# Patient Record
Sex: FEMALE | Race: WHITE | Employment: OTHER | URBAN - METROPOLITAN AREA
[De-identification: names, ages, dates, MRNs, and addresses within clinical notes are randomized per-mention and may not be internally consistent; named-entity substitution may affect disease eponyms.]

---

## 2017-01-15 ENCOUNTER — DOCUMENTATION ONLY (OUTPATIENT)
Dept: OTHER | Facility: CLINIC | Age: 82
End: 2017-01-15

## 2017-01-15 DIAGNOSIS — Z71.89 ADVANCE CARE PLANNING: Primary | Chronic | ICD-10-CM

## 2019-03-12 ENCOUNTER — HOSPITAL ENCOUNTER (INPATIENT)
Facility: CLINIC | Age: 84
LOS: 3 days | Discharge: NURSING, REHAB OR LONG-TERM CARE | DRG: 556 | End: 2019-03-16
Attending: EMERGENCY MEDICINE | Admitting: INTERNAL MEDICINE
Payer: COMMERCIAL

## 2019-03-12 ENCOUNTER — APPOINTMENT (OUTPATIENT)
Dept: GENERAL RADIOLOGY | Facility: CLINIC | Age: 84
DRG: 556 | End: 2019-03-12
Attending: EMERGENCY MEDICINE
Payer: COMMERCIAL

## 2019-03-12 ENCOUNTER — APPOINTMENT (OUTPATIENT)
Dept: GENERAL RADIOLOGY | Facility: CLINIC | Age: 84
DRG: 556 | End: 2019-03-12
Attending: INTERNAL MEDICINE
Payer: COMMERCIAL

## 2019-03-12 ENCOUNTER — APPOINTMENT (OUTPATIENT)
Dept: CT IMAGING | Facility: CLINIC | Age: 84
DRG: 556 | End: 2019-03-12
Attending: EMERGENCY MEDICINE
Payer: COMMERCIAL

## 2019-03-12 DIAGNOSIS — K59.00 CONSTIPATION, UNSPECIFIED CONSTIPATION TYPE: Primary | ICD-10-CM

## 2019-03-12 DIAGNOSIS — M25.562 ACUTE PAIN OF LEFT KNEE: ICD-10-CM
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
## 2019-03-12 DIAGNOSIS — R33.9 URINARY RETENTION: ICD-10-CM

## 2019-03-12 PROBLEM — M79.605 LEFT LEG PAIN: Status: ACTIVE | Noted: 2019-03-12

## 2019-03-12 LAB
ANION GAP SERPL CALCULATED.3IONS-SCNC: 9 MMOL/L (ref 3–14)
BASOPHILS # BLD AUTO: 0 10E9/L (ref 0–0.2)
BASOPHILS NFR BLD AUTO: 0.4 %
BUN SERPL-MCNC: 31 MG/DL (ref 7–30)
CALCIUM SERPL-MCNC: 10 MG/DL (ref 8.5–10.1)
CHLORIDE SERPL-SCNC: 102 MMOL/L (ref 94–109)
CO2 SERPL-SCNC: 29 MMOL/L (ref 20–32)
CREAT SERPL-MCNC: 1.18 MG/DL (ref 0.52–1.04)
DIFFERENTIAL METHOD BLD: NORMAL
EOSINOPHIL # BLD AUTO: 0 10E9/L (ref 0–0.7)
EOSINOPHIL NFR BLD AUTO: 0.1 %
ERYTHROCYTE [DISTWIDTH] IN BLOOD BY AUTOMATED COUNT: 12.5 % (ref 10–15)
GFR SERPL CREATININE-BSD FRML MDRD: 43 ML/MIN/{1.73_M2}
GLUCOSE SERPL-MCNC: 134 MG/DL (ref 70–99)
HCT VFR BLD AUTO: 45.1 % (ref 35–47)
HGB BLD-MCNC: 14.9 G/DL (ref 11.7–15.7)
IMM GRANULOCYTES # BLD: 0 10E9/L (ref 0–0.4)
IMM GRANULOCYTES NFR BLD: 0.4 %
INR PPP: 2.19 (ref 0.86–1.14)
LYMPHOCYTES # BLD AUTO: 1 10E9/L (ref 0.8–5.3)
LYMPHOCYTES NFR BLD AUTO: 10.1 %
MCH RBC QN AUTO: 31 PG (ref 26.5–33)
MCHC RBC AUTO-ENTMCNC: 33 G/DL (ref 31.5–36.5)
MCV RBC AUTO: 94 FL (ref 78–100)
MONOCYTES # BLD AUTO: 0.6 10E9/L (ref 0–1.3)
MONOCYTES NFR BLD AUTO: 5.9 %
NEUTROPHILS # BLD AUTO: 8.3 10E9/L (ref 1.6–8.3)
NEUTROPHILS NFR BLD AUTO: 83.1 %
NRBC # BLD AUTO: 0 10*3/UL
NRBC BLD AUTO-RTO: 0 /100
PLATELET # BLD AUTO: 187 10E9/L (ref 150–450)
POTASSIUM SERPL-SCNC: 3.9 MMOL/L (ref 3.4–5.3)
RBC # BLD AUTO: 4.8 10E12/L (ref 3.8–5.2)
SODIUM SERPL-SCNC: 140 MMOL/L (ref 133–144)
TROPONIN I SERPL-MCNC: <0.015 UG/L (ref 0–0.04)
WBC # BLD AUTO: 10 10E9/L (ref 4–11)

## 2019-03-12 PROCEDURE — 73590 X-RAY EXAM OF LOWER LEG: CPT | Mod: LT

## 2019-03-12 PROCEDURE — 73502 X-RAY EXAM HIP UNI 2-3 VIEWS: CPT

## 2019-03-12 PROCEDURE — 85610 PROTHROMBIN TIME: CPT | Performed by: EMERGENCY MEDICINE

## 2019-03-12 PROCEDURE — 93005 ELECTROCARDIOGRAM TRACING: CPT

## 2019-03-12 PROCEDURE — 99219 ZZC INITIAL OBSERVATION CARE,LEVL II: CPT | Performed by: INTERNAL MEDICINE

## 2019-03-12 PROCEDURE — G0378 HOSPITAL OBSERVATION PER HR: HCPCS

## 2019-03-12 PROCEDURE — 71045 X-RAY EXAM CHEST 1 VIEW: CPT

## 2019-03-12 PROCEDURE — 25800030 ZZH RX IP 258 OP 636: Performed by: INTERNAL MEDICINE

## 2019-03-12 PROCEDURE — 99207 ZZC CDG-MDM COMPONENT: MEETS LOW - DOWN CODED: CPT | Performed by: INTERNAL MEDICINE

## 2019-03-12 PROCEDURE — 25000132 ZZH RX MED GY IP 250 OP 250 PS 637: Performed by: INTERNAL MEDICINE

## 2019-03-12 PROCEDURE — 70450 CT HEAD/BRAIN W/O DYE: CPT

## 2019-03-12 PROCEDURE — 80048 BASIC METABOLIC PNL TOTAL CA: CPT | Performed by: EMERGENCY MEDICINE

## 2019-03-12 PROCEDURE — 73552 X-RAY EXAM OF FEMUR 2/>: CPT | Mod: LT

## 2019-03-12 PROCEDURE — 99285 EMERGENCY DEPT VISIT HI MDM: CPT | Mod: 25

## 2019-03-12 PROCEDURE — 73560 X-RAY EXAM OF KNEE 1 OR 2: CPT | Mod: LT

## 2019-03-12 PROCEDURE — 25000128 H RX IP 250 OP 636: Performed by: EMERGENCY MEDICINE

## 2019-03-12 PROCEDURE — 84484 ASSAY OF TROPONIN QUANT: CPT | Performed by: EMERGENCY MEDICINE

## 2019-03-12 PROCEDURE — 96361 HYDRATE IV INFUSION ADD-ON: CPT

## 2019-03-12 PROCEDURE — 85025 COMPLETE CBC W/AUTO DIFF WBC: CPT | Performed by: EMERGENCY MEDICINE

## 2019-03-12 PROCEDURE — 96374 THER/PROPH/DIAG INJ IV PUSH: CPT

## 2019-03-12 RX ORDER — BISACODYL 10 MG
10 SUPPOSITORY, RECTAL RECTAL DAILY PRN
Status: DISCONTINUED | OUTPATIENT
Start: 2019-03-12 | End: 2019-03-16 | Stop reason: HOSPADM

## 2019-03-12 RX ORDER — PROCHLORPERAZINE 25 MG
12.5 SUPPOSITORY, RECTAL RECTAL EVERY 12 HOURS PRN
Status: DISCONTINUED | OUTPATIENT
Start: 2019-03-12 | End: 2019-03-16 | Stop reason: HOSPADM

## 2019-03-12 RX ORDER — AMOXICILLIN 250 MG
1 CAPSULE ORAL 2 TIMES DAILY PRN
Status: DISCONTINUED | OUTPATIENT
Start: 2019-03-12 | End: 2019-03-16 | Stop reason: HOSPADM

## 2019-03-12 RX ORDER — NALOXONE HYDROCHLORIDE 0.4 MG/ML
.1-.4 INJECTION, SOLUTION INTRAMUSCULAR; INTRAVENOUS; SUBCUTANEOUS
Status: DISCONTINUED | OUTPATIENT
Start: 2019-03-12 | End: 2019-03-16 | Stop reason: HOSPADM

## 2019-03-12 RX ORDER — SODIUM CHLORIDE 9 MG/ML
INJECTION, SOLUTION INTRAVENOUS CONTINUOUS
Status: DISCONTINUED | OUTPATIENT
Start: 2019-03-12 | End: 2019-03-13

## 2019-03-12 RX ORDER — LIDOCAINE 40 MG/G
CREAM TOPICAL
Status: DISCONTINUED | OUTPATIENT
Start: 2019-03-12 | End: 2019-03-16 | Stop reason: HOSPADM

## 2019-03-12 RX ORDER — NITROGLYCERIN 0.4 MG/1
0.4 TABLET SUBLINGUAL EVERY 5 MIN PRN
Status: DISCONTINUED | OUTPATIENT
Start: 2019-03-12 | End: 2019-03-16 | Stop reason: HOSPADM

## 2019-03-12 RX ORDER — ACETAMINOPHEN 325 MG/1
650 TABLET ORAL EVERY 4 HOURS PRN
Status: DISCONTINUED | OUTPATIENT
Start: 2019-03-12 | End: 2019-03-13

## 2019-03-12 RX ORDER — ONDANSETRON 4 MG/1
4 TABLET, ORALLY DISINTEGRATING ORAL EVERY 6 HOURS PRN
Status: DISCONTINUED | OUTPATIENT
Start: 2019-03-12 | End: 2019-03-16 | Stop reason: HOSPADM

## 2019-03-12 RX ORDER — PROCHLORPERAZINE MALEATE 5 MG
5 TABLET ORAL EVERY 6 HOURS PRN
Status: DISCONTINUED | OUTPATIENT
Start: 2019-03-12 | End: 2019-03-16 | Stop reason: HOSPADM

## 2019-03-12 RX ORDER — POLYETHYLENE GLYCOL 3350 17 G/17G
17 POWDER, FOR SOLUTION ORAL DAILY PRN
Status: DISCONTINUED | OUTPATIENT
Start: 2019-03-12 | End: 2019-03-16 | Stop reason: HOSPADM

## 2019-03-12 RX ORDER — MORPHINE SULFATE 4 MG/ML
4 INJECTION, SOLUTION INTRAMUSCULAR; INTRAVENOUS ONCE
Status: COMPLETED | OUTPATIENT
Start: 2019-03-12 | End: 2019-03-12

## 2019-03-12 RX ORDER — ONDANSETRON 2 MG/ML
4 INJECTION INTRAMUSCULAR; INTRAVENOUS EVERY 6 HOURS PRN
Status: DISCONTINUED | OUTPATIENT
Start: 2019-03-12 | End: 2019-03-16 | Stop reason: HOSPADM

## 2019-03-12 RX ORDER — ACETAMINOPHEN 650 MG/1
650 SUPPOSITORY RECTAL EVERY 4 HOURS PRN
Status: DISCONTINUED | OUTPATIENT
Start: 2019-03-12 | End: 2019-03-13

## 2019-03-12 RX ORDER — AMOXICILLIN 250 MG
2 CAPSULE ORAL 2 TIMES DAILY PRN
Status: DISCONTINUED | OUTPATIENT
Start: 2019-03-12 | End: 2019-03-16 | Stop reason: HOSPADM

## 2019-03-12 RX ADMIN — OXYCODONE HYDROCHLORIDE 2.5 MG: 5 TABLET ORAL at 22:46

## 2019-03-12 RX ADMIN — MORPHINE SULFATE 4 MG: 4 INJECTION INTRAVENOUS at 20:19

## 2019-03-12 RX ADMIN — SODIUM CHLORIDE: 9 INJECTION, SOLUTION INTRAVENOUS at 22:47

## 2019-03-12 ASSESSMENT — MIFFLIN-ST. JEOR: SCORE: 1075.96

## 2019-03-12 NOTE — ED PROVIDER NOTES
History     Chief Complaint:  Fall      HPI   Liberty Horta is a 84 year old female with a history of atrial fibrillation on warfarin who presents via EMS from her independent living facility after a fall. The patient had been complaining of right thigh pain for the last few days. Today, the patient states that she got up to get a drink of water before going for a walk when she became generally weak and fell. The patient states that she did not hit her head. Since the fall, the patient has had posterior left knee pain. She has never had left knee pain previously. EMS did provide the patient with 100 mcg of Fentanyl. Of note, influenza has been going around her living facility, but pt denies cough and fever.       Allergies:  Darvocet  Demerol     Medications:    Tylenol   Albuterol   Atorvastatin calcium   Dulcolax  Diltiazem  Lasix   Glucosamine Sulfate   Levothyroxine Sodium   Claritin   Meprobamate   Psyllium   Senokot  Ultram   Sotalol   Warfarin Sodium      Past Medical History:    Basal cell carcinoma   Chronic atrial fibrillation   DVT  Esophageal reflux   Hypertension   Hypothyroid   Osteoarthritis   Pacemaker   Rental insufficiency     Past Surgical History:    Implant pacemaker   Open reduction internal fixation right femur   Left knee replacement     Family History:    History reviewed. No pertinent family history.     Social History:  Marital Status:   Presents to the ED via EMS with her family  Tobacco Use: Never Used  Alcohol Use: No  PCP: JENNIFER ROJAS      Review of Systems   Constitutional: Negative for fever.   Respiratory: Negative for cough and shortness of breath.    Cardiovascular: Negative for chest pain.   Genitourinary: Negative for dysuria.   Musculoskeletal:        Positive for left knee pain.    Neurological: Positive for weakness. Negative for numbness.   All other systems reviewed and are negative.      Physical Exam   First Vitals:  BP: 150/90  Pulse: 60  Heart  Rate: 66  Temp: 97.7  F (36.5  C)  Resp: 18  SpO2: 100 %      Physical Exam    Nursing note and vitals reviewed.  Constitutional: Cooperative. Sleepy 2/2 narcotic use by EMS.  HENT:   Mouth/Throat: Moist mucous membranes.   Eyes: EOMI, nonicteric sclera  Cardiovascular: Normal rate, irregular rhythm, no murmurs, rubs, or gallops  Pulmonary/Chest: Effort normal and breath sounds normal. No respiratory distress. No wheezes. No rales.   Abdominal: Soft. Nontender, nondistended, no guarding or rigidity. BS present.   Musculoskeletal: RLE. Normal ROM hip, knee, ankle without pain to palpation or with ROM.   LLE. Pain to palpation distal femur, knee, and proximal tib/fib. No pain at pelvis/ankle. Pain with minimal ROM.   Neurological: Alert. Moves all extremities spontaneously. Normal/equal sensation BLE. Able to wiggle all toes and DF/PF feet.   Skin: Skin is warm and dry. No rash noted.   Psychiatric: Normal mood and affect.      Emergency Department Course   ECG:  @ 1758  Indication: Fall  Vent. Rate 67 bpm. IA interval 172 ms. QRS duration 92 ms. QT/QTc 430/454 ms. P-R-T axis 13 14 -34.   Normal sinus rhythm. Minimal voltage criteria for LVH, may be normal variant. Nonspecific ST changes.  Abnormal ECG.  No significant change when compared to previous ECG from 11/19/16   Read @ 1825 by Dr. Lopez.     Imaging:  Head CT without contrast:   No bleed or fractures are identified.  Report per radiology.   Imaging independently reviewed and agree with radiologist interpretation.      Knee x-ray, left 1/2 views:   No acute fracture is seen. Left total knee arthroplasty  appears unremarkable.  Report per radiology.   Imaging independently reviewed and agree with radiologist interpretation.      Pelvis and Hip Left  X-rays 2 views:   No acute fracture is seen. Both hips appear located. Pubic  symphysis degenerative change suggested.  Report per radiology.   Imaging independently reviewed and agree with radiologist  interpretation.      Left femur x-ray, 2 views:   No fractures are identified on these images.  Report per radiology.   Imaging independently reviewed and agree with radiologist interpretation.      Tibia and fibula, left x-ray, 2 views:   No fractures are identified on these views.  Report per radiology.   Imaging independently reviewed and agree with radiologist interpretation.    Radiographic findings were communicated with the patient who voiced understanding of the findings.    Laboratory:  (1817) Troponin I: <0.015  BMP: Glucose 134 (H), BUN 31 (H), Creatinine 1.18 (H), GFR 43 (L), otherwise WNL    CBC:  WBC 10.0, HGB 14.9, , otherwise WNL     Interventions:  (2019) Morphine, 4 mg, IV    Emergency Department Course:  Nursing notes and vitals reviewed.  (1832) I performed an exam of the patient as documented above.    EKG was done, interpretation as above.   A peripheral IV was established. Blood was drawn from the patient. This was sent for laboratory testing, findings above.     The patient was sent for a x-rays while in the emergency department, findings above.    Findings and plan explained to the patient who consents to admission.   (2123) I discussed the patient with Dr. Haji of the hospitalist service, who will admit the patient to an observation bed for further monitoring, evaluation, and treatment.     Impression & Plan    Medical Decision Making:  Pt presents following fall at home. Pt was walking with walker at the time and felt weak. Given anticoagulation, Head CT indicated which is fortunately negative. Pt's RLE exam is normal, however after her fall, she's had significant LLE pain requiring 100mcg fentanyl by EMS and further pain control here. Uncertain etiology of this pain as pt has significant pain with minor ROM but imaging negaitve. She neurovascularly intact distally making vascular injury less likely. Doubt dislocation/relocation. Given pt is unable to walk, she is unsafe for  discharge home therefore admission requested. Dr. Haji accepts for admission. All familiy's questions answered. UA is pending at time of admission.       Diagnosis:    ICD-10-CM    1. Fall, initial encounter W19.XXXA    2. Acute pain of left knee M25.562        Disposition:  Admitted to Dr. Adithya PECK, Quynh Law, am serving as a scribe on 3/12/2019 at 6:32 PM to personally document services performed by Dr. Lopez based on my observations and the provider's statements to me.    3/12/2019   [facility] EMERGENCY DEPARTMENT       Vinay Lopez MD  03/13/19 0244

## 2019-03-12 NOTE — ED TRIAGE NOTES
Pt arrives via EMS from independent living d/t fall. Pt states she felt dizzy so she lowered herself to floor. Pt too weak to get back up. Pt yelling for help while on floor for approx 15-20 mins. No LOC. C/o L knee pain. CMS intact. No CP or SOB. Pt on warfarin for afib. Given 100 mcg fentanyl by EMS. . ABC intact. A&O x4.

## 2019-03-12 NOTE — ED NOTES
Bed: ED33  Expected date: 3/12/19  Expected time: 5:32 PM  Means of arrival: Ambulance  Comments:  BV4

## 2019-03-13 ENCOUNTER — APPOINTMENT (OUTPATIENT)
Dept: PHYSICAL THERAPY | Facility: CLINIC | Age: 84
DRG: 556 | End: 2019-03-13
Attending: INTERNAL MEDICINE
Payer: COMMERCIAL

## 2019-03-13 ENCOUNTER — APPOINTMENT (OUTPATIENT)
Dept: CT IMAGING | Facility: CLINIC | Age: 84
DRG: 556 | End: 2019-03-13
Attending: PHYSICIAN ASSISTANT
Payer: COMMERCIAL

## 2019-03-13 PROBLEM — S83.105A ACUTE TRAUMATIC INTERNAL DERANGEMENT OF LEFT KNEE, INITIAL ENCOUNTER: Status: ACTIVE | Noted: 2019-03-13

## 2019-03-13 LAB
ANION GAP SERPL CALCULATED.3IONS-SCNC: 7 MMOL/L (ref 3–14)
BUN SERPL-MCNC: 26 MG/DL (ref 7–30)
CALCIUM SERPL-MCNC: 9.3 MG/DL (ref 8.5–10.1)
CHLORIDE SERPL-SCNC: 108 MMOL/L (ref 94–109)
CO2 SERPL-SCNC: 26 MMOL/L (ref 20–32)
CREAT SERPL-MCNC: 0.93 MG/DL (ref 0.52–1.04)
FLUAV+FLUBV AG SPEC QL: NEGATIVE
FLUAV+FLUBV AG SPEC QL: NEGATIVE
GFR SERPL CREATININE-BSD FRML MDRD: 57 ML/MIN/{1.73_M2}
GLUCOSE SERPL-MCNC: 103 MG/DL (ref 70–99)
INR PPP: 2.2 (ref 0.86–1.14)
INTERPRETATION ECG - MUSE: NORMAL
POTASSIUM SERPL-SCNC: 3.5 MMOL/L (ref 3.4–5.3)
SODIUM SERPL-SCNC: 141 MMOL/L (ref 133–144)
SPECIMEN SOURCE: NORMAL

## 2019-03-13 PROCEDURE — 93010 ELECTROCARDIOGRAM REPORT: CPT | Performed by: INTERNAL MEDICINE

## 2019-03-13 PROCEDURE — 96361 HYDRATE IV INFUSION ADD-ON: CPT

## 2019-03-13 PROCEDURE — 97530 THERAPEUTIC ACTIVITIES: CPT | Mod: GP

## 2019-03-13 PROCEDURE — 40000275 ZZH STATISTIC RCP TIME EA 10 MIN

## 2019-03-13 PROCEDURE — 25800030 ZZH RX IP 258 OP 636: Performed by: PHYSICIAN ASSISTANT

## 2019-03-13 PROCEDURE — 25000132 ZZH RX MED GY IP 250 OP 250 PS 637: Performed by: PHYSICIAN ASSISTANT

## 2019-03-13 PROCEDURE — G0378 HOSPITAL OBSERVATION PER HR: HCPCS

## 2019-03-13 PROCEDURE — 73700 CT LOWER EXTREMITY W/O DYE: CPT | Mod: LT

## 2019-03-13 PROCEDURE — 25800030 ZZH RX IP 258 OP 636: Performed by: INTERNAL MEDICINE

## 2019-03-13 PROCEDURE — 80048 BASIC METABOLIC PNL TOTAL CA: CPT | Performed by: INTERNAL MEDICINE

## 2019-03-13 PROCEDURE — 93005 ELECTROCARDIOGRAM TRACING: CPT

## 2019-03-13 PROCEDURE — 87804 INFLUENZA ASSAY W/OPTIC: CPT | Performed by: INTERNAL MEDICINE

## 2019-03-13 PROCEDURE — 85610 PROTHROMBIN TIME: CPT | Performed by: INTERNAL MEDICINE

## 2019-03-13 PROCEDURE — 12000011 ZZH R&B MS OVERFLOW

## 2019-03-13 PROCEDURE — 97161 PT EVAL LOW COMPLEX 20 MIN: CPT | Mod: GP

## 2019-03-13 PROCEDURE — 36415 COLL VENOUS BLD VENIPUNCTURE: CPT | Performed by: INTERNAL MEDICINE

## 2019-03-13 PROCEDURE — 99207 ZZC CDG-MDM COMPONENT: MEETS LOW - DOWN CODED: CPT | Performed by: PHYSICIAN ASSISTANT

## 2019-03-13 PROCEDURE — 99232 SBSQ HOSP IP/OBS MODERATE 35: CPT | Performed by: PHYSICIAN ASSISTANT

## 2019-03-13 PROCEDURE — 25000132 ZZH RX MED GY IP 250 OP 250 PS 637: Performed by: INTERNAL MEDICINE

## 2019-03-13 PROCEDURE — 72192 CT PELVIS W/O DYE: CPT

## 2019-03-13 RX ORDER — WARFARIN SODIUM 2.5 MG/1
2.5 TABLET ORAL DAILY
COMMUNITY
End: 2021-01-01

## 2019-03-13 RX ORDER — WARFARIN SODIUM 3 MG/1
6 TABLET ORAL
Status: COMPLETED | OUTPATIENT
Start: 2019-03-13 | End: 2019-03-13

## 2019-03-13 RX ORDER — FUROSEMIDE 40 MG
40 TABLET ORAL DAILY
COMMUNITY
Start: 2020-02-21 | End: 2020-02-11

## 2019-03-13 RX ORDER — ACETAMINOPHEN 325 MG/1
650 TABLET ORAL EVERY 6 HOURS PRN
Status: ON HOLD | COMMUNITY
End: 2019-03-16

## 2019-03-13 RX ORDER — ACETAMINOPHEN 500 MG
1000 TABLET ORAL EVERY 8 HOURS
Status: DISCONTINUED | OUTPATIENT
Start: 2019-03-13 | End: 2019-03-16 | Stop reason: HOSPADM

## 2019-03-13 RX ORDER — DILTIAZEM HYDROCHLORIDE 180 MG/1
180 CAPSULE, COATED, EXTENDED RELEASE ORAL DAILY
Status: DISCONTINUED | OUTPATIENT
Start: 2019-03-13 | End: 2019-03-16 | Stop reason: HOSPADM

## 2019-03-13 RX ORDER — TAMSULOSIN HYDROCHLORIDE 0.4 MG/1
0.4 CAPSULE ORAL DAILY
Status: DISCONTINUED | OUTPATIENT
Start: 2019-03-13 | End: 2019-03-16 | Stop reason: HOSPADM

## 2019-03-13 RX ORDER — LEVOTHYROXINE SODIUM 150 UG/1
150 TABLET ORAL DAILY
Status: DISCONTINUED | OUTPATIENT
Start: 2019-03-13 | End: 2019-03-16 | Stop reason: HOSPADM

## 2019-03-13 RX ORDER — SODIUM CHLORIDE 9 MG/ML
INJECTION, SOLUTION INTRAVENOUS CONTINUOUS
Status: ACTIVE | OUTPATIENT
Start: 2019-03-13 | End: 2019-03-14

## 2019-03-13 RX ORDER — FUROSEMIDE 40 MG
40 TABLET ORAL DAILY
Status: DISCONTINUED | OUTPATIENT
Start: 2019-03-13 | End: 2019-03-16 | Stop reason: HOSPADM

## 2019-03-13 RX ORDER — SOTALOL HYDROCHLORIDE 80 MG/1
80 TABLET ORAL 2 TIMES DAILY
Status: DISCONTINUED | OUTPATIENT
Start: 2019-03-13 | End: 2019-03-16 | Stop reason: HOSPADM

## 2019-03-13 RX ADMIN — SODIUM CHLORIDE: 9 INJECTION, SOLUTION INTRAVENOUS at 20:52

## 2019-03-13 RX ADMIN — DILTIAZEM HYDROCHLORIDE 180 MG: 180 CAPSULE, COATED, EXTENDED RELEASE ORAL at 18:54

## 2019-03-13 RX ADMIN — ACETAMINOPHEN 650 MG: 325 TABLET, FILM COATED ORAL at 12:39

## 2019-03-13 RX ADMIN — SODIUM CHLORIDE: 9 INJECTION, SOLUTION INTRAVENOUS at 09:13

## 2019-03-13 RX ADMIN — TAMSULOSIN HYDROCHLORIDE 0.4 MG: 0.4 CAPSULE ORAL at 14:57

## 2019-03-13 RX ADMIN — SODIUM CHLORIDE: 9 INJECTION, SOLUTION INTRAVENOUS at 14:57

## 2019-03-13 RX ADMIN — SOTALOL HYDROCHLORIDE 80 MG: 80 TABLET ORAL at 18:53

## 2019-03-13 RX ADMIN — LEVOTHYROXINE SODIUM 150 MCG: 150 TABLET ORAL at 18:55

## 2019-03-13 RX ADMIN — WARFARIN SODIUM 6 MG: 3 TABLET ORAL at 18:55

## 2019-03-13 RX ADMIN — FUROSEMIDE 40 MG: 40 TABLET ORAL at 18:54

## 2019-03-13 RX ADMIN — ACETAMINOPHEN 1000 MG: 500 TABLET, FILM COATED ORAL at 19:41

## 2019-03-13 ASSESSMENT — ACTIVITIES OF DAILY LIVING (ADL)
COGNITION: 0 - NO COGNITION ISSUES REPORTED
SWALLOWING: 0-->SWALLOWS FOODS/LIQUIDS WITHOUT DIFFICULTY
RETIRED_COMMUNICATION: 0-->UNDERSTANDS/COMMUNICATES WITHOUT DIFFICULTY
BATHING: 2-->ASSISTIVE PERSON
TRANSFERRING: 4-->COMPLETELY DEPENDENT
AMBULATION: 4-->COMPLETELY DEPENDENT
NUMBER_OF_TIMES_PATIENT_HAS_FALLEN_WITHIN_LAST_SIX_MONTHS: 1
RETIRED_EATING: 0-->INDEPENDENT
TOILETING: 4-->COMPLETELY DEPENDENT
DRESS: 2-->ASSISTIVE PERSON
FALL_HISTORY_WITHIN_LAST_SIX_MONTHS: YES

## 2019-03-13 ASSESSMENT — ENCOUNTER SYMPTOMS
COUGH: 0
DYSURIA: 0
NUMBNESS: 0
WEAKNESS: 1
SHORTNESS OF BREATH: 0
FEVER: 0

## 2019-03-13 NOTE — PROGRESS NOTES
"[facility]    Medicine Progress Note - Hospitalist Service       Date of Admission:  3/12/2019  Assessment & Plan   Liberty Horta is a 84 year old female with a PMH significant for hypothyroidism, chronic atrial fibrillation on coumadin, history of stress cardiomyopathy (EF 35% in 2010 w/ normal heart cath at that time, EF improved to 50-55% on echo 10/2016), history of ?SSS after anesthesia, GERD, hypertension, and osteoarthritis who presented to the hospital on 3/12/19 with  left knee pain secondary to an episode earlier that day when she quickly lowered herself to the floor when she felt lightheaded standing up to walk across the room. She felt like her legs were in an awkward position when she lowered herself down, stating that her left knee felt like it \"bent backwards,\" but she denies a true fall or striking her head or losing consciousness. She continues to have exquisite knee tenderness with passive or active ROM and her hospital stay has been complicated by urinary retention with difficult catheter placement, so now also has a Lieberman in place.    1.  Left lower extremity pain, primarily left knee  No fractures on extensive left lower extremity XR imaging.  She has a prior left TKA. She was able to sit up and dangle her legs over the side of the bed the morning after admission but was unable to bear any weight on the LLE due to pain. She had a difficult time characterizing the primary spot of pain, but on exam she has the most difficult time with any passive or active ROM of her left knee. Her left leg rests in the valgus position in bed, which is not normal for her. She denies much pain at rest but has obvious and activity-limiting pain with even just passively lifting her lower extremity off the bed. Pain management has been difficult for her in the past as she is very sensitive to sedative effect of narcotics. Given her exquisite pain on exam, believe that further imaging is necessary " to sufficiently evaluate for missed fracture or other acute abnormality limiting her mobility. Will also request orthopedic recommendations regarding further work up or possible treatment.  -CT of left knee, hips and pelvis (cannot perform MRI d/t implanted pacemaker)  -Schedule tylenol 1000 mg Q8H, will replace prn oxycodone with prn ultram as she has not tolerated oxycodone well in the past  -Orthopedic consult given her inability to tolerate bearing weight on LLE due to left knee pain     2.  Generalized weakness with episode of lightheadedness  Patient's independent living has been on room quarantine precautions for influenza. The patient typically ambulates multiple times daily in the hallways at her facility, but has unable to due this because of the quarantine; instead she has been very sedentary, essentially sitting in her chair most of the day for the past 5 days prior to admission. Per patient's son, she historically deconditions very easily if she does not remain active and ambulatory. He visited her earlier in the day prior to her admission and she seemed very weak and fatigued. She denies fever, chills, myalgias, cough, wheezing, SOB, nausea, vomiting, abdominal pain, or diarrhea. Infectious work up on admission including influenza testing, CXR and UA were unremarkable. Afebrile and hemodynamically stable during admission thus far, so less likely that infection is contributing to current condition. She reports that her lightheadedness has resolved. She has required frequent encouraging from family and staff to keep up with PO intake so will continue IV hydration tonight.  -Continue IVF  ml/hr x 15 hours overnight as she appears mildly dehydrated (EF on echo 10/2016 was 50-55%)  -Obtain orthostatics tomorrow AM (was unable to obtain today due to fatigue and inability to bear weight on LLE d/t pain)  -Interrogate pacer given episode of lightheadedness/dizziness resulting in injury  -PT evaluated  patient today and recommended TCU     3.  Acute urinary retention  Bladder scanned for 680 ml today, straight cath attempted multiple times with difficulty. Lieberman was placed and will remain for now given repetitive cath attempts. She appears very worn out after Lieberman catheter placement today as it was quite difficult. She has historically had Lieberman catheters placed in the setting of post-op urinary retention.  -Start PO flomax 0.4 mg daily  -Reassess timing of Lieberman removal tomorrow AM     4.  Chronic atrial fibrillation w/ pacer in place for hx of AVB w/ cardiac pauses  Rate has been controlled here.  -Interrogate pacer for further work up of #2  -Continue warfarin with pharmacy to dose  -Continue PTA diltiazem and sotalol  -Continue to monitor on telemetry.     5.  Hypothyroidism  -Continue PTA levothyroxine.     6.  Hyperlipidemia  -Continue PTA atorvastatin.    Diet: Regular Diet Adult    DVT Prophylaxis: Warfarin  Lieberman Catheter: in place, indication: Retention  Code Status: Full Code      Disposition Plan   Expected discharge: 2 - 3 days, recommended to transitional care unit once adequate pain management/ tolerating PO medications, safe disposition plan/ TCU bed available and Orthopedic consultation complete.  Entered: Marjorie Goodwin PA-C 03/13/2019, 6:42 PM       The patient's care was discussed with the Patient and Patient's Family.    Marjorie Goodwin PA-C  Hospitalist Service  [facility]    ______________________________________________________________________    Interval History   Ms. Horta reports feeling okay when left lower extremity is still, but has significant pain with passive range of motion and unable to left left leg off of bed herself due to left knee pain. She is very fatigued today following catheter placement. She denies fever, chills, myalgias, cough, sob, wheezing, nausea, vomiting, abdominal pain, or diarrhea. She is a retired nurse and is very pleasant with a sweet,  supportive family.    Data reviewed today: I reviewed all medications, new labs and imaging results over the last 24 hours. I personally reviewed  Results for orders placed or performed during the hospital encounter of 03/12/19 (from the past 24 hour(s))   XR Knee Left 1/2 Views    Narrative    KNEE LEFT ONE TO TWO VIEWS    3/12/2019 8:18 PM     HISTORY: Fall, left knee pain    COMPARISON: None.      Impression    IMPRESSION: No acute fracture is seen. Left total knee arthroplasty  appears unremarkable.    SHYANN PETERSEN MD   XR Pelvis and Hip Left 2 Views    Narrative    PELVIS AND HIP LEFT TWO VIEWS   3/12/2019 8:18 PM     HISTORY: Fall, left hip pain    COMPARISON: 10/4/2016.      Impression    IMPRESSION: No acute fracture is seen. Both hips appear located. Pubic  symphysis degenerative change suggested.    SHYANN PETERSEN MD   XR Femur Left 2 Views    Narrative    FEMUR TWO VIEWS LEFT  3/12/2019 9:48 PM     HISTORY: fall, left leg pain    COMPARISON: None.    FINDINGS: There is normal osseous alignment.  No fractures are  identified.      Impression    IMPRESSION: No fractures are identified on these images.    NIKKI MONET MD   XR Tibia & Fibula Left 2 Views    Narrative    TIBIA FIBULA LEFT  TWO-THREE  VIEWS 3/12/2019 9:48 PM     HISTORY: fall, left proximal leg pain    COMPARISON: None.    FINDINGS: There is normal osseous alignment.  No fractures are  identified. Diffuse osteopenia. A total knee arthroplasty is in place.      Impression    IMPRESSION: No fractures are identified on these views.    NIKKI MONET MD   XR Chest Port 1 View    Narrative    XR CHEST PORT 1 VW  3/12/2019 11:35 PM     HISTORY: Weakness.    COMPARISON: 11/17/2010.    FINDINGS: Semiupright portable chest. Left subclavian cardiac device  in place. No pneumothorax. The heart is at the upper limits of normal  in size. There is no pulmonary edema. The thoracic aorta is calcified.  The lungs are clear. Old deformity of the right humeral  head.  Degenerative disease in the thoracic spine.      Impression    IMPRESSION: No acute abnormality.    JODEE CARROLL MD   Influenza A/B antigen   Result Value Ref Range    Influenza A/B Agn Specimen Nasopharyngeal     Influenza A Negative NEG^Negative    Influenza B Negative NEG^Negative   Social Work IP Consult    Narrative    Jeni Loera BSW     3/13/2019  3:12 PM  Care Transition Initial Assessment - SW     Met with: Patient, son Michele  Active Problems:    Left leg pain       DATA  Lives With: facility resident   Living Arrangements: independent living facility. [facility]  Quality of Family Relationships: helpful, involved, supportive  Description of Support System: Supportive, Involved  Who is your support system?: Children  Support Assessment: Adequate family and caregiver support,   Adequate social supports.   Identified issues/concerns regarding health management: Pt   resides at the [facility]. Pt has paid staffing from outside   agencies [facility] and [facility] to assist with   dressing/grooming daily, and bathing 2 days/week. Pt receives   meals, housekeeping, and a staff pendant from the facility. PT   evaluated and recommended TCU. Facility preference is [facility], who   has a contract with [facility]. Private room preferred. SW discussed   private pay if [employer] doesn't provide auth, $8592-8125 upfront   with daily cost of $300-400. Son reported that this would be an   option if needed, understanding that pt cannot return to [facility] in   her current physical condition. Referral send to [facility] for   review--facility anticipates a bed available for pt. Son   requesting HE WC transport at discharge. Reviewed out of pocket   cost for [facility] transport, $75 for base rate and $5 per   mile to the destination. Pt/family expressed understanding and   are agreeable to this.           Quality of Family Relationships: helpful, involved, supportive  Transportation Anticipated: family or friend will  provide    ASSESSMENT  Cognitive Status: Sleepy. Pt was sleeping for the remainder of   the assessment.   Concerns to be addressed: Discharge planning. TCU referral   sent--[employer] insurance requiring prior auth     PLAN  Financial costs for the patient includes: $45 private room fee at   TCU, WC transport.  Patient given options and choices for discharge Yes.  Patient/family is agreeable to the plan?  Yes.  [facility]  Patient anticipates discharging to:  TCU-TBD. Referral sent to   [facility] for review. SW to continue following for discharge   planning. HE WC transport at discharge.          Basic metabolic panel   Result Value Ref Range    Sodium 141 133 - 144 mmol/L    Potassium 3.5 3.4 - 5.3 mmol/L    Chloride 108 94 - 109 mmol/L    Carbon Dioxide 26 20 - 32 mmol/L    Anion Gap 7 3 - 14 mmol/L    Glucose 103 (H) 70 - 99 mg/dL    Urea Nitrogen 26 7 - 30 mg/dL    Creatinine 0.93 0.52 - 1.04 mg/dL    GFR Estimate 57 (L) >60 mL/min/[1.73_m2]    GFR Estimate If Black 66 >60 mL/min/[1.73_m2]    Calcium 9.3 8.5 - 10.1 mg/dL   INR   Result Value Ref Range    INR 2.20 (H) 0.86 - 1.14       Physical Exam   Vital Signs: Temp: 96.7  F (35.9  C) Temp src: Oral BP: 143/53 Pulse: 63 Heart Rate: 63 Resp: 16 SpO2: 92 % O2 Device: None (Room air)    Weight: 149 lbs 9.6 oz  GENERAL:  Comfortable.  PSYCH: Pleasant, oriented, No acute distress.  HEENT:  Atraumatic, normocephalic. PERRLA. Normal conjunctiva, normal hearing, and oropharynx is normal.  NECK:  Supple, no neck vein distention, adenopathy or bruits, normal thyroid.  HEART:  Normal S1, S2 with no murmur, no pericardial rub, gallops or S3 or S4.  LUNGS:  Clear to auscultation, normal respiratory effort. No wheezing, rales or ronchi.  GI:  Soft, no hepatosplenomegaly, normal bowel sounds. Non-tender, non distended.   EXTREMITIES:  No pedal edema, +2 pulses bilateral and equal. Left leg in valgus position in bed. Unable to  tolerate passive ROM testing secondary to left knee pain. Unable to bend left knee actively or raise left leg off bed secondary to left knee pain.  SKIN:  Dry to touch, No rash, wound or ulcerations.  NEUROLOGIC:  CN 2-12 intact, BL 5/5 symmetric upper and lower extremity strength, sensation is intact with no focal deficits.     Data   Results for orders placed or performed during the hospital encounter of 03/12/19   XR Pelvis and Hip Left 2 Views    Narrative    PELVIS AND HIP LEFT TWO VIEWS   3/12/2019 8:18 PM     HISTORY: Fall, left hip pain    COMPARISON: 10/4/2016.      Impression    IMPRESSION: No acute fracture is seen. Both hips appear located. Pubic  symphysis degenerative change suggested.    SHYANN PETERSEN MD   XR Knee Left 1/2 Views    Narrative    KNEE LEFT ONE TO TWO VIEWS    3/12/2019 8:18 PM     HISTORY: Fall, left knee pain    COMPARISON: None.      Impression    IMPRESSION: No acute fracture is seen. Left total knee arthroplasty  appears unremarkable.    SHYANN PETERSEN MD   Head CT w/o contrast    Narrative    CT SCAN OF THE HEAD WITHOUT CONTRAST   3/12/2019 8:00 PM     HISTORY: fall, on anticoagulation, high risk    TECHNIQUE:  Axial images of the head and coronal reformations without  IV contrast material. Radiation dose for this scan was reduced using  automated exposure control, adjustment of the mA and/or kV according  to patient size, or iterative reconstruction technique.    COMPARISON: None.    FINDINGS:     Intracranial contents: There is diffuse parenchymal volume loss.   White matter changes are present in the cerebral hemispheres that are  consistent with small vessel ischemic disease in this age patient.  There is no evidence of intracranial hemorrhage, mass, acute infarct  or anomaly.    Visualized orbits/sinuses/mastoids:  The visualized portions of the  sinuses and mastoids appear normal.    Osseous structures/soft tissues:  There is no evidence of trauma.      Impression    IMPRESSION:  No bleed or fractures are identified.      NIKKI MONET MD   XR Femur Left 2 Views    Narrative    FEMUR TWO VIEWS LEFT  3/12/2019 9:48 PM     HISTORY: fall, left leg pain    COMPARISON: None.    FINDINGS: There is normal osseous alignment.  No fractures are  identified.      Impression    IMPRESSION: No fractures are identified on these images.    NIKKI MONET MD   XR Tibia & Fibula Left 2 Views    Narrative    TIBIA FIBULA LEFT  TWO-THREE  VIEWS 3/12/2019 9:48 PM     HISTORY: fall, left proximal leg pain    COMPARISON: None.    FINDINGS: There is normal osseous alignment.  No fractures are  identified. Diffuse osteopenia. A total knee arthroplasty is in place.      Impression    IMPRESSION: No fractures are identified on these views.    NIKKI MONET MD   XR Chest Port 1 View    Narrative    XR CHEST PORT 1 VW  3/12/2019 11:35 PM     HISTORY: Weakness.    COMPARISON: 11/17/2010.    FINDINGS: Semiupright portable chest. Left subclavian cardiac device  in place. No pneumothorax. The heart is at the upper limits of normal  in size. There is no pulmonary edema. The thoracic aorta is calcified.  The lungs are clear. Old deformity of the right humeral head.  Degenerative disease in the thoracic spine.      Impression    IMPRESSION: No acute abnormality.    JODEE CARROLL MD   CBC with platelets differential   Result Value Ref Range    WBC 10.0 4.0 - 11.0 10e9/L    RBC Count 4.80 3.8 - 5.2 10e12/L    Hemoglobin 14.9 11.7 - 15.7 g/dL    Hematocrit 45.1 35.0 - 47.0 %    MCV 94 78 - 100 fl    MCH 31.0 26.5 - 33.0 pg    MCHC 33.0 31.5 - 36.5 g/dL    RDW 12.5 10.0 - 15.0 %    Platelet Count 187 150 - 450 10e9/L    Diff Method Automated Method     % Neutrophils 83.1 %    % Lymphocytes 10.1 %    % Monocytes 5.9 %    % Eosinophils 0.1 %    % Basophils 0.4 %    % Immature Granulocytes 0.4 %    Nucleated RBCs 0 0 /100    Absolute Neutrophil 8.3 1.6 - 8.3 10e9/L    Absolute Lymphocytes 1.0 0.8 - 5.3 10e9/L    Absolute Monocytes 0.6 0.0 -  1.3 10e9/L    Absolute Eosinophils 0.0 0.0 - 0.7 10e9/L    Absolute Basophils 0.0 0.0 - 0.2 10e9/L    Abs Immature Granulocytes 0.0 0 - 0.4 10e9/L    Absolute Nucleated RBC 0.0    Basic metabolic panel   Result Value Ref Range    Sodium 140 133 - 144 mmol/L    Potassium 3.9 3.4 - 5.3 mmol/L    Chloride 102 94 - 109 mmol/L    Carbon Dioxide 29 20 - 32 mmol/L    Anion Gap 9 3 - 14 mmol/L    Glucose 134 (H) 70 - 99 mg/dL    Urea Nitrogen 31 (H) 7 - 30 mg/dL    Creatinine 1.18 (H) 0.52 - 1.04 mg/dL    GFR Estimate 43 (L) >60 mL/min/[1.73_m2]    GFR Estimate If Black 50 (L) >60 mL/min/[1.73_m2]    Calcium 10.0 8.5 - 10.1 mg/dL   Troponin I   Result Value Ref Range    Troponin I ES <0.015 0.000 - 0.045 ug/L   INR   Result Value Ref Range    INR 2.19 (H) 0.86 - 1.14   Basic metabolic panel   Result Value Ref Range    Sodium 141 133 - 144 mmol/L    Potassium 3.5 3.4 - 5.3 mmol/L    Chloride 108 94 - 109 mmol/L    Carbon Dioxide 26 20 - 32 mmol/L    Anion Gap 7 3 - 14 mmol/L    Glucose 103 (H) 70 - 99 mg/dL    Urea Nitrogen 26 7 - 30 mg/dL    Creatinine 0.93 0.52 - 1.04 mg/dL    GFR Estimate 57 (L) >60 mL/min/[1.73_m2]    GFR Estimate If Black 66 >60 mL/min/[1.73_m2]    Calcium 9.3 8.5 - 10.1 mg/dL   INR   Result Value Ref Range    INR 2.20 (H) 0.86 - 1.14   EKG 12 lead   Result Value Ref Range    Interpretation ECG Click View Image link to view waveform and result    Social Work IP Consult    Narrative    Jeni Loera BSW     3/13/2019  3:12 PM  Care Transition Initial Assessment -      Met with: Patient, son Michele  Active Problems:    Left leg pain       DATA  Lives With: facility resident   Living Arrangements: independent living facility. [facility]  Quality of Family Relationships: helpful, involved, supportive  Description of Support System: Supportive, Involved  Who is your support system?: Children  Support Assessment: Adequate family and caregiver support,   Adequate social supports.   Identified issues/concerns  regarding health management: Pt   resides at the [facility]. Pt has paid staffing from outside   agencies [facility] and [facility] to assist with   dressing/grooming daily, and bathing 2 days/week. Pt receives   meals, housekeeping, and a staff pendant from the facility. PT   evaluated and recommended TCU. Facility preference is [facility], who   has a contract with [employer]. Private room preferred. SW discussed   private pay if [employer] doesn't provide auth, $9215-0581 upfront   with daily cost of $300-400. Son reported that this would be an   option if needed, understanding that pt cannot return to [facility] in   her current physical condition. Referral send to [facility] for   review--facility anticipates a bed available for pt. Son   requesting HE WC transport at discharge. Reviewed out of pocket   cost for [facility] transport, $75 for base rate and $5 per   mile to the destination. Pt/family expressed understanding and   are agreeable to this.           Quality of Family Relationships: helpful, involved, supportive  Transportation Anticipated: family or friend will provide    ASSESSMENT  Cognitive Status: Sleepy. Pt was sleeping for the remainder of   the assessment.   Concerns to be addressed: Discharge planning. TCU referral   sent--[employer] insurance requiring prior auth     PLAN  Financial costs for the patient includes: $45 private room fee at   TCU, WC transport.  Patient given options and choices for discharge Yes.  Patient/family is agreeable to the plan?  Yes.  Patient Goals and Preferences: [facility] TCU prior to returning home.  Patient anticipates discharging to:  TCU-TBD. Referral sent to   [facility] for review. SW to continue following for discharge   planning. HE WC transport at discharge.          Influenza A/B antigen   Result Value Ref Range    Influenza A/B Agn Specimen Nasopharyngeal     Influenza A Negative NEG^Negative    Influenza B Negative NEG^Negative

## 2019-03-13 NOTE — PLAN OF CARE
"PRIMARY DIAGNOSIS: ACUTE PAIN  OUTPATIENT/OBSERVATION GOALS TO BE MET BEFORE DISCHARGE:  1. Pain Status: Improved-controlled with oral pain medications.    2. Return to near baseline physical activity: No    3. Cleared for discharge by consultants (if involved): No    Discharge Planner Nurse   Safe discharge environment identified: Yes  Barriers to discharge: Knee pain       Entered by: Samuel Grant 03/13/2019   Pt is alert and oriented x4. She is having pain 4/10 in her L knee. Limited mobility in LLE. CMS intact. Pt retaining urine this AM. Bladder scanned for 680 ml. Lieberman inserted per provider order. She has not gotten out of bed yet, only dangled feet at bedside. Assist x2. Regular diet. NS @ 100 ml/hr. Tele SR AV paced HR 69. VSS.    /65 (BP Location: Right arm)   Pulse 63   Temp 96.2  F (35.7  C) (Oral)   Resp 16   Ht 1.549 m (5' 1\")   Wt 67.9 kg (149 lb 9.6 oz)   SpO2 93%   BMI 28.27 kg/m         Please review provider order for any additional goals.   Nurse to notify provider when observation goals have been met and patient is ready for discharge.  "

## 2019-03-13 NOTE — PLAN OF CARE
PRIMARY DIAGNOSIS: ACUTE PAIN  OUTPATIENT/OBSERVATION GOALS TO BE MET BEFORE DISCHARGE:  1. Pain Status: Improved-controlled with oral pain medications.    2. Return to near baseline physical activity: No    3. Cleared for discharge by consultants (if involved): No    Discharge Planner Nurse   Safe discharge environment identified: Yes- Pt comes from [facility]  Barriers to discharge: Yes       Entered by: Velvet Pérez 03/13/2019 5:18 AM     Pt A&O, vitally stable. Pt denies any pain when laying in bed. Tele: AV paced. Assist x2, has not been OOB yet. IVF infusing. Pt incontinent, purewick in place, UA to be collected. PT/SW consult in place. Will continue to monitor.   Please review provider order for any additional goals.   Nurse to notify provider when observation goals have been met and patient is ready for discharge.

## 2019-03-13 NOTE — PHARMACY-ANTICOAGULATION SERVICE
Clinical Pharmacy - Warfarin Dosing Consult     Pharmacy has been consulted to manage this patient s warfarin therapy.  Indication: Atrial Fibrillation  Therapy Goal: INR 2-2.5  Warfarin Prior to Admission: Yes  Warfarin PTA Regimen: 5 mg MWFSa, 6 mg ROW    INR   Date Value Ref Range Status   03/13/2019 2.20 (H) 0.86 - 1.14 Final   03/12/2019 2.19 (H) 0.86 - 1.14 Final       Recommend warfarin 6 mg today.  Pharmacy will monitor Liberty Horta daily and order warfarin doses to achieve specified goal.      Please contact pharmacy as soon as possible if the warfarin needs to be held for a procedure or if the warfarin goals change.

## 2019-03-13 NOTE — PLAN OF CARE
Patient No change    PRIMARY DIAGNOSIS: ACUTE PAIN  OUTPATIENT/OBSERVATION GOALS TO BE MET BEFORE DISCHARGE:  1. Pain Status: Pain free at rest    2. Return to near baseline physical activity: No    3. Cleared for discharge by consultants (if involved): No    Discharge Planner Nurse   Safe discharge environment identified: Yes  Barriers to discharge: Yes       Entered by: Yessy Rolon 03/13/2019 4:00PM     Please review provider order for any additional goals.   Nurse to notify provider when observation goals have been met and patient is ready for discharge.    Up with lift/air mat, unable to move left leg without moderate pain, CT scans ordered, pain tolerable at rest, LS clear, room air, zavaleta in place due to urinary retention, plan for TCU, will continue to monitor and provide supportive cares.

## 2019-03-13 NOTE — PLAN OF CARE
ROOM # [id number]    Living Situation  Lives at the [facility] name: [facility]  : Michele Horta     Activity level at baseline: indep  Activity level on admit: assist x 2, walker and gait belt      Patient registered to observation; given Patient Bill of Rights; given the opportunity to ask questions about observation status and their plan of care.  Patient has been oriented to the observation room, bathroom and call light is in place.    Discussed discharge goals and expectations with patient/family.

## 2019-03-13 NOTE — PROGRESS NOTES
03/13/19 1200   Quick Adds   Type of Visit Initial PT Evaluation   Living Environment   Lives With facility resident   Living Arrangements independent living facility   Home Accessibility no concerns   Transportation Anticipated family or friend will provide   Living Environment Comment Lives in ILF, has meal velasquez   Self-Care   Usual Activity Tolerance moderate   Current Activity Tolerance poor   Regular Exercise (Multiple daily hallway walks)   Equipment Currently Used at Home walker, rolling   Activity/Exercise/Self-Care Comment Daily assist comes in to assist with bathing and dressing   Functional Level Prior   Ambulation 1-->assistive equipment   Transferring 1-->assistive equipment   Toileting 1-->assistive equipment   Bathing 3-->assistive equipment and person   Fall history within last six months yes   Number of times patient has fallen within last six months 1   Which of the above functional risks had a recent onset or change? ambulation;toileting;transferring;dressing;bathing;eating   General Information   Onset of Illness/Injury or Date of Surgery - Date 03/12/19   Referring Physician Kendall Haji, DO   Patient/Family Goals Statement Discover cause of knee pain   Pertinent History of Current Problem (include personal factors and/or comorbidities that impact the POC) Liberty Horta is a 84 year old female with a history of atrial fibrillation on warfarin who presents via EMS from her independent living facility after a fall. The patient had been complaining of right thigh pain for the last few days. Today, the patient states that she got up to get a drink of water before going for a walk when she became generally weak and fell. The patient states that she did not hit her head. Since the fall, the patient has had posterior left knee pain. Imaging to L knee has been negative.   Precautions/Limitations fall precautions   Cognitive Status Examination   Orientation person;place   Level of  "Consciousness lethargic/somnolent   Follows Commands and Answers Questions 100% of the time   Pain Assessment   Patient Currently in Pain Yes, see Vital Sign flowsheet   Posture    Posture Comments Unable to assess   Range of Motion (ROM)   ROM Comment No tolerance for L knee AROM or gentle PROM   Strength   Strength Comments NT   Bed Mobility   Bed Mobility Comments Unable to supine<>sit max-A of two due to pain   Transfer Skills   Transfer Comments Unable   Gait   Gait Comments Unable   Balance   Balance Comments Unable to assess   Sensory Examination   Sensory Perception Comments Denies NT   Modality Interventions   Planned Modality Interventions Cryotherapy   General Therapy Interventions   Planned Therapy Interventions gait training;bed mobility training;strengthening;ROM;transfer training;progressive activity/exercise;home program guidelines;risk factor education   Clinical Impression   Criteria for Skilled Therapeutic Intervention yes, treatment indicated   PT Diagnosis Imapaired functional mobility   Influenced by the following impairments Pain   Functional limitations due to impairments Bed mobility, transfers, gait   Clinical Presentation Stable/Uncomplicated   Clinical Presentation Rationale Medically stable   Clinical Decision Making (Complexity) Low complexity   Therapy Frequency` 3 times/week   Predicted Duration of Therapy Intervention (days/wks) 3 days   Anticipated Discharge Disposition Transitional Care Facility   Risk & Benefits of therapy have been explained Yes   Patient, Family & other staff in agreement with plan of care Yes   [facility] [employer] TM \"6 Clicks\"   2016, Trustees of [facility], under license to [employer].  All rights reserved.   6 Clicks Short Forms Basic Mobility Inpatient Short Form   [facility]  \"6 Clicks\" V.2 Basic Mobility Inpatient Short Form   1. Turning from your back to your side while in a flat bed without using bedrails? 2 - A Lot   2. " Moving from lying on your back to sitting on the side of a flat bed without using bedrails? 1 - Total   3. Moving to and from a bed to a chair (including a wheelchair)? 1 - Total   4. Standing up from a chair using your arms (e.g., wheelchair, or bedside chair)? 1 - Total   5. To walk in hospital room? 1 - Total   6. Climbing 3-5 steps with a railing? 1 - Total   Basic Mobility Raw Score (Score out of 24.Lower scores equate to lower levels of function) 7   Total Evaluation Time   Total Evaluation Time (Minutes) 9

## 2019-03-13 NOTE — H&P
[facility]    History and Physical - Hospitalist Service       Date of Admission:  3/12/2019    Assessment & Plan   Liberty Horta is a 84 year old female admitted on 3/12/2019. She has a past medical history significant for hypothyroidism, atrial fibrillation, skin cancer, GERD, hypertension, and osteoarthritis.  She presented to the hospital with left knee pain.  She had had a episode of dizziness and lowered herself to the ground earlier today.  Did not fall.  Did feel that her legs were in an awkward position as she lowered herself to the ground.  Continued to feel weak.  Was unable to get up off of the floor.  Was brought to emergency room for further evaluation.    1.  Left leg pain.  No fractures on imaging.  Possible muscle strain.  Pain medications as needed.  Physical therapy consult.    2.  Weakness.  Does appear to be mildly dehydrated.  Start continuous IV fluids.  Physical therapy consult.  Check urinalysis.  Check chest x-ray.  Check influenza antigens.    3.  Acute kidney injury.  Suspect dehydration.  Start continuous IV fluids.  Avoid nephrotoxins as able.    4.  Atrial fibrillation.  Restart warfarin.  Restart diltiazem.  Restart sotalol.  Monitor on telemetry.    5.  Hypothyroidism.  Restart levothyroxine.    6.  Hyperlipidemia.  Restart atorvastatin.     Diet: Regular Diet Adult    DVT Prophylaxis: Warfarin  Lieberman Catheter: not present  Code Status: Full Code      Disposition Plan   Expected discharge: Tomorrow, recommended to transitional care unit   Entered: Kendall Haji DO 03/12/2019, 10:34 PM         ______________________________________________________________________    Chief Complaint   Left leg pain.    History is obtained from the patient    History of Present Illness   Liberty Horta is a 84 year old female who has a past medical history significant for hypothyroidism, atrial fibrillation, skin cancer, GERD, hypertension, and osteoarthritis.  She has  been having right leg pain for the past several days.  There have also been several people at her living facility with influenza.  Because of the influenza present at her living facility, residents have been restricted to the rooms to try to keep them safe.  She has not been walking as she usually does because she has been confined to her room.  She has not been very mobile because of right leg pain either.  She had not been eating and drinking as well as usual.  She felt very thirsty.  Had gotten up to go get a glass of water.  Felt quite dizzy.  Felt as though she might fall.  She lowered herself to the ground.  Did not fall.  Did not suffer trauma.  Did feel that her legs were in an awkward position as she lowered herself to the ground.  Continued to feel weak.  Was unable to get up off of the floor.  Was brought to emergency room for further evaluation.  She has noted an occasional cough.  Has not felt short of breath.  Has not felt as though she has had fevers or chills.  Has not urinated for several hours.  Has had an occasional fall in the past.  No other complaints at this time.      Review of Systems    The 10 point Review of Systems is negative other than noted in the HPI     Past Medical History    I have reviewed this patient's medical history and updated it with pertinent information if needed.   Past Medical History:   Diagnosis Date     Basal cell carcinoma      Chronic atrial fibrillation (H)      DVT (deep vein thrombosis) in pregnancy (H)      Esophageal reflux      Hypertension      Hypothyroid      Osteoarthritis      Pacemaker      Renal insufficiency        Past Surgical History   I have reviewed this patient's surgical history and updated it with pertinent information if needed.  Past Surgical History:   Procedure Laterality Date     IMPLANT PACEMAKER       OPEN REDUCTION INTERNAL FIXATION FEMUR DISTAL Right 10/6/2016    Procedure: OPEN REDUCTION INTERNAL FIXATION FEMUR DISTAL;  Surgeon: Almas  Filipe Shetty MD;  Location: [facility] OR     ORTHOPEDIC SURGERY      Knee replacement left in 2011       Social History   I have reviewed this patient's social history and updated it with pertinent information if needed.  Social History     Tobacco Use     Smoking status: Never Smoker   Substance Use Topics     Alcohol use: No     Drug use: No       Family History   I have reviewed this patient's family history and updated it with pertinent information if needed.       Prior to Admission Medications   Prior to Admission Medications   Prescriptions Last Dose Informant Patient Reported? Taking?   ATORVASTATIN CALCIUM PO   Yes No   Sig: Take 40 mg by mouth At Bedtime   Ascorbic Acid (VITAMIN C PO)   Yes No   Sig: Take 500 mg by mouth 2 times daily (with meals)   Cholecalciferol (VITAMIN D3 PO)   Yes No   Sig: Take 3,000 Units by mouth daily   GLUCOSAMINE SULFATE PO   Yes No   Sig: Take 3,000 mg by mouth every morning   GLUCOSAMINE SULFATE PO   Yes No   Sig: Take 1,500 mg by mouth every evening   LEVOTHYROXINE SODIUM PO   Yes No   Sig: Take 150 mcg by mouth daily   MEPROBAMATE PO   Yes No   Sig: Take 200 mg by mouth 2 times daily as needed for other (migraines)   Multiple Vitamins-Minerals (CENTRUM SILVER) per tablet   Yes No   Sig: Take 1 tablet by mouth daily   SLO-NIACIN PO   Yes No   Sig: Take 1,000 mg by mouth At Bedtime   SOTALOL HCL PO   Yes No   Sig: Take 80 mg by mouth 2 times daily   WARFARIN SODIUM PO   Yes No   Sig: Take 5-7.5 mg by mouth every evening 7.5mg on MWF, 5mg on all other days.   acetaminophen (TYLENOL) 325 MG tablet   No No   Sig: Take 3 tablets (975 mg) by mouth every 8 hours   albuterol (PROAIR HFA, PROVENTIL HFA, VENTOLIN HFA) 108 (90 BASE) MCG/ACT inhaler   Yes No   Sig: Inhale 2 puffs into the lungs every 6 hours as needed for shortness of breath / dyspnea or wheezing   bisacodyl (DULCOLAX) 10 MG suppository   No No   Sig: Place 1 suppository (10 mg) rectally daily as needed for constipation    calcium carb 1250 mg, 500 mg [place],/vitamin D 200 units (OSCAL WITH D) 500-200 MG-UNIT per tablet   Yes No   Sig: Take 1 tablet by mouth 2 times daily (with meals)   diltiazem (TIAZAC) 180 MG 24 hr ER beaded capsule   Yes No   Sig: Take 180 mg by mouth daily   ferrous sulfate (IRON) 325 (65 FE) MG tablet   Yes No   Sig: Take 325 mg by mouth 2 times daily (with meals)   furosemide (LASIX) 20 MG tablet   No No   Sig: Take 1 tablet (20 mg) by mouth 2 times daily   loratadine (CLARITIN) 10 MG tablet   Yes No   Sig: Take 10 mg by mouth daily   psyllium 0.52 G capsule   Yes No   Sig: Take 1 capsule by mouth every morning   psyllium 0.52 G capsule   Yes No   Sig: Take 2 capsules by mouth daily (with dinner)   senna-docusate (SENOKOT-S;PERICOLACE) 8.6-50 MG per tablet   No No   Sig: Take 1-2 tablets by mouth 2 times daily   traMADol (ULTRAM) 50 MG tablet   No No   Sig: Take 1 tablet (50 mg) by mouth every 6 hours as needed      Facility-Administered Medications: None     Allergies   Allergies   Allergen Reactions     Darvocet [Propoxyphene N-Apap]      Demerol [Meperidine]        Physical Exam   Vital Signs: Temp: 97.7  F (36.5  C) Temp src: Oral BP: 165/62 Pulse: 63 Heart Rate: 64 Resp: 16 SpO2: 93 % O2 Device: None (Room air)    Weight: 149 lbs 9.6 oz    Gen:  NAD, A&Ox3.  Eyes:  PERRL, sclera anicteric.  OP:  MMM, no lesions.  Neck:  Supple.  CV:  Regular, no murmurs.  Lung:  CTA b/l, normal effort.  Ab:  +BS, soft.  Skin:  Warm, dry to touch.  No rash.  Ext:  No pitting edema LE b/l.      Data   Data reviewed today: I reviewed all medications, new labs and imaging results over the last 24 hours. I personally reviewed the EKG tracing showing Sinus rhythm.  No obvious acute ischemia..    Recent Labs   Lab 03/12/19  1817   WBC 10.0   HGB 14.9   MCV 94         POTASSIUM 3.9   CHLORIDE 102   CO2 29   BUN 31*   CR 1.18*   ANIONGAP 9   ARLENE 10.0   *   TROPI <0.015

## 2019-03-13 NOTE — PLAN OF CARE
Discharge Planner PT     PT: Orders received, eval completed, tx initiated.  Pt lives in an ILF with assist for bathing and dressing. At baseline ambulates mod-I with a walker.    Patient plan for discharge: Unable to go home  Current status: Attempted gentle PROM to L knee, pt does not tolerate any motion. Attempted max-A of 2 for supine>sit, extended time taken due to slow movements at B LE, unable to raise trunk, did not tolerate. Pain at L knee with movement of any extremety, reports pain on medial side. Inconsistant pain to touch. Rests with L knee valgus. Discussed TCU recommendation. Will follow at 3x/week if pain better managed and increased mobility allows discharge home.  Barriers to return to prior living situation: Total-A for all mobility, does not tolerate movement  Recommendations for discharge: TCU  Rationale for recommendations: Will need ongoing skilled PT intervention to improve independence and safety with functional mobility skills prior to returning home. Knee pain making pt dependent for all mobility.       Entered by: Victor Hugo Justin 03/13/2019 12:59 PM

## 2019-03-13 NOTE — CONSULTS
Care Transition Initial Assessment - SW     Met with: Patient, son Michele  Active Problems:    Left leg pain       DATA  Lives With: facility resident   Living Arrangements: independent living facility. [facility]  Quality of Family Relationships: helpful, involved, supportive  Description of Support System: Supportive, Involved  Who is your support system?: Children  Support Assessment: Adequate family and caregiver support, Adequate social supports.   Identified issues/concerns regarding health management: Pt resides at the [facility]. Pt has paid staffing from outside agencies [facility] and [facility] to assist with dressing/grooming daily, and bathing 2 days/week. Pt receives meals, housekeeping, and a staff pendant from the facility. PT evaluated and recommended TCU. Facility preference is [facility], who has a contract with [facility]. Private room preferred. SW discussed private pay if [employer] doesn't provide auth, $[phone] upfront with daily cost of $300-400. Son reported that this would be an option if needed, understanding that pt cannot return to [facility] in her current physical condition. Referral send to [facility] for review--facility anticipates a bed available for pt. Son requesting HE WC transport at discharge. Reviewed out of pocket cost for [facility] transport, $75 for base rate and $5 per mile to the destination. Pt/family expressed understanding and are agreeable to this.           Quality of Family Relationships: helpful, involved, supportive  Transportation Anticipated: family or friend will provide    ASSESSMENT  Cognitive Status: Sleepy. Pt was sleeping for the remainder of the assessment.   Concerns to be addressed: Discharge planning. TCU referral sent--[employer] insurance requiring prior auth     PLAN  Financial costs for the patient includes: $45 private room fee at TCU, WC transport.  Patient given options and choices for discharge Yes.  Patient/family is agreeable to the plan?  Yes.  Patient  Goals and Preferences: [facility] TCU prior to returning home.  Patient anticipates discharging to:  TCU-TBD. Referral sent to [facility] for review. SW to continue following for discharge planning. HE WC transport at discharge.

## 2019-03-13 NOTE — PLAN OF CARE
"PRIMARY DIAGNOSIS: ACUTE PAIN  OUTPATIENT/OBSERVATION GOALS TO BE MET BEFORE DISCHARGE:  1. Pain Status: Improved-controlled with oral pain medications.     2. Return to near baseline physical activity: No     3. Cleared for discharge by consultants (if involved): No     Discharge Planner Nurse   Safe discharge environment identified: Yes  Barriers to discharge: Knee pain       Entered by: Samuel Grant 03/13/2019   Pt is alert and oriented x4. She is having pain 4/10 in her L knee. Limited mobility in LLE. CMS intact. Pt retaining urine this AM. Bladder scanned for 680 ml. Lieberman inserted per provider order. She has not gotten out of bed yet, only dangled feet at bedside. Assist x2. Regular diet. NS @ 100 ml/hr. Tele SR AV paced HR 69. VSS.     /53 (BP Location: Right arm)   Pulse 63   Temp 96.7  F (35.9  C) (Oral)   Resp 16   Ht 1.549 m (5' 1\")   Wt 67.9 kg (149 lb 9.6 oz)   SpO2 92%   BMI 28.27 kg/m            Please review provider order for any additional goals.   Nurse to notify provider when observation goals have been met and patient is ready for discharge.  "

## 2019-03-13 NOTE — PLAN OF CARE
PRIMARY DIAGNOSIS: ACUTE PAIN  OUTPATIENT/OBSERVATION GOALS TO BE MET BEFORE DISCHARGE:  1. Pain Status: Improved-controlled with oral pain medications.    2. Return to near baseline physical activity: No    3. Cleared for discharge by consultants (if involved): No    Discharge Planner Nurse   Safe discharge environment identified: Yes- Pt comes from [facility]  Barriers to discharge: Yes       Entered by: Velvet Pérez 03/13/2019 2:09 AM     Pt A&O, vitally stable. Pt denies any pain when laying in bed but pain increases with movement. Tele: AV paced. Assist x2, has not been OOB yet. IVF infusing. Pt incontinent, purewick in place, UA to be collected. PT to see. Will continue to monitor.   Please review provider order for any additional goals.   Nurse to notify provider when observation goals have been met and patient is ready for discharge.

## 2019-03-13 NOTE — ED NOTES
[facility]  ED Nurse Handoff Report    Liberty Horta is a 84 year old female   ED Chief complaint: Fall  . ED Diagnosis:   Final diagnoses:   Fall, initial encounter   Acute pain of left knee     Allergies:   Allergies   Allergen Reactions     Darvocet [Propoxyphene N-Apap]      Demerol [Meperidine]        Code Status: not on file  Activity level - Baseline/Home:  Independent. Activity Level - Current:   Total Care. Lift room needed: Yes. Bariatric: No   Needed: No   Isolation: No. Infection: Not Applicable.     Vital Signs:   Vitals:    03/12/19 1930 03/12/19 1931 03/12/19 2030 03/12/19 2045   BP: 167/79  114/71 154/65   Pulse: 60  61 63   Resp:  14     Temp:       TempSrc:       SpO2:   90% 97%       Cardiac Rhythm:  ,      Pain level:    Patient confused: No. Patient Falls Risk: Yes.   Elimination Status: Pt has been incontinent, unable to obtain quick cath due to pts body habitus (multiple attempts)  Patient Report - Initial Complaint: Fall. Focused Assessment:  Liberty Horta is a 84 year old female with a history of atrial fibrillation on warfarin who presents via EMS from her independent living facility after a fall. The patient had been complaining of right thigh pain for the last few days. Today, the patient states that she got up to get a drink of water before going for a walk when she became generally weak and fell. The patient states that she did not hit her head. Since the fall, the patient has had posterior left knee pain. She has never had left knee pain previously. EMS did provide the patient with 100 mcg of Fentanyl. Of note, influenza has been going around her living facility.       * Pain better after meds  * Unable to place weight on left leg  * CMS intact, 2+ pulses, able to wiggle toes  Tests Performed:   Labs Ordered and Resulted from Time of ED Arrival Up to the Time of Departure from the ED   BASIC METABOLIC PANEL - Abnormal; Notable for the following  components:       Result Value    Glucose 134 (*)     Urea Nitrogen 31 (*)     Creatinine 1.18 (*)     GFR Estimate 43 (*)     GFR Estimate If Black 50 (*)     All other components within normal limits   CBC WITH PLATELETS DIFFERENTIAL   TROPONIN I   ROUTINE UA WITH MICROSCOPIC REFLEX TO CULTURE     Head CT w/o contrast   Final Result   IMPRESSION: No bleed or fractures are identified.         NIKKI MONET MD      XR Pelvis and Hip Left 2 Views   Final Result   IMPRESSION: No acute fracture is seen. Both hips appear located. Pubic   symphysis degenerative change suggested.      SHYANN PETERSEN MD      XR Knee Left 1/2 Views   Final Result   IMPRESSION: No acute fracture is seen. Left total knee arthroplasty   appears unremarkable.      SHYANN PETERSEN MD      XR Femur Left 2 Views    (Results Pending)   XR Tibia & Fibula Left 2 Views    (Results Pending)       Treatments provided: Fentanyl en route, morphine  Family Comments: many at bedside  OBS brochure/video discussed/provided to patient:  yes  ED Medications:   Medications   morphine (PF) injection 4 mg (4 mg Intravenous Given 3/12/19 2019)     Drips infusing:  No  For the majority of the shift, the patient's behavior Green. Interventions performed were rounding.     Severe Sepsis OR Septic Shock Diagnosis Present: No      ED Nurse Name/Phone Number: Ayanna Carroll,   9:21 PM  RECEIVING UNIT ED HANDOFF REVIEW    Above ED Nurse Handoff Report was reviewed: Yes  Reviewed by: Karen M. Lesch on March 12, 2019 at 9:49 PM

## 2019-03-14 ENCOUNTER — APPOINTMENT (OUTPATIENT)
Dept: GENERAL RADIOLOGY | Facility: CLINIC | Age: 84
DRG: 556 | End: 2019-03-14
Attending: PHYSICIAN ASSISTANT
Payer: COMMERCIAL

## 2019-03-14 LAB
INR PPP: 2.66 (ref 0.86–1.14)
INTERPRETATION ECG - MUSE: NORMAL

## 2019-03-14 PROCEDURE — 25000132 ZZH RX MED GY IP 250 OP 250 PS 637: Performed by: PHYSICIAN ASSISTANT

## 2019-03-14 PROCEDURE — 36415 COLL VENOUS BLD VENIPUNCTURE: CPT | Performed by: INTERNAL MEDICINE

## 2019-03-14 PROCEDURE — 12000011 ZZH R&B MS OVERFLOW

## 2019-03-14 PROCEDURE — 99232 SBSQ HOSP IP/OBS MODERATE 35: CPT | Performed by: INTERNAL MEDICINE

## 2019-03-14 PROCEDURE — 73560 X-RAY EXAM OF KNEE 1 OR 2: CPT | Mod: LT

## 2019-03-14 PROCEDURE — 25000132 ZZH RX MED GY IP 250 OP 250 PS 637: Performed by: INTERNAL MEDICINE

## 2019-03-14 PROCEDURE — 85610 PROTHROMBIN TIME: CPT | Performed by: INTERNAL MEDICINE

## 2019-03-14 RX ORDER — WARFARIN SODIUM 2 MG/1
6 TABLET ORAL
Status: COMPLETED | OUTPATIENT
Start: 2019-03-14 | End: 2019-03-14

## 2019-03-14 RX ADMIN — Medication 25 MG: at 23:43

## 2019-03-14 RX ADMIN — TAMSULOSIN HYDROCHLORIDE 0.4 MG: 0.4 CAPSULE ORAL at 08:18

## 2019-03-14 RX ADMIN — ACETAMINOPHEN 1000 MG: 500 TABLET, FILM COATED ORAL at 04:16

## 2019-03-14 RX ADMIN — SOTALOL HYDROCHLORIDE 80 MG: 80 TABLET ORAL at 20:03

## 2019-03-14 RX ADMIN — FUROSEMIDE 40 MG: 40 TABLET ORAL at 08:18

## 2019-03-14 RX ADMIN — SOTALOL HYDROCHLORIDE 80 MG: 80 TABLET ORAL at 08:18

## 2019-03-14 RX ADMIN — WARFARIN SODIUM 6 MG: 2 TABLET ORAL at 18:15

## 2019-03-14 RX ADMIN — DILTIAZEM HYDROCHLORIDE 180 MG: 180 CAPSULE, COATED, EXTENDED RELEASE ORAL at 08:18

## 2019-03-14 RX ADMIN — Medication 25 MG: at 02:44

## 2019-03-14 RX ADMIN — ACETAMINOPHEN 1000 MG: 500 TABLET, FILM COATED ORAL at 20:02

## 2019-03-14 RX ADMIN — ACETAMINOPHEN 1000 MG: 500 TABLET, FILM COATED ORAL at 11:31

## 2019-03-14 RX ADMIN — LEVOTHYROXINE SODIUM 150 MCG: 150 TABLET ORAL at 08:18

## 2019-03-14 RX ADMIN — Medication 25 MG: at 13:33

## 2019-03-14 NOTE — PLAN OF CARE
PRIMARY DIAGNOSIS: ACUTE PAIN  OUTPATIENT/OBSERVATION GOALS TO BE MET BEFORE DISCHARGE:  1. Pain Status: Pain free.    2. Return to near baseline physical activity: No    3. Cleared for discharge by consultants (if involved): N/A    Patient is alert and oriented x4. VSS. Pain increased in Left leg overnight, PRN ultram given and scheduled tylenol. Unable to move Left side of her body without having pain. Unable to complete orthostatic BP.  NS running at 100 mL/hr. Tele monitoring, A Pace 60s. TCU is recommended. SW consulted.    Discharge Planner Nurse   Safe discharge environment identified: Yes  Barriers to discharge: No       Entered by: Cyndy Etienne 03/14/2019 5:21 AM     Please review provider order for any additional goals.   Nurse to notify provider when observation goals have been met and patient is ready for discharge.

## 2019-03-14 NOTE — PLAN OF CARE
PRIMARY DIAGNOSIS: ACUTE PAIN  OUTPATIENT/OBSERVATION GOALS TO BE MET BEFORE DISCHARGE:  1. Pain Status: Pain free.    2. Return to near baseline physical activity: No    3. Cleared for discharge by consultants (if involved): N/A    Patient is alert and oriented x4. VSS. Patient denies pain at rest. Pain is increased with activity. Patient unable to move Left side of her body without having pain. NS running at 100 mL/hr. TCU is recommended. SW consulted. Will continue to monitor.     Discharge Planner Nurse   Safe discharge environment identified: Yes  Barriers to discharge: No       Entered by: Cyndy Etienne 03/13/2019 8:26 PM     Please review provider order for any additional goals.   Nurse to notify provider when observation goals have been met and patient is ready for discharge.

## 2019-03-14 NOTE — PROGRESS NOTES
S: Pt. seen in [facility]. Female. Kash STOUT RX: Hinged KO, ROM as tolerated. DX: Post TKA.  O:A: Pt. fell at her residence and injuries her left knee that is post TKA 2 years ago. Today I F/D a POKO to provide stability to left knee complex. Donning doffing wear and care instructions given to Pt.. Pt. should be seen tomorrow to review Donning and doffing because Pt. was very drowsy.    P: Pt. to be seen as needed.    Adebayo BRENNAN,ASHLEY

## 2019-03-14 NOTE — PROGRESS NOTES
Discharge Planner   Discharge Plans in progress: [facility] TCU  Barriers to discharge plan: None anticipated  Follow up plan: [facility] has obtained [employer] insurance authorization. Anticipate pt will be medically stable for discharge tomorrow. HE WC transportation to be arranged. SW to continue following.        Entered by: Jeni Loera 03/14/2019 2:54 PM

## 2019-03-14 NOTE — PROGRESS NOTES
Your information has been submitted on March 14th, 2019 at 08:48:11 AM CDT. The confirmation number is [id number]

## 2019-03-14 NOTE — CONSULTS
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  It is reviewed approximately 2 days ago she became weak and fell gradually to the floor.  She believes her knee was hyperextended at the time.  Since this time she has had gradually worsening left knee pain and inability to ambulate due to the pain.   She currently is complaining of global knee pain with increased lateral discomfort.  She was unable to flex her knee at bedside due to the pain.  Patient is on Coumadin.  Patient had a left total knee arthroplasty in November 2007 performed at [facility].    CT scans of the knee and pelvis were obtained: Negative for fracture  Pelvis CT did reveal appearance of severe central stenosis at the L3-4 level  X-rays of the knee, femur, and tibia and fibula were all negative for fracture.       Past Medical History:     Past Medical History:   Diagnosis Date     Basal cell carcinoma      Chronic atrial fibrillation (H)      DVT (deep vein thrombosis) in pregnancy (H)      Esophageal reflux      Hypertension      Hypothyroid       Osteoarthritis      Pacemaker      Renal insufficiency              Past Surgical History:     Past Surgical History:   Procedure Laterality Date     IMPLANT PACEMAKER       OPEN REDUCTION INTERNAL FIXATION FEMUR DISTAL Right 10/6/2016    Procedure: OPEN REDUCTION INTERNAL FIXATION FEMUR DISTAL;  Surgeon: Filipe Coburn MD;  Location: [facility] OR     ORTHOPEDIC SURGERY      Knee replacement left in 2011             Social History:     Social History     Tobacco Use     Smoking status: Never Smoker   Substance Use Topics     Alcohol use: No             Family History:   No family history on file.          Immunizations:     VACCINE/DOSE   Diptheria   DPT   DTAP   HBIG   Hepatitis A   Hepatitis B   HIB   Influenza   Measles   Meningococcal   MMR   Mumps   Pneumococcal   Polio   Rubella   Small Pox   TDAP   Varicella   Zoster             Allergies:     Allergies   Allergen Reactions     Darvocet [Propoxyphene N-Apap]      Demerol [Meperidine]              Medications:     Current Facility-Administered Medications   Medication     acetaminophen (TYLENOL) tablet 1,000 mg     benzocaine-menthol (CHLORASEPTIC) 6-10 MG lozenge 1 lozenge     bisacodyl (DULCOLAX) Suppository 10 mg     diltiazem ER COATED BEADS (CARDIZEM CD/CARTIA XT) 24 hr capsule 180 mg     furosemide (LASIX) tablet 40 mg     levothyroxine (SYNTHROID/LEVOTHROID) tablet 150 mcg     lidocaine (LMX4) cream     lidocaine 1 % 0.1-1 mL     melatonin tablet 1 mg     naloxone (NARCAN) injection 0.1-0.4 mg     nitroGLYcerin (NITROSTAT) sublingual tablet 0.4 mg     ondansetron (ZOFRAN-ODT) ODT tab 4 mg    Or     ondansetron (ZOFRAN) injection 4 mg     polyethylene glycol (MIRALAX/GLYCOLAX) Packet 17 g     prochlorperazine (COMPAZINE) injection 5 mg    Or     prochlorperazine (COMPAZINE) tablet 5 mg    Or     prochlorperazine (COMPAZINE) Suppository 12.5 mg     senna-docusate (SENOKOT-S/PERICOLACE) 8.6-50 MG per tablet 1 tablet    Or     senna-docusate  (SENOKOT-S/PERICOLACE) 8.6-50 MG per tablet 2 tablet     sodium chloride (PF) 0.9% PF flush 3 mL     sodium chloride (PF) 0.9% PF flush 3 mL     sotalol (BETAPACE) tablet 80 mg     tamsulosin (FLOMAX) capsule 0.4 mg     traMADol (ULTRAM) half-tab 25-50 mg     warfarin (COUMADIN) tablet 6 mg     Warfarin Therapy Reminder (Check START DATE - warfarin may be starting in the FUTURE)             Review of Systems:   CV: NEGATIVE for chest pain, palpitations or peripheral edema  C: NEGATIVE for fever, chills, change in weight  E/M: NEGATIVE for ear, mouth and throat problems  R: NEGATIVE for significant cough or SOB          Physical Exam:   All vitals have been reviewed  Patient Vitals for the past 24 hrs:   BP Temp Temp src Pulse Heart Rate Resp SpO2   03/14/19 1229 125/56 -- -- -- -- -- --   03/14/19 0956 117/61 96  F (35.6  C) Oral 61 -- 20 92 %   03/14/19 0407 149/60 98  F (36.7  C) Oral -- 72 16 91 %   03/13/19 2333 120/55 95.8  F (35.4  C) Axillary -- 68 16 91 %   03/13/19 2021 113/51 99.1  F (37.3  C) Axillary -- 62 18 92 %   03/13/19 1504 143/53 96.7  F (35.9  C) Oral -- 63 16 92 %       Intake/Output Summary (Last 24 hours) at 3/14/2019 1501  Last data filed at 3/14/2019 1300  Gross per 24 hour   Intake 3740 ml   Output 2000 ml   Net 1740 ml     On physical exam of the left knee, patient's incision is well-healed.  She has a mild joint effusion present.  She is globally tender to palpation throughout the left knee.  She does have increased tenderness to palpation along the lateral aspect of the left knee.  Pain with both valgus and varus stress testing -  difficult to assess stability due to her discomfort.  Unwilling to flex or extend her knee due to pain.  Mild discomfort with left calf compression.  Denies pain with dorsi or plantarflexion of her left ankle.  Sensation is intact and equal bilaterally.  Pulses present.          Data:   All laboratory data reviewed  Results for orders placed or performed during  the hospital encounter of 03/12/19   XR Pelvis and Hip Left 2 Views    Narrative    PELVIS AND HIP LEFT TWO VIEWS   3/12/2019 8:18 PM     HISTORY: Fall, left hip pain    COMPARISON: 10/4/2016.      Impression    IMPRESSION: No acute fracture is seen. Both hips appear located. Pubic  symphysis degenerative change suggested.    SHYANN PETERSEN MD   XR Knee Left 1/2 Views    Narrative    KNEE LEFT ONE TO TWO VIEWS    3/12/2019 8:18 PM     HISTORY: Fall, left knee pain    COMPARISON: None.      Impression    IMPRESSION: No acute fracture is seen. Left total knee arthroplasty  appears unremarkable.    SHYANN PETERSEN MD   Head CT w/o contrast    Narrative    CT SCAN OF THE HEAD WITHOUT CONTRAST   3/12/2019 8:00 PM     HISTORY: fall, on anticoagulation, high risk    TECHNIQUE:  Axial images of the head and coronal reformations without  IV contrast material. Radiation dose for this scan was reduced using  automated exposure control, adjustment of the mA and/or kV according  to patient size, or iterative reconstruction technique.    COMPARISON: None.    FINDINGS:     Intracranial contents: There is diffuse parenchymal volume loss.   White matter changes are present in the cerebral hemispheres that are  consistent with small vessel ischemic disease in this age patient.  There is no evidence of intracranial hemorrhage, mass, acute infarct  or anomaly.    Visualized orbits/sinuses/mastoids:  The visualized portions of the  sinuses and mastoids appear normal.    Osseous structures/soft tissues:  There is no evidence of trauma.      Impression    IMPRESSION: No bleed or fractures are identified.      NIKKI MONET MD   XR Femur Left 2 Views    Narrative    FEMUR TWO VIEWS LEFT  3/12/2019 9:48 PM     HISTORY: fall, left leg pain    COMPARISON: None.    FINDINGS: There is normal osseous alignment.  No fractures are  identified.      Impression    IMPRESSION: No fractures are identified on these images.    NIKKI MONET MD   XR Tibia & Fibula  Left 2 Views    Narrative    TIBIA FIBULA LEFT  TWO-THREE  VIEWS 3/12/2019 9:48 PM     HISTORY: fall, left proximal leg pain    COMPARISON: None.    FINDINGS: There is normal osseous alignment.  No fractures are  identified. Diffuse osteopenia. A total knee arthroplasty is in place.      Impression    IMPRESSION: No fractures are identified on these views.    NIKKI MONET MD   XR Chest Port 1 View    Narrative    XR CHEST PORT 1 VW  3/12/2019 11:35 PM     HISTORY: Weakness.    COMPARISON: 11/17/2010.    FINDINGS: Semiupright portable chest. Left subclavian cardiac device  in place. No pneumothorax. The heart is at the upper limits of normal  in size. There is no pulmonary edema. The thoracic aorta is calcified.  The lungs are clear. Old deformity of the right humeral head.  Degenerative disease in the thoracic spine.      Impression    IMPRESSION: No acute abnormality.    JODEE CARROLL MD   CT Pelvis Bone wo Contrast    Narrative    CT PELVIS BONE WITHOUT CONTRAST 3/13/2019 6:13 PM     HISTORY: Pelvic fracture, known or suspected; fall, x-ray negative for  fracture, cannot bear weight, evaluate for fracture.    Radiation dose for this scan was reduced using automated exposure  control, adjustment of the mA and/or kV according to patient size, or  iterative reconstruction technique.    FINDINGS: On the uppermost image, there is probable severe central  stenosis at the presumed L3-4 level. Anterior fusion appears to be  present at the presumed L4-L5 and L5-S1 levels. The L5 segment may be  transitional suggesting possible segmentation anomaly in the lumbar  spine. There is no evidence of pelvic or sacral fracture. The proximal  femurs appear intact without evidence of hip fracture.  Chondrocalcinosis is noted at the hips and pubis symphysis.  Degenerative changes are present at the pubis symphysis. Hip joint  space widths are relatively well preserved. Arterial calcification is  noted. There is a small amount of  peritoneal fluid in the pelvis of  indeterminate etiology. A Lieberman catheter is present within the urinary  bladder.      Impression    IMPRESSION:  1. Suspected severe central stenosis at the presumed L3-L4 level on  the edge of the scan, not optimally evaluated with this scan. 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 The  presumed L4-L5 and L5-S1 disc levels appear to be anteriorly fused.  2. No apparent hip or pelvic fracture.  3. Chondrocalcinosis and pubis symphysis degenerative changes.  4. Small amount of free peritoneal fluid within the pelvis of  indeterminate etiology.    LEONEL VALERIO MD   CT Knee Left w/o Contrast    Narrative    CT KNEE LEFT WITHOUT CONTRAST March 13, 2019 6:12 PM     HISTORY: Knee pain, initial exam. Knee pain, negative x-ray or  effusion only.    TECHNIQUE: Radiation dose for this scan was reduced using automated  exposure control, adjustment of the mA and/or kV according to patient  size, or iterative reconstruction technique.    FINDINGS: Left total knee arthroplasty is in place with associated  metallic artifact. There is no lucency about the tibial cement/host  bone interface to suggest tibial component loosening. The femoral  cement/host bone interface is less well seen. However, there is no  definitive evidence of femoral component loosening. There is a  nonmetal backed patellar prosthesis without definitive evidence of  loosening. There are several small calcifications/ossifications  located approximately 2 cm from the superior margin of the patella  associated with the quadriceps tendon. These are also radiographically  visible on 3/12/2019. There appears to be a small amount of fluid  within the suprapatellar recess of the knee joint. Partly calcified  superficial venous varices are noted about the knee. No periarticular  fracture is identified.      Impression     IMPRESSION:  1. Several calcifications/ossifications superior to the patella which  may be related to age indeterminate quadriceps tendon injury, most  likely chronic.  2. Nonspecific small joint effusion.  3. Left total knee arthroplasty without other evidence of  complication.  4. Partly calcified superficial venous varicosities noted proximal and  distal to the knee. Possibilities include chronic superficial  thrombophlebitis.    LEONEL VALERIO MD   XR Knee Left 1/2 Views    Narrative    LEFT KNEE TWO VIEWS March 14, 2019 1:22 PM     HISTORY: Obtain a true AP/lateral view without rotation.    COMPARISON: None.      Impression    IMPRESSION: There is a small knee effusion. Left total knee  replacement. There are calcifications in the quadriceps tendon. No  fracture or dislocation.    JAMES LEA MD   CBC with platelets differential   Result Value Ref Range    WBC 10.0 4.0 - 11.0 10e9/L    RBC Count 4.80 3.8 - 5.2 10e12/L    Hemoglobin 14.9 11.7 - 15.7 g/dL    Hematocrit 45.1 35.0 - 47.0 %    MCV 94 78 - 100 fl    MCH 31.0 26.5 - 33.0 pg    MCHC 33.0 31.5 - 36.5 g/dL    RDW 12.5 10.0 - 15.0 %    Platelet Count 187 150 - 450 10e9/L    Diff Method Automated Method     % Neutrophils 83.1 %    % Lymphocytes 10.1 %    % Monocytes 5.9 %    % Eosinophils 0.1 %    % Basophils 0.4 %    % Immature Granulocytes 0.4 %    Nucleated RBCs 0 0 /100    Absolute Neutrophil 8.3 1.6 - 8.3 10e9/L    Absolute Lymphocytes 1.0 0.8 - 5.3 10e9/L    Absolute Monocytes 0.6 0.0 - 1.3 10e9/L    Absolute Eosinophils 0.0 0.0 - 0.7 10e9/L    Absolute Basophils 0.0 0.0 - 0.2 10e9/L    Abs Immature Granulocytes 0.0 0 - 0.4 10e9/L    Absolute Nucleated RBC 0.0    Basic metabolic panel   Result Value Ref Range    Sodium 140 133 - 144 mmol/L    Potassium 3.9 3.4 - 5.3 mmol/L    Chloride 102 94 - 109 mmol/L    Carbon Dioxide 29 20 - 32 mmol/L    Anion Gap 9 3 - 14 mmol/L    Glucose 134 (H) 70 - 99 mg/dL    Urea Nitrogen 31 (H) 7 - 30 mg/dL     Creatinine 1.18 (H) 0.52 - 1.04 mg/dL    GFR Estimate 43 (L) >60 mL/min/[1.73_m2]    GFR Estimate If Black 50 (L) >60 mL/min/[1.73_m2]    Calcium 10.0 8.5 - 10.1 mg/dL   Troponin I   Result Value Ref Range    Troponin I ES <0.015 0.000 - 0.045 ug/L   INR   Result Value Ref Range    INR 2.19 (H) 0.86 - 1.14   Basic metabolic panel   Result Value Ref Range    Sodium 141 133 - 144 mmol/L    Potassium 3.5 3.4 - 5.3 mmol/L    Chloride 108 94 - 109 mmol/L    Carbon Dioxide 26 20 - 32 mmol/L    Anion Gap 7 3 - 14 mmol/L    Glucose 103 (H) 70 - 99 mg/dL    Urea Nitrogen 26 7 - 30 mg/dL    Creatinine 0.93 0.52 - 1.04 mg/dL    GFR Estimate 57 (L) >60 mL/min/[1.73_m2]    GFR Estimate If Black 66 >60 mL/min/[1.73_m2]    Calcium 9.3 8.5 - 10.1 mg/dL   INR   Result Value Ref Range    INR 2.20 (H) 0.86 - 1.14   INR   Result Value Ref Range    INR 2.66 (H) 0.86 - 1.14   EKG 12 lead   Result Value Ref Range    Interpretation ECG Click View Image link to view waveform and result    EKG 12 lead   Result Value Ref Range    Interpretation ECG Click View Image link to view waveform and result    Social Work IP Consult    Narrative    Jeni Loera BSW     3/13/2019  3:12 PM  Care Transition Initial Assessment -      Met with: Patient, son Michele  Active Problems:    Left leg pain       DATA  Lives With: facility resident   Living Arrangements: independent living facility. [facility]  Quality of Family Relationships: helpful, involved, supportive  Description of Support System: Supportive, Involved  Who is your support system?: Children  Support Assessment: Adequate family and caregiver support,   Adequate social supports.   Identified issues/concerns regarding health management: Pt   resides at the [facility]. Pt has paid staffing from outside   agencies [facility] and [facility] to assist with   dressing/grooming daily, and bathing 2 days/week. Pt receives   meals, housekeeping, and a staff pendant from the facility. PT    evaluated and recommended TCU. Facility preference is [facility], who   has a contract with [facility]. Private room preferred. SW discussed   private pay if [employer] doesn't provide auth, $[phone] upfront   with daily cost of $300-400. Son reported that this would be an   option if needed, understanding that pt cannot return to ILF in   her current physical condition. Referral send to [facility] for   review--facility anticipates a bed available for pt. Son   requesting HE WC transport at discharge. Reviewed out of pocket   cost for [facility] transport, $75 for base rate and $5 per   mile to the destination. Pt/family expressed understanding and   are agreeable to this.           Quality of Family Relationships: helpful, involved, supportive  Transportation Anticipated: family or friend will provide    ASSESSMENT  Cognitive Status: Sleepy. Pt was sleeping for the remainder of   the assessment.   Concerns to be addressed: Discharge planning. TCU referral   sent--[employer] insurance requiring prior auth     PLAN  Financial costs for the patient includes: $45 private room fee at   TCU, WC transport.  Patient given options and choices for discharge Yes.  Patient/family is agreeable to the plan?  Yes.  Patient Goals and Preferences: [facility] TCU prior to returning home.  Patient anticipates discharging to:  TCU-TBD. Referral sent to   [facility] for review. SW to continue following for discharge   planning. HE WC transport at discharge.          Influenza A/B antigen   Result Value Ref Range    Influenza A/B Agn Specimen Nasopharyngeal     Influenza A Negative NEG^Negative    Influenza B Negative NEG^Negative          Attestation:  I have reviewed today's vital signs, notes, medications, labs and imaging with Dr. Alvarez.  Amount of time performed on this consult: 45 minutes.    Doris Hewitt PA-C

## 2019-03-14 NOTE — PROGRESS NOTES
Discharge Planner   Discharge Plans in progress: [facility] TCU  Barriers to discharge plan: [employer] prior authorization  Follow up plan: Pt has clinically been accepted for a prvt TCU bed at [facility]. Facility has initiated prior auth process for insurance. HE WC transport needed at discharge. SW will continue to follow for discharge planning.        Entered by: Jeni Loera 03/14/2019 8:33 AM

## 2019-03-14 NOTE — PROGRESS NOTES
[facility]  Hospitalist Progress Note  Venita Moraes MD 03/14/19  Text Page  Pager: [phone] (7am-6pm)    Reason for Stay (Diagnosis): Left Knee Pain         Assessment and Plan:      Summary of Stay: Liberty Horta is a 84 year old female with past medical history of hypothyroidism, chronic atrial fibrillation on coumadin, history of stress cardiomyopathy (EF 35% in 2010 w/ normal heart cath at that time, EF improved to 50-55% on echo 10/2016), history of ?SSS after anesthesia, GERD, hypertension, and osteoarthritis who was admitted on 3/12/19 with left knee pain which started after she had lowered herself to the ground due to weakness/lightheadedness. Hospital stay has been complicated by urinary retention with difficult catheter placement, so now also has a Lieberman in place. Will need TCU upon discharge.    Problem List/Assessment and Plan:     1.  Left Knee Pain: No fractures on extensive left lower extremity XR imaging.  She has a prior left TKA. She had persistent rather severe pain in the left knee limiting any mobility.  CT obtained which showed likely chronic quadricept tendon injury and small effusion but no other acute abnormalities.  Also found to have severe central stenosis of L3-L4 but no symptoms to go along with this (just has pain in the left knee). Pain management has been difficult for her in the past as she is very sensitive to sedative effect of narcotics.   -Orthopedics consulted  -Schedule tylenol 1000 mg Q8H, prn ultram (she has not tolerated oxycodone in the past)  -Will need TCU upon discharge     2.  Generalized weakness with episode of lightheadedness  Patient's independent living has been on room quarantine precautions for influenza. The patient typically ambulates multiple times daily in the hallways at her facility, but has unable to due this because of the quarantine; instead she has been very sedentary, essentially sitting in her chair most of the day for the  past 5 days prior to admission. Per patient's son, she historically deconditions very easily if she does not remain active and ambulatory. Infectious work up on admission including influenza testing, CXR and UA were unremarkable. Lightheadedness resolved. I suspect this is deconditioning due to pain as well as decreased movement PTA.  -PT evaluated patient today and recommended TCU     3.  Acute urinary retention  Had urinary retention, straight cath attempted multiple times with difficulty. Zavaleta was placed and will remain for now given repetitive cath attempts. She has historically had Zavaleta catheters placed in the setting of post-op urinary retention.  -PO Flomax 0.4 mg daily (started this admission)  -Keep zavaleta in at least until tomorrow     4.  Chronic atrial fibrillation w/ pacer in place for hx of AVB w/ cardiac pauses  Rate has been controlled here.  -Continue warfarin with pharmacy to dose  -Continue PTA diltiazem and sotalol  -Continue to monitor on telemetry.     5.  Hypothyroidism  -Continue PTA levothyroxine.     6.  Hyperlipidemia  -Continue PTA atorvastatin.    Diet: Regular Diet Adult    DVT Prophylaxis: Warfarin  Zavaleta Catheter: in place, indication: Retention  Code Status: Full Code      Disposition Plan   Expected discharge: Tomorrow, recommended to transitional care unit once adequate pain management/ tolerating PO medications.  Entered: Venita Moraes MD 03/14/2019, 10:07 AM       The patient's care was discussed with the Bedside Nurse, Care Coordinator/, Patient and Patient's Family.    Venita Moraes MD  Hospitalist Service  [facility]          Interval History (Subjective):      Patient was seen with her son this morning.  She states that her pain moves around but her son helps with history.  He states that she had been complaining of left thigh pain before she came to the hospital but since she has been admitted she has continued to complain of left knee pain.  " She has been unable to ambulate because of this.  She denies numbness, tingling or back pain.  She did have urinary retention and zavaleta was placed yesterday.  She is now eating well.  No CP, SOB or abdominal pain.  Denies nausea or emesis.                  Physical Exam:      Last Vital Signs:  /61 (BP Location: Right arm)   Pulse 61   Temp 96  F (35.6  C) (Oral)   Resp 20   Ht 1.549 m (5' 1\")   Wt 67.9 kg (149 lb 9.6 oz)   SpO2 92%   BMI 28.27 kg/m      General: Alert, awake, no acute distress.  HEENT: Normocephalic and atraumatic, eyes anicteric and without scleral injection, EOMI, face symmetric, MMM.  Cardiac: RRR, normal S1, S2. No m/g/r, 1 + non pitting LE edema.  Pulmonary: Normal chest rise, normal work of breathing.  Lungs CTAB without crackles or wheezing.  Abdomen: soft, non-tender, non-distended.  Normoactive bowel sounds, no guarding or rebound tenderness.  Extremities: Mild left knee valgus position, she is able to slightly internally and externally rotate the left hip, no warmth or redness, unable to flex knee due to pain.  Warm, well perfused.  Skin: no rashes or lesions.  Warm and Dry.  Neuro: No focal deficits.  Speech clear.  Moving upper extremities in bed, very weak.  Cannot move left leg significantly due to pain in the left knee  Psych: Alert and oriented x3. Appropriate affect.         Medications:      All current medications were reviewed with changes reflected in problem list.         Data:      All new lab and imaging data was reviewed.   Labs:  Recent Labs   Lab 03/13/19  0635 03/12/19  1817    140   POTASSIUM 3.5 3.9   CHLORIDE 108 102   CO2 26 29   ANIONGAP 7 9   * 134*   BUN 26 31*   CR 0.93 1.18*   GFRESTIMATED 57* 43*   GFRESTBLACK 66 50*   ARLENE 9.3 10.0     Recent Labs   Lab 03/12/19  1817   WBC 10.0   HGB 14.9   HCT 45.1   MCV 94         Imaging:   Recent Results (from the past 24 hour(s))   CT Knee Left w/o Contrast    Narrative    CT KNEE LEFT " WITHOUT CONTRAST March 13, 2019 6:12 PM     HISTORY: Knee pain, initial exam. Knee pain, negative x-ray or  effusion only.    TECHNIQUE: Radiation dose for this scan was reduced using automated  exposure control, adjustment of the mA and/or kV according to patient  size, or iterative reconstruction technique.    FINDINGS: Left total knee arthroplasty is in place with associated  metallic artifact. There is no lucency about the tibial cement/host  bone interface to suggest tibial component loosening. The femoral  cement/host bone interface is less well seen. However, there is no  definitive evidence of femoral component loosening. There is a  nonmetal backed patellar prosthesis without definitive evidence of  loosening. There are several small calcifications/ossifications  located approximately 2 cm from the superior margin of the patella  associated with the quadriceps tendon. These are also radiographically  visible on 3/12/2019. There appears to be a small amount of fluid  within the suprapatellar recess of the knee joint. Partly calcified  superficial venous varices are noted about the knee. No periarticular  fracture is identified.      Impression    IMPRESSION:  1. Several calcifications/ossifications superior to the patella which  may be related to age indeterminate quadriceps tendon injury, most  likely chronic.  2. Nonspecific small joint effusion.  3. Left total knee arthroplasty without other evidence of  complication.  4. Partly calcified superficial venous varicosities noted proximal and  distal to the knee. Possibilities include chronic superficial  thrombophlebitis.    LEONEL VALERIO MD   CT Pelvis Bone wo Contrast    Narrative    CT PELVIS BONE WITHOUT CONTRAST 3/13/2019 6:13 PM     HISTORY: Pelvic fracture, known or suspected; fall, x-ray negative for  fracture, cannot bear weight, evaluate for fracture.    Radiation dose for this scan was reduced using automated exposure  control, adjustment of the  mA and/or kV according to patient size, or  iterative reconstruction technique.    FINDINGS: On the uppermost image, there is probable severe central  stenosis at the presumed L3-4 level. Anterior fusion appears to be  present at the presumed L4-L5 and L5-S1 levels. The L5 segment may be  transitional suggesting possible segmentation anomaly in the lumbar  spine. There is no evidence of pelvic or sacral fracture. The proximal  femurs appear intact without evidence of hip fracture.  Chondrocalcinosis is noted at the hips and pubis symphysis.  Degenerative changes are present at the pubis symphysis. Hip joint  space widths are relatively well preserved. Arterial calcification is  noted. There is a small amount of peritoneal fluid in the pelvis of  indeterminate etiology. A Lieberman catheter is present within the urinary  bladder.      Impression    IMPRESSION:  1. Suspected severe central stenosis at the presumed L3-L4 level on  the edge of the scan, not optimally evaluated with this scan. 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 The  presumed L4-L5 and L5-S1 disc levels appear to be anteriorly fused.  2. No apparent hip or pelvic fracture.  3. Chondrocalcinosis and pubis symphysis degenerative changes.  4. Small amount of free peritoneal fluid within the pelvis of  indeterminate etiology.    MD Venita SANDRA MD.

## 2019-03-15 ENCOUNTER — APPOINTMENT (OUTPATIENT)
Dept: NUCLEAR MEDICINE | Facility: CLINIC | Age: 84
DRG: 556 | End: 2019-03-15
Attending: PHYSICIAN ASSISTANT
Payer: COMMERCIAL

## 2019-03-15 LAB
INR PPP: 2.94 (ref 0.86–1.14)
WBC # BLD AUTO: 7.5 10E9/L (ref 4–11)

## 2019-03-15 PROCEDURE — 25000132 ZZH RX MED GY IP 250 OP 250 PS 637: Performed by: INTERNAL MEDICINE

## 2019-03-15 PROCEDURE — A9561 TC99M OXIDRONATE: HCPCS | Performed by: INTERNAL MEDICINE

## 2019-03-15 PROCEDURE — 99232 SBSQ HOSP IP/OBS MODERATE 35: CPT | Performed by: INTERNAL MEDICINE

## 2019-03-15 PROCEDURE — 85048 AUTOMATED LEUKOCYTE COUNT: CPT | Performed by: INTERNAL MEDICINE

## 2019-03-15 PROCEDURE — 78315 BONE IMAGING 3 PHASE: CPT

## 2019-03-15 PROCEDURE — 85610 PROTHROMBIN TIME: CPT | Performed by: INTERNAL MEDICINE

## 2019-03-15 PROCEDURE — 36415 COLL VENOUS BLD VENIPUNCTURE: CPT | Performed by: INTERNAL MEDICINE

## 2019-03-15 PROCEDURE — 12000011 ZZH R&B MS OVERFLOW

## 2019-03-15 PROCEDURE — 34300033 ZZH RX 343: Performed by: INTERNAL MEDICINE

## 2019-03-15 PROCEDURE — 25000132 ZZH RX MED GY IP 250 OP 250 PS 637: Performed by: PHYSICIAN ASSISTANT

## 2019-03-15 RX ORDER — WARFARIN SODIUM 5 MG/1
5 TABLET ORAL
Status: COMPLETED | OUTPATIENT
Start: 2019-03-15 | End: 2019-03-15

## 2019-03-15 RX ADMIN — SOTALOL HYDROCHLORIDE 80 MG: 80 TABLET ORAL at 20:01

## 2019-03-15 RX ADMIN — Medication 25 MG: at 08:04

## 2019-03-15 RX ADMIN — Medication 25.5 MILLICURIE: at 13:28

## 2019-03-15 RX ADMIN — WARFARIN SODIUM 5 MG: 5 TABLET ORAL at 18:27

## 2019-03-15 RX ADMIN — ACETAMINOPHEN 1000 MG: 500 TABLET, FILM COATED ORAL at 20:02

## 2019-03-15 RX ADMIN — DILTIAZEM HYDROCHLORIDE 180 MG: 180 CAPSULE, COATED, EXTENDED RELEASE ORAL at 08:49

## 2019-03-15 RX ADMIN — Medication 50 MG: at 13:56

## 2019-03-15 RX ADMIN — Medication 25 MG: at 22:34

## 2019-03-15 RX ADMIN — FUROSEMIDE 40 MG: 40 TABLET ORAL at 08:49

## 2019-03-15 RX ADMIN — ACETAMINOPHEN 1000 MG: 500 TABLET, FILM COATED ORAL at 03:13

## 2019-03-15 RX ADMIN — ACETAMINOPHEN 1000 MG: 500 TABLET, FILM COATED ORAL at 12:02

## 2019-03-15 RX ADMIN — LEVOTHYROXINE SODIUM 150 MCG: 150 TABLET ORAL at 08:49

## 2019-03-15 RX ADMIN — TAMSULOSIN HYDROCHLORIDE 0.4 MG: 0.4 CAPSULE ORAL at 08:49

## 2019-03-15 RX ADMIN — SOTALOL HYDROCHLORIDE 80 MG: 80 TABLET ORAL at 08:49

## 2019-03-15 NOTE — PLAN OF CARE
Vital signs:  Temp: 96.8  F (36  C) Temp src: Oral BP: 149/60 Pulse: 61 Heart Rate: 60 Resp: 16 SpO2: 93 %    Neuros: Intact  GI:  Bowel sounds positive.   : Zavaleta in place,  Will possibly  take out this morning and do voiding trials  SKIN: Scattered  bruising and ecchymosis  Activity: Assist of 2, did not get out of bed yesterday  Diet: Regular  Pain: Pain to left leg 5/10, taking tramadol and scheduled tylenol with relief. Brace to left knee on   Plan:  Remove zavaleta, do voiding trials and will discharge to [facility] TCU when ready

## 2019-03-15 NOTE — PLAN OF CARE
"  /57 (BP Location: Right arm)   Pulse 61   Temp 99.6  F (37.6  C) (Temporal)   Resp 18   Ht 1.549 m (5' 1\")   Wt 67.9 kg (149 lb 9.6 oz)   SpO2 94%   BMI 28.27 kg/m    Neuro: WNL  Cardiac: WNL, pacer  Tele: A-paced with 1*AVB HR 60 per tele tech  Lungs: WNL  GI: WNL  : zavaleta catheter in place d/t retention; anticipate void trial 3/15  Pain: L knee pain managed with scheduled tylenol, PRN tramadol  IV: PIV RYANE SL  Labs/tests: L knee xray completed today - no acute findings  Diet: Regular, tolerating well, appetite good  Activity: Ax2 when oob; assisted to stand at bedside x3 this afternoon; orthotics consulted to place knee brace to L knee - pt reports she has noticed quite an improvement since brace was applied, has not been oob since brace applied  Plan: continue to encourage mobility, encourage PO intake, anticipate discharge to TCU 3/15 pending successful void post-zavaleta removal      "

## 2019-03-15 NOTE — PROGRESS NOTES
Orthopedic Surgery  Liberty Horta  3/15/2019  Admit Date:  3/12/2019  Left Knee Pain following fall  Incidental finding of L3-4 central canal stenosis seen on CT    Patient resting comfortably in bed.    Pain controlled while at rest.  Still pain with weight bearing.   Reports pain much improved with  T-scope brace applied.   Tolerating oral intake.    Denies nausea or vomiting  Denies chest pain or shortness of breath    Vital Sign Ranges  Temperature Temp  Av.8  F (36.6  C)  Min: 96.8  F (36  C)  Max: 99.6  F (37.6  C)   Blood pressure Systolic (24hrs), Av , Min:125 , Max:158        Diastolic (24hrs), Av, Min:56, Max:77      Pulse No Data Recorded   Respirations Resp  Av.7  Min: 16  Max: 18   Pulse oximetry SpO2  Av %  Min: 92 %  Max: 94 %       On physical exam of the left knee, patient's incision is well-healed.  She has a mild joint effusion present.  She is globally tender to palpation throughout the left knee and left leg.  She does have increased tenderness to palpation along the lateral aspect of the left knee, also tenderness superior to patella.  Pain with both valgus and varus stress testing -  difficult to assess stability due to her discomfort.  Able to flex to 10 degrees slightly more comfortably today vs yesterday.  Mild discomfort with left calf compression.  Denies pain with dorsi or plantarflexion of her left ankle.  Sensation is intact and equal bilaterally.  Pulses present.    Labs:  Recent Labs   Lab Test 19  0635 19  2258   POTASSIUM 3.5 3.9 4.9     Recent Labs   Lab Test 19  2258 10/09/16  0550   HGB 14.9 14.0 7.2*     Recent Labs   Lab Test 03/15/19  0618 19  0642 19  0635   INR 2.94* 2.66* 2.20*     Recent Labs   Lab Test 19  2258 10/08/16  0624    198 111*     CT scans of the knee and pelvis were obtained: Negative for fracture  Pelvis CT did reveal appearance of severe  central stenosis at the L3-4 level  X-rays of the knee, femur, and tibia and fibula were all negative for fracture.    A/P  1. PLAN:   Patient seen and examined by Dr. Armstrong today.     An ultrasound of the quadriceps tendon was ordered, unfortunately this cannot be completed at [facility] until next week.  Therefore we will proceed with a bilateral lower extremity bone scan to further evaluate for possible insufficiency fracture versus prosthetic loosening.   WBC decreased today, would not recommend an aspiration at this time   Nonweightbearing status due to patient's pain, continue to attempt transferring with physical therapy   Pain medication as ordered, ice as needed.  T score   Brace able to be off in the bed per patient request.  Have applied during transferring.    2. Disposition   Anticipate d/c to TCU when stable, progressing in PT.     Doris Hewitt PA-C

## 2019-03-15 NOTE — PLAN OF CARE
A&O. CMS intact. LLE pain managed w/ scheduled Tylenol and prn Ultram. 2+ moderate swelling to BLE. Leg brace in place, pt. stated she feels relief w/ this brace on. Currently NWB to LLE. Lieberman catheter patent w/ adequate amts. of output. BS active x4, tolerating regular diet, passing flatus. Family remains at bedside. Pt. to complete bone scan of lower extremities at 3:00 PM. Ortho following. Discharge plans pending. Will continue to monitor.

## 2019-03-15 NOTE — CONSULTS
Consult Date:  03/15/2019      CHIEF COMPLAINT:  Left leg pain.      HISTORY OF PRESENT ILLNESS:  Dr. Haji has requested orthopedic evaluation for this 84-year-old female with a history of a previous left knee arthroplasty.  She was admitted due to pain and difficulty walking.  Apparently, according to her son, she has been a limited ambulator using a walker.  Apparently, also there was a quarantine in her care facility, so she did not do walking for over 1 week.  She then attempted to walk and felt weak and lowered herself to the ground without a discrete injury.  She notes diffuse left leg pain.  A component of this was present previously.  She localizes this predominantly about the knee.      PAST MEDICAL HISTORY:  Significant for atrial fibrillation, hypothyroidism, hyperlipidemia and a suspected acute renal insufficiency from dehydration.      PHYSICAL EXAMINATION:  She has normal general appearance and affect, respirations are normal.  Skin is intact.  She is able flex and extend her foot.  She has no clear discomfort with internal and external rotation of the hip.  Exam of her knee shows that she is unwilling to perform a straight leg lift test.  There is no clear soft tissue swelling or effusion of the knee.  Collateral ligaments are stable.  She is diffusely tender from her tubercle to mid quad.  There is no palpable defect.      X-rays are unremarkable.  There is a small amount of anterior femoral bone that appears reactive.  There is calcification in the region of the distal quad on the lateral view.  Review of her CT scan shows no evidence of fracture.  The musculoskeletal radiologist overread this report and felt that the quadriceps tendon was intact.      IMPRESSION:     1.  Left knee pain.   2.  Status post left total knee replacement.      RECOMMENDATIONS:  I reviewed with the family and the patient that the origin of her pain is unclear.  She could have an insufficiency fracture or quadriceps  injury, although less likely based on the CT exam.  She is unable to have an MRI scan.  I will obtain a bone scan of the lower extremities to rule out a fracture.  If this is negative, she will be sent to a rehabilitation facility with reevaluation of her leg as an outpatient.  If she continues to have difficulties performing a leg lift, an ultrasound could be done as an outpatient.         VIRGIE SCHULTZ MD             D: 03/15/2019   T: 03/15/2019   MT: JOVANNA      Name:     ESTEFANY MONROY   MRN:      [id number]        Account:       [id number]   :      1935           Consult Date:  03/15/2019      Document: [id number]

## 2019-03-15 NOTE — PROGRESS NOTES
[facility]  Hospitalist Progress Note  Venita Moraes MD 03/14/19  Text Page  Pager: [phone] (7am-6pm)    Reason for Stay (Diagnosis): Left Knee Pain         Assessment and Plan:      Summary of Stay: Liberty Horta is a 84 year old female with past medical history of hypothyroidism, chronic atrial fibrillation on coumadin, history of stress cardiomyopathy (EF 35% in 2010 w/ normal heart cath at that time, EF improved to 50-55% on echo 10/2016), history of ?SSS after anesthesia, GERD, hypertension, and osteoarthritis who was admitted on 3/12/19 with left knee pain which started after she had lowered herself to the ground due to weakness/lightheadedness. Hospital stay has been complicated by urinary retention with difficult catheter placement, so now also has a Lieberman in place. Today will have bone scan to further evaluate for occult fracture of the knee.  Will need TCU upon discharge.    Problem List/Assessment and Plan:     1.  Left Knee Pain: No fractures on extensive left lower extremity XR imaging.  She has a prior left TKA. She had persistent rather severe pain in the left knee limiting any mobility.  CT obtained which showed likely chronic quadricept tendon injury and small effusion but no other acute abnormalities.  Also found to have severe central stenosis of L3-L4 but no symptoms to go along with this (just has pain in the left knee). Pain management has been difficult for her in the past as she is very sensitive to sedative effect of narcotics. Discussed with orthopedics today.  Plan had been to get US of the quadricept to rule out a ruptured tendon (which would require surgery) but this cannot be done at [facility] until next Wednesday.  Instead will be obtaining bone scan of the knee to see if occult fracture is present (cannot do MRI due to PPM).  -Orthopedics consulted, appreciate recommendations  -Schedule tylenol 1000 mg Q8H, prn ultram (she has not tolerated oxycodone in the  past)  -Will need TCU upon discharge  -NWB LLE until bone scan is complete  -Bone scan of left knee today     2.  Generalized weakness with episode of lightheadedness  Patient's independent living has been on room quarantine precautions for influenza. The patient typically ambulates multiple times daily in the hallways at her facility, but has unable to due this because of the quarantine; instead she has been very sedentary, essentially sitting in her chair most of the day for the past 5 days prior to admission. Per patient's son, she historically deconditions very easily if she does not remain active and ambulatory. Infectious work up on admission including influenza testing, CXR and UA were unremarkable. Lightheadedness resolved. I suspect this is deconditioning due to pain as well as decreased movement PTA.  -PT evaluated patient today and recommended TCU     3.  Acute urinary retention  Had urinary retention, straight cath attempted multiple times with difficulty. Zavaleta was placed and will remain for now given repetitive cath attempts. She has historically had Zavaleta catheters placed in the setting of post-op urinary retention.  -PO Flomax 0.4 mg daily (started this admission)  -Keep zavaleta in at least until tomorrow (will be able to determine weight bear status after bone scan done)     4.  Chronic atrial fibrillation w/ pacer in place for hx of AVB w/ cardiac pauses  Rate has been controlled here.  -Continue warfarin with pharmacy to dose  -Continue PTA diltiazem and sotalol  -Continue to monitor on telemetry.     5.  Hypothyroidism  -Continue PTA levothyroxine.     6.  Hyperlipidemia  -Continue PTA atorvastatin.    Diet: Regular Diet Adult    DVT Prophylaxis: Warfarin  Zavaleta Catheter: in place, indication: Retention  Code Status: Full Code      Disposition Plan   Expected discharge: Tomorrow, recommended to transitional care unit once adequate pain management/ tolerating PO medications.  Entered: Venita Melgar  "MD Dimitry 03/15/2019, 11:46 AM       The patient's care was discussed with the Bedside Nurse, Care Coordinator/, Patient and Patient's Family.    Venita Moraes MD  Hospitalist Service  [facility]          Interval History (Subjective):      Patient was seen with her son this morning.  She is resting at this time.  I discussed the case with Ortho on two occasions.  Had been planning of US of the hamstring to rule out rupture but this could not be done until next Wednesday.  Now will be obtaining bone scan of the knee to rule out fracture or instability of the hardware.    Needs to be non weight bearing on the left leg until imaging is done.                  Physical Exam:      Last Vital Signs:  /77 (BP Location: Right arm)   Pulse 61   Temp 97  F (36.1  C) (Oral)   Resp 16   Ht 1.549 m (5' 1\")   Wt 67.9 kg (149 lb 9.6 oz)   SpO2 92%   BMI 28.27 kg/m      General: Resting comfortably in bed, NAD  HEENT: Normocephalic and atraumatic, eyes anicteric and without scleral injection, EOMI, face symmetric, MMM.  Cardiac: RRR, normal S1, S2. No m/g/r, 1 + non pitting LE edema.  Pulmonary: Normal chest rise, normal work of breathing.  Lungs CTAB without crackles or wheezing.  Abdomen: soft, non-tender, non-distended.  Normoactive bowel sounds, no guarding or rebound tenderness.  Extremities: Mild left knee valgus position, she is able to slightly internally and externally rotate the left hip, no warmth or redness, unable to flex knee due to pain.  Warm, well perfused.  Skin: no rashes or lesions.  Warm and Dry.  Neuro: No focal deficits.  Speech clear.  Moving upper extremities in bed, very weak.  Cannot move left leg significantly due to pain in the left knee  Psych: Alert and oriented x3. Appropriate affect.         Medications:      All current medications were reviewed with changes reflected in problem list.         Data:      All new lab and imaging data was reviewed. "   Labs:  Recent Labs   Lab 03/13/19  0635 03/12/19  1817    140   POTASSIUM 3.5 3.9   CHLORIDE 108 102   CO2 26 29   ANIONGAP 7 9   * 134*   BUN 26 31*   CR 0.93 1.18*   GFRESTIMATED 57* 43*   GFRESTBLACK 66 50*   ARLENE 9.3 10.0     Recent Labs   Lab 03/15/19  0618 03/12/19  1817   WBC 7.5 10.0   HGB  --  14.9   HCT  --  45.1   MCV  --  94   PLT  --  187      Imaging:   Recent Results (from the past 24 hour(s))   XR Knee Left 1/2 Views    Narrative    LEFT KNEE TWO VIEWS March 14, 2019 1:22 PM     HISTORY: Obtain a true AP/lateral view without rotation.    COMPARISON: None.      Impression    IMPRESSION: There is a small knee effusion. Left total knee  replacement. There are calcifications in the quadriceps tendon. No  fracture or dislocation.    MD Venita OLMSTEAD MD.

## 2019-03-16 VITALS
TEMPERATURE: 93.9 F | OXYGEN SATURATION: 94 % | RESPIRATION RATE: 16 BRPM | SYSTOLIC BLOOD PRESSURE: 150 MMHG | HEIGHT: 61 IN | BODY MASS INDEX: 28.25 KG/M2 | WEIGHT: 149.6 LBS | HEART RATE: 60 BPM | DIASTOLIC BLOOD PRESSURE: 71 MMHG

## 2019-03-16 LAB
ANION GAP SERPL CALCULATED.3IONS-SCNC: 7 MMOL/L (ref 3–14)
BUN SERPL-MCNC: 27 MG/DL (ref 7–30)
CALCIUM SERPL-MCNC: 9.4 MG/DL (ref 8.5–10.1)
CHLORIDE SERPL-SCNC: 105 MMOL/L (ref 94–109)
CO2 SERPL-SCNC: 26 MMOL/L (ref 20–32)
CREAT SERPL-MCNC: 0.89 MG/DL (ref 0.52–1.04)
GFR SERPL CREATININE-BSD FRML MDRD: 60 ML/MIN/{1.73_M2}
GLUCOSE SERPL-MCNC: 87 MG/DL (ref 70–99)
INR PPP: 2.71 (ref 0.86–1.14)
POTASSIUM SERPL-SCNC: 4 MMOL/L (ref 3.4–5.3)
SODIUM SERPL-SCNC: 138 MMOL/L (ref 133–144)

## 2019-03-16 PROCEDURE — 99239 HOSP IP/OBS DSCHRG MGMT >30: CPT | Performed by: HOSPITALIST

## 2019-03-16 PROCEDURE — 36415 COLL VENOUS BLD VENIPUNCTURE: CPT | Performed by: INTERNAL MEDICINE

## 2019-03-16 PROCEDURE — 25000132 ZZH RX MED GY IP 250 OP 250 PS 637: Performed by: PHYSICIAN ASSISTANT

## 2019-03-16 PROCEDURE — 85610 PROTHROMBIN TIME: CPT | Performed by: INTERNAL MEDICINE

## 2019-03-16 PROCEDURE — 80048 BASIC METABOLIC PNL TOTAL CA: CPT | Performed by: INTERNAL MEDICINE

## 2019-03-16 RX ORDER — TAMSULOSIN HYDROCHLORIDE 0.4 MG/1
0.4 CAPSULE ORAL DAILY
DISCHARGE
Start: 2019-03-17 | End: 2019-06-17

## 2019-03-16 RX ORDER — TRAMADOL HYDROCHLORIDE 50 MG/1
25-50 TABLET ORAL EVERY 6 HOURS PRN
Qty: 30 TABLET | Refills: 0 | Status: SHIPPED | OUTPATIENT
Start: 2019-03-16 | End: 2019-06-17

## 2019-03-16 RX ORDER — AMOXICILLIN 250 MG
1 CAPSULE ORAL 2 TIMES DAILY PRN
DISCHARGE
Start: 2019-03-16 | End: 2019-06-17

## 2019-03-16 RX ORDER — POLYETHYLENE GLYCOL 3350 17 G/17G
17 POWDER, FOR SOLUTION ORAL DAILY PRN
DISCHARGE
Start: 2019-03-16 | End: 2019-06-17

## 2019-03-16 RX ORDER — ACETAMINOPHEN 500 MG
1000 TABLET ORAL EVERY 8 HOURS
Status: ON HOLD | DISCHARGE
Start: 2019-03-16 | End: 2019-06-19

## 2019-03-16 RX ADMIN — FUROSEMIDE 40 MG: 40 TABLET ORAL at 08:38

## 2019-03-16 RX ADMIN — SOTALOL HYDROCHLORIDE 80 MG: 80 TABLET ORAL at 08:38

## 2019-03-16 RX ADMIN — ACETAMINOPHEN 1000 MG: 500 TABLET, FILM COATED ORAL at 12:28

## 2019-03-16 RX ADMIN — ACETAMINOPHEN 1000 MG: 500 TABLET, FILM COATED ORAL at 03:34

## 2019-03-16 RX ADMIN — TAMSULOSIN HYDROCHLORIDE 0.4 MG: 0.4 CAPSULE ORAL at 08:38

## 2019-03-16 RX ADMIN — DILTIAZEM HYDROCHLORIDE 180 MG: 180 CAPSULE, COATED, EXTENDED RELEASE ORAL at 08:38

## 2019-03-16 RX ADMIN — LEVOTHYROXINE SODIUM 150 MCG: 150 TABLET ORAL at 08:38

## 2019-03-16 NOTE — PLAN OF CARE
"/76 (BP Location: Right arm)   Pulse 60   Temp 96.8  F (36  C) (Oral)   Resp 16   Ht 1.549 m (5' 1\")   Wt 67.9 kg (149 lb 9.6 oz)   SpO2 96%   BMI 28.27 kg/m      A&Ox4. Hypertensive this AM (181/76), VSS otherwise. CMS intact. LLE pain managed w/ scheduled Tylenol. Prn Ultram available. 2+ moderate swelling to BLE. Leg brace in place, pt. stated she feels relief w/ this brace on. Currently NWB to LLE. Lieberman catheter patent w/ adequate amts. of output (retention, continues po Flomax). BS active x4, tolerating regular diet, passing flatus. Family remains at bedside. Ortho following. Discharge plans pending. Will continue to monitor.     "

## 2019-03-16 NOTE — DISCHARGE SUMMARY
[facility]  Hospitalist Discharge Summary       Date of Admission:  3/12/2019  Date of Discharge:  3/16/2019  Discharging Provider: Portillo Villela MD      Discharge Diagnoses   Knee pain, likely soft tissue injury    Follow-ups Needed After Discharge   Follow-up Appointments     Follow Up and recommended labs and tests      Follow up with halfway physician.  The following labs/tests are   recommended: INR in 2-3 days.  Follow up with Dr.RANDALL CHRISTA SCHULTZ MD  In 1-2 weeks ([facility]   Orthopedics [phone])             Unresulted Labs Ordered in the Past 30 Days of this Admission     No orders found from 1/11/2019 to 3/13/2019.          Discharge Disposition   Discharged to short-term care facility  Condition at discharge: Stable    Hospital Course    84 year old female who has a past medical history significant for hypothyroidism, atrial fibrillation, skin cancer, GERD, hypertension, and osteoarthritis.  She has been having right leg pain for the past several days.  There have also been several people at her living facility with influenza.  Because of the influenza present at her living facility, residents have been restricted to the rooms to try to keep them safe.  She has not been walking as she usually does because she has been confined to her room.  She has not been very mobile because of right leg pain either.  She had not been eating and drinking as well as usual.  She felt very thirsty.  Had gotten up to go get a glass of water.  Felt quite dizzy.  Felt as though she might fall.  She lowered herself to the ground.  Did not fall.  Did not suffer trauma.  Did feel that her legs were in an awkward position as she lowered herself to the ground.  Continued to feel weak.  Was unable to get up off of the floor.  Was brought to emergency room for further evaluation.  She has noted an occasional cough.  Has not felt short of breath.  Has not felt as though she has had fevers or chills.  Has  not urinated for several hours.  Has had an occasional fall in the past.  No other complaints at this time.    Patient had xrays, CT and bone scan of her left knee. Ortho was following. We were unable to obtain an MRI due to pacemaker. At this point ortho feels she had a soft tissue injury. She is now in a brace. She will discharge to TCU today. She had issues with urinary retention while here. A zavaleta is in and she was started on flomax. Voiding trial will be done at TCU. Son in room. Questions answered.    Consultations This Hospital Stay   PHYSICAL THERAPY ADULT IP CONSULT  PHARMACY TO DOSE WARFARIN  SOCIAL WORK IP CONSULT  ORTHOPEDIC SURGERY IP CONSULT  ORTHOSIS EXTREMITY LOWER REFERRAL IP CONSULT  PHYSICAL THERAPY ADULT IP CONSULT  OCCUPATIONAL THERAPY ADULT IP CONSULT    Code Status   Full Code    Time Spent on this Encounter   I, Portillo Villela, personally saw the patient today and spent greater than 30 minutes discharging this patient.       Portillo Villela MD  [facility]  ______________________________________________________________________    Physical Exam   Vital Signs: Temp: 96.8  F (36  C) Temp src: Oral BP: 181/76 Pulse: 60   Resp: 16 SpO2: 96 % O2 Device: None (Room air)    Weight: 149 lbs 9.6 oz  Constitutional: awake, alert, cooperative, no apparent distress, and appears stated age  Respiratory: No increased work of breathing, good air exchange, clear to auscultation bilaterally, no crackles or wheezing  Cardiovascular: Normal apical impulse, regular rate and rhythm, normal S1 and S2, no S3 or S4, and no murmur noted  GI: No scars, normal bowel sounds, soft, non-distended, non-tender, no masses palpated, no hepatosplenomegally       Primary Care Physician   JENNIFER ROJAS    Immunizations       Discharge Orders      General info for SNF    Length of Stay Estimate: Short Term Care: Estimated # of Days 31-90  Condition at Discharge: Stable  Level of care:skilled    Rehabilitation Potential: Fair  Admission H&P remains valid and up-to-date: Yes  Recent Chemotherapy: N/A  Use Nursing Home Standing Orders: Yes     Mantoux instructions    Give two-step Mantoux (PPD) Per Facility Policy Yes     Reason for your hospital stay    Left knee pain     Lieberman catheter    To straight gravity drainage. Voiding trial in 1-2 days     Follow Up and recommended labs and tests    Follow up with senior care physician.  The following labs/tests are recommended: INR in 2-3 days.  Follow up with Dr.RANDALL CHRISTA SCHULTZ MD  In 1-2 weeks ([facility] Orthoped[facility] [phone])     Activity - Up with nursing assistance    Continue knee brace until follows up with ortho     Full Code     Physical Therapy Adult Consult    Evaluate and treat as clinically indicated.    Reason:  Knee pain     Occupational Therapy Adult Consult    Evaluate and treat as clinically indicated.    Reason:  Knee pain     Fall precautions     Advance Diet as Tolerated    Follow this diet upon discharge: Orders Placed This Encounter      Regular Diet Adult       Significant Results and Procedures   Most Recent 3 CBC's:  Recent Labs   Lab Test 03/15/19  0618 03/12/19  1817 11/19/16  2258 10/09/16  0550  10/08/16  0624   WBC 7.5 10.0 7.5  --   --  7.8   HGB  --  14.9 14.0 7.2*   < > 7.3*   MCV  --  94 100  --   --  101*   PLT  --  187 198  --   --  111*    < > = values in this interval not displayed.     Most Recent 3 BMP's:  Recent Labs   Lab Test 03/16/19  0608 03/13/19  0635 03/12/19  1817    141 140   POTASSIUM 4.0 3.5 3.9   CHLORIDE 105 108 102   CO2 26 26 29   BUN 27 26 31*   CR 0.89 0.93 1.18*   ANIONGAP 7 7 9   ARLENE 9.4 9.3 10.0   GLC 87 103* 134*     Most Recent Urinalysis:  Recent Labs   Lab Test 11/19/16  2304   COLOR Light Yellow   APPEARANCE Clear   URINEGLC Negative   URINEBILI Negative   URINEKETONE Negative   SG 1.009   UBLD Negative   URINEPH 5.0   PROTEIN Negative   NITRITE Negative   LEUKEST Negative   RBCU 0    WBCU 1   ,   Results for orders placed or performed during the hospital encounter of 03/12/19   XR Pelvis and Hip Left 2 Views    Narrative    PELVIS AND HIP LEFT TWO VIEWS   3/12/2019 8:18 PM     HISTORY: Fall, left hip pain    COMPARISON: 10/4/2016.      Impression    IMPRESSION: No acute fracture is seen. Both hips appear located. Pubic  symphysis degenerative change suggested.    SHYANN PETERSEN MD   XR Knee Left 1/2 Views    Narrative    KNEE LEFT ONE TO TWO VIEWS    3/12/2019 8:18 PM     HISTORY: Fall, left knee pain    COMPARISON: None.      Impression    IMPRESSION: No acute fracture is seen. Left total knee arthroplasty  appears unremarkable.    SHYANN PETERSEN MD   Head CT w/o contrast    Narrative    CT SCAN OF THE HEAD WITHOUT CONTRAST   3/12/2019 8:00 PM     HISTORY: fall, on anticoagulation, high risk    TECHNIQUE:  Axial images of the head and coronal reformations without  IV contrast material. Radiation dose for this scan was reduced using  automated exposure control, adjustment of the mA and/or kV according  to patient size, or iterative reconstruction technique.    COMPARISON: None.    FINDINGS:     Intracranial contents: There is diffuse parenchymal volume loss.   White matter changes are present in the cerebral hemispheres that are  consistent with small vessel ischemic disease in this age patient.  There is no evidence of intracranial hemorrhage, mass, acute infarct  or anomaly.    Visualized orbits/sinuses/mastoids:  The visualized portions of the  sinuses and mastoids appear normal.    Osseous structures/soft tissues:  There is no evidence of trauma.      Impression    IMPRESSION: No bleed or fractures are identified.      NIKKI MONET MD   XR Femur Left 2 Views    Narrative    FEMUR TWO VIEWS LEFT  3/12/2019 9:48 PM     HISTORY: fall, left leg pain    COMPARISON: None.    FINDINGS: There is normal osseous alignment.  No fractures are  identified.      Impression    IMPRESSION: No fractures are  identified on these images.    NIKKI MONET MD   XR Tibia & Fibula Left 2 Views    Narrative    TIBIA FIBULA LEFT  TWO-THREE  VIEWS 3/12/2019 9:48 PM     HISTORY: fall, left proximal leg pain    COMPARISON: None.    FINDINGS: There is normal osseous alignment.  No fractures are  identified. Diffuse osteopenia. A total knee arthroplasty is in place.      Impression    IMPRESSION: No fractures are identified on these views.    NIKKI MONET MD   XR Chest Port 1 View    Narrative    XR CHEST PORT 1 VW  3/12/2019 11:35 PM     HISTORY: Weakness.    COMPARISON: 11/17/2010.    FINDINGS: Semiupright portable chest. Left subclavian cardiac device  in place. No pneumothorax. The heart is at the upper limits of normal  in size. There is no pulmonary edema. The thoracic aorta is calcified.  The lungs are clear. Old deformity of the right humeral head.  Degenerative disease in the thoracic spine.      Impression    IMPRESSION: No acute abnormality.    JODEE CARROLL MD   CT Pelvis Bone wo Contrast    Narrative    CT PELVIS BONE WITHOUT CONTRAST 3/13/2019 6:13 PM     HISTORY: Pelvic fracture, known or suspected; fall, x-ray negative for  fracture, cannot bear weight, evaluate for fracture.    Radiation dose for this scan was reduced using automated exposure  control, adjustment of the mA and/or kV according to patient size, or  iterative reconstruction technique.    FINDINGS: On the uppermost image, there is probable severe central  stenosis at the presumed L3-4 level. Anterior fusion appears to be  present at the presumed L4-L5 and L5-S1 levels. The L5 segment may be  transitional suggesting possible segmentation anomaly in the lumbar  spine. There is no evidence of pelvic or sacral fracture. The proximal  femurs appear intact without evidence of hip fracture.  Chondrocalcinosis is noted at the hips and pubis symphysis.  Degenerative changes are present at the pubis symphysis. Hip joint  space widths are relatively well preserved.  Arterial calcification is  noted. There is a small amount of peritoneal fluid in the pelvis of  indeterminate etiology. A Lieberman catheter is present within the urinary  bladder.      Impression    IMPRESSION:  1. Suspected severe central stenosis at the presumed L3-L4 level on  the edge of the scan, not optimally evaluated with this scan. 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 The  presumed L4-L5 and L5-S1 disc levels appear to be anteriorly fused.  2. No apparent hip or pelvic fracture.  3. Chondrocalcinosis and pubis symphysis degenerative changes.  4. Small amount of free peritoneal fluid within the pelvis of  indeterminate etiology.    LEONEL VALERIO MD   CT Knee Left w/o Contrast    Narrative    CT KNEE LEFT WITHOUT CONTRAST March 13, 2019 6:12 PM     HISTORY: Knee pain, initial exam. Knee pain, negative x-ray or  effusion only.    TECHNIQUE: Radiation dose for this scan was reduced using automated  exposure control, adjustment of the mA and/or kV according to patient  size, or iterative reconstruction technique.    FINDINGS: Left total knee arthroplasty is in place with associated  metallic artifact. There is no lucency about the tibial cement/host  bone interface to suggest tibial component loosening. The femoral  cement/host bone interface is less well seen. However, there is no  definitive evidence of femoral component loosening. There is a  nonmetal backed patellar prosthesis without definitive evidence of  loosening. There are several small calcifications/ossifications  located approximately 2 cm from the superior margin of the patella  associated with the quadriceps tendon. These are also radiographically  visible on 3/12/2019. There appears to be a small amount of fluid  within the suprapatellar recess of the knee joint. Partly calcified  superficial venous varices are noted about the knee. No  periarticular  fracture is identified.      Impression    IMPRESSION:  1. Several calcifications/ossifications superior to the patella which  may be related to age indeterminate quadriceps tendon injury, most  likely chronic.  2. Nonspecific small joint effusion.  3. Left total knee arthroplasty without other evidence of  complication.  4. Partly calcified superficial venous varicosities noted proximal and  distal to the knee. Possibilities include chronic superficial  thrombophlebitis.    LEONEL VALERIO MD   XR Knee Left 1/2 Views    Narrative    LEFT KNEE TWO VIEWS March 14, 2019 1:22 PM     HISTORY: Obtain a true AP/lateral view without rotation.    COMPARISON: None.      Impression    IMPRESSION: There is a small knee effusion. Left total knee  replacement. There are calcifications in the quadriceps tendon. No  fracture or dislocation.    JAMES LEA MD   NM Bone Scan 3 Phase    Narrative    NM BONE SCAN THREE PHASE 3/15/2019 4:05 PM    HISTORY: Left knee arthroplasty 2010. Pain since injury 3/12/2019.    TECHNIQUE: 25.5 mCi 99mTc-HDP Three phase study of limited area  performed.     COMPARISON/CORRELATIVE IMAGING: None.    FINDINGS: Dynamic flow study was not diagnostic due to patient motion.  Immediate blood pool images show early increased uptake around the  left knee arthroplasty components and/or synovium. Mild early  increased uptake is seen related to the medial compartment of the  right knee.    Delayed images show moderately increased uptake around the femoral and  tibial components of the left knee arthroplasty consistent with  loosening/infection. There are no focal linear areas of increased  uptake to suggest a superimposed fracture.    Delayed images show abnormal increased uptake about the right knee,  especially in the medial compartment, most consistent with  degenerative change.      Impression    IMPRESSION:   1. Abnormal activity around the left knee arthroplasty components  consistent  with loosening/infection.  2. Probable degenerative change right knee.    GERI HAWTHORNE MD       Discharge Medications   Current Discharge Medication List      START taking these medications    Details   polyethylene glycol (MIRALAX/GLYCOLAX) packet Take 17 g by mouth daily as needed for constipation    Associated Diagnoses: Constipation, unspecified constipation type      senna-docusate (SENOKOT-S/PERICOLACE) 8.6-50 MG tablet Take 1 tablet by mouth 2 times daily as needed for constipation    Associated Diagnoses: Constipation, unspecified constipation type      tamsulosin (FLOMAX) 0.4 MG capsule Take 1 capsule (0.4 mg) by mouth daily    Associated Diagnoses: Urinary retention      traMADol (ULTRAM) 50 MG tablet Take 0.5-1 tablets (25-50 mg) by mouth every 6 hours as needed for moderate pain  Qty: 30 tablet, Refills: 0    Associated Diagnoses: Acute pain of left knee         CONTINUE these medications which have CHANGED    Details   acetaminophen (TYLENOL) 500 MG tablet Take 2 tablets (1,000 mg) by mouth every 8 hours For 1 week, then re-assess    Associated Diagnoses: Acute pain of left knee         CONTINUE these medications which have NOT CHANGED    Details   diltiazem (TIAZAC) 180 MG 24 hr ER beaded capsule Take 180 mg by mouth daily      furosemide (LASIX) 40 MG tablet Take 40 mg by mouth daily      LEVOTHYROXINE SODIUM PO Take 150 mcg by mouth daily      Multiple Vitamins-Minerals (CENTRUM SILVER) per tablet Take 1 tablet by mouth daily      psyllium (METAMUCIL/KONSYL) capsule Take 2 capsules by mouth 2 times daily      SOTALOL HCL PO Take 80 mg by mouth 2 times daily      warfarin (COUMADIN) 2.5 MG tablet Take by mouth daily 5 mg MWFSa, 6 mg TuThSu or as directed by INR clinic      ATORVASTATIN CALCIUM PO Take 40 mg by mouth At Bedtime           Allergies   Allergies   Allergen Reactions     Darvocet [Propoxyphene N-Apap]      Demerol [Meperidine]

## 2019-03-16 NOTE — PLAN OF CARE
Vital signs:  Temp: 96.5  F (35.8  C) Temp src: Oral BP: 152/68 Pulse: 60 Heart Rate: 56 Resp: 18 SpO2: 93 %     Neuro:  Intact., patient arouses easily to voice, alert, orientated x 4 pleasant, cooperative  Cardiac:  Monitor per tele tech:  Paced rhythm  GI:  Bowel sounds positive. Patient tolerating evening meal tray without nausea or emesis  : Lieberman patient, urine output moderate  SKIN: Scattered  bruising and ecchymosis  Activity: assist of two with no weight bearing on left lower extremity  Diet: Regular  Pain: Pain to left leg 5/10, taking tramadol and scheduled tylenol with relief. Brace to left knee on   Plan: Discharge to TCU at [facility].  Continue to provide supportive cares.

## 2019-03-16 NOTE — PLAN OF CARE
Patient's After Visit Summary was reviewed with patient and/or family (son).   Patient verbalized understanding of After Visit Summary, recommended follow up and was given an opportunity to ask questions.   Discharge medications sent home with patient/family: Not applicable (Prescriptions faxed/sent to [facility])   Discharged with HE transport via wheelchair.    IV access removed. All personal belongings returned to patient. Lieberman catheter patent.

## 2019-03-16 NOTE — PLAN OF CARE
PRIMARY DIAGNOSIS: L knee pain  OUTPATIENT/OBSERVATION GOALS TO BE MET BEFORE DISCHARGE:  1. Pain Status: Improved-controlled with oral pain medications.    2. Return to near baseline physical activity: No    3. Cleared for discharge by consultants (if involved): N/A    Discharge Planner Nurse   Safe discharge environment identified: Yes  Barriers to discharge: Yes       Entered by: Elda Mckeon 03/16/2019 5:49 AM    Pt is A&Ox4, repositioning with assist x2, SL, pain controlled with oral pain meds, zavaleta in for retention- urine clear, brace on left leg, NWB on left leg, PT following- recommends TCU, tolerating a regular diet, will continue to monitor, Tele: AV paced-60     Please review provider order for any additional goals.   Nurse to notify provider when observation goals have been met and patient is ready for discharge.

## 2019-03-16 NOTE — PROGRESS NOTES
Service Date: 2019      There are no changes in the patient's status.      Physical examination is unchanged.        RADIOLOGY REPORT:  I reviewed the patient's bone scan.  There is no evidence of fracture about the femur or tibia.  There is somewhat increased uptake around the knee itself.  This is nonspecific.  This appears to be present on both the femoral and tibial sides.      ASSESSMENT AND PLAN:  There is no clinical evidence of infection with no fever and a normal white blood count.  Her history is more consistent with a soft tissue or bony injury.  There is no evidence of fracture or quadriceps rupture.  I will review this with the patient, but I recommend observation alone at this point.         VIRGIE SCHULTZ MD             D: 2019   T: 2019   MT: SANIA      Name:     ESTEFANY MONROY   MRN:      [id number]        Account:      [id number]   :      1935           Service Date: 2019      Document: [id number]

## 2019-03-16 NOTE — PHARMACY-ANTICOAGULATION SERVICE
Clinical Pharmacy- Warfarin Discharge Note  This patient is currently on warfarin for the treatment of Atrial fibrillation.  INR Goal= 2-3  Expected length of therapy unknown    Warfarin PTA Regimen: 5 mg MWFSa, 6 mg ROW      Anticoagulation Dose History     Recent Dosing and Labs Latest Ref Rng & Units 10/9/2016 11/19/2016 3/12/2019 3/13/2019 3/14/2019 3/15/2019 3/16/2019    Warfarin 2 mg - - - - - 6 mg - -    Warfarin 3 mg - - - - 6 mg - - -    Warfarin 5 mg - - - - - - 5 mg -    INR 0.86 - 1.14 1.46(H) 2.52(H) 2.19(H) 2.20(H) 2.66(H) 2.94(H) 2.71(H)          Vitamin K doses administered during the last 7 days: none  FFP administered during the last 7 days: none      Agree with discharge orders to resume home dose of warfarin 5mg on Mon,Wed,Fri,Sat, + 6mg the rest of the week. INR check per anticoagulation clinic.

## 2019-03-16 NOTE — PROGRESS NOTES
Notified [facility] patient is ready for discharge.   Patient and Family in agreement of discharge plan.   Requested for transport to be scheduled.   Notified [facility] transport is scheduled for 1230. Faxed orders.

## 2019-03-16 NOTE — PLAN OF CARE
Physical Therapy Discharge Summary    Reason for therapy discharge:    Discharged to transitional care facility.    Progress towards therapy goal(s). See goals on Care Plan in [facility] electronic health record for goal details.  Goals partially met.  Barriers to achieving goals:   discharge from facility.    Therapy recommendation(s):    Continued therapy is recommended.  Rationale/Recommendations:  To improve functional mobility as pt below baseline .

## 2019-03-24 ENCOUNTER — RECORDS - HEALTHEAST (OUTPATIENT)
Dept: LAB | Facility: CLINIC | Age: 84
End: 2019-03-24

## 2019-03-25 LAB
ANION GAP SERPL CALCULATED.3IONS-SCNC: 13 MMOL/L (ref 5–18)
BUN SERPL-MCNC: 24 MG/DL (ref 8–28)
CALCIUM SERPL-MCNC: 9.9 MG/DL (ref 8.5–10.5)
CHLORIDE BLD-SCNC: 104 MMOL/L (ref 98–107)
CO2 SERPL-SCNC: 22 MMOL/L (ref 22–31)
CREAT SERPL-MCNC: 0.73 MG/DL (ref 0.6–1.1)
GFR SERPL CREATININE-BSD FRML MDRD: >60 ML/MIN/1.73M2
GLUCOSE BLD-MCNC: 91 MG/DL (ref 70–125)
POTASSIUM BLD-SCNC: 4 MMOL/L (ref 3.5–5)
SODIUM SERPL-SCNC: 139 MMOL/L (ref 136–145)

## 2019-04-04 ENCOUNTER — RECORDS - HEALTHEAST (OUTPATIENT)
Dept: LAB | Facility: CLINIC | Age: 84
End: 2019-04-04

## 2019-04-04 LAB — INR PPP: 1.73 (ref 0.9–1.1)

## 2019-05-08 ENCOUNTER — MEDICAL CORRESPONDENCE (OUTPATIENT)
Dept: HEALTH INFORMATION MANAGEMENT | Facility: CLINIC | Age: 84
End: 2019-05-08

## 2019-05-10 ENCOUNTER — AMBULATORY - HEALTHEAST (OUTPATIENT)
Dept: OTHER | Facility: CLINIC | Age: 84
End: 2019-05-10

## 2019-05-10 ENCOUNTER — DOCUMENTATION ONLY (OUTPATIENT)
Dept: OTHER | Facility: CLINIC | Age: 84
End: 2019-05-10

## 2019-06-17 ENCOUNTER — HOSPITAL ENCOUNTER (OUTPATIENT)
Facility: CLINIC | Age: 84
Setting detail: OBSERVATION
Discharge: NURSING, REHAB OR LONG-TERM CARE | End: 2019-06-19
Attending: EMERGENCY MEDICINE | Admitting: INTERNAL MEDICINE
Payer: COMMERCIAL

## 2019-06-17 ENCOUNTER — APPOINTMENT (OUTPATIENT)
Dept: CT IMAGING | Facility: CLINIC | Age: 84
End: 2019-06-17
Attending: EMERGENCY MEDICINE
Payer: COMMERCIAL

## 2019-06-17 DIAGNOSIS — S32.058A OTHER CLOSED FRACTURE OF FIFTH LUMBAR VERTEBRA, INITIAL ENCOUNTER (H): ICD-10-CM

## 2019-06-17 DIAGNOSIS — S32.059A CLOSED FRACTURE OF FIFTH LUMBAR VERTEBRA, UNSPECIFIED FRACTURE MORPHOLOGY, INITIAL ENCOUNTER (H): Primary | ICD-10-CM

## 2019-06-17 LAB
ALBUMIN UR-MCNC: NEGATIVE MG/DL
ANION GAP SERPL CALCULATED.3IONS-SCNC: 6 MMOL/L (ref 3–14)
APPEARANCE UR: CLEAR
BASOPHILS # BLD AUTO: 0 10E9/L (ref 0–0.2)
BASOPHILS NFR BLD AUTO: 0.3 %
BILIRUB UR QL STRIP: NEGATIVE
BUN SERPL-MCNC: 20 MG/DL (ref 7–30)
CALCIUM SERPL-MCNC: 9.7 MG/DL (ref 8.5–10.1)
CHLORIDE SERPL-SCNC: 105 MMOL/L (ref 94–109)
CO2 SERPL-SCNC: 30 MMOL/L (ref 20–32)
COLOR UR AUTO: NORMAL
CREAT SERPL-MCNC: 0.81 MG/DL (ref 0.52–1.04)
DIFFERENTIAL METHOD BLD: NORMAL
EOSINOPHIL # BLD AUTO: 0.2 10E9/L (ref 0–0.7)
EOSINOPHIL NFR BLD AUTO: 2.1 %
ERYTHROCYTE [DISTWIDTH] IN BLOOD BY AUTOMATED COUNT: 12.8 % (ref 10–15)
GFR SERPL CREATININE-BSD FRML MDRD: 67 ML/MIN/{1.73_M2}
GLUCOSE SERPL-MCNC: 95 MG/DL (ref 70–99)
GLUCOSE UR STRIP-MCNC: NEGATIVE MG/DL
HCT VFR BLD AUTO: 44.9 % (ref 35–47)
HGB BLD-MCNC: 14.2 G/DL (ref 11.7–15.7)
HGB UR QL STRIP: NEGATIVE
IMM GRANULOCYTES # BLD: 0 10E9/L (ref 0–0.4)
IMM GRANULOCYTES NFR BLD: 0.4 %
INR PPP: 1.97 (ref 0.86–1.14)
KETONES UR STRIP-MCNC: NEGATIVE MG/DL
LEUKOCYTE ESTERASE UR QL STRIP: NEGATIVE
LYMPHOCYTES # BLD AUTO: 1.3 10E9/L (ref 0.8–5.3)
LYMPHOCYTES NFR BLD AUTO: 16.5 %
MCH RBC QN AUTO: 30.9 PG (ref 26.5–33)
MCHC RBC AUTO-ENTMCNC: 31.6 G/DL (ref 31.5–36.5)
MCV RBC AUTO: 98 FL (ref 78–100)
MONOCYTES # BLD AUTO: 0.7 10E9/L (ref 0–1.3)
MONOCYTES NFR BLD AUTO: 9.2 %
NEUTROPHILS # BLD AUTO: 5.6 10E9/L (ref 1.6–8.3)
NEUTROPHILS NFR BLD AUTO: 71.5 %
NITRATE UR QL: NEGATIVE
NRBC # BLD AUTO: 0 10*3/UL
NRBC BLD AUTO-RTO: 0 /100
PH UR STRIP: 6.5 PH (ref 5–7)
PLATELET # BLD AUTO: 187 10E9/L (ref 150–450)
POTASSIUM SERPL-SCNC: 4 MMOL/L (ref 3.4–5.3)
RBC # BLD AUTO: 4.6 10E12/L (ref 3.8–5.2)
RBC #/AREA URNS AUTO: 1 /HPF (ref 0–2)
SODIUM SERPL-SCNC: 141 MMOL/L (ref 133–144)
SOURCE: NORMAL
SP GR UR STRIP: 1.01 (ref 1–1.03)
UROBILINOGEN UR STRIP-MCNC: NORMAL MG/DL (ref 0–2)
WBC # BLD AUTO: 7.8 10E9/L (ref 4–11)
WBC #/AREA URNS AUTO: 1 /HPF (ref 0–5)

## 2019-06-17 PROCEDURE — 72131 CT LUMBAR SPINE W/O DYE: CPT

## 2019-06-17 PROCEDURE — 25000132 ZZH RX MED GY IP 250 OP 250 PS 637: Performed by: EMERGENCY MEDICINE

## 2019-06-17 PROCEDURE — 99207 ZZC CDG-CODE CATEGORY CHANGED: CPT | Performed by: INTERNAL MEDICINE

## 2019-06-17 PROCEDURE — 51798 US URINE CAPACITY MEASURE: CPT

## 2019-06-17 PROCEDURE — 99220 ZZC INITIAL OBSERVATION CARE,LEVL III: CPT | Mod: AI | Performed by: INTERNAL MEDICINE

## 2019-06-17 PROCEDURE — 99285 EMERGENCY DEPT VISIT HI MDM: CPT | Mod: 25

## 2019-06-17 PROCEDURE — 85610 PROTHROMBIN TIME: CPT | Performed by: EMERGENCY MEDICINE

## 2019-06-17 PROCEDURE — 25000132 ZZH RX MED GY IP 250 OP 250 PS 637: Performed by: INTERNAL MEDICINE

## 2019-06-17 PROCEDURE — 85025 COMPLETE CBC W/AUTO DIFF WBC: CPT | Performed by: EMERGENCY MEDICINE

## 2019-06-17 PROCEDURE — 80048 BASIC METABOLIC PNL TOTAL CA: CPT | Performed by: EMERGENCY MEDICINE

## 2019-06-17 PROCEDURE — 81001 URINALYSIS AUTO W/SCOPE: CPT | Performed by: EMERGENCY MEDICINE

## 2019-06-17 RX ORDER — LEVOTHYROXINE SODIUM 150 UG/1
150 TABLET ORAL DAILY
Status: DISCONTINUED | OUTPATIENT
Start: 2019-06-18 | End: 2019-06-19 | Stop reason: HOSPADM

## 2019-06-17 RX ORDER — ONDANSETRON 2 MG/ML
4 INJECTION INTRAMUSCULAR; INTRAVENOUS EVERY 6 HOURS PRN
Status: DISCONTINUED | OUTPATIENT
Start: 2019-06-17 | End: 2019-06-19 | Stop reason: HOSPADM

## 2019-06-17 RX ORDER — ACETAMINOPHEN 500 MG
1000 TABLET ORAL ONCE
Status: COMPLETED | OUTPATIENT
Start: 2019-06-17 | End: 2019-06-17

## 2019-06-17 RX ORDER — NALOXONE HYDROCHLORIDE 0.4 MG/ML
.1-.4 INJECTION, SOLUTION INTRAMUSCULAR; INTRAVENOUS; SUBCUTANEOUS
Status: DISCONTINUED | OUTPATIENT
Start: 2019-06-17 | End: 2019-06-17

## 2019-06-17 RX ORDER — ATORVASTATIN CALCIUM 40 MG/1
40 TABLET, FILM COATED ORAL AT BEDTIME
Status: DISCONTINUED | OUTPATIENT
Start: 2019-06-17 | End: 2019-06-19 | Stop reason: HOSPADM

## 2019-06-17 RX ORDER — BISACODYL 10 MG
10 SUPPOSITORY, RECTAL RECTAL DAILY PRN
Status: DISCONTINUED | OUTPATIENT
Start: 2019-06-17 | End: 2019-06-19 | Stop reason: HOSPADM

## 2019-06-17 RX ORDER — LIDOCAINE 40 MG/G
CREAM TOPICAL
Status: DISCONTINUED | OUTPATIENT
Start: 2019-06-17 | End: 2019-06-19 | Stop reason: HOSPADM

## 2019-06-17 RX ORDER — LIDOCAINE 4 G/G
1 PATCH TOPICAL ONCE
Status: COMPLETED | OUTPATIENT
Start: 2019-06-17 | End: 2019-06-18

## 2019-06-17 RX ORDER — SOTALOL HYDROCHLORIDE 80 MG/1
80 TABLET ORAL 2 TIMES DAILY
Status: DISCONTINUED | OUTPATIENT
Start: 2019-06-17 | End: 2019-06-19 | Stop reason: HOSPADM

## 2019-06-17 RX ORDER — WARFARIN SODIUM 5 MG/1
5 TABLET ORAL
Status: COMPLETED | OUTPATIENT
Start: 2019-06-17 | End: 2019-06-17

## 2019-06-17 RX ORDER — DILTIAZEM HYDROCHLORIDE 180 MG/1
180 CAPSULE, COATED, EXTENDED RELEASE ORAL DAILY
Status: DISCONTINUED | OUTPATIENT
Start: 2019-06-18 | End: 2019-06-19 | Stop reason: HOSPADM

## 2019-06-17 RX ORDER — ONDANSETRON 4 MG/1
4 TABLET, ORALLY DISINTEGRATING ORAL EVERY 6 HOURS PRN
Status: DISCONTINUED | OUTPATIENT
Start: 2019-06-17 | End: 2019-06-19 | Stop reason: HOSPADM

## 2019-06-17 RX ORDER — LIDOCAINE 4 G/G
1 PATCH TOPICAL
Status: DISCONTINUED | OUTPATIENT
Start: 2019-06-18 | End: 2019-06-19 | Stop reason: HOSPADM

## 2019-06-17 RX ORDER — ACETAMINOPHEN 500 MG
1000 TABLET ORAL 3 TIMES DAILY
Status: DISCONTINUED | OUTPATIENT
Start: 2019-06-17 | End: 2019-06-19 | Stop reason: HOSPADM

## 2019-06-17 RX ORDER — AMOXICILLIN 250 MG
2 CAPSULE ORAL 2 TIMES DAILY PRN
Status: DISCONTINUED | OUTPATIENT
Start: 2019-06-17 | End: 2019-06-19 | Stop reason: HOSPADM

## 2019-06-17 RX ORDER — FUROSEMIDE 40 MG
40 TABLET ORAL DAILY
Status: DISCONTINUED | OUTPATIENT
Start: 2019-06-18 | End: 2019-06-19 | Stop reason: HOSPADM

## 2019-06-17 RX ORDER — NALOXONE HYDROCHLORIDE 0.4 MG/ML
.1-.4 INJECTION, SOLUTION INTRAMUSCULAR; INTRAVENOUS; SUBCUTANEOUS
Status: DISCONTINUED | OUTPATIENT
Start: 2019-06-17 | End: 2019-06-19 | Stop reason: HOSPADM

## 2019-06-17 RX ORDER — AMOXICILLIN 250 MG
1 CAPSULE ORAL 2 TIMES DAILY PRN
Status: DISCONTINUED | OUTPATIENT
Start: 2019-06-17 | End: 2019-06-19 | Stop reason: HOSPADM

## 2019-06-17 RX ADMIN — ACETAMINOPHEN 1000 MG: 500 TABLET, FILM COATED ORAL at 08:38

## 2019-06-17 RX ADMIN — WARFARIN SODIUM 5 MG: 5 TABLET ORAL at 18:54

## 2019-06-17 RX ADMIN — ACETAMINOPHEN 1000 MG: 500 TABLET, FILM COATED ORAL at 19:53

## 2019-06-17 RX ADMIN — OXYCODONE HYDROCHLORIDE 2.5 MG: 5 TABLET ORAL at 12:03

## 2019-06-17 RX ADMIN — ACETAMINOPHEN 1000 MG: 500 TABLET, FILM COATED ORAL at 14:41

## 2019-06-17 RX ADMIN — LIDOCAINE 1 PATCH: 246 PATCH TOPICAL at 12:03

## 2019-06-17 RX ADMIN — SOTALOL HYDROCHLORIDE 80 MG: 80 TABLET ORAL at 19:53

## 2019-06-17 RX ADMIN — ATORVASTATIN CALCIUM 40 MG: 40 TABLET, FILM COATED ORAL at 22:50

## 2019-06-17 ASSESSMENT — ENCOUNTER SYMPTOMS
DIARRHEA: 0
BLOOD IN STOOL: 0
ABDOMINAL PAIN: 0
HEADACHES: 0
FEVER: 0
MYALGIAS: 1
CONSTIPATION: 0
SHORTNESS OF BREATH: 0
DYSURIA: 0

## 2019-06-17 NOTE — PLAN OF CARE
ROOM # 220    Living Situation (if not independent, order SW consult):[facility] assisted living  Facility name:  :[facility] (son)    Activity level at baseline: Ind with walker  Activity level on admit: Assist of 2      Patient registered to observation; given Patient Bill of Rights; given the opportunity to ask questions about observation status and their plan of care.  Patient has been oriented to the observation room, bathroom and call light is in place.    Discussed discharge goals and expectations with patient/family.

## 2019-06-17 NOTE — PHARMACY-ADMISSION MEDICATION HISTORY
Admission medication history interview status for this patient is complete. See [facility] admission navigator for allergy information, prior to admission medications and immunization status.     Medication history interview source(s):Patient and Family  Medication history resources (including written lists, pill bottles, clinic record):None  [facility]    Changes made to PTA medication list:  Added: none  Deleted:   Multivitamin, miralax, metamucil, senokot, tamsolusin, tramadol  Changed: dosing on levothyroxin, atorvastatin, sotalol    Actions taken by pharmacist (provider contacted, etc):None     Additional medication history information:None    Medication reconciliation/reorder completed by provider prior to medication history? No    Do you take OTC medications (eg tylenol, ibuprofen, fish oil, eye/ear drops, etc)? Y (Y/N)    For patients on insulin therapy: N (Y/N)      Prior to Admission medications    Medication Sig Last Dose Taking? Auth Provider   acetaminophen (TYLENOL) 500 MG tablet Take 2 tablets (1,000 mg) by mouth every 8 hours For 1 week, then re-assess 6/16/2019 at pm Yes Portillo Villela MD   atorvastatin (LIPITOR) 40 MG tablet Take 1 tablet by mouth At Bedtime  6/16/2019 at Unknown time Yes Unknown, Entered By History   diltiazem (TIAZAC) 180 MG 24 hr ER beaded capsule Take 180 mg by mouth daily 6/17/2019 at am Yes Unknown, Entered By History   furosemide (LASIX) 40 MG tablet Take 40 mg by mouth daily 6/17/2019 at am Yes Unknown, Entered By History   levothyroxine (TIROSINT) 150 MCG capsule Take 150 mcg by mouth daily  6/17/2019 at am Yes Unknown, Entered By History   sotalol (BETAPACE) 80 MG tablet Take 1 tablet by mouth 2 times daily  6/17/2019 at am Yes Unknown, Entered By History   warfarin (COUMADIN) 2.5 MG tablet Take 2.5 mg by mouth daily 5 mg Tuesday-Sunday, 2.5 mg on Monday 6/17/2019 at am Yes Unknown, Entered By History

## 2019-06-17 NOTE — PHARMACY-ANTICOAGULATION SERVICE
Clinical Pharmacy - Warfarin Dosing Consult     Pharmacy has been consulted to manage this patient s warfarin therapy.  Indication: Atrial Fibrillation  Therapy Goal: INR 2-3  Warfarin Prior to Admission: Yes  Warfarin PTA Regimen: 2.5 mg Tuesday-Sunday, 5 mg Monday   Recent documented change in oral intake/nutrition: Unknown    INR   Date Value Ref Range Status   06/17/2019 1.97 (H) 0.86 - 1.14 Final   03/16/2019 2.71 (H) 0.86 - 1.14 Final       Recommend warfarin 5 mg today.  Pharmacy will monitor Liberty Horta daily and order warfarin doses to achieve specified goal.      Please contact pharmacy as soon as possible if the warfarin needs to be held for a procedure or if the warfarin goals change.

## 2019-06-17 NOTE — ED NOTES
Patient attempted ambulation. Unable to move patient from lying to sitting due to increased pain in lumbar region. MD notified

## 2019-06-17 NOTE — ED NOTES
[facility]  ED Nurse Handoff Report    Liberty Horta is a 84 year old female   ED Chief complaint: No chief complaint on file.  . ED Diagnosis:   Final diagnoses:   Other closed fracture of fifth lumbar vertebra, initial encounter (H) - Non-displaced left lateral mass     Allergies:   Allergies   Allergen Reactions     Darvocet [Propoxyphene N-Apap]      Demerol [Meperidine]        Code Status: Full Code  Activity level - Baseline/Kaia ambulate with walker. Activity Level - Current:   Assist X 2. Lift room needed: Yes. Bariatric: No   Needed: No   Isolation: No. Infection: Not Applicable.     Vital Signs:   Vitals:    06/17/19 1016 06/17/19 1017 06/17/19 1018 06/17/19 1030   BP:    127/78   Pulse:    67   Resp:       Temp:       TempSrc:       SpO2: 94% 95% 96% 95%       Cardiac Rhythm:  ,      Pain level: 0-10 Pain Scale: 5  Patient confused: No. Patient Falls Risk: Yes.   Elimination Status: Has voided   Patient Report -   Left buttock pain past 3 days, hurts more to bear weight. Denies any recent fall or injury. Staff at assisted living stated patient has been urinate more than usual, with couple accidents yesterday. Patient denies any burning urination. ABCs intact.        Focused Assessment: Genitourinary - Genitourinary WDL: all  Voiding Characteristics: incontinence; frequency; dribbling; urgency  Urine Characteristics: clear; light; clear yellow (straight cath urine assessed as clear, light yellow) Genitourinary Comment: pt with 3 days of flank pain is having increased frequency and constancy. No fever, appetite adequate  Genitalia (Female) WDL: WDL   Genitourinary -  Signs and Symptoms: flank pain   Tests Performed:   Lumbar spine CT w/o contrast   Final Result   IMPRESSION:   1. Numbering of the levels is based on what appears to be five lumbar   type vertebral bodies with partial sacralization of L5. If level   specific treatment is considered, close attention should be  given to   the numbering of the levels.   2. There is a nondisplaced recent appearing fracture involving the   left lateral mass of L5 adjacent to the left sacroiliac joint.   3. Diffuse degenerative changes as described above. No focal disc   herniation is demonstrated. This is most prominent at L3-L4 where   there is severe central canal stenosis and moderate to severe   bilateral neural foraminal stenosis.      RAJ CORONA MD      . Abnormal Results:   Labs Ordered and Resulted from Time of ED Arrival Up to the Time of Departure from the ED   INR - Abnormal; Notable for the following components:       Result Value    INR 1.97 (*)     All other components within normal limits   ROUTINE UA WITH MICROSCOPIC   CBC WITH PLATELETS DIFFERENTIAL   BASIC METABOLIC PANEL   PULSE OXIMETRY NURSING   PERIPHERAL IV CATHETER       Treatments provided: SEE MAR  Family Comments: At bedside  OBS brochure/video discussed/provided to patient:  No  ED Medications:   Medications   lidocaine 1 % 0.1-1 mL (has no administration in time range)   lidocaine (LMX4) cream (has no administration in time range)   sodium chloride (PF) 0.9% PF flush 3 mL (has no administration in time range)   sodium chloride (PF) 0.9% PF flush 3 mL (has no administration in time range)   Lidocaine (LIDOCARE) 4 % Patch 1 patch (1 patch Transdermal Given 6/17/19 1203)   acetaminophen (TYLENOL) tablet 1,000 mg (1,000 mg Oral Given 6/17/19 0838)   oxyCODONE IR (ROXICODONE) half-tab 2.5 mg (2.5 mg Oral Given 6/17/19 1203)     Drips infusing:  No  For the majority of the shift, the patient's behavior Green. Interventions performed were NA     Severe Sepsis OR Septic Shock Diagnosis Present: No      ED Nurse Name/Phone Number: Deann GOOD Fall,   12:05 PM  RECEIVING UNIT ED HANDOFF REVIEW    Above ED Nurse Handoff Report was reviewed: Yes  Reviewed by: Ramona Gonzalez on June 17, 2019 at 1:25 PM

## 2019-06-17 NOTE — ED NOTES
Pt was scanned for 558 ml in her bladder pre void. Pt used the bedpan to void. Currently scanning pt for post void residual.

## 2019-06-17 NOTE — ED NOTES
MD Performed bedside US with large amount of urine in bladder post void. No orders at this time for zavaleta.

## 2019-06-17 NOTE — ED PROVIDER NOTES
History     Chief Complaint:  Left buttock pain    HPI   Liberty Horta is a 84 year old female who presents with pain in her left buttock. She reports that the pain started a few days ago. She states that the pain worsens while she is weightbearing and reduces when she sits or lays down. She notes that she has taken Tylenol for the pain without relief. She notes urinary incontinence recently. She denies recent falls, recent injury, fevers, chest pain, shortness of breath, abdominal pain, headache, abnormal bowel movements, numbness in the legs. Of note, the patient had a fall earlier this year and had a left knee replacement.    Allergies:  Darvocet  Demerol  Tramadol    Medications:    Lipitor  Tiazac  Lasix  Tirosint  Betapace  Coumadin    Past Medical History:    Basal cell carcinoma  Chronic atrial fibrillation  Esophageal reflux  Hypertension  Hypothyroid  Osteoarthritis  Pacemaker  Renal insufficiency    Past Surgical History:    Pacemaker implant  Open reduction internal fixation femurs distal (R)  Knee replacement (L)    Family History:    History reviewed. No pertinent family history.     Social History:  Smoking status: No  Alcohol use: No  Drug use: No  PCP: JENNIFER ROJAS  Presents to the ED with her son  Marital Status:   [5]       Review of Systems   Constitutional: Negative for fever.   Respiratory: Negative for shortness of breath.    Cardiovascular: Negative for chest pain.   Gastrointestinal: Negative for abdominal pain, blood in stool, constipation and diarrhea.   Genitourinary: Positive for enuresis. Negative for dysuria.   Musculoskeletal: Positive for gait problem and myalgias.   Neurological: Negative for headaches.   All other systems reviewed and are negative.        Physical Exam     Patient Vitals for the past 24 hrs:   BP Temp Temp src Pulse Heart Rate Resp SpO2   06/17/19 1300 132/68 -- -- 68 -- -- 93 %   06/17/19 1200 127/78 -- -- 73 -- -- 96 %   06/17/19 1145 (!)  138/112 -- -- 85 -- -- 96 %   06/17/19 1131 143/78 -- -- 87 -- -- 97 %   06/17/19 1030 127/78 -- -- 67 -- -- 95 %   06/17/19 1018 -- -- -- -- -- -- 96 %   06/17/19 1017 -- -- -- -- -- -- 95 %   06/17/19 1016 -- -- -- -- -- -- 94 %   06/17/19 1015 113/70 -- -- 69 -- -- 94 %   06/17/19 1000 124/69 -- -- 72 -- -- 95 %   06/17/19 0945 113/68 -- -- 79 -- -- 95 %   06/17/19 0943 -- -- -- -- -- -- 98 %   06/17/19 0942 -- -- -- -- -- -- 97 %   06/17/19 0941 -- -- -- -- -- -- 94 %   06/17/19 0940 -- -- -- -- -- -- 96 %   06/17/19 0938 -- -- -- -- -- -- 94 %   06/17/19 0937 -- -- -- -- -- -- 96 %   06/17/19 0930 122/69 -- -- 84 -- -- 95 %   06/17/19 0921 119/68 -- -- 74 -- -- --   06/17/19 0845 121/73 -- -- 87 -- -- 95 %   06/17/19 0830 115/62 -- -- 81 -- -- 97 %   06/17/19 0815 118/62 -- -- 85 -- -- 95 %   06/17/19 0800 153/71 -- -- 78 -- -- 98 %   06/17/19 0745 144/61 -- -- 59 -- -- 96 %   06/17/19 0701 179/78 97.8  F (36.6  C) Oral 60 60 18 98 %     Physical Exam     Nursing note and vitals reviewed.    Constitutional: Pleasant and well groomed.          HENT:    Mouth/Throat: Oropharynx is without swelling or erythema. Oral mucosa moist.    Eyes: Conjunctivae are normal. No scleral icterus.    Neck: Neck supple.   Cardiovascular: Normal rate, irregular rhythm and intact distal pulses.   Pulmonary/Chest: Effort normal and breath sounds normal.   Abdominal: Soft.  No distension. There is no tenderness.   Musculoskeletal:  No edema, No calf tenderness. Left gluteal tenderness. No lower extremity shortening. Midline low lumbar tenderness and left paraspinal muscle tenderness.   Neurological:Alert and answering questions appropriately. Coordination normal. Lower dorsi plantar flexor and knee flexion 5/5. Lower extremity light touch sensation intact. Pain with attempt to flex left hip.   Skin: Skin is warm and dry. No overlying skin changes. No external sign of trauma.   Psychiatric: Normal mood and affect.     Emergency  Department Course   Imaging:  Radiographic findings were communicated with the patient who voiced understanding of the findings.  Lumbar spine CT w/o contrast  1. Numbering of the levels is based on what appears to be five lumbar  type vertebral bodies with partial sacralization of L5. If level  specific treatment is considered, close attention should be given to  the numbering of the levels.  2. There is a nondisplaced recent appearing fracture involving the  left lateral mass of L5 adjacent to the left sacroiliac joint.  3. Diffuse degenerative changes as described above. No focal disc  herniation is demonstrated. This is most prominent at L3-L4 where  there is severe central canal stenosis and moderate to severe  bilateral neural foraminal stenosis.  As read by Radiology.    Laboratory:  CBC: WNL (WBC 7.8, HGB 14.2, )  BMP: WNL (Creatinine 0.81)  INR: 1.97 (H)  UA: Negative    Interventions:  0838: Tylenol 1000 mg PO  1203: Lidocaine 1 patch Transdermal  1203: Oxycodone 2.5 mg PO    Emergency Department Course:  Past medical records, nursing notes, and vitals reviewed.  0749: I performed an exam of the patient and obtained history, as documented above.    1017: I rechecked the patient.    1027: I talked to the orthopedics PA about the patient.    1109: I rechecked the patient. Explained findings to the patient.    1117: I rechecked the patient.    1131: Findings and plan explained to the Patient who consents to admission.     1217: Discussed the patient with Dr. Hicks, who will admit the patient to an adult med/surg bed for further monitoring, evaluation, and treatment.     Impression & Plan    Medical Decision Making:  Liberty Horta is a 84 year old female who presents with acute onset of left buttock pain 3 days ago without reported trauma.  Last night this was keeping her from sleeping and therefore she presented to the emergency department.  Differential diagnosis was broad and included but  was not limited to pelvic fracture, hip fracture, zoster, urinary tract infection, lumbar radiculopathy.  ED evaluation is as noted above.  With x-rays strength Tylenol she was now able to sleep and rest comfortably.  CT revealed a nondisplaced lateral mass fracture.  Consulted with Orth about the findings and initially believed that we would be able to discharge her home however she was too uncomfortable to sit up and therefore require admission.  I consulted with Ortho again to see if they would be able to manage her as an inpatient and they agreed.  The patient had had some urinary frequency and an episode of incontinence of urine with a negative urine analysis here in the emergency department.  It did seem that she may be having some urinary retention which was determined just prior to transfer to the floor.  The inpatient service was notified of this finding.  It seems unlikely that this is related to her symptoms as the level of the fracture is the L5 level.  Dr. Lehman has accepted ongoing care of the patient for ongoing evaluation and management    Critical Care time:  none    Diagnosis:    ICD-10-CM    1. Other closed fracture of fifth lumbar vertebra, initial encounter (H) S32.058A     Non-displaced left lateral mass     Disposition:  Admitted to adult med/surg    Jose Steinberg  6/17/2019   [facility] EMERGENCY DEPARTMENT  IJose, am serving as a scribe at 7:49 AM on 6/17/2019 to document services personally performed by Marlee Jo MD based on my observations and the provider's statements to me.        Marlee Jo MD  06/17/19 0271

## 2019-06-17 NOTE — H&P
[facility]    History and Physical  Hospitalist       Date of Admission:  6/17/2019    Assessment & Plan   Liberty Horta is a 84 year old female with past medical history significant for hypothyroidism, chronic atrial fibrillation, status post pacemaker, history of skin cancer, GERD, hypertension, hyperlipidemia, osteoarthritis.  History of left knee replacement and right hip ORIF. Patient reported left buttock pain starting a few days ago.  She initially was taking Tylenol for the pain, but did not have significant relief.  Pain was worse with weightbearing and reduced when sitting or lying down.  On 6/17 morning, patient developed acutely worsening pain and was no longer able to bear weight due to the pain. Presented to the emergency department for further evaluation.  Lumbar spine CT was completed and revealed a recent-appearing nondisplaced fracture involving left lateral mass of L5 adjacent to the left sacroiliac joint. Unable to discharge home secondary to pain and patient will be admitted to hospitalist service for further evaluation and treatment of fracture with orthopedics consulting.     Nondisplaced L5 lateral vertebral body fracture  Appreciate orthopedics consulted.  Nonoperative management has been recommended.  PT and OT have been ordered.  Weightbearing as tolerated with walker.  Have scheduled Tylenol with lidocaine patch.  PRN oxycodone available.  Orthopedics will reassess in a.m. consider support brace versus steroid Dosepak.    History of urinary retention  Patient has had history of urinary retention with previous admissions.  Nursing to bladder scan and can place Lieberman if retaining.    Hypothyroidism  Continue PTA levothyroxine.    Chronic atrial fibrillation  Status post pacemaker  Continue PTA diltiazem and sotalol.  Patient is currently in a regular, paced rhythm.  INR is 1.97 and will continue warfarin with pharmacy to dose.    Hypertension  Hyperlipidemia  Continue  PTA diltiazem, Lasix, and atorvastatin.    DVT Prophylaxis: Warfarin  Code Status: Full Code after discussion with patient.  She does state that she has wish for her children to make this decision.  I have discussed this later in the day and she believes they may decide on DNR/DNI, but she wishes to wait until tomorrow to confirm this with them.  She does not wish for me to call them tonight.  Expected discharge: Anticipate hospitalization at least 2 days pending orthopedics evaluation, improvement in pain control, and ability to ambulate.     Jessica Gutierrez MD FACP  Hospitalist Service  [facility]  Text Page (7am - 6pm)    Primary Care Physician   JENNIFER ROJAS    Chief Complaint   Left buttock pain    History is obtained from the patient and the emergency department physician.    History of Present Illness   Liberty Horta is a 84 year old female with past medical history significant for hypothyroidism, chronic atrial fibrillation, history of skin cancer, GERD, hypertension, hyperlipidemia, osteoarthritis.  Patient has had a series of events leading to low back and leg pain in the past months, but denies any recent falls.  Patient reported left buttock pain starting a few days ago.  She initially was taking Tylenol for the pain, but did not have significant relief.  Pain was worse with weightbearing and reduced when sitting or lying down.  On 6/17 morning, patient developed acutely worsening pain and was no longer able to bear weight due to the pain.  Presented to the emergency department for further evaluation.  Lumbar spine CT was completed and revealed a recent appearing nondisplaced fracture involving left lateral mass of L5 adjacent to the left sacroiliac joint.  Emergency department discussed with orthopedics with initial plan for patient to discharge home.  However given increased pain and inability to sit up or bear weight, patient will be admitted for further evaluation and  treatment.  Orthopedics was consulted for further recommendations.  Patient had reported urinary frequency as well as incontinence.  Urinalysis was unremarkable in the emergency department.  It was also thought that patient may be having some degree of urinary retention at time of admission, as well, though this was not felt to be related to fracture at the L5 level. Patient will be admitted to hospitalist service for further evaluation and treatment.       Past Medical History    I have reviewed this patient's medical history and updated it with pertinent information if needed.   Past Medical History:   Diagnosis Date     Basal cell carcinoma      Chronic atrial fibrillation (H)      DVT (deep vein thrombosis) in pregnancy (H)      Esophageal reflux      Hypertension      Hypothyroid      Osteoarthritis      Pacemaker      Renal insufficiency        Past Surgical History   I have reviewed this patient's surgical history and updated it with pertinent information if needed.  Past Surgical History:   Procedure Laterality Date     IMPLANT PACEMAKER       OPEN REDUCTION INTERNAL FIXATION FEMUR DISTAL Right 10/6/2016    Procedure: OPEN REDUCTION INTERNAL FIXATION FEMUR DISTAL;  Surgeon: Filipe Coburn MD;  Location:  OR     ORTHOPEDIC SURGERY      Knee replacement left in 2011       Prior to Admission Medications   Prior to Admission Medications   Prescriptions Last Dose Informant Patient Reported? Taking?   acetaminophen (TYLENOL) 500 MG tablet 6/16/2019 at pm  No Yes   Sig: Take 2 tablets (1,000 mg) by mouth every 8 hours For 1 week, then re-assess   atorvastatin (LIPITOR) 40 MG tablet 6/16/2019 at Unknown time  Yes Yes   Sig: Take 1 tablet by mouth At Bedtime    diltiazem (TIAZAC) 180 MG 24 hr ER beaded capsule 6/17/2019 at am  Yes Yes   Sig: Take 180 mg by mouth daily   furosemide (LASIX) 40 MG tablet 6/17/2019 at am  Yes Yes   Sig: Take 40 mg by mouth daily   levothyroxine (TIROSINT) 150 MCG capsule 6/17/2019  "at am  Yes Yes   Sig: Take 150 mcg by mouth daily    sotalol (BETAPACE) 80 MG tablet 2019 at am  Yes Yes   Sig: Take 1 tablet by mouth 2 times daily    warfarin (COUMADIN) 2.5 MG tablet 2019 at Unknown time  Yes Yes   Sig: Take by mouth daily 5 mg Tuesday-, 2.5 mg on Monday      Facility-Administered Medications: None     Allergies   Allergies   Allergen Reactions     Darvocet [Propoxyphene N-Apap]      Demerol [Meperidine]      Tramadol      Patient described feeling \"squirrely\"       Social History   Patient is a retired nurse.  She has been  since her   of colon cancer at age 54.  She lives alone.  Denies any alcohol or tobacco use.  Denies illicit drugs.    Family History   Patient reports that her father  at age 85.  He has history of heart disease.  Patient's mother  in her 70s and also had a history of heart disease.    Review of Systems   Patient denies any recent fevers or chills.  Denies any recent chest pain or shortness of breath.  Denies any abdominal pain, constipation, or diarrhea.  Has reported increased frequency of urination followed by possible difficulty with urination.  Difficulty with weightbearing due to pain on left side as above.  Unless otherwise stated above remainder 10 point review of systems otherwise negative.    Physical Exam   Temp: 97.4  F (36.3  C) Temp src: Oral BP: (!) 136/92 Pulse: 74 Heart Rate: 60 Resp: 18 SpO2: 96 % O2 Device: None (Room air)    Vital Signs with Ranges  Temp:  [97.4  F (36.3  C)-97.8  F (36.6  C)] 97.4  F (36.3  C)  Pulse:  [59-87] 74  Heart Rate:  [60] 60  Resp:  [18] 18  BP: (103-179)/() 136/92  SpO2:  [92 %-98 %] 96 %  0 lbs 0 oz    Constitutional: Elderly woman who appears well for her age. Alert and oriented x3 at time of exam. No distress at rest, the patient does have discomfort with any movement.  Nontoxic.  Children at bedside earlier today.    HEENT: NCAT. EOMI. Moist oral mucosa.  Respiratory: Clear " to auscultation bilaterally. No crackles or wheezes.  Cardiovascular: Regular, paced rhythm with regular rate.  No murmur.  GI: Soft, nontender, nondistended. Normoactive bowel sounds.   Musculoskeletal: No internal rotation or lower extremity shortening.  Pain with attempts to flex left hip.  Given known fracture, did not further evaluate range of motion or strength to lower extremities.  Upper extremities 5 out of 5.    Neurologic: Alert and oriented x3. No focal neurologic deficits.     Data   Data reviewed today:  I personally reviewed labs and CT lumbar spine report.     Recent Labs   Lab 06/17/19  0831   WBC 7.8   HGB 14.2   MCV 98      INR 1.97*      POTASSIUM 4.0   CHLORIDE 105   CO2 30   BUN 20   CR 0.81   ANIONGAP 6   ARLENE 9.7   GLC 95       Recent Results (from the past 24 hour(s))   Lumbar spine CT w/o contrast    Narrative    CT LUMBAR SPINE WITHOUT CONTRAST June 17, 2019 9:18 AM    HISTORY: Back and left-sided buttock pain.    COMPARISON: None.    TECHNIQUE: CT imaging is performed through the lumbar spine without  contrast. Radiation dose for this scan was reduced using automated  exposure control, adjustment of the mA and/or kV according to patient  size, or iterative reconstruction technique.     FINDINGS: There are five lumbar type vertebral bodies with partial  sacralization of L5. The anterior aspect of the L5-S1 disc is not  entirely included on this study. Vertebral body alignment is normal.  There is a nondisplaced recent appearing fracture involving the left  lateral mass of L5 adjacent to the anterior aspect of the left  sacroiliac joint. No other fracture is demonstrated. There is  calcification of the vascular structures. No other soft tissue  abnormality is seen.    There are vacuum discs from T11 to L4. There is prominent disc height  loss at L4-L5 with bridging anterior marginal osteophytes. There is  moderate disc height loss at L2-L3 with mild disc height  loss  elsewhere.    FINDINGS by specific level:    T11-T12: There is a mild disc bulge and marginal osteophyte formation.  No disc herniation is demonstrated. There is mild narrowing of the  neural foramina bilaterally. No central canal stenosis is seen.    T12-L1: There is a mild disc bulge and moderate marginal osteophyte  formation. No disc herniation is seen. There are moderate degenerative  changes in the facet joints. No central canal stenosis is present.  There is mild bilateral neural foraminal narrowing.    L1-L2: There is a moderate disc bulge and mild marginal osteophyte  formation. No disc herniation is seen. There are moderate degenerative  changes in the facet joints. There is mild to moderate central canal  stenosis. There is moderate to severe right and moderate left neural  foraminal stenosis.    L2-L3: There is a moderately prominent disc bulge and marginal  osteophyte formation with no focal disc herniation seen. There are  moderate to advanced degenerative changes in the facet joints. There  is moderate central canal stenosis with moderate to severe right and  moderate left neural foraminal stenosis.    L3-L4: There is a prominent disc bulge and marginal osteophyte  formation with no focal disc herniation seen. There are prominent  hypertrophic degenerative changes in the facet joints. There is severe  central canal stenosis with moderate to severe bilateral neural  foraminal stenosis.    L4-L5: There is a moderate disc bulge with prominent marginal  osteophyte formation including anterior bridging spur formation.  Advanced left and mild to moderate right facet joint degenerative  changes are present. There is mild central canal stenosis with  moderate right and mild left neural foraminal stenosis.    L5-S1: The anterior aspect of the disc space is not included on this  study. The visualized disc height is well-preserved. There is a mild  disc bulge with no focal herniation seen. There are  moderate to  advanced degenerative changes in the facet joints. No stenosis is  seen.      Impression    IMPRESSION:  1. Numbering of the levels is based on what appears to be five lumbar  type vertebral bodies with partial sacralization of L5. If level  specific treatment is considered, close attention should be given to  the numbering of the levels.  2. There is a nondisplaced recent appearing fracture involving the  left lateral mass of L5 adjacent to the left sacroiliac joint.  3. Diffuse degenerative changes as described above. No focal disc  herniation is demonstrated. This is most prominent at L3-L4 where  there is severe central canal stenosis and moderate to severe  bilateral neural foraminal stenosis.    RAJ CORONA MD

## 2019-06-18 ENCOUNTER — APPOINTMENT (OUTPATIENT)
Dept: PHYSICAL THERAPY | Facility: CLINIC | Age: 84
End: 2019-06-18
Attending: INTERNAL MEDICINE
Payer: COMMERCIAL

## 2019-06-18 ENCOUNTER — APPOINTMENT (OUTPATIENT)
Dept: OCCUPATIONAL THERAPY | Facility: CLINIC | Age: 84
End: 2019-06-18
Attending: INTERNAL MEDICINE
Payer: COMMERCIAL

## 2019-06-18 LAB — INR PPP: 2.38 (ref 0.86–1.14)

## 2019-06-18 PROCEDURE — 97161 PT EVAL LOW COMPLEX 20 MIN: CPT | Mod: GP | Performed by: PHYSICAL THERAPIST

## 2019-06-18 PROCEDURE — 97116 GAIT TRAINING THERAPY: CPT | Mod: GP,59 | Performed by: PHYSICAL THERAPIST

## 2019-06-18 PROCEDURE — G0378 HOSPITAL OBSERVATION PER HR: HCPCS

## 2019-06-18 PROCEDURE — 25000132 ZZH RX MED GY IP 250 OP 250 PS 637: Performed by: INTERNAL MEDICINE

## 2019-06-18 PROCEDURE — 97165 OT EVAL LOW COMPLEX 30 MIN: CPT | Mod: GO

## 2019-06-18 PROCEDURE — 97535 SELF CARE MNGMENT TRAINING: CPT | Mod: GO

## 2019-06-18 PROCEDURE — 99225 ZZC SUBSEQUENT OBSERVATION CARE,LEVEL II: CPT | Performed by: INTERNAL MEDICINE

## 2019-06-18 PROCEDURE — 36415 COLL VENOUS BLD VENIPUNCTURE: CPT | Performed by: INTERNAL MEDICINE

## 2019-06-18 PROCEDURE — 97530 THERAPEUTIC ACTIVITIES: CPT | Mod: GP | Performed by: PHYSICAL THERAPIST

## 2019-06-18 PROCEDURE — 85610 PROTHROMBIN TIME: CPT | Performed by: INTERNAL MEDICINE

## 2019-06-18 RX ORDER — WARFARIN SODIUM 2.5 MG/1
2.5 TABLET ORAL
Status: COMPLETED | OUTPATIENT
Start: 2019-06-18 | End: 2019-06-18

## 2019-06-18 RX ADMIN — ATORVASTATIN CALCIUM 40 MG: 40 TABLET, FILM COATED ORAL at 21:30

## 2019-06-18 RX ADMIN — DILTIAZEM HYDROCHLORIDE 180 MG: 180 CAPSULE, COATED, EXTENDED RELEASE ORAL at 08:32

## 2019-06-18 RX ADMIN — ACETAMINOPHEN 1000 MG: 500 TABLET, FILM COATED ORAL at 08:31

## 2019-06-18 RX ADMIN — ACETAMINOPHEN 1000 MG: 500 TABLET, FILM COATED ORAL at 19:55

## 2019-06-18 RX ADMIN — LEVOTHYROXINE SODIUM 150 MCG: 150 TABLET ORAL at 06:48

## 2019-06-18 RX ADMIN — SOTALOL HYDROCHLORIDE 80 MG: 80 TABLET ORAL at 19:55

## 2019-06-18 RX ADMIN — SOTALOL HYDROCHLORIDE 80 MG: 80 TABLET ORAL at 08:32

## 2019-06-18 RX ADMIN — FUROSEMIDE 40 MG: 40 TABLET ORAL at 08:32

## 2019-06-18 RX ADMIN — ACETAMINOPHEN 1000 MG: 500 TABLET, FILM COATED ORAL at 14:49

## 2019-06-18 RX ADMIN — WARFARIN SODIUM 2.5 MG: 2.5 TABLET ORAL at 17:55

## 2019-06-18 RX ADMIN — Medication 2.5 MG: at 06:48

## 2019-06-18 NOTE — PROGRESS NOTES
Orthopedic Surgery  Liberty Horta  2019  Admit Date:  2019  Left sided low back pain, L5 vertebral body fracture      Alert and orient to person, place, and time.  Patient resting comfortably in chair  Pain improved today  Tolerating oral intake.    Denies nausea or vomiting  Denies chest pain or shortness of breath    Vital Sign Ranges  Temperature Temp  Av.8  F (36.6  C)  Min: 97.4  F (36.3  C)  Max: 98.1  F (36.7  C)   Blood pressure Systolic (24hrs), Av , Min:103 , Max:138        Diastolic (24hrs), Av, Min:67, Max:112      Pulse Pulse  Av.8  Min: 68  Max: 85   Respirations Resp  Av.8  Min: 16  Max: 18   Pulse oximetry SpO2  Av.9 %  Min: 92 %  Max: 96 %       On physical exam:  She denies having pain when at rest.  Minimal pain with transferring.  She remains mildly tender to palpation along the midline of her lumbar spine in the L5 region.  No palpable swelling or masses.  Straight leg raise is negative for radiculopathy.  Denies pain with bilateral hip range of motion.  Denies numbness or tingling to light touch on the right versus left lower extremities.  Distal pulses are intact and equal bilaterally.  Able to resist dorsi and plantar flexion.  Denies pain with knee flexion or extension bilaterally.      Labs:  Recent Labs   Lab Test 19  0831 19  0608 19  0635   POTASSIUM 4.0 4.0 3.5     Recent Labs   Lab Test 19  0831 19  1817 16  2258   HGB 14.2 14.9 14.0     Recent Labs   Lab Test 19  0600 19  0831 19  0608   INR 2.38* 1.97* 2.71*     Recent Labs   Lab Test 19  0831 19  1817 16  2258    187 198       1. PLAN:   Mobilize with PT/OT    WBAT Bilat LEs.     Family request limiting narcotics.  Tylenol primarily for pain    Would not add Medrol dose pack at this time as she is improving and does not have any radiating symptoms.    Patient declines support brace, states her family has one  for her.    Follow-up: 1-2 weeks with [facility] spine provider if continued or worsening pain    2. Disposition   Anticipate d/c to home vs TCU when medically cleared and progressing in PT.  Ok from ortho standpoint    Doris Hewitt PA-C

## 2019-06-18 NOTE — PROGRESS NOTES
SPIRITUAL HEALTH SERVICES Progress Note  [facility] Obs 2    SHS visit per routine hospital  request.  Pt son is with pt and states pt would like to receive  Yazdanism communion while admitted. Introduced patient/family to Spiritual Health Services and availability for support during hospital stay and advised [facility]isters will provide Eucharist daily.     Plan: Spiritual Health Services remains available for additional emotional/spiritual support.    Rickey Nava MA  Staff   Pager: [phone]  Phone: [phone]

## 2019-06-18 NOTE — PLAN OF CARE
Blood pressure 138/68, pulse 74, temperature 97.8  F (36.6  C), temperature source Oral, resp. rate 17, SpO2 93 %.    Neuro:   Patient is alert, orientated, pleasant, cooperative with cares  LS:  Clear, adequate sats on room air  CV:  WDL  GI: WDL  : Patient using bedpan for voiding.  Reported dribbling and frequency.  Bladder  Scanned for 125 ccs  Activity:  Patient is a heavy assist of two persons, unsteady on feet, uses a walker for assistance in ambulation at baseline.  PLAN:  Continue to provide supportive cares.  PT SW consulted.

## 2019-06-18 NOTE — PLAN OF CARE
PRIMARY DIAGNOSIS: ACUTE PAIN/5th lumbar vertebra fx  OUTPATIENT/OBSERVATION GOALS TO BE MET BEFORE DISCHARGE:  1. Pain Status: Improved-controlled with oral pain medications.    2. Return to near baseline physical activity: No-baseline is Ind w/walker at assisted living-currently A2 w/gaitbelt and walker    3. Cleared for discharge by consultants (if involved): No-ortho, PT, and OT following    Discharge Planner Nurse   Safe discharge environment identified: No  Barriers to discharge: Yes       Entered by: Rosario Kelly 06/18/2019 2:40 PM   /61 (BP Location: Left arm)   Pulse 74   Temp 97.8  F (36.6  C) (Oral)   Resp 16   SpO2 92%   Neuro: WNL-becomes anxious with ambulating o/w WNL  Cardiac: Hx:a-fib, s/p pacemaker  Lungs: LS clear/equal   GI: WNL  : frequency (on lasix), dribbling, incontinence.   Pain: controlled with scheduled Tylenol today  IV: SL  Diet: Regular  Activity: A2 w/gaitbelt and walker-encourage to ambulate to bathroom and to chair for meals  Plan: PT, OT, ortho, SW following. Safe discharge plan needs to be established.     Please review provider order for any additional goals.   Nurse to notify provider when observation goals have been met and patient is ready for discharge.

## 2019-06-18 NOTE — PROGRESS NOTES
06/18/19 1344   Quick Adds   Type of Visit Initial Occupational Therapy Evaluation   Living Environment   Lives With facility resident   Living Arrangements assisted living  ([facility])   Home Accessibility no concerns   Transportation Anticipated family or friend will provide   Living Environment Comment Pt lives in apt at the [facility], at baseline pt indep w/ mobility using a fww, managing her own meds, escort to meals, assist with showers and has a shower chair, A w/ dressing and 1 nighttime bathroom check . Pt can get out of bed indep, but needs A with BLE;s getting into bed. Pt has a bed rail and wedge pillow.    Self-Care   Usual Activity Tolerance fair   Current Activity Tolerance fair   Regular Exercise   (walks about 100' daily with w/ fww, wc follow to meals)   Equipment Currently Used at Home walker, rolling;shower chair   Functional Level   Ambulation 1-->assistive equipment   Transferring 1-->assistive equipment   Toileting 1-->assistive equipment   Bathing 3-->assistive equipment and person   Dressing 1-->assistive equipment   Eating 0-->independent   Communication 0-->understands/communicates without difficulty   Swallowing 0-->swallows foods/liquids without difficulty   Cognition 0 - no cognition issues reported   Fall history within last six months yes   Number of times patient has fallen within last six months 2   Which of the above functional risks had a recent onset or change? transferring;toileting   Prior Functional Level Comment Pt typically walks in her apt with fww, walks hallway x 1 w/ staff ( 100'). Son reports when pt initially moved to the [facility], was receiving Ax1 for all transfers to w/c, assist for all ADLs, had recently progressed to ambulating 100ft. Son spoke with detention and they are able to increase services as needed.    General Information   Onset of Illness/Injury or Date of Surgery - Date 06/17/19   Referring Physician Jessica Sadler MD   Patient/Family Goals Statement Pt's  goal is to d/c home w/ increased services   Additional Occupational Profile Info/Pertinent History of Current Problem Per chart: Pt is an 84 year old female admitted with progressive LLE pain with radiating symptoms and inability to bear wt, recent L 5 lateral mass vertebral fx.   Precautions/Limitations fall precautions   Cognitive Status Examination   Cognitive Comment Lethargic and fatigued during session   Visual Perception   Visual Perception Wears glasses   Sensory Examination   Sensory Comments Pt denies numbness/tingling in BUEs   Pain Assessment   Patient Currently in Pain No   Range of Motion (ROM)   ROM Comment WFL   Strength   Strength Comments Generalized weakness BUEs   Hand Strength   Hand Strength Comments Generalized weakness B gross grasp   Bed Mobility Skill: Sit to Supine   Level of [place]: Sit/Supine moderate assist (50% patients effort)   Physical Assist/Nonphysical Assist: Sit/Supine 1 person assist   Bed Mobility Skill: Supine to Sit   Level of [place]: Supine/Sit minimum assist (75% patients effort)   Physical Assist/Nonphysical Assist: Supine/Sit 1 person assist   Transfer Skill: Bed to Chair/Chair to Bed   Level of [place]: Bed to Chair contact guard   Physical Assist/Nonphysical Assist: Bed to Chair 1 person assist   Weight-Bearing Restrictions weight-bearing as tolerated   Assistive Device - Transfer Skill Bed to Chair Chair to Bed Rehab Eval rolling walker   Transfer Skill: Sit to Stand   Level of [place]: Sit/Stand contact guard   Physical Assist/Nonphysical Assist: Sit/Stand 1 person assist   Transfer Skill: Sit to Stand weight-bearing as tolerated   Assistive Device for Transfer: Sit/Stand rolling walker   Transfer Skill: Toilet Transfer   Level of [place]: Toilet contact guard   Physical Assist/Nonphysical Assist: Toilet 1 person assist   Weight-Bearing Restrictions: Toilet weight-bearing as tolerated   Assistive Device rolling walker   Balance   Balance  Comments CGA provided while in stance   Upper Body Dressing   Level of [place]: Dress Upper Body minimum assist (75% patients effort)   Physical Assist/Nonphysical Assist: Dress Upper Body 1 person assist   Lower Body Dressing   Level of [place]: Dress Lower Body maximum assist (25% patients effort)   Physical Assist/Nonphysical Assist: Dress Lower Body 1 person assist   Toileting   Level of [place]: Toilet moderate assist (50% patients effort)   Physical Assist/Nonphysical Assist: Toilet 1 person assist   Grooming   Level of [place]: Grooming minimum assist (75% patients effort)   Physical Assist/Nonphysical Assist: Grooming 1 person assist   Instrumental Activities of Daily Living (IADL)   IADL Comments Facility and family assist with all IADLs   Activities of Daily Living Analysis   Impairments Contributing to Impaired Activities of Daily Living balance impaired;strength decreased   ADL Comments Pt presents to OT below baseline level of functioning in all areas of self cares including grooming and toileting   General Therapy Interventions   Planned Therapy Interventions ADL retraining;strengthening;transfer training   Clinical Impression   Criteria for Skilled Therapeutic Interventions Met yes, treatment indicated   OT Diagnosis Impaired ADLs, mobility   Influenced by the following impairments Decreased functional activity tolerance and strength   Assessment of Occupational Performance 3-5 Performance Deficits   Identified Performance Deficits Grooming, toileting, transfers   Clinical Decision Making (Complexity) Low complexity   Therapy Frequency 5x/week   Predicted Duration of Therapy Intervention (days/wks) 3 days   Anticipated Discharge Disposition Home with Assist;Home with Home Therapy   Risks and Benefits of Treatment have been explained. Yes   Patient, Family & other staff in agreement with plan of care Yes   Clinical Impression Comments Pt would benefit from skilled OT to maximize  "safety and indep in ADLs and mobility tasks due to current deficits impacting function    [facility] AM-PAC  \"6 Clicks\" Daily Activity Inpatient Short Form   1. Putting on and taking off regular lower body clothing? 1 - Total   2. Bathing (including washing, rinsing, drying)? 1 - Total   3. Toileting, which includes using toilet, bedpan or urinal? 2 - A Lot   4. Putting on and taking off regular upper body clothing? 3 - A Little   5. Taking care of personal grooming such as brushing teeth? 3 - A Little   6. Eating meals? 4 - None   Daily Activity Raw Score (Score out of 24.Lower scores equate to lower levels of function) 14   Total Evaluation Time   Total Evaluation Time (Minutes) 10     "

## 2019-06-18 NOTE — PLAN OF CARE
PRIMARY DIAGNOSIS: L5 Lumbar Fx  OUTPATIENT/OBSERVATION GOALS TO BE MET BEFORE DISCHARGE:  1. Pain Status: Improved-controlled with oral pain medications.    2. Return to near baseline physical activity: No    3. Cleared for discharge by consultants (if involved): Yes    Discharge Planner Nurse   Safe discharge environment identified: Yes  Barriers to discharge: No       Entered by: Yessy Rolon 06/18/2019      Please review provider order for any additional goals.   Nurse to notify provider when observation goals have been met and patient is ready for discharge.    VSS, up with A-1 with walker and gait belt, tolerating regular diet, LS clear, room air, denies SOB, BS A&Ax4, passing gas, pain controlled with tylenol, lidocaine PIP, hopeful to return to intermediate with increased cares.

## 2019-06-18 NOTE — PLAN OF CARE
"OT: Order received, eval completed and treatment initiated. Per chart: Pt is an 84 year old female admitted with progressive LLE pain with radiating symptoms and inability to bear wt, recent L 5 lateral mass vertebral fx. Pt resides in [facility] at \"[facility]\", walk in shower with shower chair, RTS. Pt receives staff assist for AM/PM dressing, bathing 2x/wk, 1 nighttime BR check/assist, escort to meals (for past month has been able to ambulate ~100 ft FWW level with staff), assist for sit > supine for BLE mgmt. Facility and family assist with all IADLs including 3 meals/day, cleaning/laundry and med mgmt. Son present and reporting upon initial move to [facility], pt had Ax1 for all transfers to w/c and services can be increased to previous level.     Discharge Planner OT   Patient plan for discharge: Home w/ increased ADL/transfer services and HH PT/OT  Current status: Pt required min A for bed mobility supine > sit EOB, mod A for sit > supine for BLE mgmt. Pt completed sit > stand from EOB to FWW with CGA, required multiple trials to achieve upright standing position, but no physical assist required. Pt ambulated to BR FWW level with CGA, slow pace, increased time. Pt completed toilet transfer on/off RTS with CGA and safety cues for positioning. Pt able to stand at sink for grooming/hygiene task of oral cares, min A for toothpaste mgmt. Pt required seated rest break on return trip to EOB due to fatigue. Pt lethargic during session. Extensive conversation had with pt and pt's son regarding previous living environment and recommendations.   Barriers to return to prior living situation: Current Ax1 required for ADLs/transfers, decreased strength and functional activity tolerance, fatigue/lethargy  Recommendations for discharge: Home to [facility] w/ increased services including Ax1 for all transfers/mobility, Ax1 for toileting/toilet tf, resumed assist for ADLs/IADLS and HH PT/OT  Rationale for recommendations: Pt is below " baseline level of functioning, recommend ongoing skilled OT to maximize safety and indep in ADLs and mobility tasks. If [facility] unable to increase services to Ax1 for all transfers/ADLS, recommend TCU.        Entered by: Dina Segura 06/18/2019 2:33 PM

## 2019-06-18 NOTE — UTILIZATION REVIEW
"  Admission Status; Secondary Review Determination         Under the authority of the Utilization Management Committee, the utilization review process indicated a secondary review on the above patient.  The review outcome is based on review of the medical records, discussions with staff, and applying clinical experience noted on the date of the review.          (x) Observation Status Appropriate - This patient does not meet hospital inpatient criteria and is placed in observation status. If this patient's primary payer is Medicare and was admitted as an inpatient, Condition Code 44 should be used and patient status changed to \"observation\".     RATIONALE FOR DETERMINATION     The severity of illness, intensity of service provided, expected LOS and risk for adverse outcome make the care appropriate for further observation; however, doesn't meet criteria for hospital inpatient admission.   notified of this determination.    Patient is an 84-year-old female who was brought to the emergency room for assessment of significant pain in her low back especially on the side when weightbearing and it was improved with sitting or lying down.  The pain worsened and she was unable to weight-bear.  Lumbar CT scan was done that showed a acute nondisplaced fracture involving the lateral facet of L5 adjacent to the left SI joint.  There is also noted significant spinal stenosis at L3-4 with significant bilateral severe nerve impingement.  She does have significant past medical history for hypothyroidism,, chronic atrial fib, status post pacemaker, degenerative arthritis.  She does have a left knee replacement.  Orthopedics has been consulted and is debating on need for steroid injection.  At this point no surgery has been recommended.  The patient is no longer retaining urine and there are no other acute medical issues being treated for the hospital.  I discussed the case with Dr. Sadler.  We agreed that the hospital " status is most consistent with observation at this time.      The information on this document is developed by the utilization review team in order for the business office to ensure compliance.  This only denotes the appropriateness of proper admission status and does not reflect the quality of care rendered.         The definitions of Inpatient Status and Observation Status used in making the determination above are those provided in the CMS Coverage Manual, Chapter 1 and Chapter 6, section 70.4.      Sincerely,     Jsoe Groves MD  Physician Advisor  Utilization Review/ Case Management  [facility].

## 2019-06-18 NOTE — PROGRESS NOTES
06/18/19 1000   Quick Adds   Type of Visit Initial PT Evaluation   Living Environment   Lives With facility resident   Living Arrangements assisted living  ([facility])   Transportation Anticipated family or friend will provide   Living Environment Comment Pt lives in apt at the [facility], at baseline pt indep w/ mobility using a fww, managing her own meds, escort to meals, assist with showers and has a shower chair, A w/ dressing and 1 nighttime bathroom check . Pt can get out of bed indep, but needs A with BLE;s getting into bed. Pt has a bed rail and wedge pillow.    Self-Care   Usual Activity Tolerance fair   Current Activity Tolerance poor   Regular Exercise   (walks about 100' daily with w/ fww, wc follow to meals)   Equipment Currently Used at Home walker, rolling;shower chair   Activity/Exercise/Self-Care Comment Indep w/ most ADL's   Functional Level Prior   Ambulation 1-->assistive equipment   Transferring 1-->assistive equipment   Toileting 1-->assistive equipment   Bathing 3-->assistive equipment and person   Communication 0-->understands/communicates without difficulty   Swallowing 0-->swallows foods/liquids without difficulty   Cognition 0 - no cognition issues reported   Fall history within last six months yes   Number of times patient has fallen within last six months 2   Prior Functional Level Comment Pt typically walks in her apt with fww, walks hallway x 1 w/ staff ( 100')   General Information   Onset of Illness/Injury or Date of Surgery - Date 06/17/19   Referring Physician Jessica Sadler   Patient/Family Goals Statement Pt wants to go home, family prefers home, but aware pt needs to have a safe DC plan   Pertinent History of Current Problem (include personal factors and/or comorbidities that impact the POC) Pt admitted with progressive LLE pain with radiating symptoms and inability to bear wt, recent L 5 lateral mass vertebral fx . Hx: L knee replacement, R hip ORIF, pacemaker.  "  Precautions/Limitations fall precautions   Weight-Bearing Status - LLE weight-bearing as tolerated   General Observations adult children present at eval and supportive. Dtr went to pt's apartment to look for her soft back brace during session,k but could not find it.   Cognitive Status Examination   Level of Consciousness alert   Follows Commands and Answers Questions 75% of the time;able to follow single-step instructions   Pain Assessment   Patient Currently in Pain Yes, see Vital Sign flowsheet  (5/10 L buttock)   Integumentary/Edema   Integumentary/Edema no deficits were identifed   Posture    Posture Forward head position   Posture Comments slight forward flexed at torso   Range of Motion (ROM)   ROM Comment LLE: 80 HF then pain as sciatica nerve stretches across buttocks. Lacking ankle flexibility, DF to barely neutral    Strength   Strength Comments Pt cannot SLR on L, due to pain more so than true weakness . Bilat DF/PF/EHL 5/5.    Bed Mobility   Bed Mobility Comments Mod A    Transfer Skills   Transfer Comments CGA>min A, tc/verbal cues   Gait   Gait Comments 3' for eval with fww, slow shuffling gait with need for constant cues for step sequence, sudden need to sit due to pain/feeling \" nervous\"   Balance   Balance Comments sitting balance WNL, needs UE support of AD for upright dynamic and static balance   General Therapy Interventions   Planned Therapy Interventions transfer training;gait training;bed mobility training;progressive activity/exercise   Intervention Comments fww left in room   Clinical Impression   Criteria for Skilled Therapeutic Intervention yes, treatment indicated   PT Diagnosis impaired gait below baseline functional mobility   Influenced by the following impairments pain LLE, weakness, loss of DF/PF flexibility, fear of falling, fatigue   Functional limitations due to impairments bed mobility. transfers, gait   Clinical Presentation Stable/Uncomplicated   Clinical Presentation " "Rationale PLOF, per clinical observation and current functioning   Clinical Decision Making (Complexity) Low complexity   Therapy Frequency Daily   Predicted Duration of Therapy Intervention (days/wks) 2   Anticipated Discharge Disposition Transitional Care Facility  (unless pt's mobility dramatically improves)   Risk & Benefits of therapy have been explained Yes   Patient, Family & other staff in agreement with plan of care Yes   Clinical Impression Comments Per son who is present, pt moving at ablut 50% below baseline with speed and is only walking 20% of her normal distance.    [facility] Switchboard TM \"6 Clicks\"   2016, Trustees of [facility], under license to [employer].  All rights reserved.   6 Clicks Short Forms Basic Mobility Inpatient Short Form   [facility] [employer]  \"6 Clicks\" V.2 Basic Mobility Inpatient Short Form   1. Turning from your back to your side while in a flat bed without using bedrails? 3 - A Little   2. Moving from lying on your back to sitting on the side of a flat bed without using bedrails? 2 - A Lot   3. Moving to and from a bed to a chair (including a wheelchair)? 3 - A Little   4. Standing up from a chair using your arms (e.g., wheelchair, or bedside chair)? 3 - A Little   5. To walk in hospital room? 3 - A Little   6. Climbing 3-5 steps with a railing? 2 - A Lot   Basic Mobility Raw Score (Score out of 24.Lower scores equate to lower levels of function) 16   Total Evaluation Time   Total Evaluation Time (Minutes) 20     "

## 2019-06-18 NOTE — CONSULTS
Care Transition Initial Assessment - RN        Met with: Patient and Family.  DATA   Active Problems:    Closed fracture of fifth lumbar vertebra, unspecified fracture morphology, initial encounter (H)       Cognitive Status: awake and alert.  Primary Care Clinic Name: [facility]  Primary Care MD Name: Delmis Chris  Contact information and PCP information verified: Yes  Lives With: facility resident   Living Arrangements: assisted living([facility])  Quality of Family Relationships: involved, supportive              Insurance concerns: No Insurance issues identified  ASSESSMENT  Patient currently receives the following services:  [facility] assisted living facility provides meals 3x/day, bathing , dressing support, nightly bathroom checks, transfers Ax1 and escort to meals.        Identified issues/concerns regarding health management: patient diagnosed with closed fracture fifth lumbar vertebra. Patient living at the [facility]. Met with patient and son, Michele. Pt receives services from the [facility] for meals 3x/day, bathing , dressing support, nightly bathroom checks, transfers Ax1 and escort to meals. Patient takes her medications independently with support of family for set up. She also has an emergency pendent. Per chart, PT recommending TCU. Discussed with family TCU placement vs return to [facility] with increased services. Family would prefer patient return to the [facility] with increased services. Discussed starting prior auth process with TCU, son open to starting process. Son given list of [facility] Skilled Nursing Facilities. He would like referrals sent to [facility], [facility] and [facility]. Referrals sent to facilities.  CTS also call the [facility] RN staff [phone], spoke with Tommy. Facility providing meal, bathing, dressing, transfers and escort support to meals. The [facility] can provide some additional services. Evening RNEmily will call  CTS back.     Update 1536: call received from LETY Diaz at [facility]. Facility can provide increase services for an additional cost to patient. [facility] will call patient's son and discuss increased service cost.     Barrier to discharge: Awaiting TCU referral response vs [facility] with increased services.      PLAN  Financial costs for the patient include yes, [employer] will need TCU prior authorization. If [employer] doesn't provide auth, Patient will have $[phone] upfront cost for TCU stay.      Patient given options and choices for discharge yes .  Patient/family is agreeable to the plan?  Yes  Patient anticipates discharging to TCU vs home .        Transportation/person available to transport on day of discharge  is SonMichele. He will need to be notified of discharge time.     Plan/Disposition: TCU vs [facility] with increased services.     Care  (CTS) will continue to follow as needed.    Allen Owens RN BSN CTS  Care Management Team  [facility]

## 2019-06-18 NOTE — PLAN OF CARE
Discharge Planner PT PT: PT evaluation completion , treatment initiated for  Pt admitted with progressive LLE pain with radiating symptoms and inability to bear wt, recent L 5 lateral mass vertebral fx . Hx: L knee replacement, R hip ORIF, pacemaker. Pt lives in apt at the [facility], at baseline pt indep w/ mobility using a fww, managing her own meds, Pt walks to meals 100' w/escort who has a wc in tow once they get to dining room, assist with showers and has a shower chair, A w/ dressing and 1 night time bathroom check . Pt can get out of bed indep, but needs A with BLE;s getting into bed. Pt has a bed rail and wedge pillow.   Patient plan for discharge: Pt wants to go home, family prefers home, but understands need for safe discharge plan. Pt can get increase services.   Current status: Pain 5/10. Mod A torso and BLE's sit<>supine. Increased pain w/ sitting, but tolerable.  Min A>CGA with transfers sit<>stand, needs safety cues. Walking with fww 3' x1 ( sudden need to sit), 11' x1 ( fatigued), and 20' x 1 with fatigue and fear of falling/nervousness developing. WC follow provided, pt requires occasional assist to maneuver the fww, and cues for step sequence, shuffles her feet. Up to recliner w/ suggestion of 30-60 min sitting.   Barriers to return to prior living situation: Pt alone in apartment. Pt lacking confidence/strength/stamina with gait and transfers, pt would not be able to manage her toileting at this time. Pt can get increased services, pt have concerns that pt will not call  each time she needs to mobilize.   Recommendations for discharge: TCU. If pt/family refusing TCU, then pt would need to have increased  services for all functional mobility, she would need to call for help 100% of the time until back to near baseline functioning, Pt is a high falls risk. Home PT would then be recommended as pt needs assistive device and assistive person to get out of building.   Rationale for recommendations:  Currently pt is about 50% below baseline for speed per son, and is only walking 20% ( 20 feet) of her daily distance of 100', so is significantly below baseline due to pain  ( 5/10), and lack of confidence in herself/fear of falling/, needing lots of reassurance. Pt would benefit from skilled PT to address these deficits.        Entered by: Britta Shetty 06/18/2019 12:13 PM   '

## 2019-06-18 NOTE — PLAN OF CARE
PRIMARY DIAGNOSIS: ACUTE PAIN  OUTPATIENT/OBSERVATION GOALS TO BE MET BEFORE DISCHARGE:  1. Pain Status: Pain free.    2. Return to near baseline physical activity: No    3. Cleared for discharge by consultants (if involved): No    Discharge Planner Nurse   Safe discharge environment identified: No  Barriers to discharge: Yes       Entered by: Rita Johnson 06/18/2019 12:24 AM     Please review provider order for any additional goals.   Nurse to notify provider when observation goals have been met and patient is ready for discharge.    VSS. Afebrile. AxO. Denies pain. Due to void. Ax2 with bedpan. Moderate dorsi and plantar flexion. Will continue to monitor.

## 2019-06-18 NOTE — PLAN OF CARE
PRIMARY DIAGNOSIS: ACUTE PAIN  OUTPATIENT/OBSERVATION GOALS TO BE MET BEFORE DISCHARGE:  1. Pain Status: Pain free.    2. Return to near baseline physical activity: No    3. Cleared for discharge by consultants (if involved): No    Discharge Planner Nurse   Safe discharge environment identified: No  Barriers to discharge: Yes       Entered by: Rita Johnson 06/18/2019 4:29 AM     Please review provider order for any additional goals.   Nurse to notify provider when observation goals have been met and patient is ready for discharge.    VSS. Afebrile. AxO. Denies pain. Incontinent at times. purewick in place..dribbling, frequency at times. Ax2 with bedpan. Moderate dorsi and plantar flexion. Will continue to monitor.     6:51 AM 3/10 back pain. Oxycodone x1. Repositioned. Bladder scanned for 214 ml. Will continue to monitor.

## 2019-06-19 VITALS
HEART RATE: 60 BPM | TEMPERATURE: 98.4 F | SYSTOLIC BLOOD PRESSURE: 120 MMHG | RESPIRATION RATE: 18 BRPM | OXYGEN SATURATION: 93 % | DIASTOLIC BLOOD PRESSURE: 52 MMHG

## 2019-06-19 LAB — INR PPP: 2.38 (ref 0.86–1.14)

## 2019-06-19 PROCEDURE — 25000132 ZZH RX MED GY IP 250 OP 250 PS 637: Performed by: INTERNAL MEDICINE

## 2019-06-19 PROCEDURE — 85610 PROTHROMBIN TIME: CPT | Performed by: INTERNAL MEDICINE

## 2019-06-19 PROCEDURE — 99217 ZZC OBSERVATION CARE DISCHARGE: CPT | Performed by: PHYSICIAN ASSISTANT

## 2019-06-19 PROCEDURE — 36415 COLL VENOUS BLD VENIPUNCTURE: CPT | Performed by: INTERNAL MEDICINE

## 2019-06-19 PROCEDURE — G0378 HOSPITAL OBSERVATION PER HR: HCPCS

## 2019-06-19 RX ORDER — LIDOCAINE 4 G/G
1 PATCH TOPICAL EVERY 24 HOURS
Qty: 15 PATCH | Refills: 0 | Status: SHIPPED | OUTPATIENT
Start: 2019-06-19 | End: 2020-02-11

## 2019-06-19 RX ORDER — ACETAMINOPHEN 500 MG
1000 TABLET ORAL 3 TIMES DAILY
Refills: 0 | COMMUNITY
Start: 2019-06-19 | End: 2020-02-11

## 2019-06-19 RX ADMIN — ACETAMINOPHEN 1000 MG: 500 TABLET, FILM COATED ORAL at 07:36

## 2019-06-19 RX ADMIN — LIDOCAINE 1 PATCH: 560 PATCH PERCUTANEOUS; TOPICAL; TRANSDERMAL at 07:41

## 2019-06-19 RX ADMIN — FUROSEMIDE 40 MG: 40 TABLET ORAL at 07:36

## 2019-06-19 RX ADMIN — SOTALOL HYDROCHLORIDE 80 MG: 80 TABLET ORAL at 07:36

## 2019-06-19 RX ADMIN — DILTIAZEM HYDROCHLORIDE 180 MG: 180 CAPSULE, COATED, EXTENDED RELEASE ORAL at 07:36

## 2019-06-19 RX ADMIN — LEVOTHYROXINE SODIUM 150 MCG: 150 TABLET ORAL at 07:36

## 2019-06-19 NOTE — PLAN OF CARE
Patient's After Visit Summary was reviewed with patient and son.  Patient verbalized understanding of After Visit Summary, recommended follow up and was given an opportunity to ask questions.   Discharge medications sent home with patient/family: YES, sent to her pharmacy (lidocaine Patches)    Discharged with son    OBSERVATION patient END time: 1129    Patient was stable and provided a wheelchair ride out at discharge.

## 2019-06-19 NOTE — PHARMACY-ANTICOAGULATION SERVICE
Clinical Pharmacy- Warfarin Discharge Note  This patient is currently on warfarin for the treatment of Atrial fibrillation.  INR Goal= 2-3  Expected length of therapy undetermined.    Warfarin PTA Regimen: 5mg Tuesday-Sunday, 2.5mg Monday (Corrected from initial note)      Anticoagulation Dose History     Recent Dosing and Labs Latest Ref Rng & Units 3/13/2019 3/14/2019 3/15/2019 3/16/2019 6/17/2019 6/18/2019 6/19/2019    Warfarin 2 mg - - 6 mg - - - - -    Warfarin 2.5 mg - - - - - - 2.5 mg -    Warfarin 3 mg - 6 mg - - - - - -    Warfarin 5 mg - - - 5 mg - 5 mg - -    INR 0.86 - 1.14 2.20(H) 2.66(H) 2.94(H) 2.71(H) 1.97(H) 2.38(H) 2.38(H)          Vitamin K doses administered during the last 7 days: none  FFP administered during the last 7 days: none    Agree with discharge orders to resume home regimen of 5mg Tuesday through Sunday and 2.5mg on Mondays with INR check per anticoagulation clinic

## 2019-06-19 NOTE — PROGRESS NOTES
Discharge Planner   Discharge Plans in progress: [facility] nursing staff is ready to receive patient. They will be able to provide patient with increased services as ordered. Patient's son will provide transportion to the [facility].   Barriers to discharge plan: none anticipated.   Follow up plan: awaiting discharge orders when medically ready; CTS will fax discharge orders to [facility], fax #[phone].        Entered by: Allen Owens 06/19/2019 10:14 AM

## 2019-06-19 NOTE — PLAN OF CARE
Occupational Therapy Discharge Summary    Reason for therapy discharge:    Discharged to home with home therapy.    Progress towards therapy goal(s). See goals on Care Plan in [facility] electronic health record for goal details.  Goals not met.  Barriers to achieving goals:   discharge from facility.    Therapy recommendation(s):    Continued therapy is recommended.  Rationale/Recommendations:  [facility] services including Ax1 for all transfers/mobility, Ax1 for toileting/toilet tf, resumed assist for ADLs/IADLS and HH PT/OT.

## 2019-06-19 NOTE — PLAN OF CARE
PRIMARY DIAGNOSIS: LUMBAR Fx  OUTPATIENT/OBSERVATION GOALS TO BE MET BEFORE DISCHARGE:  1. ADLs back to baseline: No    2. Activity and level of assistance: Up with maximum assistance. Consider SW and/or PT evaluation. Heavy assist of two    3. Pain status: Improved-controlled with oral pain medications.    4. Return to near baseline physical activity: No     Discharge Planner Nurse   Safe discharge environment identified: Yes  Barriers to discharge: Yes       Entered by: Sina Grove 06/19/2019 3:51 AM     Please review provider order for any additional goals.   Nurse to notify provider when observation goals have been met and patient is ready for discharge.  Temp: 97.4  F (36.3  C) Temp src: Oral BP: 141/69 Pulse: 60 Heart Rate: 69 Resp: 20 SpO2: 97 % O2 Device: None (Room air)    Pt is AxOx4. Pt denies pain, N/V/D, numbness or tingling. Pt is incontinent of bladder, pure wick was put in place on prior shift. Pt reports her desire to go home instead of TCU. Lidocaine patch not in place. Ortho, PT, and SW following

## 2019-06-19 NOTE — PROGRESS NOTES
Orthopedic Surgery  Liberty Horta  2019  Admit Date:  2019  Left sided low back pain, L5 vertebral body fracture    Patient in restroom at time of exam.  Talked with patient's son.  Reports that Liberty continues to improve and is ambulating more comfortably.  They agree with plan for her to return to her assisted living today.     Vital Sign Ranges  Temperature Temp  Av.8  F (36.6  C)  Min: 97.4  F (36.3  C)  Max: 98.4  F (36.9  C)   Blood pressure Systolic (24hrs), Av , Min:120 , Max:166        Diastolic (24hrs), Av, Min:52, Max:85      Pulse Pulse  Av  Min: 60  Max: 60   Respirations Resp  Av.7  Min: 16  Max: 20   Pulse oximetry SpO2  Av.3 %  Min: 92 %  Max: 97 %         Labs:  Recent Labs   Lab Test 19  0831 19  0608 19  0635   POTASSIUM 4.0 4.0 3.5     Recent Labs   Lab Test 19  0831 19  1817 16  2258   HGB 14.2 14.9 14.0     Recent Labs   Lab Test 19  0533 19  0600 19  0831   INR 2.38* 2.38* 1.97*                   1.PLAN:              Mobilize with PT/OT               WBAT Bilat LEs.                Family request limiting narcotics.  Tylenol primarily for pain               Would not add Medrol dose pack at this time as she is improving and does not have any radiating symptoms.               Patient declines support brace,  family has a 2 panel support brace for her.               Follow-up: 1-2 weeks with [facility] spine provider if continued or worsening pain    2. Disposition   Anticipate d/c to assisted living with increased cares today when medically cleared.  Ok from ortho standpoint.      Doris Hewitt PA-C

## 2019-06-19 NOTE — PROGRESS NOTES
[facility]    Hospitalist Progress Note      Assessment & Plan   Liberty Horta is a 84 year old female who was admitted on 6/17/2019. Past medical history significant for hypothyroidism, chronic atrial fibrillation, status post pacemaker, history of skin cancer, GERD, hypertension, hyperlipidemia, osteoarthritis.  History of left knee replacement and right hip ORIF. Patient reported left buttock pain starting a few days prior to admission.  She initially was taking Tylenol for the pain, but did not have significant relief.  Pain was worse with weightbearing and reduced when sitting or lying down.  On 6/17 morning, patient developed acutely worsening pain and was no longer able to bear weight due to the pain. Presented to the emergency department for further evaluation. Lumbar spine CT was completed and revealed a recent-appearing nondisplaced fracture involving left lateral mass of L5 adjacent to the left sacroiliac joint. Unable to discharge home secondary to pain and patient will be admitted to hospitalist service for further evaluation and treatment of fracture with orthopedics consulting.      Nondisplaced L5 lateral vertebral body fracture  Appreciate orthopedics consult.  Nonoperative management has been recommended.  PT and OT have been ordered.  Weightbearing as tolerated with walker.  Have scheduled Tylenol with lidocaine patch.  PRN oxycodone available, however has caused increased sedation.  Patient and family request no further narcotics.   Orthopedics has reassessed and does not plan to add steroid Dosepak at this time.  Patient declined a support brace, stating that she believes family has one at home available for her.  Orthopedics recommends follow-up in 1 to 2 weeks with [facility] spine provider if patient has continued or worsening pain.     Severe central stenosis at L3-L4 level  This was noted on 6/17 CT scan and was also noted on 3/13/2019 CT scan.  Lumbar fracture was not noted at  that time and since development of lumbar fracture, patient has the onset of the left buttock pain. Left buttock pain is limiting ambulation at this time.   Patient has not had any urinary retention during this stay.   If patient were to have onset of any new neurologic changes, could consider further neurosurgical evaluation.     History of urinary retention  Patient has had history of urinary retention with previous admissions. Nursing to bladder scan and can place Lieberman if retaining. Has not required straight cath.      Hypothyroidism  Continue PTA levothyroxine.     Chronic atrial fibrillation  Status post pacemaker  Continue PTA diltiazem and sotalol.  Patient is currently in a regular, paced rhythm.  INR is 2.38 and will continue warfarin with pharmacy to dose.     Hypertension  Hyperlipidemia  Continue PTA diltiazem, Lasix, and atorvastatin.     DVT Prophylaxis: Warfarin  Code Status:  DNR/DNI.  This is a change from previous admissions and was changed after extensive conversation with patient and her son.  Expected discharge: Anticipate hospitalization another 1 to 2 days depending on improvement in ambulation and placement.  Patient and family are currently deciding between TCU versus return to assisted living with increased assistance.  Will note that patient required assist of 2 this evening to get out of bed.    Jessica Gutierrez MD FACP  Hospitalist Service  [facility]  Text Page (7am - 6pm)    Interval History   Patient seen by orthopedics this morning.  Patient working with PT and OT.  Possible discharge to TCU versus return to assisted living with increased assistance.  Did have conversation with patient and son and have change CODE STATUS to DNR/DNI.  Patient seen again this evening and reporting increased left lower back pain with sitting up.  Required assist of 2 for transfer this evening.   No new complaints.   Otherwise no acute events.     -Data reviewed today: I reviewed all new  labs and imaging results over the last 24 hours.     Physical Exam   Temp: 97.8  F (36.6  C) Temp src: Oral BP: 154/67   Heart Rate: 69 Resp: 18 SpO2: 95 % O2 Device: None (Room air)    There were no vitals filed for this visit.  Vital Signs with Ranges  Temp:  [97.7  F (36.5  C)-98.1  F (36.7  C)] 97.8  F (36.6  C)  Heart Rate:  [59-72] 69  Resp:  [16-18] 18  BP: (131-154)/(61-69) 154/67  SpO2:  [92 %-95 %] 95 %  I/O last 3 completed shifts:  In: -   Out: 200 [Urine:200]    Constitutional: Elderly woman.  Somnolent.  Oriented x3.  No distress at rest, but does have significant pain with any movement.  This does not appear to be significantly improved from yesterday.  Nontoxic.  Son at bedside earlier today.  Nursing at bedside.  HEENT: NCAT. EOMI. Moist oral mucosa.  Respiratory: Clear to auscultation bilaterally. No crackles or wheezes.  Cardiovascular: Regular, paced rhythm with regular rate.  No murmur.   GI: Soft, nontender, nondistended. Normoactive bowel sounds.   Musculoskeletal: Continues to have tenderness to midline lumbar spine. Pain with minimal movement. Sensation intact to lower extremities. Straight leg test negative for radiating pain.   Neurologic: Somnolent. Oriented x3. No focal neurologic deficits at time of exam.       Medications     - MEDICATION INSTRUCTIONS -       Warfarin Therapy Reminder         acetaminophen  1,000 mg Oral TID     atorvastatin  40 mg Oral At Bedtime     diltiazem ER COATED BEADS  180 mg Oral Daily     furosemide  40 mg Oral Daily     levothyroxine  150 mcg Oral Daily     lidocaine  1 patch Transdermal Q24H     lidocaine   Transdermal Q8H     lidocaine   Transdermal Q24h     sodium chloride (PF)  3 mL Intracatheter Q8H     sotalol  80 mg Oral BID       Data   Recent Labs   Lab 06/18/19  0600 06/17/19  0831   WBC  --  7.8   HGB  --  14.2   MCV  --  98   PLT  --  187   INR 2.38* 1.97*   NA  --  141   POTASSIUM  --  4.0   CHLORIDE  --  105   CO2  --  30   BUN  --  20   CR   --  0.81   ANIONGAP  --  6   ARLENE  --  9.7   GLC  --  95       No results found for this or any previous visit (from the past 24 hour(s)).

## 2019-06-19 NOTE — DISCHARGE SUMMARY
[facility] [place]    Observation Unit   Discharge Summary  Hospitalist    Date of Admission:  6/17/2019  Date of Discharge:  6/19/2019  Provider:  Cyndie Phelps PA-C  Date of Service (when I last saw the patient): 06/19/19    Discharge Diagnoses   Nondisplaced L5 lateral vertebral body fracture  Severe central stenosis at L3-4 level    Other medical issues:  Past Medical History:   Diagnosis Date     Basal cell carcinoma      Chronic atrial fibrillation (H)      DVT (deep vein thrombosis) in pregnancy (H)      Esophageal reflux      Hypertension      Hypothyroid      Osteoarthritis      Pacemaker      Renal insufficiency        History of Present Illness   Liberty Horta is a 84 year old female who was admitted on 6/17/2019. Past medical history significant for hypothyroidism, chronic atrial fibrillation, status post pacemaker, history of skin cancer, GERD, hypertension, hyperlipidemia, osteoarthritis.  History of left knee replacement and right hip ORIF. Patient reported left buttock pain starting a few days prior to admission. On 6/17 morning, patient developed acutely worsening pain and was no longer able to bear weight due to the pain. Presented to the emergency department for further evaluation. Lumbar spine CT was completed and revealed a recent-appearing nondisplaced fracture involving left lateral mass of L5 adjacent to the left sacroiliac joint. Unable to discharge home secondary to pain and patient was admitted to the hospitalist service for further evaluation and treatment of fracture with orthopedics consulting. Please see the admission history and physical for full details.    Hospital Course   Liberty Horta was admitted on 6/17/2019.  The following problems were addressed during her hospitalization:    #Nondisplaced L5 lateral vertebral body fracture  - Orthopedic surgery consulted on admission and recommended nonoperative management with WBAT with walker.   - PT evaluated patient and  recommended TCU but family prefers the patient to return to her [facility] with increased services and home PT/OT  - Pain controlled with scheduled Tylenol with lidocaine patch.  PRN oxycodone was available, however has caused increased sedation thus patient and family requested no further narcotics.  - Patient declined a support brace, stating that she believes family has one at home available for her.  - Orthopedics recommends follow-up in 1 to 2 weeks with [facility] spine provider if patient has continued or worsening pain.     #Severe central stenosis at L3-L4 level: this was noted on 6/17 CT scan and was also noted on 3/13/2019 CT scan. Lumbar fracture was not noted at that time and since development of lumbar fracture, patient has the onset of the left buttock pain. Left buttock pain is limiting ambulation at this time.      #Hypothyroidism  Continue PTA levothyroxine.     #Chronic atrial fibrillation  #Status post pacemaker  Continue PTA diltiazem and sotalol.  Patient is currently in a regular, paced rhythm.  INR is 2.38 and will continue PTA Warfarin dosing upon discharge.    #Hypertension  #Hyperlipidemia  Continue PTA diltiazem, Lasix, and atorvastatin.    Pending Results   None    Code Status   DNR / DNI       Primary Care Physician   JENNIFER ROJAS    Exam:    /52 (BP Location: Left arm)   Pulse 60   Temp 98.4  F (36.9  C) (Oral)   Resp 18   SpO2 93%   Constitutional: alert, interactive and oriented x3.  NAD. Pain improved but still increases with movement. Son at bedside.   HEENT: NCAT. EOMI. Moist oral mucosa.  Respiratory: Clear to auscultation bilaterally. No crackles or wheezes.  Cardiovascular: Regular, paced rhythm with regular rate.  No murmur.   GI: Soft, nontender, nondistended. Normoactive bowel sounds.   Musculoskeletal: Continues to have tenderness to midline lumbar spine. Pain with increased movement. Sensation intact to lower extremities. Straight leg test negative for radiating pain.    Neurologic: No focal neurologic deficits at time of exam.     Discharge Disposition   Discharged to home    Consultations This Hospital Stay   ORTHOPEDIC SURGERY IP CONSULT  SPIRITUAL HEALTH SERVICES IP CONSULT  PHARMACY TO DOSE WARFARIN  SOCIAL WORK IP CONSULT  PHYSICAL THERAPY ADULT IP CONSULT  OCCUPATIONAL THERAPY ADULT IP CONSULT    Time Spent on this Encounter   IBelkis, personally saw the patient today and spent greater than 30 minutes discharging this patient.    Discharge Orders      Home Care PT Referral for Hospital Discharge      Home Care OT Referral for Hospital Discharge      Reason for your hospital stay    You were admitted due to concerns for worsening left buttocks and low back pain with workup including basic labs and imaging which showed a new L5 compression fracture. Your pain was controlled with scheduled Tylenol and Lidocaine patches. You were evaluated by orthopedic surgery who recommended weight bearing as tolerated, no need for surgical intervention or steroids, and outpatient follow up with spine surgery at [facility] in 1-2 weeks if pain is not improving or worsening. You were evaluated by physical therapy during your stay with recommendations for either increased cares at your current assisted living with home care or rehab, you opted to return to your assisted living with increased care at this time.     Follow-up and recommended labs and tests     Follow up with primary care provider, JENNIFER ROJAS, within 7 days for hospital follow- up.  No follow up labs or test are needed.  Follow up with [facility] spine surgeon in 1-2 weeks if ongoing pain or worsening of pain.     Activity    Your activity upon discharge: activity as tolerated     MD face to face encounter    Documentation of Face to Face and Certification for Home Health Services    I certify that patient: Liberty Horta is under my care and that I, or a nurse practitioner or  physician's assistant working with me, had a face-to-face encounter that meets the physician face-to-face encounter requirements with this patient on: 6/19/2019.    This encounter with the patient was in whole, or in part, for the following medical condition, which is the primary reason for home health care: decreased mobility due to increased pain.    I certify that, based on my findings, the following services are medically necessary home health services: Occupational Therapy and Physical Therapy.    My clinical findings support the need for the above services because: Occupational Therapy Services are needed to assess and treat cognitive ability and address ADL safety due to impairment in decreased mobility due to compression fracture. and Physical Therapy Services are needed to assess and treat the following functional impairments: decreased mobility due to compression fracture.    Further, I certify that my clinical findings support that this patient is homebound (i.e. absences from home require considerable and taxing effort and are for medical reasons or Confucian[employer] services or infrequently or of short duration when for other reasons) because: Requires assistance of another person or specialized equipment to access medical services because patient: Is unable to operate assistive equipment on their own...    Based on the above findings. I certify that this patient is confined to the home and needs intermittent skilled nursing care, physical therapy and/or speech therapy.  The patient is under my care, and I have initiated the establishment of the plan of care.  This patient will be followed by a physician who will periodically review the plan of care.  Physician/Provider to provide follow up care: Delmis Chris    Attending hospital physician (the Medicare certified [facility] provider): Portillo Villela MD  Physician Signature: See electronic signature associated with these discharge orders.  Date: 6/19/2019  "    Discharge Instructions    You can use over the counter Lidocaine patches pending coverage for prescription     DNR/DNI     Diet    Follow this diet upon discharge: Orders Placed This Encounter      Combination Diet Regular Diet Adult     Discharge Medications   Current Discharge Medication List      START taking these medications    Details   Lidocaine (LIDOCARE) 4 % Patch Place 1 patch onto the skin every 24 hours Apply to lumbar spine, 12 hours on then 12 hours off  Qty: 15 patch, Refills: 0    Associated Diagnoses: Closed fracture of fifth lumbar vertebra, unspecified fracture morphology, initial encounter (H)         CONTINUE these medications which have CHANGED    Details   acetaminophen (TYLENOL) 500 MG tablet Take 2 tablets (1,000 mg) by mouth 3 times daily  Refills: 0    Associated Diagnoses: Closed fracture of fifth lumbar vertebra, unspecified fracture morphology, initial encounter (H)         CONTINUE these medications which have NOT CHANGED    Details   atorvastatin (LIPITOR) 40 MG tablet Take 1 tablet by mouth At Bedtime       diltiazem (TIAZAC) 180 MG 24 hr ER beaded capsule Take 180 mg by mouth daily      furosemide (LASIX) 40 MG tablet Take 40 mg by mouth daily      levothyroxine (TIROSINT) 150 MCG capsule Take 150 mcg by mouth daily       sotalol (BETAPACE) 80 MG tablet Take 1 tablet by mouth 2 times daily       warfarin (COUMADIN) 2.5 MG tablet Take by mouth daily 5 mg Tuesday-Sunday, 2.5 mg on Monday           Allergies   Allergies   Allergen Reactions     Darvocet [Propoxyphene N-Apap]      Demerol [Meperidine]      Tramadol      Patient described feeling \"squirrely\"     Data   Results for orders placed or performed during the hospital encounter of 06/17/19   Lumbar spine CT w/o contrast    Narrative    CT LUMBAR SPINE WITHOUT CONTRAST June 17, 2019 9:18 AM    HISTORY: Back and left-sided buttock pain.    COMPARISON: None.    TECHNIQUE: CT imaging is performed through the lumbar spine " without  contrast. Radiation dose for this scan was reduced using automated  exposure control, adjustment of the mA and/or kV according to patient  size, or iterative reconstruction technique.     FINDINGS: There are five lumbar type vertebral bodies with partial  sacralization of L5. The anterior aspect of the L5-S1 disc is not  entirely included on this study. Vertebral body alignment is normal.  There is a nondisplaced recent appearing fracture involving the left  lateral mass of L5 adjacent to the anterior aspect of the left  sacroiliac joint. No other fracture is demonstrated. There is  calcification of the vascular structures. No other soft tissue  abnormality is seen.    There are vacuum discs from T11 to L4. There is prominent disc height  loss at L4-L5 with bridging anterior marginal osteophytes. There is  moderate disc height loss at L2-L3 with mild disc height loss  elsewhere.    FINDINGS by specific level:    T11-T12: There is a mild disc bulge and marginal osteophyte formation.  No disc herniation is demonstrated. There is mild narrowing of the  neural foramina bilaterally. No central canal stenosis is seen.    T12-L1: There is a mild disc bulge and moderate marginal osteophyte  formation. No disc herniation is seen. There are moderate degenerative  changes in the facet joints. No central canal stenosis is present.  There is mild bilateral neural foraminal narrowing.    L1-L2: There is a moderate disc bulge and mild marginal osteophyte  formation. No disc herniation is seen. There are moderate degenerative  changes in the facet joints. There is mild to moderate central canal  stenosis. There is moderate to severe right and moderate left neural  foraminal stenosis.    L2-L3: There is a moderately prominent disc bulge and marginal  osteophyte formation with no focal disc herniation seen. There are  moderate to advanced degenerative changes in the facet joints. There  is moderate central canal stenosis  with moderate to severe right and  moderate left neural foraminal stenosis.    L3-L4: There is a prominent disc bulge and marginal osteophyte  formation with no focal disc herniation seen. There are prominent  hypertrophic degenerative changes in the facet joints. There is severe  central canal stenosis with moderate to severe bilateral neural  foraminal stenosis.    L4-L5: There is a moderate disc bulge with prominent marginal  osteophyte formation including anterior bridging spur formation.  Advanced left and mild to moderate right facet joint degenerative  changes are present. There is mild central canal stenosis with  moderate right and mild left neural foraminal stenosis.    L5-S1: The anterior aspect of the disc space is not included on this  study. The visualized disc height is well-preserved. There is a mild  disc bulge with no focal herniation seen. There are moderate to  advanced degenerative changes in the facet joints. No stenosis is  seen.      Impression    IMPRESSION:  1. Numbering of the levels is based on what appears to be five lumbar  type vertebral bodies with partial sacralization of L5. If level  specific treatment is considered, close attention should be given to  the numbering of the levels.  2. There is a nondisplaced recent appearing fracture involving the  left lateral mass of L5 adjacent to the left sacroiliac joint.  3. Diffuse degenerative changes as described above. No focal disc  herniation is demonstrated. This is most prominent at L3-L4 where  there is severe central canal stenosis and moderate to severe  bilateral neural foraminal stenosis.    RAJ CORONA MD   UA with Microscopic   Result Value Ref Range    Color Urine Light Yellow     Appearance Urine Clear     Glucose Urine Negative NEG^Negative mg/dL    Bilirubin Urine Negative NEG^Negative    Ketones Urine Negative NEG^Negative mg/dL    Specific Gravity Urine 1.013 1.003 - 1.035    Blood Urine Negative NEG^Negative     pH Urine 6.5 5.0 - 7.0 pH    Protein Albumin Urine Negative NEG^Negative mg/dL    Urobilinogen mg/dL Normal 0.0 - 2.0 mg/dL    Nitrite Urine Negative NEG^Negative    Leukocyte Esterase Urine Negative NEG^Negative    Source Catheterized Urine     WBC Urine 1 0 - 5 /HPF    RBC Urine 1 0 - 2 /HPF   CBC with platelets differential   Result Value Ref Range    WBC 7.8 4.0 - 11.0 10e9/L    RBC Count 4.60 3.8 - 5.2 10e12/L    Hemoglobin 14.2 11.7 - 15.7 g/dL    Hematocrit 44.9 35.0 - 47.0 %    MCV 98 78 - 100 fl    MCH 30.9 26.5 - 33.0 pg    MCHC 31.6 31.5 - 36.5 g/dL    RDW 12.8 10.0 - 15.0 %    Platelet Count 187 150 - 450 10e9/L    Diff Method Automated Method     % Neutrophils 71.5 %    % Lymphocytes 16.5 %    % Monocytes 9.2 %    % Eosinophils 2.1 %    % Basophils 0.3 %    % Immature Granulocytes 0.4 %    Nucleated RBCs 0 0 /100    Absolute Neutrophil 5.6 1.6 - 8.3 10e9/L    Absolute Lymphocytes 1.3 0.8 - 5.3 10e9/L    Absolute Monocytes 0.7 0.0 - 1.3 10e9/L    Absolute Eosinophils 0.2 0.0 - 0.7 10e9/L    Absolute Basophils 0.0 0.0 - 0.2 10e9/L    Abs Immature Granulocytes 0.0 0 - 0.4 10e9/L    Absolute Nucleated RBC 0.0    Basic metabolic panel   Result Value Ref Range    Sodium 141 133 - 144 mmol/L    Potassium 4.0 3.4 - 5.3 mmol/L    Chloride 105 94 - 109 mmol/L    Carbon Dioxide 30 20 - 32 mmol/L    Anion Gap 6 3 - 14 mmol/L    Glucose 95 70 - 99 mg/dL    Urea Nitrogen 20 7 - 30 mg/dL    Creatinine 0.81 0.52 - 1.04 mg/dL    GFR Estimate 67 >60 mL/min/[1.73_m2]    GFR Estimate If Black 78 >60 mL/min/[1.73_m2]    Calcium 9.7 8.5 - 10.1 mg/dL   INR   Result Value Ref Range    INR 1.97 (H) 0.86 - 1.14   INR   Result Value Ref Range    INR 2.38 (H) 0.86 - 1.14   INR   Result Value Ref Range    INR 2.38 (H) 0.86 - 1.14   Social Work IP Consult    Narrative    Allen Owens RN     6/18/2019  3:55 PM  Care Transition Initial Assessment - RN        Met with: Patient and Family.  DATA   Active Problems:    Closed fracture  of fifth lumbar vertebra, unspecified fracture   morphology, initial encounter (H)       Cognitive Status: awake and alert.  Primary Care Clinic Name: [facility]  Primary Care MD Name: Delmis Chris  Contact information and PCP information verified: Yes  Lives With: facility resident   Living Arrangements: assisted living([facility])  Quality of Family Relationships: involved, supportive              Insurance concerns: No Insurance issues identified  ASSESSMENT  Patient currently receives the following services:  [facility]   assisted living facility provides meals 3x/day, bathing ,   dressing support, nightly bathroom checks, transfers Ax1 and   escort to meals.        Identified issues/concerns regarding health management: patient   diagnosed with closed fracture fifth lumbar vertebra. Patient   living at the [facility]. Met with patient and son, Michele. Pt   receives services from the [facility] for meals 3x/day, bathing ,   dressing support, nightly bathroom checks, transfers Ax1 and   escort to meals. Patient takes her medications independently with   support of family for set up. She also has an emergency pendent.   Per chart, PT recommending TCU. Discussed with family TCU   placement vs return to [facility] with increased services. Family   would prefer patient return to [facility] with increased   services. Discussed starting prior auth process with TCU, son   open to starting process. Son given list of [facility]urs[facility] Facilities. He would like referrals sent to [facility], [facility] and [facility]. Referrals sent to facilities.  CTS also call the [facility]   RN staff [phone], spoke with Tommy. Facility providing   meal, bathing, dressing, transfers and escort support to meals.   [facility] can provide some additional services. Evening Emily DAVIDSON will call CTS back.     Update 1536: call received from LETY Diaz at [facility].  Facility   can provide increase services for an additional cost to patient.   [facility] will call patient's son and discuss increased service   cost.     Barrier to discharge: Awaiting TCU referral response vs [facility] with increased services.    PLAN  Financial costs for the patient include yes, [employer] will need TCU   prior authorization. If [employer] doesn't provide auth, Patient will   have $[phone] upfront cost for TCU stay.      Patient given options and choices for discharge yes .  Patient/family is agreeable to the plan?  Yes  Patient anticipates discharging to TCU vs home .        Transportation/person available to transport on day of discharge    is Michele Fuentes. He will need to be notified of discharge time.     Plan/Disposition: TCU vs [facility] with increased services.     Care  (CTS) will continue to follow as   needed.    Allen Owens RN BSN CTS  Care Management Team  [facility]                   Belkis Phelps PA-C

## 2019-06-19 NOTE — PLAN OF CARE
PRIMARY DIAGNOSIS: ACUTE PAIN- T5 fx  OUTPATIENT/OBSERVATION GOALS TO BE MET BEFORE DISCHARGE:  1. Pain Status: Improved-controlled with oral pain medications.    2. Return to near baseline physical activity: No    3. Cleared for discharge by consultants (if involved): Yes    Discharge Planner Nurse   Safe discharge environment identified: Yes  Barriers to discharge: No       Entered by: Ramona Gonzalez 06/19/2019 9:42 AM     Please review provider order for any additional goals.   Nurse to notify provider when observation goals have been met and patient is ready for discharge.    Patient is alert and oriented. Patient says when she feels the pain the most is when she is the sitting position. Patient is up assist of 1-2 with walker. Patient says lidocaine patch has been working the best for her pain along with the oral tylenol. PT saw patient yesterday and recommended TCU. Patient and family do not want TCU and instead want increased services [facility]. Plan to discharge later today. Son at bedside.

## 2019-06-19 NOTE — PLAN OF CARE
PRIMARY DIAGNOSIS: Lumbar Fx  OUTPATIENT/OBSERVATION GOALS TO BE MET BEFORE DISCHARGE:  ADLs back to baseline: No    Activity and level of assistance: Up with maximum assistance. Consider SW and/or PT evaluation.     Pain status: Improved-controlled with oral pain medications.    Return to near baseline physical activity: No     Discharge Planner Nurse   Safe discharge environment identified: Yes  Barriers to discharge: Yes       Entered by: Elda Mckeon 06/18/2019 8:02 PM    Pt is assisted x2 with walker and gait belt, pain controlled with oral pain meds, incontinent of urine, SL, PT and SW following, ortho following, will continue to monitor       Please review provider order for any additional goals.   Nurse to notify provider when observation goals have been met and patient is ready for discharge.

## 2019-06-19 NOTE — PLAN OF CARE
Physical Therapy Discharge Summary    Reason for therapy discharge:    Discharged to home with home therapy.    Progress towards therapy goal(s). See goals on Care Plan in [facility] electronic health record for goal details.  Goals not met.  Barriers to achieving goals:   discharge from facility.    Therapy recommendation(s):    Continued therapy is recommended.  Rationale/Recommendations:  TCU. If pt/family refusing TCU, then pt would need to have increased  services for all functional mobility, she would need to call for help 100% of the time until back to near baseline functioning, Pt is a high falls risk. Home PT would then be recommended as pt needs assistive device and assistive person to get out of building.

## 2019-06-19 NOTE — PLAN OF CARE
PRIMARY DIAGNOSIS: LUMBAR Fx  OUTPATIENT/OBSERVATION GOALS TO BE MET BEFORE DISCHARGE:  1. ADLs back to baseline: No    2. Activity and level of assistance: Up with maximum assistance. Consider SW and/or PT evaluation. Heavy assist of two    3. Pain status: Improved-controlled with oral pain medications.    4. Return to near baseline physical activity: No     Discharge Planner Nurse   Safe discharge environment identified: Yes  Barriers to discharge: Yes       Entered by: Sina Grove 06/19/2019 4:09 AM     Please review provider order for any additional goals.   Nurse to notify provider when observation goals have been met and patient is ready for discharge.  Temp: 97.4  F (36.3  C) Temp src: Oral BP: 141/69 Pulse: 60 Heart Rate: 69 Resp: 20 SpO2: 97 % O2 Device: None (Room air)    Pt is AxOx4. Pt denies pain, N/V/D, numbness or tingling. Pt is incontinent of bladder, pure wick was put in place on prior shift. Pt reports her desire to go home instead of TCU. Lidocaine patch not in place. Ortho, PT, and SW following. Pt is currently sleeping

## 2020-02-11 ENCOUNTER — ASSISTED LIVING VISIT (OUTPATIENT)
Dept: GERIATRICS | Facility: CLINIC | Age: 85
End: 2020-02-11
Payer: COMMERCIAL

## 2020-02-11 VITALS
HEART RATE: 60 BPM | SYSTOLIC BLOOD PRESSURE: 138 MMHG | RESPIRATION RATE: 18 BRPM | TEMPERATURE: 97.8 F | OXYGEN SATURATION: 98 % | DIASTOLIC BLOOD PRESSURE: 68 MMHG

## 2020-02-11 DIAGNOSIS — Z71.89 ACP (ADVANCE CARE PLANNING): ICD-10-CM

## 2020-02-11 DIAGNOSIS — I48.11 LONGSTANDING PERSISTENT ATRIAL FIBRILLATION (H): ICD-10-CM

## 2020-02-11 DIAGNOSIS — E03.9 HYPOTHYROIDISM, UNSPECIFIED TYPE: Primary | ICD-10-CM

## 2020-02-11 DIAGNOSIS — G20.A1 PARKINSON'S DISEASE (TREMOR, STIFFNESS, SLOW MOTION, UNSTABLE POSTURE) (H): ICD-10-CM

## 2020-02-11 DIAGNOSIS — I15.9 SECONDARY HYPERTENSION: ICD-10-CM

## 2020-02-11 DIAGNOSIS — K21.9 GASTROESOPHAGEAL REFLUX DISEASE, ESOPHAGITIS PRESENCE NOT SPECIFIED: ICD-10-CM

## 2020-02-11 DIAGNOSIS — M15.0 PRIMARY OSTEOARTHRITIS INVOLVING MULTIPLE JOINTS: ICD-10-CM

## 2020-02-11 DIAGNOSIS — D50.9 IRON DEFICIENCY ANEMIA, UNSPECIFIED IRON DEFICIENCY ANEMIA TYPE: ICD-10-CM

## 2020-02-11 DIAGNOSIS — M48.00 CENTRAL STENOSIS OF SPINAL CANAL: ICD-10-CM

## 2020-02-11 DIAGNOSIS — E78.5 HYPERLIPIDEMIA LDL GOAL <100: ICD-10-CM

## 2020-02-11 PROBLEM — I48.91 ATRIAL FIBRILLATION (H): Status: ACTIVE | Noted: 2020-02-11

## 2020-02-11 PROBLEM — I48.91 ATRIAL FIBRILLATION (H): Status: RESOLVED | Noted: 2020-02-11 | Resolved: 2020-02-11

## 2020-02-11 RX ORDER — FUROSEMIDE 40 MG
60 TABLET ORAL DAILY
COMMUNITY
Start: 2020-02-17 | End: 2020-02-11

## 2020-02-11 RX ORDER — FUROSEMIDE 40 MG
40 TABLET ORAL DAILY
Qty: 90 TABLET | Refills: 11 | Status: SHIPPED | OUTPATIENT
Start: 2020-02-21 | End: 2021-02-08

## 2020-02-11 RX ORDER — ACETAMINOPHEN 500 MG
1000 TABLET ORAL 2 TIMES DAILY
COMMUNITY
Start: 2020-02-17 | End: 2020-02-11

## 2020-02-11 RX ORDER — ACETAMINOPHEN 500 MG
TABLET ORAL
Qty: 120 TABLET | Refills: 11 | Status: SHIPPED | OUTPATIENT
Start: 2020-02-11 | End: 2020-12-13

## 2020-02-11 RX ORDER — FUROSEMIDE 40 MG
60 TABLET ORAL DAILY
Qty: 6 TABLET | Refills: 0 | Status: SHIPPED | OUTPATIENT
Start: 2020-02-17 | End: 2020-05-12

## 2020-02-11 RX ORDER — MULTIVIT WITH MINERALS/LUTEIN
1 TABLET ORAL DAILY
COMMUNITY
End: 2020-05-12

## 2020-02-11 NOTE — LETTER
2/11/2020        RE: Liberty Horta  [facility]  [address] [facility] [address]        [place] [facility]  PRIMARY CARE PROVIDER AND CLINIC:  IRASEMA Rivero [facility], [address] / [facility] [address]  Chief Complaint   Patient presents with     Establish Care     [place] Medical Record Number:  [id number]  Place of Service where encounter took place:  [facility] ([facility]) [[id number]]    Liberty Horta  is a 85 year old  (1935), currently living at [facility] but opting to change to onsite care today.  Admitted to this facility for  medical management and nursing care.    HPI:    HPI information obtained from: facility chart records, facility staff, patient report, [facility] chart review, Care Everywhere Epic chart review and family/first contact son, Chu report.     Patient is a 85 year old female that was admitted to [facility] facility May 2019 after hospitalization for severe pain to her left buttock in which she was unable to bear weight.  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  Ortho saw her and recommended nonoperative management with WBAT and therapy.  Her Lumbar fracture was noted previously on CT imaging from March 2019 and conservative management and monitoring recommended.    She is seen today as desires to establish with onsite care due to difficulty getting to appointments.  Patient was previously followed by [facility] PCP Dr. Chris.  She has PMH significant for a-fib, s/p pacer maker placement (on coumadin), hypothyroidism, HTN, GERD, OA, Anemia, HLD, urinary retention, Parkinson's.  She has had her left knee replaced and also had right hip ORIF in the past    She is seen today in her apartment with her son, chu.  Neither patient or son have immediate concerns or questions today.  Patient denies chest pain, shortness of breath, orthopnea, PND, n/v,  diarrhea, constipation, changes in bowel or bladder.  She denies numbness/tingling to both LE and UE's.  Currently still able to ambulate with 4ww and assistance of 1.  Gait unsteady but no recent falls.  She had home care therapy this summer after hospital stay.  Son reports that her previous PCP believed she had some parkinson's, but patient and family do not desire to have further w/u by neurology and not interested in meds.  She has a resting tremor, which patient and son feel slightly improved from previously.  She denies difficulty with swallowing.  Her speech is quiet and her gait is slightly rigid and slow.      She reports pain only to bilateral knees, R>L.  She has difficulty describing pain.  Reports is less often then daily but at least 1X per week.  Occurs with ambulation, thinks it is dull.  She has had injections by [employer] in the past and interested in having repeated.  It has been >6months since her last injection per son and patient    We reviewed POLST today.  It states FULL code but no intubation.  We discussed how this is not feasible, as airway needs to be placed in code situation.  After discussion, both patient and son feel she wants to be DNR/DNI but still ok with hospitalizations for restorative cares.        CODE STATUS/ADVANCE DIRECTIVES DISCUSSION:   DNR / DNI  Patient's living condition: lives in an assisted living facility  ALLERGIES: Darvocet [propoxyphene n-apap]; Demerol [meperidine]; and Tramadol  PAST MEDICAL HISTORY:  has a past medical history of Basal cell carcinoma, Chronic atrial fibrillation, DVT (deep vein thrombosis) in pregnancy, Esophageal reflux, Hypertension, Hypothyroid, Osteoarthritis, Pacemaker, and Renal insufficiency.  PAST SURGICAL HISTORY:   has a past surgical history that includes orthopedic surgery; Open reduction internal fixation femur distal (Right, 10/6/2016); and Implant pacemaker.  FAMILY HISTORY: Family history is unknown by patient.  SOCIAL  HISTORY:   reports that she has never smoked. She does not have any smokeless tobacco history on file. She reports that she does not drink alcohol or use drugs.    Post Discharge Medication Reconciliation Status: patient was not discharged from an inpatient facility    Current Outpatient Medications   Medication Sig Dispense Refill     [START ON 2/17/2020] acetaminophen (TYLENOL) 500 MG tablet Take 1,000 mg by mouth 2 times daily AND 1000 mg daily at night PRN starting 02/17       atorvastatin (LIPITOR) 40 MG tablet Take 1 tablet by mouth At Bedtime        diltiazem (TIAZAC) 180 MG 24 hr ER beaded capsule Take 180 mg by mouth daily       [START ON 2/17/2020] furosemide (LASIX) 40 MG tablet Take 60 mg by mouth daily starting 02/17 until 02/21       [START ON 2/21/2020] furosemide (LASIX) 40 MG tablet Take 40 mg by mouth daily starting Friday 02/21       levothyroxine (TIROSINT) 150 MCG capsule Take 150 mcg by mouth daily        multivitamin (CENTRUM SILVER) tablet Take 1 tablet by mouth daily       sotalol (BETAPACE) 80 MG tablet Take 1 tablet by mouth 2 times daily        warfarin (COUMADIN) 2.5 MG tablet Take 2.5 mg by mouth daily on Tues & Thurs; AND take 5 mg AOD       acetaminophen (TYLENOL) 500 MG tablet Take 2 tablets (1,000 mg) by mouth 3 times daily (Patient not taking: Reported on 2/11/2020)  0     Lidocaine (LIDOCARE) 4 % Patch Place 1 patch onto the skin every 24 hours Apply to lumbar spine, 12 hours on then 12 hours off (Patient not taking: Reported on 2/11/2020) 15 patch 0       ROS:  10 point ROS of systems including Constitutional, Eyes, Respiratory, Cardiovascular, Gastroenterology, Genitourinary, Integumentary, Musculoskeletal, Psychiatric were all negative except for pertinent positives noted in my HPI.    Vitals:  /68   Pulse 60   Temp 97.8  F (36.6  C)   Resp 18   SpO2 98%   Exam:  GENERAL APPEARANCE:  Alert, in no distress, cooperative  ENT:  Mouth and posterior oropharynx normal,  moist mucous membranes  EYES:  EOM, conjunctivae, lids, pupils and irises normal, PERRL  NECK:  No adenopathy,masses or thyromegaly  RESP:  respiratory effort and palpation of chest normal, no respiratory distress, crackles to BLLL, no cough, on room air  CV:  Palpation and auscultation of heart done , regular rate and rhythm, no murmur, rub, or gallop, edema 1+ BLLE, no compression  ABDOMEN:  no guarding or rebound, bowel sounds normal, no distension  M/S:   Gait and station abnormal generalized weakness, gait slow and slightly rigid, pain to palpation or bilateral knees, no edema/erythema joints, ambulates with 4ww, gait unsteady  SKIN:  warm, dry, no visible rashes or wounds  NEURO:   speech quiet but clear, bilateral resting hand tremor, slight UE rigidity, no cogwheeling, no facial asymmetry   PSYCH:  insight and judgement impaired, memory impaired , affect and mood normal    Lab/Diagnostic data:  reviewed most recent labs in care everywhere today    ASSESSMENT/PLAN:  (E03.9) Hypothyroidism, unspecified type  (primary encounter diagnosis)  Comment: per history  -per care everywhere last TSH 0.22 on 10/7/19, goal TSH 3-4 given age  Plan: continue synthroid, adjust dose with next lab  -TSH recheck next Tuesday    (I48.11) Longstanding persistent atrial fibrillation  Comment: per history, with pace maker  -goes to have device checked onsite Q6 months, per son, just done  -anticoagulation with coumadin, per review of previous PCP notes, goal INR 1.5-2.5, unclear why?  Plan: continue pacer and checks per recommendation   -on diltiazem and sotalol   -anticoagulated with coumadin  -INR next week  -does not follow with cards and no desire to per family       (K21.9) Gastroesophageal reflux disease, esophagitis presence not specified  Comment: per history, denies symptoms today   Plan: staff update with concerns    (I15.9) Secondary hypertension  Comment:   -goal SBP <140 given age and goals of care  -crackles heard  upon exam today, per review of last PCP note, crackles were present upon exam then as well.  Patient asymptomatic per her report and family/pt not interested in cards involvement which is reasonable   -no history of HF, but would not be surprised if component of HF given history  -med administration to start with facility next week.  Discussed management and POC today with patient and son Michele  Plan: increase lasix next week (because this is when med administration will start) to 60mg po every day X 4 days, then back to 40mg every day dosing  -BMP next week  -monitor BP    (M15.0) Primary osteoarthritis involving multiple joints  Comment: per history   -s/p left knee replacement and right hip ORIF  -pain ongoing but manageable  -has received kenalog injections in past and desires to have another   Plan: continue APAP  -will request for onsite ortho joint injection     (M48.00) Central stenosis of spinal canal  Comment: June 2019 with severe buttock pain, unable to weight bear  -L5 lateral vertebral body fx and central stenosis L3-L4  -conservative management, WBAT, completed therapy  -denies any neurological changes today in bowel/bladder, numbness/tingling  Plan: monitor, patient and son educated on s/s to update provider with, if neurological changes would warrant neurosurgery involvement     (G20) Parkinson's disease (tremor, stiffness, slow motion, unstable posture) (H)  Comment: no official neuro w/u but per symptoms of tremor, quiet speech, rigidity, slow movement, falls  -does not desire neuro involvement or medications  -no recent falls, no reports of difficulty swallowing   Plan: monitor, AL staff assist with cares, assist with all ambulation  -if falls or decrease in function staff to update to get therapy involved     (D50.9) Iron deficiency anemia, unspecified iron deficiency anemia type  Comment: per history, not currently on iron  -no reports of acute blood loss   Plan: CBC next week    (E78.5)  Hyperlipidemia LDL goal <100  Comment: per history  -on statin  -per care everywhere lipid panel completed fall 2019  Plan: continue statin  -periodically monitor lipid panel     (Z71.89) ACP (advance care planning)  Comment: discussed today as noted in HPI, cannot be full code without intubation  -patient desires to be DNR/DNI, her son, Michele and healthcare agent agrees  Plan: new POLST today DNR/DNI, code status updated         Total time spent with patient visit at the [facility] nursing [facility] was 74 minutes including patient visit, review of past records and discussion on ACP and POC with AL staff and son, Michele, present during visit. Greater than 50% of total time spent with counseling and coordinating care due to discussion on management of crackles to lungs, ACP discussion, and education with patient and son on symptoms to notify provider of with worsening central stenosis.     Electronically signed by:  IRASEMA Rivero CNP                     Sincerely,        IRASEMA Rivero CNP

## 2020-02-11 NOTE — PROGRESS NOTES
[place] [facility]  PRIMARY CARE PROVIDER AND CLINIC:  IRASEMA Rivero CNP, [address] / [facility] [address]  Chief Complaint   Patient presents with     Establish Care     [place] Medical Record Number:  [id number]  Place of Service where encounter took place:  [facility] ([facility]) [[id number]]    Liberty Horta  is a 85 year old  (1935), currently living at [facility] but opting to change to onsite care today.  Admitted to this facility for  medical management and nursing care.    HPI:    HPI information obtained from: facility chart records, facility staff, patient report, [facility] chart review, Care Everywhere Epic chart review and family/first contact son, Chu report.     Patient is a 85 year old female that was admitted to Al facility May 2019 after hospitalization for severe pain to her left buttock in which she was unable to bear weight.  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  Ortho saw her and recommended nonoperative management with WBAT and therapy.  Her Lumbar fracture was noted previously on CT imaging from March 2019 and conservative management and monitoring recommended.    She is seen today as desires to establish with onsite care due to difficulty getting to appointments.  Patient was previously followed by [facility] PCP Dr. Chris.  She has PMH significant for a-fib, s/p pacer maker placement (on coumadin), hypothyroidism, HTN, GERD, OA, Anemia, HLD, urinary retention, Parkinson's.  She has had her left knee replaced and also had right hip ORIF in the past    She is seen today in her apartment with her son, chu.  Neither patient or son have immediate concerns or questions today.  Patient denies chest pain, shortness of breath, orthopnea, PND, n/v, diarrhea, constipation, changes in bowel or bladder.  She denies numbness/tingling to both LE and UE's.  Currently  still able to ambulate with 4ww and assistance of 1.  Gait unsteady but no recent falls.  She had home care therapy this summer after hospital stay.  Son reports that her previous PCP believed she had some parkinson's, but patient and family do not desire to have further w/u by neurology and not interested in meds.  She has a resting tremor, which patient and son feel slightly improved from previously.  She denies difficulty with swallowing.  Her speech is quiet and her gait is slightly rigid and slow.      She reports pain only to bilateral knees, R>L.  She has difficulty describing pain.  Reports is less often then daily but at least 1X per week.  Occurs with ambulation, thinks it is dull.  She has had injections by [facility] in the past and interested in having repeated.  It has been >6months since her last injection per son and patient    We reviewed POLST today.  It states FULL code but no intubation.  We discussed how this is not feasible, as airway needs to be placed in code situation.  After discussion, both patient and son feel she wants to be DNR/DNI but still ok with hospitalizations for restorative cares.        CODE STATUS/ADVANCE DIRECTIVES DISCUSSION:   DNR / DNI  Patient's living condition: lives in an assisted living facility  ALLERGIES: Darvocet [propoxyphene n-apap]; Demerol [meperidine]; and Tramadol  PAST MEDICAL HISTORY:  has a past medical history of Basal cell carcinoma, Chronic atrial fibrillation, DVT (deep vein thrombosis) in pregnancy, Esophageal reflux, Hypertension, Hypothyroid, Osteoarthritis, Pacemaker, and Renal insufficiency.  PAST SURGICAL HISTORY:   has a past surgical history that includes orthopedic surgery; Open reduction internal fixation femur distal (Right, 10/6/2016); and Implant pacemaker.  FAMILY HISTORY: Family history is unknown by patient.  SOCIAL HISTORY:   reports that she has never smoked. She does not have any smokeless tobacco history on file. She reports  that she does not drink alcohol or use drugs.    Post Discharge Medication Reconciliation Status: patient was not discharged from an inpatient facility    Current Outpatient Medications   Medication Sig Dispense Refill     [START ON 2/17/2020] acetaminophen (TYLENOL) 500 MG tablet Take 1,000 mg by mouth 2 times daily AND 1000 mg daily at night PRN starting 02/17       atorvastatin (LIPITOR) 40 MG tablet Take 1 tablet by mouth At Bedtime        diltiazem (TIAZAC) 180 MG 24 hr ER beaded capsule Take 180 mg by mouth daily       [START ON 2/17/2020] furosemide (LASIX) 40 MG tablet Take 60 mg by mouth daily starting 02/17 until 02/21       [START ON 2/21/2020] furosemide (LASIX) 40 MG tablet Take 40 mg by mouth daily starting Friday 02/21       levothyroxine (TIROSINT) 150 MCG capsule Take 150 mcg by mouth daily        multivitamin (CENTRUM SILVER) tablet Take 1 tablet by mouth daily       sotalol (BETAPACE) 80 MG tablet Take 1 tablet by mouth 2 times daily        warfarin (COUMADIN) 2.5 MG tablet Take 2.5 mg by mouth daily on Tues & Thurs; AND take 5 mg AOD       acetaminophen (TYLENOL) 500 MG tablet Take 2 tablets (1,000 mg) by mouth 3 times daily (Patient not taking: Reported on 2/11/2020)  0     Lidocaine (LIDOCARE) 4 % Patch Place 1 patch onto the skin every 24 hours Apply to lumbar spine, 12 hours on then 12 hours off (Patient not taking: Reported on 2/11/2020) 15 patch 0       ROS:  10 point ROS of systems including Constitutional, Eyes, Respiratory, Cardiovascular, Gastroenterology, Genitourinary, Integumentary, Musculoskeletal, Psychiatric were all negative except for pertinent positives noted in my HPI.    Vitals:  /68   Pulse 60   Temp 97.8  F (36.6  C)   Resp 18   SpO2 98%   Exam:  GENERAL APPEARANCE:  Alert, in no distress, cooperative  ENT:  Mouth and posterior oropharynx normal, moist mucous membranes  EYES:  EOM, conjunctivae, lids, pupils and irises normal, PERRL  NECK:  No adenopathy,masses or  thyromegaly  RESP:  respiratory effort and palpation of chest normal, no respiratory distress, crackles to BLLL, no cough, on room air  CV:  Palpation and auscultation of heart done , regular rate and rhythm, no murmur, rub, or gallop, edema 1+ BLLE, no compression  ABDOMEN:  no guarding or rebound, bowel sounds normal, no distension  M/S:   Gait and station abnormal generalized weakness, gait slow and slightly rigid, pain to palpation or bilateral knees, no edema/erythema joints, ambulates with 4ww, gait unsteady  SKIN:  warm, dry, no visible rashes or wounds  NEURO:   speech quiet but clear, bilateral resting hand tremor, slight UE rigidity, no cogwheeling, no facial asymmetry   PSYCH:  insight and judgement impaired, memory impaired , affect and mood normal    Lab/Diagnostic data:  reviewed most recent labs in care everywhere today    ASSESSMENT/PLAN:  (E03.9) Hypothyroidism, unspecified type  (primary encounter diagnosis)  Comment: per history  -per care everywhere last TSH 0.22 on 10/7/19, goal TSH 3-4 given age  Plan: continue synthroid, adjust dose with next lab  -TSH recheck next Tuesday    (I48.11) Longstanding persistent atrial fibrillation  Comment: per history, with pace maker  -goes to have device checked onsite Q6 months, per son, just done  -anticoagulation with coumadin, per review of previous PCP notes, goal INR 1.5-2.5, unclear why?  Plan: continue pacer and checks per recommendation   -on diltiazem and sotalol   -anticoagulated with coumadin  -INR next week  -does not follow with cards and no desire to per family       (K21.9) Gastroesophageal reflux disease, esophagitis presence not specified  Comment: per history, denies symptoms today   Plan: staff update with concerns    (I15.9) Secondary hypertension  Comment:   -goal SBP <140 given age and goals of care  -crackles heard upon exam today, per review of last PCP note, crackles were present upon exam then as well.  Patient asymptomatic per her  report and family/pt not interested in cards involvement which is reasonable   -no history of HF, but would not be surprised if component of HF given history  -med administration to start with facility next week.  Discussed management and POC today with patient and son Michele  Plan: increase lasix next week (because this is when med administration will start) to 60mg po every day X 4 days, then back to 40mg every day dosing  -BMP next week  -monitor BP    (M15.0) Primary osteoarthritis involving multiple joints  Comment: per history   -s/p left knee replacement and right hip ORIF  -pain ongoing but manageable  -has received kenalog injections in past and desires to have another   Plan: continue APAP  -will request for onsite ortho joint injection     (M48.00) Central stenosis of spinal canal  Comment: June 2019 with severe buttock pain, unable to weight bear  -L5 lateral vertebral body fx and central stenosis L3-L4  -conservative management, WBAT, completed therapy  -denies any neurological changes today in bowel/bladder, numbness/tingling  Plan: monitor, patient and son educated on s/s to update provider with, if neurological changes would warrant neurosurgery involvement     (G20) Parkinson's disease (tremor, stiffness, slow motion, unstable posture) (H)  Comment: no official neuro w/u but per symptoms of tremor, quiet speech, rigidity, slow movement, falls  -does not desire neuro involvement or medications  -no recent falls, no reports of difficulty swallowing   Plan: monitor, AL staff assist with cares, assist with all ambulation  -if falls or decrease in function staff to update to get therapy involved     (D50.9) Iron deficiency anemia, unspecified iron deficiency anemia type  Comment: per history, not currently on iron  -no reports of acute blood loss   Plan: CBC next week    (E78.5) Hyperlipidemia LDL goal <100  Comment: per history  -on statin  -per care everywhere lipid panel completed fall 2019  Plan:  continue statin  -periodically monitor lipid panel     (Z71.89) ACP (advance care planning)  Comment: discussed today as noted in HPI, cannot be full code without intubation  -patient desires to be DNR/DNI, her son, Michele and healthcare agent agrees  Plan: new POLST today DNR/DNI, code status updated         Total time spent with patient visit at the [facility] nursing [facility] was 74 minutes including patient visit, review of past records and discussion on ACP and POC with AL staff and son, Michele, present during visit. Greater than 50% of total time spent with counseling and coordinating care due to discussion on management of crackles to lungs, ACP discussion, and education with patient and son on symptoms to notify provider of with worsening central stenosis.     Electronically signed by:  IRASEMA Rivero CNP

## 2020-02-11 NOTE — Clinical Note
I know you are out of town this week, no urgency.  New patient of mine that is interested in right knee injection when you get a chance.  She was previously having them done at [facility].  Let me know if any questions!Thank Chrissy

## 2020-02-12 ENCOUNTER — AMBULATORY - HEALTHEAST (OUTPATIENT)
Dept: OTHER | Facility: CLINIC | Age: 85
End: 2020-02-12

## 2020-02-12 ENCOUNTER — DOCUMENTATION ONLY (OUTPATIENT)
Dept: OTHER | Facility: CLINIC | Age: 85
End: 2020-02-12

## 2020-02-18 ENCOUNTER — HOSPITAL LABORATORY (OUTPATIENT)
Dept: OTHER | Facility: CLINIC | Age: 85
End: 2020-02-18

## 2020-02-18 ENCOUNTER — TRANSFERRED RECORDS (OUTPATIENT)
Dept: HEALTH INFORMATION MANAGEMENT | Facility: CLINIC | Age: 85
End: 2020-02-18

## 2020-02-18 LAB
ANION GAP SERPL CALCULATED.3IONS-SCNC: 6 MMOL/L (ref 3–14)
BUN SERPL-MCNC: 22 MG/DL (ref 7–30)
CALCIUM SERPL-MCNC: 9.4 MG/DL (ref 8.5–10.1)
CHLORIDE SERPLBLD-SCNC: 104 MMOL/L (ref 94–109)
CO2 SERPL-SCNC: 30 MMOL/L (ref 20–32)
CREAT SERPL-MCNC: 0.88 MG/DL (ref 0.52–1.04)
ERYTHROCYTE [DISTWIDTH] IN BLOOD BY AUTOMATED COUNT: 12.9 % (ref 10–15)
GFR SERPL CREATININE-BSD FRML MDRD: 61 ML/MIN/(1.73_M2)
GLUCOSE SERPL-MCNC: 135 MG/DL (ref 70–99)
HCT VFR BLD AUTO: 43.3 % (ref 35–47)
HEMOGLOBIN: 14.2 G/DL (ref 11.7–15.7)
INR PPP: 2.2 (ref 0.86–1.14)
MCH RBC QN AUTO: 31.4 PG (ref 26.5–33)
MCHC RBC AUTO-ENTMCNC: 32.8 G/DL (ref 31.5–36.5)
MCV RBC AUTO: 96 FL (ref 78–100)
PLATELET # BLD AUTO: 194 10E9/L (ref 150–450)
POTASSIUM SERPL-SCNC: 3.7 MMOL/L (ref 3.4–5.3)
RBC # BLD AUTO: 4.52 10E12/L (ref 3.8–5.2)
SODIUM SERPL-SCNC: 140 MMOL/L (ref 133–144)
TSH SERPL-ACNC: 1.2 MU/L (ref 0.4–4)
WBC # BLD AUTO: 6.5 10E9/L (ref 4–11)

## 2020-02-20 ENCOUNTER — HOSPITAL LABORATORY (OUTPATIENT)
Dept: OTHER | Facility: CLINIC | Age: 85
End: 2020-02-20

## 2020-02-20 ENCOUNTER — ASSISTED LIVING VISIT (OUTPATIENT)
Dept: GERIATRICS | Facility: CLINIC | Age: 85
End: 2020-02-20
Payer: COMMERCIAL

## 2020-02-20 DIAGNOSIS — M15.0 PRIMARY OSTEOARTHRITIS INVOLVING MULTIPLE JOINTS: Primary | ICD-10-CM

## 2020-02-20 NOTE — PROGRESS NOTES
"Ortho Nursing home visit    Liberty Horta is a 85 year old female who resides at [facility]    Patient is seen today for Right knee pain, hs of OA/ and traumatic arthritis, 2nd to distal femur fracture, has had steroid injections in the past, and, family is requesting same, last injections over 6 months per son Romaine,      Past Medical History:   Diagnosis Date     Basal cell carcinoma      Chronic atrial fibrillation      DVT (deep vein thrombosis) in pregnancy      Esophageal reflux      Hypertension      Hypothyroid      Osteoarthritis      Pacemaker      Renal insufficiency       Past Surgical History:   Procedure Laterality Date     IMPLANT PACEMAKER       OPEN REDUCTION INTERNAL FIXATION FEMUR DISTAL Right 10/6/2016    Procedure: OPEN REDUCTION INTERNAL FIXATION FEMUR DISTAL;  Surgeon: Filipe Coburn MD;  Location:  OR     ORTHOPEDIC SURGERY      Knee replacement left in 2011        Allergies   Allergen Reactions     Darvocet [Propoxyphene N-Apap]      Demerol [Meperidine]      Tramadol      Patient described feeling \"squirrely\"      There were no vitals taken for this visit.     Exam: Seen today on-site at AL, seated allows PROM 0-80 Lig intact, some noted crepitus, no noted swelling, no effusion, pain lateral joint line,       X-rays show advanced DJD right knee;    ASSESSMENT / PLAN:  After consent, RIght knee prepped, injected with 1 ml depo medrol, 5 ml 1% lidocaine into knee joint, tolerated well , no complaints, F/U prn 4 months    [id number]      Dayanna OPA-C  [phone] Cell    "

## 2020-02-25 ENCOUNTER — ASSISTED LIVING VISIT (OUTPATIENT)
Dept: GERIATRICS | Facility: CLINIC | Age: 85
End: 2020-02-25
Payer: COMMERCIAL

## 2020-02-25 ENCOUNTER — HOSPITAL LABORATORY (OUTPATIENT)
Dept: OTHER | Facility: CLINIC | Age: 85
End: 2020-02-25

## 2020-02-25 DIAGNOSIS — Z86.718 PERSONAL HISTORY OF DVT (DEEP VEIN THROMBOSIS): ICD-10-CM

## 2020-02-25 DIAGNOSIS — I48.11 LONGSTANDING PERSISTENT ATRIAL FIBRILLATION (H): Primary | ICD-10-CM

## 2020-02-25 DIAGNOSIS — Z51.81 ENCOUNTER FOR THERAPEUTIC DRUG MONITORING: ICD-10-CM

## 2020-02-25 DIAGNOSIS — Z79.01 LONG TERM (CURRENT) USE OF ANTICOAGULANTS: ICD-10-CM

## 2020-02-25 NOTE — LETTER
2/25/2020        RE: Liberty Horta  [facility]  [address] [address] [address]        [facility] Medical Record Number:  [id number]  Place of Service where encounter took place: [facility]  (Critical access hospital) [[id number]]    HPI:    Liberty Horta is a 85 year old  (1935), who is being seen today for an episodic care visit at the above location.   HPI information obtained from: facility chart records, facility staff and [facility] chart review. Today's concern is INR/Coumadin management for A. Fib    ROS/Subjective:  Bleeding Signs/Symptoms:  None  Thromboembolic Signs/Symptoms:  None  Medication Changes:  No  Dietary Changes:  No  Activity Changes: No  Bacterial/Viral Infection:  No  Missed Coumadin Doses:  None  On ASA: No  Other Concerns:  No    OBJECTIVE:  There were no vitals taken for this visit.  Last INR: 1.87 on 2/20/20  INR Today:  2.14  Current Dose:  2.5mg every Tues, Fri, and Coumadin 5mg M, W, Thurs, Sat, Sun    INR Flow sheet at SNF:    ASSESSMENT:  1. Longstanding persistent atrial fibrillation    2. Encounter for therapeutic drug monitoring    3. Long term (current) use of anticoagulants      Therapeutic INR for goal of 2-3    PLAN:   Orders written by provider at facility  New Dose: No Change    Next INR: 2 weeks      Electronically signed by:  IRASEMA Rivero CNP     [facility] Medical Record Number:  [id number]  Place of Service where encounter took place: [facility]  (Critical access hospital) [[id number]]    HPI:    Liberty Horta is a 85 year old  (1935), who is being seen today for an episodic care visit at the above location.   HPI information obtained from: facility chart records, facility staff and [facility] chart review. Today's concern is INR/Coumadin management for A. Fib/DVT    ROS/Subjective:  Bleeding Signs/Symptoms:  None  Thromboembolic Signs/Symptoms:  None  Medication  Changes:  No  Dietary Changes:  No  Activity Changes: No  Bacterial/Viral Infection:  No  Missed Coumadin Doses:  None  On ASA: No  Other Concerns:  No    OBJECTIVE:  There were no vitals taken for this visit.  Last INR: 1.87 on 2/20/20  INR Today:  2.14  Current Dose:  2.5mg every Tues, Fri, and Coumadin 5mg M, W, Thurs, Sat, Sun    INR Flow sheet at [facility]:    ASSESSMENT:  1. Longstanding persistent atrial fibrillation    2. Encounter for therapeutic drug monitoring    3. Long term (current) use of anticoagulants    4. Personal history of DVT (deep vein thrombosis)      Therapeutic INR for goal of 2-2.5    PLAN:   Orders written by provider at facility  New Dose: No Change    Next INR: 2 weeks      Electronically signed by:  IRASEMA Rivero CNP       Sincerely,        IRASEMA Rivero CNP

## 2020-02-25 NOTE — PROGRESS NOTES
[place] [facility]  [place] Medical Record Number:  [id number]  Place of Service where encounter took place: [facility]  (S) [[id number]]    HPI:    Liberty Horta is a 85 year old  (1935), who is being seen today for an episodic care visit at the above location.   HPI information obtained from: facility chart records, facility staff and [facility] chart review. Today's concern is INR/Coumadin management for A. Fib/DVT    ROS/Subjective:  Bleeding Signs/Symptoms:  None  Thromboembolic Signs/Symptoms:  None  Medication Changes:  No  Dietary Changes:  No  Activity Changes: No  Bacterial/Viral Infection:  No  Missed Coumadin Doses:  None  On ASA: No  Other Concerns:  No    OBJECTIVE:  There were no vitals taken for this visit.  Last INR: 1.87 on 2/20/20  INR Today:  2.14  Current Dose:  2.5mg every Tues, Fri, and Coumadin 5mg M, W, Thurs, Sat, Sun    INR Flow sheet at SNF:    ASSESSMENT:  1. Longstanding persistent atrial fibrillation    2. Encounter for therapeutic drug monitoring    3. Long term (current) use of anticoagulants    4. Personal history of DVT (deep vein thrombosis)      Therapeutic INR for goal of 2-2.5    PLAN:   Orders written by provider at facility  New Dose: No Change    Next INR: 2 weeks      Electronically signed by:  IRASEMA Rivero CNP

## 2020-03-10 ENCOUNTER — ASSISTED LIVING VISIT (OUTPATIENT)
Dept: GERIATRICS | Facility: CLINIC | Age: 85
End: 2020-03-10
Payer: COMMERCIAL

## 2020-03-10 ENCOUNTER — HOSPITAL LABORATORY (OUTPATIENT)
Dept: OTHER | Facility: CLINIC | Age: 85
End: 2020-03-10

## 2020-03-10 DIAGNOSIS — I48.11 LONGSTANDING PERSISTENT ATRIAL FIBRILLATION (H): ICD-10-CM

## 2020-03-10 DIAGNOSIS — Z86.718 PERSONAL HISTORY OF DVT (DEEP VEIN THROMBOSIS): Primary | ICD-10-CM

## 2020-03-10 DIAGNOSIS — Z51.81 ENCOUNTER FOR THERAPEUTIC DRUG MONITORING: ICD-10-CM

## 2020-03-10 DIAGNOSIS — Z79.01 LONG TERM (CURRENT) USE OF ANTICOAGULANTS: ICD-10-CM

## 2020-03-10 LAB
ANION GAP SERPL CALCULATED.3IONS-SCNC: 6 MMOL/L (ref 3–14)
BUN SERPL-MCNC: 22 MG/DL (ref 7–30)
CALCIUM SERPL-MCNC: 9.4 MG/DL (ref 8.5–10.1)
CHLORIDE SERPL-SCNC: 104 MMOL/L (ref 94–109)
CO2 SERPL-SCNC: 30 MMOL/L (ref 20–32)
CREAT SERPL-MCNC: 0.88 MG/DL (ref 0.52–1.04)
ERYTHROCYTE [DISTWIDTH] IN BLOOD BY AUTOMATED COUNT: 12.9 % (ref 10–15)
GFR SERPL CREATININE-BSD FRML MDRD: 61 ML/MIN/{1.73_M2}
GLUCOSE SERPL-MCNC: 135 MG/DL (ref 70–99)
HCT VFR BLD AUTO: 43.3 % (ref 35–47)
HGB BLD-MCNC: 14.2 G/DL (ref 11.7–15.7)
INR PPP: 1.87 (ref 0.86–1.14)
INR PPP: 2.14 (ref 0.86–1.14)
INR PPP: 2.2 (ref 0.86–1.14)
INR PPP: 2.61 (ref 0.86–1.14)
MCH RBC QN AUTO: 31.4 PG (ref 26.5–33)
MCHC RBC AUTO-ENTMCNC: 32.8 G/DL (ref 31.5–36.5)
MCV RBC AUTO: 96 FL (ref 78–100)
PLATELET # BLD AUTO: 194 10E9/L (ref 150–450)
POTASSIUM SERPL-SCNC: 3.7 MMOL/L (ref 3.4–5.3)
RBC # BLD AUTO: 4.52 10E12/L (ref 3.8–5.2)
SODIUM SERPL-SCNC: 140 MMOL/L (ref 133–144)
TSH SERPL DL<=0.005 MIU/L-ACNC: 1.2 MU/L (ref 0.4–4)
WBC # BLD AUTO: 6.5 10E9/L (ref 4–11)

## 2020-03-10 NOTE — PROGRESS NOTES
[facility]  [place] Medical Record Number:  [id number]  Place of Service where encounter took place: [facility] ([facility] [[id number]]    HPI:    Liberty Horta is a 85 year old  (1935), who is being seen today for an episodic care visit at the above location.   HPI information obtained from: facility chart records, facility staff and [facility] chart review. Today's concern is INR/Coumadin management for A. Fib and DVT    ROS/Subjective:  Bleeding Signs/Symptoms:  None  Thromboembolic Signs/Symptoms:  None  Medication Changes:  No  Dietary Changes:  No  Activity Changes: No  Bacterial/Viral Infection:  No  Missed Coumadin Doses:  None  On ASA: No  Other Concerns:  No    OBJECTIVE:  There were no vitals taken for this visit.  Last INR: 2.14 on 2/25/20  INR Today:  2.61  Current Dose:  2.5mg every Tues, Fri, and Coumadin 5mg M, W, Thurs, Sat, Sun.     INR Flow sheet at SNF:    ASSESSMENT:  1. Personal history of DVT (deep vein thrombosis)    2. Longstanding persistent atrial fibrillation    3. Encounter for therapeutic drug monitoring    4. Long term (current) use of anticoagulants      Therapeutic INR for goal of 2-3    PLAN:   Orders written by provider at facility  New Dose: No Change    Next INR: 3 weeks      Electronically signed by:  IRASEMA Rivero CNP

## 2020-03-10 NOTE — LETTER
3/10/2020        RE: Liberty Thornton [facility]  [address] [address] [address]        [place] [facility]  [place] Medical Record Number:  [id number]  Place of Service where encounter took place: [facility] ([facility] [[id number]]    HPI:    Liberty Horta is a 85 year old  (1935), who is being seen today for an episodic care visit at the above location.   HPI information obtained from: facility chart records, facility staff and [facility] chart review. Today's concern is INR/Coumadin management for A. Fib and DVT    ROS/Subjective:  Bleeding Signs/Symptoms:  None  Thromboembolic Signs/Symptoms:  None  Medication Changes:  No  Dietary Changes:  No  Activity Changes: No  Bacterial/Viral Infection:  No  Missed Coumadin Doses:  None  On ASA: No  Other Concerns:  No    OBJECTIVE:  There were no vitals taken for this visit.  Last INR: 2.14 on 2/25/20  INR Today:  2.61  Current Dose:  2.5mg every Tues, Fri, and Coumadin 5mg M, W, Thurs, Sat, Sun.     INR Flow sheet at SNF:    ASSESSMENT:  1. Personal history of DVT (deep vein thrombosis)    2. Longstanding persistent atrial fibrillation    3. Encounter for therapeutic drug monitoring    4. Long term (current) use of anticoagulants      Therapeutic INR for goal of 2-3    PLAN:   Orders written by provider at facility  New Dose: No Change    Next INR: 3 weeks      Electronically signed by:  IRASEMA Rivero CNP       Sincerely,        IRASEMA Rivero CNP

## 2020-03-31 ENCOUNTER — HOSPITAL LABORATORY (OUTPATIENT)
Dept: OTHER | Facility: CLINIC | Age: 85
End: 2020-03-31

## 2020-03-31 LAB — INR PPP: 2.66 (ref 0.86–1.14)

## 2020-04-21 ENCOUNTER — HOSPITAL LABORATORY (OUTPATIENT)
Dept: OTHER | Facility: CLINIC | Age: 85
End: 2020-04-21

## 2020-04-21 LAB — INR PPP: 2.7 (ref 0.86–1.14)

## 2020-05-12 ENCOUNTER — VIRTUAL VISIT (OUTPATIENT)
Dept: GERIATRICS | Facility: CLINIC | Age: 85
End: 2020-05-12
Payer: COMMERCIAL

## 2020-05-12 VITALS
BODY MASS INDEX: 28.27 KG/M2 | RESPIRATION RATE: 18 BRPM | HEART RATE: 70 BPM | DIASTOLIC BLOOD PRESSURE: 70 MMHG | HEIGHT: 61 IN | SYSTOLIC BLOOD PRESSURE: 110 MMHG | OXYGEN SATURATION: 98 % | TEMPERATURE: 98.1 F

## 2020-05-12 DIAGNOSIS — E03.9 HYPOTHYROIDISM, UNSPECIFIED TYPE: Primary | ICD-10-CM

## 2020-05-12 DIAGNOSIS — K21.9 GASTROESOPHAGEAL REFLUX DISEASE, ESOPHAGITIS PRESENCE NOT SPECIFIED: ICD-10-CM

## 2020-05-12 DIAGNOSIS — M48.00 CENTRAL STENOSIS OF SPINAL CANAL: ICD-10-CM

## 2020-05-12 DIAGNOSIS — D50.9 IRON DEFICIENCY ANEMIA, UNSPECIFIED IRON DEFICIENCY ANEMIA TYPE: ICD-10-CM

## 2020-05-12 DIAGNOSIS — G20.A1 PARKINSON'S DISEASE (TREMOR, STIFFNESS, SLOW MOTION, UNSTABLE POSTURE) (H): ICD-10-CM

## 2020-05-12 DIAGNOSIS — I48.20 CHRONIC ATRIAL FIBRILLATION (H): ICD-10-CM

## 2020-05-12 DIAGNOSIS — M15.0 PRIMARY OSTEOARTHRITIS INVOLVING MULTIPLE JOINTS: ICD-10-CM

## 2020-05-12 PROBLEM — I48.91 ATRIAL FIBRILLATION (H): Status: ACTIVE | Noted: 2020-05-12

## 2020-05-12 NOTE — LETTER
"    5/12/2020        RE: Liberty Horta  [facility]  [address] [address]        [place] [facility]  Type of service: Video Visit  Liberty Horta is being evaluated via a billable visit due to the restrictions of the Covid-19 pandemic.   The patient or first contact has been notified of following:  \"This video visit will be conducted via a call between you and your provider. We have found that certain health care needs can be provided without the need for an in-person physical exam.  This service lets us provide the care you need with a video conversation. If during the course of the call the provider feels a video visit is not appropriate, you will not be charged for this service.\"   The provider has received verbal consent for a Video or Telephone Visit from the patient and or first contact? Yes  Video or Telephone Start Time: 10:38  [place] Medical Record Number:  [id number]  [facility]   Chief Complaint   Patient presents with     Annual Comprehensive Nursing Home     HPI:    Liberty Horta  is a 85 year old  (1935), who is being seen today for an annual comprehensive visit. HPI information obtained from: facility chart records, facility staff, patient report and [facility] chart review.  Today's concerns are:    1. Hypothyroidism, unspecified type    2. Chronic atrial fibrillation    3. Primary osteoarthritis involving multiple joints    4. Gastroesophageal reflux disease, esophagitis presence not specified    5. Parkinson's disease (tremor, stiffness, slow motion, unstable posture) (H)    6. Central stenosis of spinal canal    7. Iron deficiency anemia, unspecified iron deficiency anemia type        Patient is a 85 year old female that was admitted to [facility] facility May 2019 after hospitalization for severe pain to her left buttock in which she was unable to bear weight.  At that time was " noted to have nondisplaced L5 lateral vertebral body fracture and severe central stenosis at L3-L4 level.  Ortho saw her and recommended nonoperative management with WBAT and therapy.  Her Lumbar fracture was noted previously on CT imaging from March 2019 and conservative management and monitoring recommended.    She has PMH significant for a-fib, s/p pacer maker placement (on coumadin), hypothyroidism, HTN, GERD, OA, Anemia, HLD, urinary retention, Parkinson's.  She has had her left knee replaced and also had right hip ORIF in the past.    She is seen today for annual exam via virtual video visit. She reports doing well given the circumstances.  She has some chronic right knee pain but tells me it is bearable.  She had a kenalog injection in February and did not find helpful, does not plan to repeat. She states she is sleeping well, gets up once a night to use the bathroom but easily falls back asleep.  Reports no issues with bowel or bladder.  Denies shortness of breath, chest pain, dizziness or lightheadedness.  I was able to speak with her son, Michele, who is her POA.  He has no concerns and feels she has been doing quite well        ALLERGIES: Darvocet [propoxyphene n-apap]; Demerol [meperidine]; and Tramadol  PAST MEDICAL HISTORY:  has a past medical history of Basal cell carcinoma, Chronic atrial fibrillation, DVT (deep vein thrombosis) in pregnancy, Esophageal reflux, Hypertension, Hypothyroid, Osteoarthritis, Pacemaker, and Renal insufficiency.  PAST SURGICAL HISTORY:  has a past surgical history that includes orthopedic surgery; Open reduction internal fixation femur distal (Right, 10/6/2016); and Implant pacemaker.  IMMUNIZATIONS:  Immunization History   Administered Date(s) Administered     DTaP, Unspecified 10/08/2012     FLU 6-35 months 10/14/2011     Flu 65+ Years 10/08/2018     [id number] Novel Flu 02/01/2010     Influenza (H1N1) 02/01/2010     Influenza (High Dose) 3 valent vaccine 11/14/2014,  10/26/2015, 09/20/2017, 09/26/2017, 10/01/2019     Influenza (IIV3) PF 10/28/2003, 11/02/2004, 10/25/2005, 11/21/2006, 11/12/2007, 11/24/2008, 10/19/2009, 10/27/2010, 10/14/2011, 10/08/2012, 12/04/2013     Pneumo Conj 13-V (2010&after) 01/18/2017     Pneumococcal 23 valent 05/01/2001, 11/21/2006     Td (Adult), Adsorbed 03/17/2003     Zoster vaccine, live 10/21/2011, 10/21/2011     Above immunizations pulled from [facility]. [facility] and facility records also reconciled. Outstanding information sent to  to update [facility].  Future immunizations are not needed at this point as all recommended immunizations are up to date.     Current Outpatient Medications   Medication Sig Dispense Refill     acetaminophen (TYLENOL) 500 MG tablet Take 2 tablets (1,000 mg) by mouth 2 times daily. May also take 2 tablets (1,000 mg) daily as needed for mild pain. AND 1000 mg daily at night PRN starting 02/17 120 tablet 11     atorvastatin (LIPITOR) 40 MG tablet TAKE 1 TABLET BY MOUTH AT BEDTIME 28 tablet PRN     DILT- MG 24 hr capsule TAKE 1 CAPSULE BY MOUTH ONCE DAILY 28 capsule PRN     furosemide (LASIX) 40 MG tablet Take 1 tablet (40 mg) by mouth daily starting Friday 02/21 90 tablet 11     levothyroxine (SYNTHROID/LEVOTHROID) 150 MCG tablet TAKE 1 TABLET BY MOUTH ONCE DAILY 28 tablet PRN     Multiple Vitamin (TAB-A-IRIS) TABS TAKE 1 TABLET BY MOUTH ONCE DAILY 28 tablet PRN     sotalol (BETAPACE) 80 MG tablet TAKE 1 TABLET BY MOUTH TWICE DAILY WITH MEALS 56 tablet PRN     warfarin (COUMADIN) 2.5 MG tablet Take 2.5 mg by mouth daily on Tues & Thurs; AND take 5 mg AOD           Case Management:  I have reviewed the Assisted Living care plan, current immunizations and preventive care/cancer screening. .Future cancer screening is not clinically indicated secondary to age/goals of care Patient's desire to return to the community is currently living in a community environment. Current Level of Care is  "appropriate.    Advance Directive Discussion:    I reviewed the current advanced directives as reflected in EPIC, the POLST and the facility chart, and verified the congruency of orders. I contacted the first party son and discussed the plan of Care.  I did review the advance directives with the resident.     Team Discussion:  I communicated with the appropriate disciplines involved with the Plan of Care:   Nursing    Patient's goal is pain control and comfort.  Information reviewed:  Medications, vital signs, orders, and nursing notes.    ROS:  Limited secondary to cognitive impairment but as noted in HPI    Vitals:  /70   Pulse 70   Temp 98.1  F (36.7  C)   Resp 18   Ht 1.549 m (5' 1\")   SpO2 98%   BMI 28.27 kg/m   Body mass index is 28.27 kg/m .  Exam:  GENERAL APPEARANCE:  Alert, in no distress  EYES:  EOM normal, conjunctiva and lids normal  NECK:  no visible masses  RESP:  non-labored, on room air, no cough, nurse auscultated lungs and report clear  CV:  trace BLLE edema, no compression  M/S:   Gait and station abnormal ambulates short distances with 4ww, no edema/erythema joints   SKIN:  no noted rashes or open areas visible during video encounter   NEURO:   speech clear, no facial asymmetry  PSYCH:  insight and judgement impaired, memory impaired , affect and mood flat     Lab/Diagnostic data:   Recent labs in [facility] reviewed by me today.     ASSESSMENT/PLAN  (E03.9) Hypothyroidism, unspecified type  (primary encounter diagnosis)  Comment: per history  Lab Results   Component Value Date    TSH 1.20 02/18/2020       Plan: continue synthroid  -annual TSH    (I48.20) Chronic atrial fibrillation  Comment:  per history, with pace maker  -goes to have device checked onsite Q6 months  Plan: continue pacer and checks per recommendation, appears has recheck July 22  -on diltiazem and sotalol   -anticoagulated with coumadin  -follow INR's  -does not follow with cards and no desire to per family     (M15.0) " Primary osteoarthritis involving multiple joints  Comment: per history   -s/p left knee replacement and right hip ORIF  -pain ongoing but manageable  -has received kenalog injections in past, had one by onsite ortho this past winter, did not find helpful   Plan: continue APAP  -prn kenalog injections     (K21.9) Gastroesophageal reflux disease, esophagitis presence not specified  Comment:per history, denies symptoms today   Plan: staff update with concerns       (G20) Parkinson's disease (tremor, stiffness, slow motion, unstable posture) (H)  Comment: no official neuro w/u but per symptoms of tremor, quiet speech, rigidity, slow movement, falls  -does not desire neuro involvement or medications  -no recent falls, no reports of difficulty swallowing   Plan: monitor, [facility] staff assist with cares, assist with all ambulation  -if falls or decrease in function staff to update to get therapy involved     (M48.00) Central stenosis of spinal canal  Comment: June 2019 with severe buttock pain, unable to weight bear  -L5 lateral vertebral body fx and central stenosis L3-L4  -conservative management, WBAT, completed therapy  -denies any neurological changes today in bowel/bladder, numbness/tingling  Plan: monitor, patient and son educated on s/s to update provider with, if neurological changes would warrant neurosurgery involvement     (D50.9) Iron deficiency anemia, unspecified iron deficiency anemia type  Comment: per history, not on iron  Lab Results   Component Value Date    HGB 14.2 02/18/2020     -no reports of acute s/s blood loss   Plan: staff update with s/s blood loss  -periodically monitor Hgb      Electronically signed by:  IRASEMA Rivero CNP     Visit Details  Video or Telephone End Time: 10:50  [place] Location (provider location):  [place] [facility]         Sincerely,        IRASEMA Rivero CNP

## 2020-05-12 NOTE — PROGRESS NOTES
"[place]  Type of service: Video Visit  Liberty Horta is being evaluated via a billable visit due to the restrictions of the Covid-19 pandemic.   The patient or first contact has been notified of following:  \"This video visit will be conducted via a call between you and your provider. We have found that certain health care needs can be provided without the need for an in-person physical exam.  This service lets us provide the care you need with a video conversation. If during the course of the call the provider feels a video visit is not appropriate, you will not be charged for this service.\"   The provider has received verbal consent for a Video or Telephone Visit from the patient and or first contact? Yes  Video or Telephone Start Time: 10:38  [place] Medical Record Number:  [id number]  Where the Patient is living now: [facility]   Chief Complaint   Patient presents with     Annual Comprehensive Nursing Home     HPI:    Liberty Horta  is a 85 year old  (1935), who is being seen today for an annual comprehensive visit. HPI information obtained from: facility chart records, facility staff, patient report and [facility] chart review.  Today's concerns are:    1. Hypothyroidism, unspecified type    2. Chronic atrial fibrillation    3. Primary osteoarthritis involving multiple joints    4. Gastroesophageal reflux disease, esophagitis presence not specified    5. Parkinson's disease (tremor, stiffness, slow motion, unstable posture) (H)    6. Central stenosis of spinal canal    7. Iron deficiency anemia, unspecified iron deficiency anemia type        Patient is a 85 year old female that was admitted to [facility] facility May 2019 after hospitalization for severe pain to her left buttock in which she was unable to bear weight.  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  Ortho " saw her and recommended nonoperative management with WBAT and therapy.  Her Lumbar fracture was noted previously on CT imaging from March 2019 and conservative management and monitoring recommended.    She has PMH significant for a-fib, s/p pacer maker placement (on coumadin), hypothyroidism, HTN, GERD, OA, Anemia, HLD, urinary retention, Parkinson's.  She has had her left knee replaced and also had right hip ORIF in the past.    She is seen today for annual exam via virtual video visit. She reports doing well given the circumstances.  She has some chronic right knee pain but tells me it is bearable.  She had a kenalog injection in February and did not find helpful, does not plan to repeat. She states she is sleeping well, gets up once a night to use the bathroom but easily falls back asleep.  Reports no issues with bowel or bladder.  Denies shortness of breath, chest pain, dizziness or lightheadedness.  I was able to speak with her son, Michele, who is her POA.  He has no concerns and feels she has been doing quite well        ALLERGIES: Darvocet [propoxyphene n-apap]; Demerol [meperidine]; and Tramadol  PAST MEDICAL HISTORY:  has a past medical history of Basal cell carcinoma, Chronic atrial fibrillation, DVT (deep vein thrombosis) in pregnancy, Esophageal reflux, Hypertension, Hypothyroid, Osteoarthritis, Pacemaker, and Renal insufficiency.  PAST SURGICAL HISTORY:  has a past surgical history that includes orthopedic surgery; Open reduction internal fixation femur distal (Right, 10/6/2016); and Implant pacemaker.  IMMUNIZATIONS:  Immunization History   Administered Date(s) Administered     DTaP, Unspecified 10/08/2012     FLU 6-35 months 10/14/2011     Flu 65+ Years 10/08/2018     [id number] Novel Flu 02/01/2010     Influenza (H1N1) 02/01/2010     Influenza (High Dose) 3 valent vaccine 11/14/2014, 10/26/2015, 09/20/2017, 09/26/2017, 10/01/2019     Influenza (IIV3) PF 10/28/2003, 11/02/2004, 10/25/2005, 11/21/2006,  11/12/2007, 11/24/2008, 10/19/2009, 10/27/2010, 10/14/2011, 10/08/2012, 12/04/2013     Pneumo Conj 13-V (2010&after) 01/18/2017     Pneumococcal 23 valent 05/01/2001, 11/21/2006     Td (Adult), Adsorbed 03/17/2003     Zoster vaccine, live 10/21/2011, 10/21/2011     Above immunizations pulled from [facility]. [facility] and facility records also reconciled. Outstanding information sent to  to update [facility].  Future immunizations are not needed at this point as all recommended immunizations are up to date.     Current Outpatient Medications   Medication Sig Dispense Refill     acetaminophen (TYLENOL) 500 MG tablet Take 2 tablets (1,000 mg) by mouth 2 times daily. May also take 2 tablets (1,000 mg) daily as needed for mild pain. AND 1000 mg daily at night PRN starting 02/17 120 tablet 11     atorvastatin (LIPITOR) 40 MG tablet TAKE 1 TABLET BY MOUTH AT BEDTIME 28 tablet PRN     DILT- MG 24 hr capsule TAKE 1 CAPSULE BY MOUTH ONCE DAILY 28 capsule PRN     furosemide (LASIX) 40 MG tablet Take 1 tablet (40 mg) by mouth daily starting Friday 02/21 90 tablet 11     levothyroxine (SYNTHROID/LEVOTHROID) 150 MCG tablet TAKE 1 TABLET BY MOUTH ONCE DAILY 28 tablet PRN     Multiple Vitamin (TAB-A-IRIS) TABS TAKE 1 TABLET BY MOUTH ONCE DAILY 28 tablet PRN     sotalol (BETAPACE) 80 MG tablet TAKE 1 TABLET BY MOUTH TWICE DAILY WITH MEALS 56 tablet PRN     warfarin (COUMADIN) 2.5 MG tablet Take 2.5 mg by mouth daily on Tues & Thurs; AND take 5 mg AOD           Case Management:  I have reviewed the Assisted Living care plan, current immunizations and preventive care/cancer screening. .Future cancer screening is not clinically indicated secondary to age/goals of care Patient's desire to return to the community is currently living in a community environment. Current Level of Care is appropriate.    Advance Directive Discussion:    I reviewed the current advanced directives as reflected in EPIC, the POLST and the  "facility chart, and verified the congruency of orders. I contacted the first party son and discussed the plan of Care.  I did review the advance directives with the resident.     Team Discussion:  I communicated with the appropriate disciplines involved with the Plan of Care:   Nursing    Patient's goal is pain control and comfort.  Information reviewed:  Medications, vital signs, orders, and nursing notes.    ROS:  Limited secondary to cognitive impairment but as noted in HPI    Vitals:  /70   Pulse 70   Temp 98.1  F (36.7  C)   Resp 18   Ht 1.549 m (5' 1\")   SpO2 98%   BMI 28.27 kg/m   Body mass index is 28.27 kg/m .  Exam:  GENERAL APPEARANCE:  Alert, in no distress  EYES:  EOM normal, conjunctiva and lids normal  NECK:  no visible masses  RESP:  non-labored, on room air, no cough, nurse auscultated lungs and report clear  CV:  trace BLLE edema, no compression  M/S:   Gait and station abnormal ambulates short distances with 4ww, no edema/erythema joints   SKIN:  no noted rashes or open areas visible during video encounter   NEURO:   speech clear, no facial asymmetry  PSYCH:  insight and judgement impaired, memory impaired , affect and mood flat     Lab/Diagnostic data:   Recent labs in Psychiatric reviewed by me today.     ASSESSMENT/PLAN  (E03.9) Hypothyroidism, unspecified type  (primary encounter diagnosis)  Comment: per history  Lab Results   Component Value Date    TSH 1.20 02/18/2020       Plan: continue synthroid  -annual TSH    (I48.20) Chronic atrial fibrillation  Comment:  per history, with pace maker  -goes to have device checked onsite Q6 months  Plan: continue pacer and checks per recommendation, appears has recheck July 22  -on diltiazem and sotalol   -anticoagulated with coumadin  -follow INR's  -does not follow with cards and no desire to per family     (M15.0) Primary osteoarthritis involving multiple joints  Comment: per history   -s/p left knee replacement and right hip ORIF  -pain ongoing " but manageable  -has received kenalog injections in past, had one by onsite ortho this past winter, did not find helpful   Plan: continue APAP  -prn kenalog injections     (K21.9) Gastroesophageal reflux disease, esophagitis presence not specified  Comment:per history, denies symptoms today   Plan: staff update with concerns       (G20) Parkinson's disease (tremor, stiffness, slow motion, unstable posture) (H)  Comment: no official neuro w/u but per symptoms of tremor, quiet speech, rigidity, slow movement, falls  -does not desire neuro involvement or medications  -no recent falls, no reports of difficulty swallowing   Plan: monitor, AL staff assist with cares, assist with all ambulation  -if falls or decrease in function staff to update to get therapy involved     (M48.00) Central stenosis of spinal canal  Comment: June 2019 with severe buttock pain, unable to weight bear  -L5 lateral vertebral body fx and central stenosis L3-L4  -conservative management, WBAT, completed therapy  -denies any neurological changes today in bowel/bladder, numbness/tingling  Plan: monitor, patient and son educated on s/s to update provider with, if neurological changes would warrant neurosurgery involvement     (D50.9) Iron deficiency anemia, unspecified iron deficiency anemia type  Comment: per history, not on iron  Lab Results   Component Value Date    HGB 14.2 02/18/2020     -no reports of acute s/s blood loss   Plan: staff update with s/s blood loss  -periodically monitor Hgb      Electronically signed by:  IRASEMA Rivero CNP     Visit Details  Video or Telephone End Time: 10:50  Distant Location (provider location):  [place] [facility]

## 2020-05-19 ENCOUNTER — HOSPITAL LABORATORY (OUTPATIENT)
Dept: OTHER | Facility: CLINIC | Age: 85
End: 2020-05-19

## 2020-05-19 LAB — INR PPP: 2.49 (ref 0.86–1.14)

## 2020-06-02 ENCOUNTER — HOSPITAL LABORATORY (OUTPATIENT)
Dept: OTHER | Facility: CLINIC | Age: 85
End: 2020-06-02

## 2020-06-04 LAB
COVID-19 VIRUS PCR TO MAYO - RESULT: NORMAL
SPECIMEN SOURCE: NORMAL

## 2020-06-11 ENCOUNTER — HOSPITAL LABORATORY (OUTPATIENT)
Dept: OTHER | Facility: CLINIC | Age: 85
End: 2020-06-11

## 2020-06-11 ENCOUNTER — VIRTUAL VISIT (OUTPATIENT)
Dept: GERIATRICS | Facility: CLINIC | Age: 85
End: 2020-06-11
Payer: COMMERCIAL

## 2020-06-11 VITALS
OXYGEN SATURATION: 93 % | RESPIRATION RATE: 20 BRPM | SYSTOLIC BLOOD PRESSURE: 126 MMHG | DIASTOLIC BLOOD PRESSURE: 90 MMHG | TEMPERATURE: 98.9 F | HEART RATE: 60 BPM

## 2020-06-11 DIAGNOSIS — I48.11 LONGSTANDING PERSISTENT ATRIAL FIBRILLATION (H): ICD-10-CM

## 2020-06-11 DIAGNOSIS — E03.9 HYPOTHYROIDISM, UNSPECIFIED TYPE: ICD-10-CM

## 2020-06-11 DIAGNOSIS — R06.09 DYSPNEA ON EXERTION: ICD-10-CM

## 2020-06-11 DIAGNOSIS — M15.0 PRIMARY OSTEOARTHRITIS INVOLVING MULTIPLE JOINTS: ICD-10-CM

## 2020-06-11 DIAGNOSIS — N18.30 CKD (CHRONIC KIDNEY DISEASE) STAGE 3, GFR 30-59 ML/MIN (H): ICD-10-CM

## 2020-06-11 DIAGNOSIS — I15.9 SECONDARY HYPERTENSION: ICD-10-CM

## 2020-06-11 DIAGNOSIS — R60.0 EDEMA OF BOTH LEGS: Primary | ICD-10-CM

## 2020-06-11 DIAGNOSIS — D50.9 IRON DEFICIENCY ANEMIA, UNSPECIFIED IRON DEFICIENCY ANEMIA TYPE: ICD-10-CM

## 2020-06-11 LAB
ALBUMIN SERPL-MCNC: 3.7 G/DL (ref 3.4–5)
ALP SERPL-CCNC: 78 U/L (ref 40–150)
ALT SERPL W P-5'-P-CCNC: 30 U/L (ref 0–50)
ANION GAP SERPL CALCULATED.3IONS-SCNC: 5 MMOL/L (ref 3–14)
AST SERPL W P-5'-P-CCNC: 18 U/L (ref 0–45)
BILIRUB SERPL-MCNC: 0.5 MG/DL (ref 0.2–1.3)
BUN SERPL-MCNC: 25 MG/DL (ref 7–30)
CALCIUM SERPL-MCNC: 8.9 MG/DL (ref 8.5–10.1)
CHLORIDE SERPL-SCNC: 107 MMOL/L (ref 94–109)
CO2 SERPL-SCNC: 29 MMOL/L (ref 20–32)
CREAT SERPL-MCNC: 1 MG/DL (ref 0.52–1.04)
ERYTHROCYTE [DISTWIDTH] IN BLOOD BY AUTOMATED COUNT: 12.7 % (ref 10–15)
GFR SERPL CREATININE-BSD FRML MDRD: 52 ML/MIN/{1.73_M2}
GLUCOSE SERPL-MCNC: 95 MG/DL (ref 70–99)
HCT VFR BLD AUTO: 43.5 % (ref 35–47)
HGB BLD-MCNC: 14 G/DL (ref 11.7–15.7)
MCH RBC QN AUTO: 32 PG (ref 26.5–33)
MCHC RBC AUTO-ENTMCNC: 32.2 G/DL (ref 31.5–36.5)
MCV RBC AUTO: 100 FL (ref 78–100)
NT-PROBNP SERPL-MCNC: 608 PG/ML (ref 0–450)
PLATELET # BLD AUTO: 174 10E9/L (ref 150–450)
POTASSIUM SERPL-SCNC: 4.1 MMOL/L (ref 3.4–5.3)
PROT SERPL-MCNC: 6.8 G/DL (ref 6.8–8.8)
RBC # BLD AUTO: 4.37 10E12/L (ref 3.8–5.2)
SODIUM SERPL-SCNC: 141 MMOL/L (ref 133–144)
WBC # BLD AUTO: 8.4 10E9/L (ref 4–11)

## 2020-06-11 NOTE — LETTER
"    6/11/2020        RE: Liberty Horta  [facility]  [id number] [place] [id number]        [place]   Liberty Horta is being evaluated via a billable video visit due to the restrictions of the Covid-19 pandemic.   The patient has been notified of following:  \"This video visit will be conducted via a call between you and your provider. We have found that certain health care needs can be provided without the need for an in-person physical exam.  This service lets us provide the care you need with a video conversation. If during the course of the call the provider feels a video visit is not appropriate, you will not be charged for this service.\"   The provider has received verbal consent for a Video Visit from the patient or first contact? Yes  Patient  or facility staff would like the video invitation sent by: N/A   Video Start Time: 1000    [place] Medical Record Number:  [id number]  [facility] the [facility] of [facility]   Chief Complaint   Patient presents with     RECHECK     HPI:  Liberty Horta  is a 85 year old (1935), who is being seen today for a visit.  HPI information obtained from: facility chart records, facility staff, patient report and [facility] chart review.    85 year old female initially admitted to [facility] May 2019 after hospitalization for severe pain to her left buttock and inability to bear weight, diagnosed with nondisplaced L5 lateral vertebral body fracture and severe central stenosis at L3-L4 level.  Ortho recommended nonoperative management with WBAT, therapies.  PMH significant for a-fib, s/p pacer maker placement (on coumadin), hypothyroidism, HTN, GERD, OA, Anemia, HLD, urinary retention, Parkinson's.  Hx left TKA and right hip ORIF in the past. Afib managed with diltiazem and sotalol as well as coumadin for AC. Does not see cards. Arthritis managed with tylenol. Suspect Parkinson's " disease: has tremor, doesn't want to see neuro. Hx of anemia, but recent Hgb 14.     Today's concern is:  Patient seen today for episodic longterm visit per patient and staff request due to increased edema, dyspnea with exertion, weight gain 13 lbs in the past year. Recent BPs range 110-150/ 52-90 and HR ranges 60-70s. She is afebrile and sats are 93% on room air. Patient stated to staff that she would now be interested in cardiac work up if appropriate, in the past has not wanted to pursue.     Patient reports she has been fatigued lately - needs to stop and sit down when walking in halls with walker. She gets short of breath with exertion and times but more so when laying in bed.       Past Medical and Surgical History reviewed in Epic today.  MEDICATIONS:  Current Outpatient Medications   Medication Sig Dispense Refill     acetaminophen (TYLENOL) 500 MG tablet Take 2 tablets (1,000 mg) by mouth 2 times daily. May also take 2 tablets (1,000 mg) daily as needed for mild pain. AND 1000 mg daily at night PRN starting 02/17 120 tablet 11     atorvastatin (LIPITOR) 40 MG tablet TAKE 1 TABLET BY MOUTH AT BEDTIME 28 tablet PRN     DILT- MG 24 hr capsule TAKE 1 CAPSULE BY MOUTH ONCE DAILY 28 capsule PRN     furosemide (LASIX) 40 MG tablet Take 1 tablet (40 mg) by mouth daily starting Friday 02/21 90 tablet 11     levothyroxine (SYNTHROID/LEVOTHROID) 150 MCG tablet TAKE 1 TABLET BY MOUTH ONCE DAILY 28 tablet PRN     Multiple Vitamin (TAB-A-IRIS) TABS TAKE 1 TABLET BY MOUTH ONCE DAILY 28 tablet PRN     sotalol (BETAPACE) 80 MG tablet TAKE 1 TABLET BY MOUTH TWICE DAILY WITH MEALS 56 tablet PRN     warfarin (COUMADIN) 2.5 MG tablet Take 2.5 mg by mouth daily on Tues & Fridays  AND take 5 mg AOD       REVIEW OF SYSTEMS: 4 point ROS including Respiratory, CV, GI and , other than that noted in the HPI,  is negative    Objective: BP (!) 126/90   Pulse 60   Temp 98.9  F (37.2  C)   Resp 20   SpO2 93%   Limited visit exam  done given COVID-19 precautions.   GENERAL APPEARANCE:  Alert, in no distress, oriented, cooperative  ENT:  normal hearing acuity, nose no nasal discharge or congestion  EYES:  EOM, conjunctivae, lids, pupils and irises normal  RESP:  no respiratory distress, on room air and no cough  M/S:   up with walker, able to move all extremities  NEURO:   cranial nerves grossly intact, no facial asymmetry, speech clear  PSYCH:  oriented X 3, affect and mood normal     Labs:   Most Recent 3 CBC's:  Recent Labs   Lab Test 06/11/20  0705 02/18/20  0940 02/18/20   WBC 8.4 6.5 6.5   HGB 14.0 14.2 14.2    96 96    194 194     Most Recent 3 BMP's:  Recent Labs   Lab Test 06/11/20  0705 02/18/20  0940 02/18/20    140 140   POTASSIUM 4.1 3.7 3.7   CHLORIDE 107 104 104   CO2 29 30 30   BUN 25 22 22   CR 1.00 0.88 0.88   ANIONGAP 5 6 6   ARLENE 8.9 9.4 9.4   GLC 95 135* 135*     Most Recent 3 BNP's:  Recent Labs   Lab Test 06/11/20  0705   NTBNP 608*     Most Recent TSH and T4:  Recent Labs   Lab Test 02/18/20  0940   TSH 1.20       ASSESSMENT/PLAN:  Edema of both legs  Dyspnea on exertion  Secondary hypertension  Longstanding persistent atrial fibrillation  Acute on chronic issues. Patient with known a fib and HTN. Per chart review in the past EF decreased at one point, then back to normal during last ECHO in 2016. Now more edema, orthopnea ?HF. Spoke to patient, son Romaine and [facility] MD Dr Mendez. Plan is to increase diuretics short term, start using TEDs, order ECHO and review results when available. Of note, CMP, CBC and BNP all WNL today. Continue other cardiac meds unchanged at this time. Due to weakness will order therapies eval and treat.     CKD (chronic kidney disease) stage 3, GFR 30-59 ml/min (H)  Chronic, stable. Avoid nephrotoxic meds and monitor renal function every 6-12 months and PRN.     Iron deficiency anemia, unspecified iron deficiency anemia type  By history, currently HGB normal range. Monitor PRN.      Hypothyroidism, unspecified type  Chronic, well replaced with synthroid and TSH normal range 02/2020. Monitor TSH yearly and continue meds as above.     Primary osteoarthritis involving multiple joints  Chronic, well managed with tylenol, no changes.     Orders written by provider at facility  1. ECHO to be done by PPX, report results to primary provider  2. TEDs knee high on in AM off at HS diagnosis edema  3. PT and OT eval and treat diagnosis weakness  4. Lasix increase to 60 mg PO daily 6/11 through 6/14, back to 40 mg daily starting 6/15. Update primary NP to weight and status early next week or sooner with any new concerns.     Electronically signed by:  IRASEMA Leyva CNP     Video-Visit Details  Type of service:  Video Visit  Video End Time (time video stopped): 1210  Distant Location (provider location):  [place]             Sincerely,        IRASEMA Leyva CNP

## 2020-06-11 NOTE — Clinical Note
Martha - saw this [facility] patient today, asked them to update you to status next week

## 2020-06-11 NOTE — PROGRESS NOTES
"[facility]   Liberty Horta is being evaluated via a billable video visit due to the restrictions of the Covid-19 pandemic.   The patient has been notified of following:  \"This video visit will be conducted via a call between you and your provider. We have found that certain health care needs can be provided without the need for an in-person physical exam.  This service lets us provide the care you need with a video conversation. If during the course of the call the provider feels a video visit is not appropriate, you will not be charged for this service.\"   The provider has received verbal consent for a Video Visit from the patient or first contact? Yes  Patient  or facility staff would like the video invitation sent by: N/A   Video Start Time: 1000    [place] Medical Record Number:  [id number]  [facility]   Chief Complaint   Patient presents with     RECHECK     HPI:  Liberty Horta  is a 85 year old (1935), who is being seen today for a visit.  HPI information obtained from: facility chart records, facility staff, patient report and [place] Epic chart review.    85 year old female initially admitted to [facility] May 2019 after hospitalization for severe pain to her left buttock and inability to bear weight, diagnosed with nondisplaced L5 lateral vertebral body fracture and severe central stenosis at L3-L4 level.  Ortho recommended nonoperative management with WBAT, therapies.  PMH significant for a-fib, s/p pacer maker placement (on coumadin), hypothyroidism, HTN, GERD, OA, Anemia, HLD, urinary retention, Parkinson's.  Hx left TKA and right hip ORIF in the past. Afib managed with diltiazem and sotalol as well as coumadin for AC. Does not see cards. Arthritis managed with tylenol. Suspect Parkinson's disease: has tremor, doesn't want to see neuro. Hx of anemia, but recent Hgb 14.     Today's concern is:  Patient seen " today for episodic PABLITO visit per patient and staff request due to increased edema, dyspnea with exertion, weight gain 13 lbs in the past year. Recent BPs range 110-150/ 52-90 and HR ranges 60-70s. She is afebrile and sats are 93% on room air. Patient stated to staff that she would now be interested in cardiac work up if appropriate, in the past has not wanted to pursue.     Patient reports she has been fatigued lately - needs to stop and sit down when walking in halls with walker. She gets short of breath with exertion and times but more so when laying in bed.       Past Medical and Surgical History reviewed in Epic today.  MEDICATIONS:  Current Outpatient Medications   Medication Sig Dispense Refill     acetaminophen (TYLENOL) 500 MG tablet Take 2 tablets (1,000 mg) by mouth 2 times daily. May also take 2 tablets (1,000 mg) daily as needed for mild pain. AND 1000 mg daily at night PRN starting 02/17 120 tablet 11     atorvastatin (LIPITOR) 40 MG tablet TAKE 1 TABLET BY MOUTH AT BEDTIME 28 tablet PRN     DILT- MG 24 hr capsule TAKE 1 CAPSULE BY MOUTH ONCE DAILY 28 capsule PRN     furosemide (LASIX) 40 MG tablet Take 1 tablet (40 mg) by mouth daily starting Friday 02/21 90 tablet 11     levothyroxine (SYNTHROID/LEVOTHROID) 150 MCG tablet TAKE 1 TABLET BY MOUTH ONCE DAILY 28 tablet PRN     Multiple Vitamin (TAB-A-IRIS) TABS TAKE 1 TABLET BY MOUTH ONCE DAILY 28 tablet PRN     sotalol (BETAPACE) 80 MG tablet TAKE 1 TABLET BY MOUTH TWICE DAILY WITH MEALS 56 tablet PRN     warfarin (COUMADIN) 2.5 MG tablet Take 2.5 mg by mouth daily on Tues & Fridays  AND take 5 mg AOD       REVIEW OF SYSTEMS: 4 point ROS including Respiratory, CV, GI and , other than that noted in the HPI,  is negative    Objective: BP (!) 126/90   Pulse 60   Temp 98.9  F (37.2  C)   Resp 20   SpO2 93%   Limited visit exam done given COVID-19 precautions.   GENERAL APPEARANCE:  Alert, in no distress, oriented, cooperative  ENT:  normal  hearing acuity, nose no nasal discharge or congestion  EYES:  EOM, conjunctivae, lids, pupils and irises normal  RESP:  no respiratory distress, on room air and no cough  M/S:   up with walker, able to move all extremities  NEURO:   cranial nerves grossly intact, no facial asymmetry, speech clear  PSYCH:  oriented X 3, affect and mood normal     Labs:   Most Recent 3 CBC's:  Recent Labs   Lab Test 06/11/20  0705 02/18/20  0940 02/18/20   WBC 8.4 6.5 6.5   HGB 14.0 14.2 14.2    96 96    194 194     Most Recent 3 BMP's:  Recent Labs   Lab Test 06/11/20  0705 02/18/20  0940 02/18/20    140 140   POTASSIUM 4.1 3.7 3.7   CHLORIDE 107 104 104   CO2 29 30 30   BUN 25 22 22   CR 1.00 0.88 0.88   ANIONGAP 5 6 6   ARLENE 8.9 9.4 9.4   GLC 95 135* 135*     Most Recent 3 BNP's:  Recent Labs   Lab Test 06/11/20  0705   NTBNP 608*     Most Recent TSH and T4:  Recent Labs   Lab Test 02/18/20  0940   TSH 1.20       ASSESSMENT/PLAN:  Edema of both legs  Dyspnea on exertion  Secondary hypertension  Longstanding persistent atrial fibrillation  Acute on chronic issues. Patient with known a fib and HTN. Per chart review in the past EF decreased at one point, then back to normal during last ECHO in 2016. Now more edema, orthopnea ?HF. Spoke to patient, son Romaine and [facility] MD Dr Mendez. Plan is to increase diuretics short term, start using TEDs, order ECHO and review results when available. Of note, CMP, CBC and BNP all WNL today. Continue other cardiac meds unchanged at this time. Due to weakness will order therapies eval and treat.     CKD (chronic kidney disease) stage 3, GFR 30-59 ml/min (H)  Chronic, stable. Avoid nephrotoxic meds and monitor renal function every 6-12 months and PRN.     Iron deficiency anemia, unspecified iron deficiency anemia type  By history, currently HGB normal range. Monitor PRN.     Hypothyroidism, unspecified type  Chronic, well replaced with synthroid and TSH normal range 02/2020. Monitor TSH  yearly and continue meds as above.     Primary osteoarthritis involving multiple joints  Chronic, well managed with tylenol, no changes.     Orders written by provider at facility  1. ECHO to be done by PPX, report results to primary provider  2. TEDs knee high on in AM off at HS diagnosis edema  3. PT and OT eval and treat diagnosis weakness  4. Lasix increase to 60 mg PO daily 6/11 through 6/14, back to 40 mg daily starting 6/15. Update primary NP to weight and status early next week or sooner with any new concerns.     Electronically signed by:  IRASEMA Leyva CNP     Video-Visit Details  Type of service:  Video Visit  Video End Time (time video stopped): 1210  Distant Location (provider location):  [place] [facility]

## 2020-06-16 ENCOUNTER — DOCUMENTATION ONLY (OUTPATIENT)
Dept: CARE COORDINATION | Facility: CLINIC | Age: 85
End: 2020-06-16

## 2020-06-16 ENCOUNTER — HOSPITAL LABORATORY (OUTPATIENT)
Dept: OTHER | Facility: CLINIC | Age: 85
End: 2020-06-16

## 2020-06-16 LAB
COVID-19 VIRUS PCR TO MAYO - RESULT: NORMAL
INR PPP: 2.3 (ref 0.86–1.14)
SPECIMEN SOURCE: NORMAL

## 2020-06-16 NOTE — PROGRESS NOTES
SN Eval completed for home care.  Requested orders:  SN: 1 wk 1, 2 wk 2, 1 PRN  For wound care    Debbie Mcneil RN  [phone]

## 2020-06-30 ENCOUNTER — HOSPITAL LABORATORY (OUTPATIENT)
Dept: OTHER | Facility: CLINIC | Age: 85
End: 2020-06-30

## 2020-06-30 LAB
ANION GAP SERPL CALCULATED.3IONS-SCNC: 6 MMOL/L (ref 3–14)
BUN SERPL-MCNC: 26 MG/DL (ref 7–30)
CALCIUM SERPL-MCNC: 9.1 MG/DL (ref 8.5–10.1)
CHLORIDE SERPL-SCNC: 106 MMOL/L (ref 94–109)
CO2 SERPL-SCNC: 28 MMOL/L (ref 20–32)
CREAT SERPL-MCNC: 0.97 MG/DL (ref 0.52–1.04)
GFR SERPL CREATININE-BSD FRML MDRD: 54 ML/MIN/{1.73_M2}
GLUCOSE SERPL-MCNC: 105 MG/DL (ref 70–99)
POTASSIUM SERPL-SCNC: 3.9 MMOL/L (ref 3.4–5.3)
SODIUM SERPL-SCNC: 140 MMOL/L (ref 133–144)

## 2020-07-16 ENCOUNTER — HOSPITAL LABORATORY (OUTPATIENT)
Dept: OTHER | Facility: CLINIC | Age: 85
End: 2020-07-16

## 2020-07-16 ASSESSMENT — MIFFLIN-ST. JEOR: SCORE: 1254.66

## 2020-07-20 LAB — INR PPP: 2.01 (ref 0.86–1.14)

## 2020-07-29 ENCOUNTER — HOSPITAL LABORATORY (OUTPATIENT)
Dept: OTHER | Facility: CLINIC | Age: 85
End: 2020-07-29

## 2020-07-31 ENCOUNTER — ASSISTED LIVING VISIT (OUTPATIENT)
Dept: GERIATRICS | Facility: CLINIC | Age: 85
End: 2020-07-31
Payer: COMMERCIAL

## 2020-07-31 ENCOUNTER — HOSPITAL LABORATORY (OUTPATIENT)
Dept: OTHER | Facility: CLINIC | Age: 85
End: 2020-07-31

## 2020-07-31 VITALS
SYSTOLIC BLOOD PRESSURE: 130 MMHG | TEMPERATURE: 97.8 F | HEIGHT: 62 IN | DIASTOLIC BLOOD PRESSURE: 92 MMHG | WEIGHT: 186.6 LBS | HEART RATE: 64 BPM | BODY MASS INDEX: 34.34 KG/M2 | RESPIRATION RATE: 22 BRPM

## 2020-07-31 DIAGNOSIS — Z79.01 LONG TERM CURRENT USE OF ANTICOAGULANT THERAPY: ICD-10-CM

## 2020-07-31 DIAGNOSIS — I48.11 LONGSTANDING PERSISTENT ATRIAL FIBRILLATION (H): Primary | ICD-10-CM

## 2020-07-31 DIAGNOSIS — Z79.01 ENCOUNTER FOR MONITORING COUMADIN THERAPY: ICD-10-CM

## 2020-07-31 DIAGNOSIS — Z51.81 ENCOUNTER FOR MONITORING COUMADIN THERAPY: ICD-10-CM

## 2020-07-31 NOTE — PROGRESS NOTES
"[facility] Medical Record Number:  [id number]  Place of Service where encounter took place: [facility] ([facility]) [[id number]]    HPI:    Liberty Horta is a 85 year old  (1935), who is being seen today for an episodic care visit at the above location.   HPI information obtained from: facility chart records, facility staff, patient report and [facility] chart review. Today's concern is INR/Coumadin management for A. Fib    ROS/Subjective:  Bleeding Signs/Symptoms:  None  Thromboembolic Signs/Symptoms:  None  Medication Changes:  No  Dietary Changes:  No  Activity Changes: No  Bacterial/Viral Infection:  No  Missed Coumadin Doses:  None  On ASA: No  Other Concerns:  No    OBJECTIVE:  BP (!) 130/92   Pulse 64   Temp 97.8  F (36.6  C)   Resp 22   Ht 1.575 m (5' 2\")   Wt 84.6 kg (186 lb 9.6 oz)   BMI 34.13 kg/m      INR Today:  1.88  Current Dose:  Coumadin 2.5mg qWed; Coumadin 5mg all other days    ASSESSMENT:     Longstanding persistent atrial fibrillation (H)  Long term current use of anticoagulant therapy  Encounter for monitoring Coumadin therapy  Subtherapeutic INR for goal of 2-3    PLAN:   Orders written by provider at facility Coumadin 2.5 W; 5mg every other day - 2 week  New Dose: Coumadin 2.5mg qWed; Coumadin 5mg all other days    Next INR: 2 weeks    Electronically signed by:  Dr. Martha Pham, APRN, DNP, A/GNP-BC  [facility]  [address] [place] [address]     Cell: [phone]  Fax: [phone]  Email: [email or web address]       "

## 2020-07-31 NOTE — LETTER
"    7/31/2020        RE: Liberty Kwok [facility]  [id number] [place] [id number]        [facility]  [place] Medical Record Number:  [id number]  Place of Service where encounter took place: [facility] ([facility] [[id number]]    HPI:    Liberty Horta is a 85 year old  (1935), who is being seen today for an episodic care visit at the above location.   HPI information obtained from: facility chart records, facility staff, patient report and [facility] chart review. Today's concern is INR/Coumadin management for A. Fib    ROS/Subjective:  Bleeding Signs/Symptoms:  None  Thromboembolic Signs/Symptoms:  None  Medication Changes:  No  Dietary Changes:  No  Activity Changes: No  Bacterial/Viral Infection:  No  Missed Coumadin Doses:  None  On ASA: No  Other Concerns:  No    OBJECTIVE:  BP (!) 130/92   Pulse 64   Temp 97.8  F (36.6  C)   Resp 22   Ht 1.575 m (5' 2\")   Wt 84.6 kg (186 lb 9.6 oz)   BMI 34.13 kg/m      INR Today:  1.88  Current Dose:  Coumadin 2.5mg qWed; Coumadin 5mg all other days    ASSESSMENT:     Longstanding persistent atrial fibrillation (H)  Long term current use of anticoagulant therapy  Encounter for monitoring Coumadin therapy  Subtherapeutic INR for goal of 2-3    PLAN:   Orders written by provider at facility Coumadin 2.5 W; 5mg every other day - 2 week  New Dose: Coumadin 2.5mg qWed; Coumadin 5mg all other days    Next INR: 2 weeks    Electronically signed by:  IRASEMA Gonzalez, DNP, A/GNP-[facility]  [address]   [address]  [place] [address]     Cell: [phone]  Fax: [phone]  Email: [email or web address]           Sincerely,        IRASEMA Paez CNP    "

## 2020-08-01 LAB
SARS-COV-2 RNA SPEC QL NAA+PROBE: NOT DETECTED
SPECIMEN SOURCE: NORMAL

## 2020-08-05 LAB — INR PPP: 1.88 (ref 0.86–1.14)

## 2020-08-13 ENCOUNTER — HOSPITAL LABORATORY (OUTPATIENT)
Dept: OTHER | Facility: CLINIC | Age: 85
End: 2020-08-13

## 2020-08-14 LAB — INR PPP: 2.07 (ref 0.86–1.14)

## 2020-08-18 ENCOUNTER — HOSPITAL LABORATORY (OUTPATIENT)
Dept: OTHER | Facility: CLINIC | Age: 85
End: 2020-08-18

## 2020-08-19 LAB
SARS-COV-2 RNA SPEC QL NAA+PROBE: NOT DETECTED
SPECIMEN SOURCE: NORMAL

## 2020-08-20 ENCOUNTER — HOSPITAL LABORATORY (OUTPATIENT)
Dept: OTHER | Facility: CLINIC | Age: 85
End: 2020-08-20

## 2020-08-21 LAB — INR PPP: 1.9 (ref 0.86–1.14)

## 2020-08-25 PROBLEM — Z79.01 ANTICOAGULATION MONITORING, SPECIAL RANGE: Status: ACTIVE | Noted: 2019-01-17

## 2020-08-25 ASSESSMENT — MIFFLIN-ST. JEOR: SCORE: 1254.66

## 2020-08-27 ENCOUNTER — HOSPITAL LABORATORY (OUTPATIENT)
Dept: OTHER | Facility: CLINIC | Age: 85
End: 2020-08-27

## 2020-08-27 LAB — INR PPP: 1.93 (ref 0.86–1.14)

## 2020-09-01 ENCOUNTER — ASSISTED LIVING VISIT (OUTPATIENT)
Dept: GERIATRICS | Facility: CLINIC | Age: 85
End: 2020-09-01
Payer: COMMERCIAL

## 2020-09-01 VITALS
HEART RATE: 64 BPM | SYSTOLIC BLOOD PRESSURE: 130 MMHG | RESPIRATION RATE: 22 BRPM | TEMPERATURE: 98 F | DIASTOLIC BLOOD PRESSURE: 92 MMHG | OXYGEN SATURATION: 95 % | WEIGHT: 186.6 LBS | BODY MASS INDEX: 34.34 KG/M2 | HEIGHT: 62 IN

## 2020-09-01 DIAGNOSIS — G20.C PARKINSONISM, UNSPECIFIED PARKINSONISM TYPE (H): ICD-10-CM

## 2020-09-01 DIAGNOSIS — R41.89 COGNITIVE IMPAIRMENT: ICD-10-CM

## 2020-09-01 DIAGNOSIS — I50.32 CHRONIC DIASTOLIC HEART FAILURE (H): ICD-10-CM

## 2020-09-01 DIAGNOSIS — E03.9 HYPOTHYROIDISM, UNSPECIFIED TYPE: ICD-10-CM

## 2020-09-01 DIAGNOSIS — M17.11 PRIMARY OSTEOARTHRITIS OF RIGHT KNEE: Primary | ICD-10-CM

## 2020-09-01 DIAGNOSIS — R60.0 EDEMA OF BOTH LEGS: ICD-10-CM

## 2020-09-01 DIAGNOSIS — I48.11 LONGSTANDING PERSISTENT ATRIAL FIBRILLATION (H): ICD-10-CM

## 2020-09-01 DIAGNOSIS — M15.0 PRIMARY OSTEOARTHRITIS INVOLVING MULTIPLE JOINTS: ICD-10-CM

## 2020-09-01 DIAGNOSIS — I12.9 BENIGN HYPERTENSION WITH CKD (CHRONIC KIDNEY DISEASE) STAGE III (H): ICD-10-CM

## 2020-09-01 DIAGNOSIS — N18.30 BENIGN HYPERTENSION WITH CKD (CHRONIC KIDNEY DISEASE) STAGE III (H): ICD-10-CM

## 2020-09-01 NOTE — LETTER
"    9/1/2020        RE: Liberty Horta  [facility]  [address] [place] [id number]        Liberty Horta is a 85 year old female seen September 1, 2020 at [facility] where she has resided for 6 months (admit 2/2020)  Pt is seen in her apartment up to [facility].   States \"My right knee is just killing me\"  Wonders if she should get an extra Tylenol at lunch, but would \"put me right to sleep\"   Reports she woke up during the night last night with pain as well.   Some confusion about when she gets her Tylenol.  States she doesn't like to take a lot of medications, and worries about the cost of frequent med passes.   Does say she has pain relief if aide rubs Cerave cream on her knee   She had a cortisone injection in February, but not sure she got much effect from that.      By chart review, patient last hospitalized in June 2019 with a nondisplaced L5 lateral vertebral body fracture and severe canal stenosis at L3-4.   Pt states her back doesn't bother much at this point.   She has significant OA/DJD with h/o left TKA and right hip ORIF.       She has had Parkinsonism noted by prior physician, but pt and family have declined any workup or medication.  She also has longstanding atrial fibrillation and has a pacemaker, anticoagulated with warfarin    She has had device checks, but not seen in Cardiology clinic in >5 years.            Past Medical History:   Diagnosis Date     Basal cell carcinoma      Chronic atrial fibrillation (H)      DVT (deep vein thrombosis) in pregnancy      Esophageal reflux      Hypertension      Hypothyroid      Osteoarthritis      Pacemaker      Renal insufficiency    Parkinsonism    Past Surgical History:   Procedure Laterality Date     IMPLANT PACEMAKER       OPEN REDUCTION INTERNAL FIXATION FEMUR DISTAL Right 10/6/2016    Procedure: OPEN REDUCTION INTERNAL FIXATION FEMUR DISTAL;  Surgeon: Filipe Coburn MD;  Location:  OR     ORTHOPEDIC SURGERY      " "Knee replacement left in 2011     SH:   for 30 years.   She has 7 children.  Son Romaine is POA  Non smoker    ROS: ambulatory with 4WW, or uses WC at times  Wt Readings from Last 5 Encounters:   08/25/20 84.6 kg (186 lb 9.6 oz)   07/16/20 84.6 kg (186 lb 9.6 oz)   03/12/19 67.9 kg (149 lb 9.6 oz)   10/05/16 76.2 kg (168 lb)      EXAM: limited secondary to COVID19 precautions   Up to recliner, NAD  BP (!) 130/92   Pulse 64   Temp 98  F (36.7  C)   Resp 22   Ht 1.575 m (5' 2\")   Wt 84.6 kg (186 lb 9.6 oz)   SpO2 95%   BMI 34.13 kg/m     +dysphonia  Sits very still, increased tone  Left hand resting tremor  Heart irreg irreg at 70, 1/6 NELLY  1+ ankle edema  Neuro: somewhat confused history    Psych: a little anxious    Last Comprehensive Metabolic Panel:  Sodium   Date Value Ref Range Status   06/30/2020 140 133 - 144 mmol/L Final     Potassium   Date Value Ref Range Status   06/30/2020 3.9 3.4 - 5.3 mmol/L Final     Chloride   Date Value Ref Range Status   06/30/2020 106 94 - 109 mmol/L Final     Carbon Dioxide   Date Value Ref Range Status   06/30/2020 28 20 - 32 mmol/L Final     Anion Gap   Date Value Ref Range Status   06/30/2020 6 3 - 14 mmol/L Final     Glucose   Date Value Ref Range Status   06/30/2020 105 (H) 70 - 99 mg/dL Final     Urea Nitrogen   Date Value Ref Range Status   06/30/2020 26 7 - 30 mg/dL Final     Creatinine   Date Value Ref Range Status   06/30/2020 0.97 0.52 - 1.04 mg/dL Final     GFR Estimate   Date Value Ref Range Status   06/30/2020 54 (L) >60 mL/min/[1.73_m2] Final     Comment:     Non  GFR Calc  Starting 12/18/2018, serum creatinine based estimated GFR (eGFR) will be   calculated using the Chronic Kidney Disease Epidemiology Collaboration   (CKD-EPI) equation.       Calcium   Date Value Ref Range Status   06/30/2020 9.1 8.5 - 10.1 mg/dL Final     Lab Results   Component Value Date    AST 18 06/11/2020     Lab Results   Component Value Date    ALBUMIN 3.7 " 06/11/2020     Lab Results   Component Value Date    PROTTOTAL 6.8 06/11/2020      Lab Results   Component Value Date    ALKPHOS 78 06/11/2020     Lab Results   Component Value Date    WBC 8.4 06/11/2020      HGB 14.0 06/11/2020       06/11/2020       06/11/2020     ECHO 2016   Interpretation Summary  Left ventricular systolic function is low normal.  The visual ejection fraction is estimated at 50-55%.  The left ventricle is normal in size.  There is mild concentric left ventricular hypertrophy.  No regional wall motion abnormalities noted.  The right ventricle is normal in structure, function and size.  Doppler interrogation does not demonstrate significant stenosis or insufficiency involving cardiac valves.      IMP/PLAN:   (M17.11) Primary osteoarthritis of right knee    (M89.49) Primary osteoarthritis involving multiple joints  Comment: mainly right knee pain on visit today    Plan: recommended use of diclofenac gel or Aspercreme, but pt not interested.   Continue scheduled acetaminophen with prn available.   Ambulation with 4WW and assist, or WC for all destinations.     (I48.11) Longstanding persistent atrial fibrillation (H)  Comment: has a pacemaker   Pulse Readings from Last 4 Encounters:   08/25/20 64   07/16/20 64   06/11/20 60   05/04/20 70      Plan: sotalol 80 mg/day and diltiazem 180 mg/day for VR control.   Warfarin per INR for stroke prophylaxis.       (I12.9,  N18.3) Benign hypertension with CKD (chronic kidney disease) stage III (H)  Comment:   BP Readings from Last 3 Encounters:   08/25/20 (!) 130/92   07/16/20 (!) 130/92   06/11/20 (!) 126/90      Plan: diltiazem 180 mg/day, follow bps and BMP      (G20) Parkinsonism, unspecified Parkinsonism type (H)  Comment: clear symptoms with dysphonia, left hand resting tremor, rigidity and decline in mobility   Plan: she has been seen by [facility] PHYSICAL THERAPY / OCCUPATIONAL THERAPY with [place] tx   Could reconsider trial of Sinemet if  her ability to use hands is impaired, reviewed with patient today.       (E03.9) Hypothyroidism, unspecified type  Comment:   TSH   Date Value Ref Range Status   02/18/2020 1.20 0.40 - 4.00 mU/L Final      Plan: levothyroxine 150 mcg/day       (I50.32) Chronic diastolic heart failure (H)  (R60.0) Edema of both legs  Comment: LEs elevated today, and lightweight compression stockings on   Plan: furosemide 40 mg/day and follow     (R41.89) Cognitive impairment  Comment: no scores available, but STML and some mild confusion   Plan: AL support for meds, meals, activity, mobility        Funmi Mendez MD       Sincerely,        Funmi Mendez MD

## 2020-09-01 NOTE — PROGRESS NOTES
"Liberty Horta is a 85 year old female seen September 1, 2020 at [facility] where she has resided for 6 months (admit 2/2020)  Pt is seen in her apartment up to recliner.   States \"My right knee is just killing me\"  Wonders if she should get an extra Tylenol at lunch, but would \"put me right to sleep\"   Reports she woke up during the night last night with pain as well.   Some confusion about when she gets her Tylenol.  States she doesn't like to take a lot of medications, and worries about the cost of frequent med passes.   Does say she has pain relief if aide rubs Cerave cream on her knee   She had a cortisone injection in February, but not sure she got much effect from that.      By chart review, patient last hospitalized in June 2019 with a nondisplaced L5 lateral vertebral body fracture and severe canal stenosis at L3-4.   Pt states her back doesn't bother much at this point.   She has significant OA/DJD with h/o left TKA and right hip ORIF.       She has had Parkinsonism noted by prior physician, but pt and family have declined any workup or medication.  She also has longstanding atrial fibrillation and has a pacemaker, anticoagulated with warfarin    She has had device checks, but not seen in Cardiology clinic in >5 years.            Past Medical History:   Diagnosis Date     Basal cell carcinoma      Chronic atrial fibrillation (H)      DVT (deep vein thrombosis) in pregnancy      Esophageal reflux      Hypertension      Hypothyroid      Osteoarthritis      Pacemaker      Renal insufficiency    Parkinsonism    Past Surgical History:   Procedure Laterality Date     IMPLANT PACEMAKER       OPEN REDUCTION INTERNAL FIXATION FEMUR DISTAL Right 10/6/2016    Procedure: OPEN REDUCTION INTERNAL FIXATION FEMUR DISTAL;  Surgeon: Filipe Coburn MD;  Location:  OR     ORTHOPEDIC SURGERY      Knee replacement left in 2011     SH:   for 30 years.   She has 7 children.  Son Romaine is POA  Non " "smoker    ROS: ambulatory with 4WW, or uses WC at times  Wt Readings from Last 5 Encounters:   08/25/20 84.6 kg (186 lb 9.6 oz)   07/16/20 84.6 kg (186 lb 9.6 oz)   03/12/19 67.9 kg (149 lb 9.6 oz)   10/05/16 76.2 kg (168 lb)      EXAM: limited secondary to COVID19 precautions   Up to recliner, NAD  BP (!) 130/92   Pulse 64   Temp 98  F (36.7  C)   Resp 22   Ht 1.575 m (5' 2\")   Wt 84.6 kg (186 lb 9.6 oz)   SpO2 95%   BMI 34.13 kg/m     +dysphonia  Sits very still, increased tone  Left hand resting tremor  Heart irreg irreg at 70, 1/6 NELLY  1+ ankle edema  Neuro: somewhat confused history    Psych: a little anxious    Last Comprehensive Metabolic Panel:  Sodium   Date Value Ref Range Status   06/30/2020 140 133 - 144 mmol/L Final     Potassium   Date Value Ref Range Status   06/30/2020 3.9 3.4 - 5.3 mmol/L Final     Chloride   Date Value Ref Range Status   06/30/2020 106 94 - 109 mmol/L Final     Carbon Dioxide   Date Value Ref Range Status   06/30/2020 28 20 - 32 mmol/L Final     Anion Gap   Date Value Ref Range Status   06/30/2020 6 3 - 14 mmol/L Final     Glucose   Date Value Ref Range Status   06/30/2020 105 (H) 70 - 99 mg/dL Final     Urea Nitrogen   Date Value Ref Range Status   06/30/2020 26 7 - 30 mg/dL Final     Creatinine   Date Value Ref Range Status   06/30/2020 0.97 0.52 - 1.04 mg/dL Final     GFR Estimate   Date Value Ref Range Status   06/30/2020 54 (L) >60 mL/min/[1.73_m2] Final     Comment:     Non  GFR Calc  Starting 12/18/2018, serum creatinine based estimated GFR (eGFR) will be   calculated using the Chronic Kidney Disease Epidemiology Collaboration   (CKD-EPI) equation.       Calcium   Date Value Ref Range Status   06/30/2020 9.1 8.5 - 10.1 mg/dL Final     Lab Results   Component Value Date    AST 18 06/11/2020     Lab Results   Component Value Date    ALBUMIN 3.7 06/11/2020     Lab Results   Component Value Date    PROTTOTAL 6.8 06/11/2020      Lab Results   Component " Value Date    ALKPHOS 78 06/11/2020     Lab Results   Component Value Date    WBC 8.4 06/11/2020      HGB 14.0 06/11/2020       06/11/2020       06/11/2020     ECHO 2016   Interpretation Summary  Left ventricular systolic function is low normal.  The visual ejection fraction is estimated at 50-55%.  The left ventricle is normal in size.  There is mild concentric left ventricular hypertrophy.  No regional wall motion abnormalities noted.  The right ventricle is normal in structure, function and size.  Doppler interrogation does not demonstrate significant stenosis or insufficiency involving cardiac valves.      IMP/PLAN:   (M17.11) Primary osteoarthritis of right knee    (M89.49) Primary osteoarthritis involving multiple joints  Comment: mainly right knee pain on visit today    Plan: recommended use of diclofenac gel or Aspercreme, but pt not interested.   Continue scheduled acetaminophen with prn available.   Ambulation with 4WW and assist, or WC for all destinations.     (I48.11) Longstanding persistent atrial fibrillation (H)  Comment: has a pacemaker   Pulse Readings from Last 4 Encounters:   08/25/20 64   07/16/20 64   06/11/20 60   05/04/20 70      Plan: sotalol 80 mg/day and diltiazem 180 mg/day for VR control.   Warfarin per INR for stroke prophylaxis.       (I12.9,  N18.3) Benign hypertension with CKD (chronic kidney disease) stage III (H)  Comment:   BP Readings from Last 3 Encounters:   08/25/20 (!) 130/92   07/16/20 (!) 130/92   06/11/20 (!) 126/90      Plan: diltiazem 180 mg/day, follow bps and BMP      (G20) Parkinsonism, unspecified Parkinsonism type (H)  Comment: clear symptoms with dysphonia, left hand resting tremor, rigidity and decline in mobility   Plan: she has been seen by [facility] PHYSICAL THERAPY / OCCUPATIONAL THERAPY with [place] tx   Could reconsider trial of Sinemet if her ability to use hands is impaired, reviewed with patient today.       (E03.9) Hypothyroidism,  unspecified type  Comment:   TSH   Date Value Ref Range Status   02/18/2020 1.20 0.40 - 4.00 mU/L Final      Plan: levothyroxine 150 mcg/day       (I50.32) Chronic diastolic heart failure (H)  (R60.0) Edema of both legs  Comment: LEs elevated today, and lightweight compression stockings on   Plan: furosemide 40 mg/day and follow     (R41.89) Cognitive impairment  Comment: no scores available, but STML and some mild confusion   Plan: AL support for meds, meals, activity, mobility        Funmi Mendez MD

## 2020-09-03 ENCOUNTER — HOSPITAL LABORATORY (OUTPATIENT)
Dept: OTHER | Facility: CLINIC | Age: 85
End: 2020-09-03

## 2020-09-03 LAB — INR PPP: 2.17 (ref 0.86–1.14)

## 2020-09-10 ENCOUNTER — HOSPITAL LABORATORY (OUTPATIENT)
Dept: OTHER | Facility: CLINIC | Age: 85
End: 2020-09-10

## 2020-09-10 LAB — INR PPP: 2.43 (ref 0.86–1.14)

## 2020-09-17 ENCOUNTER — HOSPITAL LABORATORY (OUTPATIENT)
Dept: OTHER | Facility: CLINIC | Age: 85
End: 2020-09-17

## 2020-09-18 LAB — INR PPP: 2.16 (ref 0.86–1.14)

## 2020-09-24 VITALS
TEMPERATURE: 98.7 F | SYSTOLIC BLOOD PRESSURE: 130 MMHG | HEART RATE: 64 BPM | RESPIRATION RATE: 22 BRPM | WEIGHT: 186.6 LBS | HEIGHT: 62 IN | OXYGEN SATURATION: 95 % | DIASTOLIC BLOOD PRESSURE: 92 MMHG | BODY MASS INDEX: 34.34 KG/M2

## 2020-09-24 ASSESSMENT — MIFFLIN-ST. JEOR: SCORE: 1254.66

## 2020-09-24 NOTE — PROGRESS NOTES
[facility]  [place] Medical Record Number:  [id number]  Place of Service where encounter took place:  [facility] ([facility] [[id number]]  Chief Complaint   Patient presents with     RECHECK     Wellness Visit       HPI:    Liberty Horta  is a 85 year old (1935), who is being seen today for an episodic care visit.  HPI information obtained from: facility chart records, facility staff, patient report and [facility] chart review. Today's concern is:     Parkinson's disease (tremor, stiffness, slow motion, unstable posture) (H)  Colon adenocarcinoma (H)  Obesity, unspecified classification, unspecified obesity type, unspecified whether serious comorbidity present  Hyperlipidemia, unspecified hyperlipidemia type  Hypothyroidism, unspecified type  Chronic atrial fibrillation (H)  Peripheral angiopathy in diseases classified elsewhere (H)  Bilateral edema of lower extremity  Stage 3 chronic kidney disease, unspecified whether stage 3a or 3b CKD  Status post total left knee replacement  Encounter for annual wellness exam in Medicare patient     Patient seen today for admission visit to TCU. Patient notes that things are going very well - despite current environment in lieu of the pandemic she has rather high spirits. She has been able to visit with family, this has helped quite a bit. Appetite Good. She is sleeping well. Denies CP, palpitations, fatigue, nausea, vomiting, increased SOB/BISHOP, fever, chills, and/or b/b concerns today.    Past Medical and Surgical History reviewed in Epic today.    MEDICATIONS:  Current Outpatient Medications   Medication Sig Dispense Refill     acetaminophen (TYLENOL) 500 MG tablet Take 2 tablets (1,000 mg) by mouth 2 times daily. May also take 2 tablets (1,000 mg) daily as needed for mild pain. AND 1000 mg daily at night PRN starting 02/17 120 tablet 11     atorvastatin (LIPITOR) 40 MG tablet TAKE 1 TABLET BY MOUTH AT BEDTIME 28 tablet PRN     DILT-XR  "180 MG 24 hr capsule TAKE 1 CAPSULE BY MOUTH ONCE DAILY 28 capsule PRN     furosemide (LASIX) 40 MG tablet Take 1 tablet (40 mg) by mouth daily starting Friday 02/21 90 tablet 11     levothyroxine (SYNTHROID/LEVOTHROID) 150 MCG tablet TAKE 1 TABLET BY MOUTH ONCE DAILY 28 tablet PRN     Multiple Vitamin (TAB-A-IRIS) TABS TAKE 1 TABLET BY MOUTH ONCE DAILY 28 tablet PRN     sotalol (BETAPACE) 80 MG tablet TAKE 1 TABLET BY MOUTH TWICE DAILY WITH MEALS 56 tablet PRN     warfarin (COUMADIN) 2.5 MG tablet Take 2.5 mg by mouth daily on Tues & Fridays  AND take 5 mg AOD       REVIEW OF SYSTEMS:  10 point ROS of systems including Constitutional, Eyes, Respiratory, Cardiovascular, Gastroenterology, Genitourinary, Integumentary, Musculoskeletal, Psychiatric were all negative except for pertinent positives noted in my HPI.    Objective:  BP (!) 130/92   Pulse 64   Temp 98.7  F (37.1  C)   Resp 22   Ht 1.575 m (5' 2\")   Wt 84.6 kg (186 lb 9.6 oz)   SpO2 95%   BMI 34.13 kg/m    Exam:  GENERAL APPEARANCE:  Alert, in no distress, appears healthy, oriented, cooperative, elderly woman in recliner in apt  EYES:  EOM, conjunctivae, lids, pupils and irises normal  RESP:  respiratory effort and palpation of chest normal, lungs clear to auscultation , no respiratory distress, cough - none  CV:  Palpation and auscultation of heart done , regular rate and rhythm, no murmur, rub, or gallop, +2 pedal pulses, peripheral edema 1-2+ in BLE  ABDOMEN:  normal bowel sounds, soft, nontender, no hepatosplenomegaly or other masses  M/S:   Gait and station abnormal - KAIT 2/2 patient being in recliner  Digits and nails abnormal - arthritic changes present  SKIN:  Inspection of skin and subcutaneous tissue baseline, Palpation of skin and subcutaneous tissue baseline, skin thin and dry  NEURO:   Cranial nerves 2-12 are normal tested and grossly at patient's baseline  PSYCH:  oriented to 2-3, normal insight, judgement and memory, affect and mood " normal    Labs:   Recent labs in [facility] reviewed by me today.     ASSESSMENT/PLAN:  (G20) Parkinson's disease (tremor, stiffness, slow motion, unstable posture) (H)  (primary encounter diagnosis)  Comment: Chronic - limits mobility at baseline; no active tx regimen - receives linen/towel changes weekly; assist to the BR with use of 2 aides; assist with dressing BID; lunch/dinner meal trays; BID medication administration; assist of transfers with 1 aide; walking program 2d/week; daily safety checks; and 2x/weekly showers   Plan: Stable without medical intervention; continue placement and level of assistance as these remain appropriate based upon patient's needs and POC    (C18.9) Colon adenocarcinoma (H)  Comment: Per PMHx - no noted surgical intervention. No ongoing tx plan.  Plan: Stable without medical intervention    (E66.9) Obesity, unspecified classification, unspecified obesity type, unspecified whether serious comorbidity present  Comment: Chronic - no active interventions; meals per in-house for lunch/dinner  Wt Readings from Last 4 Encounters:   09/24/20 84.6 kg (186 lb 9.6 oz)   08/25/20 84.6 kg (186 lb 9.6 oz)   07/16/20 84.6 kg (186 lb 9.6 oz)   03/12/19 67.9 kg (149 lb 9.6 oz)   Plan: Weight gain since admission as noted above; ongoing monitoring and educations    (E78.5) Hyperlipidemia, unspecified hyperlipidemia type  Comment: Chronic - managed on regimen of Lipitor  LIPID PANEL W REFLEX MEASURED LDL (10/07/2019 3:12 PM CDT)  LIPID PANEL W REFLEX MEASURED LDL (10/07/2019 3:12 PM CDT)   Component Value Ref Range Performed At Pathologist Signature   CHOLESTEROL,TOTAL 149 100 - 199 mg/dL [facility]     TRIGLYCERIDES 452 (H) <150 mg/dL [facility]     HDL CHOLESTEROL 36 (L) >40 mg/dL [facility]     NON-HDL CHOLESTEROL 113 <145 mg/dl [facility]     CHOL/HDL RATIO  4.14 <4.50   [facility]     LDL CHOLESTEROL Comment: Invalid LDL when Trig >400, reflexed to measured LDL    [facility]    Plan: Continue medications as ordered; recheck annual FLP for ongoing assessment with elevation in Triglycerides as noted above    (E03.9) Hypothyroidism, unspecified type  Comment: Chronic - patient notes she gets her morning pills AFTER breakfast, thus she has not been receiving her Synthroid before food as needed for proper absorption  TSH   Date Value Ref Range Status   02/18/2020 1.20 0.40 - 4.00 mU/L Final   02/18/2020 1.20 0.40 - 4.00 mU/L Final   Plan: Continue medications as ordered; Recheck TSH with administration concerns - change AM med administration time to 0700    (I48.20) Chronic atrial fibrillation (H)  Comment: Chronic - managed on regimen of Diltiazem and Sotalol; anticoagulated on Coumadin  Pulse Readings from Last 4 Encounters:   09/24/20 64   08/25/20 64   07/16/20 64   06/11/20 60     Plan: Coumadin/INR as noted above with facility VS per protocol    (I79.8) Peripheral angiopathy in diseases classified elsewhere (H)  Comment: Chronic - Dilt/Sotalol as ordered; no reports of CP  Plan: Monitor - intervene as needed    (R60.0) Bilateral edema of lower extremity  Comment: Chronic - compression therapy and Lasix management  Plan: Continue medication/compression therapy as ordered with monitoring of BMP routinely    (N18.30) Stage 3 chronic kidney disease, unspecified whether stage 3a or 3b CKD  Comment: Chronic - voiding w/o concern   Ref. Range 2/18/2020 09:40 6/11/2020 07:05 6/30/2020 10:40   Sodium Latest Ref Range: 133 - 144 mmol/L 140 141 140   Potassium Latest Ref Range: 3.4 - 5.3 mmol/L 3.7 4.1 3.9   Chloride Latest Ref Range: 94 - 109 mmol/L 104 107 106   Carbon Dioxide Latest Ref Range: 20 - 32 mmol/L 30 29 28   Urea Nitrogen Latest Ref Range: 7 - 30 mg/dL 22 25 26   Creatinine Latest Ref Range: 0.52 - 1.04 mg/dL  "0.88 1.00 0.97   GFR Estimate Latest Ref Range: >60 mL/min/1.73_m2 61 52 (L) 54 (L)   GFR Estimate If Black Latest Ref Range: >60 mL/min/1.73_m2 70 60 (L) 62   Calcium Latest Ref Range: 8.5 - 10.1 mg/dL 9.4 8.9 9.1   Anion Gap Latest Ref Range: 3 - 14 mmol/L 6 5 6   Plan: Stable - continue medications as ordered with routine monitoring of kidney function    (Z96.652) Status post total left knee replacement  Comment: Per PMHx - no acute pain concerns; managed on regimen of scheduled and PRN Tylenol - she would prefer to receive bed time dose of Tylenol closer to bed time to help treat pain throughout the night  Plan: Adjust HS medication administration to 2100 for optimal pain management    (Z00.00) Encounter for annual wellness exam in Medicare patient  Annual Wellness Visit    Are you in the first 12 months of your Medicare Part B coverage?  No    Physical Health:    In general, how would you rate your overall physical health? good    Outside of work, how many days during the week do you exercise?6-7 days/week    Outside of work, approximately how many minutes a day do you exercise?30-45 minutes    If you drink alcohol do you typically have >3 drinks per day or >7 drinks per week? No    Do you usually eat at least 4 servings of fruit and vegetables a day, include whole grains & fiber and avoid regularly eating high fat or \"junk\" foods? Yes    Do you have any problems taking medications regularly? No    Do you have any side effects from medications? none    Needs assistance for the following daily activities: transportation, shopping, preparing meals, housework, bathing, laundry, money management and taking medicine    Which of the following safety concerns are present in your home?  none identified     Hearing impairment: No    Vision - follow-up with Opthamologist next Friday    In the past 6 months, have you been bothered by leaking of urine? no    Mental Health:    In general, how would you rate your overall " mental or emotional health? good  PHQ-2 Score:     PHQ-2 (  [employer]) 10/2/2020   Q1: Little interest or pleasure in doing things 0   Q2: Feeling down, depressed or hopeless 0   PHQ-2 Score 0        Do you feel safe in your environment? Yes    Have you ever done Advance Care Planning? (For example, a Health Directive, POLST, or a discussion with a medical provider or your loved ones about your wishes)? Yes, advance care planning is on file.    Fall risk:  Fallen 2 or more times in the past year?: No  Any fall with injury in the past year?: No    Cognitive Screenin) Repeat 3 items (Leader, Season, Table)  - Unable to name all three as given  2) Clock draw: ABNORMAL - Unable to draw 2/2 PD - Got 9/15 animals in 1 min  3) 3 item recall: Recalls 2 objects   Results: ABNORMAL clock, 1-2 items recalled: PROBABLE COGNITIVE IMPAIRMENT, **INFORM PROVIDER**    Mini-CogTM Copyright [employer] [place]. Licensed by the author for use in [facility]; reprinted with permission (harriet@[facility]). All rights reserved.      Do you have sleep apnea, excessive snoring or daytime drowsiness?: yes    Current providers sharing in care for this patient include:     Patient Care Team:  Charla Pham APRN CNP as PCP - General (Nurse Practitioner - Gerontology)  Funmi Mendez MD as MD (Internal Medicine)  Doris Gonzalez as Other (see comments) (Gerontology)  [facility] ([facility] ([facility] ([facility]  Sugar Polanco APRN CNP as Assigned PCP    Charisse Lawson MA    Orders written by provider at facility  Move medication administration times to 0700 and 2100    Total time with an established patient visit in the assisted livin minutes including discussions about the POC and care coordination with the patient. Greater than 50% of total time spent with counseling and coordinating care due to medical complexity; education re:medication administration times and planning for optimal treatment of pain and  hypothyroidism; discussion re:diet; and ongoing care planning.    Electronically signed by:  Dr. Martha Pham APRN, DNP, A/GNP-BC  [facility]  [facility] [address] [address]     Cell: [phone]  Fax: [phone]  Email: [email or web address]

## 2020-10-01 ENCOUNTER — DOCUMENTATION ONLY (OUTPATIENT)
Dept: GERIATRICS | Facility: CLINIC | Age: 85
End: 2020-10-01

## 2020-10-01 ENCOUNTER — HOSPITAL LABORATORY (OUTPATIENT)
Dept: OTHER | Facility: CLINIC | Age: 85
End: 2020-10-01

## 2020-10-01 DIAGNOSIS — Z86.718 HISTORY OF DEEP VENOUS THROMBOSIS (DVT) OF DISTAL VEIN OF LEFT LOWER EXTREMITY: ICD-10-CM

## 2020-10-01 DIAGNOSIS — I48.11 LONGSTANDING PERSISTENT ATRIAL FIBRILLATION (H): Primary | ICD-10-CM

## 2020-10-01 NOTE — PROGRESS NOTES
[facility] Medical Record Number:  [id number]  Place of Service where encounter took place: No question data found.    HPI:    Liberty Horta is a 85 year old  (1935), who is being seen today for an episodic care visit at the above location.   HPI information obtained from: facility chart records and facility staff. Today's concern is INR/Coumadin management for A. Fib and DVT    ROS/Subjective:  Bleeding Signs/Symptoms:  None  Thromboembolic Signs/Symptoms:  None  Medication Changes:  No  Dietary Changes:  No  Activity Changes: No  Bacterial/Viral Infection:  No  Missed Coumadin Doses:  None  On ASA: No  Other Concerns:  No    OBJECTIVE:  There were no vitals taken for this visit.  Last INR: 2.16 on 9/17/20  INR Today:  2.03  Current Dose:  Coumadfin 2.03 qWed and 5mg AOD    Date INR New Dose/Orders   8/27/20 1.93 Coumadin 2.5mg qW and 5mg AOD   9/3/20 2.17 Continue   9/10/20 2.43 Continue   9/17/20 2.16 Continue   10/1/20 2.03 Continue; INR 2 weeks       ASSESSMENT:  (I48.11) Longstanding persistent atrial fibrillation (H)  (primary encounter diagnosis)  (Z86.718) History of deep venous thrombosis (DVT) of distal vein of left lower extremity    Therapeutic INR for goal of 2-3    PLAN:   Orders written by provider at facility  New Dose: No Change    Next INR: 2 weeks    Electronically signed by:  Dr. Martha Pham, APRN, DNP, A/GNP-BC  [facility]  [facility] [address] [address]     Cell: [phone]  Fax: [phone]  Email: [email or web address]

## 2020-10-02 ENCOUNTER — ASSISTED LIVING VISIT (OUTPATIENT)
Dept: GERIATRICS | Facility: CLINIC | Age: 85
End: 2020-10-02
Payer: COMMERCIAL

## 2020-10-02 DIAGNOSIS — E78.5 HYPERLIPIDEMIA, UNSPECIFIED HYPERLIPIDEMIA TYPE: ICD-10-CM

## 2020-10-02 DIAGNOSIS — E66.9 OBESITY, UNSPECIFIED CLASSIFICATION, UNSPECIFIED OBESITY TYPE, UNSPECIFIED WHETHER SERIOUS COMORBIDITY PRESENT: ICD-10-CM

## 2020-10-02 DIAGNOSIS — E03.9 HYPOTHYROIDISM, UNSPECIFIED TYPE: ICD-10-CM

## 2020-10-02 DIAGNOSIS — Z00.00 ENCOUNTER FOR ANNUAL WELLNESS EXAM IN MEDICARE PATIENT: ICD-10-CM

## 2020-10-02 DIAGNOSIS — R60.0 BILATERAL EDEMA OF LOWER EXTREMITY: ICD-10-CM

## 2020-10-02 DIAGNOSIS — I48.20 CHRONIC ATRIAL FIBRILLATION (H): ICD-10-CM

## 2020-10-02 DIAGNOSIS — N18.30 STAGE 3 CHRONIC KIDNEY DISEASE, UNSPECIFIED WHETHER STAGE 3A OR 3B CKD (H): ICD-10-CM

## 2020-10-02 DIAGNOSIS — Z96.652 STATUS POST TOTAL LEFT KNEE REPLACEMENT: ICD-10-CM

## 2020-10-02 DIAGNOSIS — I79.8 PERIPHERAL ANGIOPATHY IN DISEASES CLASSIFIED ELSEWHERE (H): ICD-10-CM

## 2020-10-02 DIAGNOSIS — G20.A1 PARKINSON'S DISEASE (TREMOR, STIFFNESS, SLOW MOTION, UNSTABLE POSTURE) (H): Primary | ICD-10-CM

## 2020-10-02 DIAGNOSIS — C18.9 COLON ADENOCARCINOMA (H): ICD-10-CM

## 2020-10-02 LAB — INR PPP: 2.03 (ref 0.86–1.14)

## 2020-10-02 NOTE — LETTER
10/2/2020        RE: Liberty Horta  [facility]  [address] [address] [facility] [address]        [place] [facility]  [place] Medical Record Number:  [id number]  Place of Service where encounter took place:  [facility] ([facility]) [[id number]]  Chief Complaint   Patient presents with     RECHECK     Wellness Visit       HPI:    Liberty Horta  is a 85 year old (1935), who is being seen today for an episodic care visit.  HPI information obtained from: facility chart records, facility staff, patient report and [facility] chart review. Today's concern is:     Parkinson's disease (tremor, stiffness, slow motion, unstable posture) (H)  Colon adenocarcinoma (H)  Obesity, unspecified classification, unspecified obesity type, unspecified whether serious comorbidity present  Hyperlipidemia, unspecified hyperlipidemia type  Hypothyroidism, unspecified type  Chronic atrial fibrillation (H)  Peripheral angiopathy in diseases classified elsewhere (H)  Bilateral edema of lower extremity  Stage 3 chronic kidney disease, unspecified whether stage 3a or 3b CKD  Status post total left knee replacement  Encounter for annual wellness exam in Medicare patient     Patient seen today for admission visit to TCU. Patient notes that things are going very well - despite current environment in lieu of the pandemic she has rather high spirits. She has been able to visit with family, this has helped quite a bit. Appetite Good. She is sleeping well. Denies CP, palpitations, fatigue, nausea, vomiting, increased SOB/BISHOP, fever, chills, and/or b/b concerns today.    Past Medical and Surgical History reviewed in Epic today.    MEDICATIONS:  Current Outpatient Medications   Medication Sig Dispense Refill     acetaminophen (TYLENOL) 500 MG tablet Take 2 tablets (1,000 mg) by mouth 2 times daily. May also take 2 tablets (1,000 mg) daily as needed for mild pain. AND 1000 mg daily at night PRN starting 02/17  "120 tablet 11     atorvastatin (LIPITOR) 40 MG tablet TAKE 1 TABLET BY MOUTH AT BEDTIME 28 tablet PRN     DILT- MG 24 hr capsule TAKE 1 CAPSULE BY MOUTH ONCE DAILY 28 capsule PRN     furosemide (LASIX) 40 MG tablet Take 1 tablet (40 mg) by mouth daily starting Friday 02/21 90 tablet 11     levothyroxine (SYNTHROID/LEVOTHROID) 150 MCG tablet TAKE 1 TABLET BY MOUTH ONCE DAILY 28 tablet PRN     Multiple Vitamin (TAB-A-IRIS) TABS TAKE 1 TABLET BY MOUTH ONCE DAILY 28 tablet PRN     sotalol (BETAPACE) 80 MG tablet TAKE 1 TABLET BY MOUTH TWICE DAILY WITH MEALS 56 tablet PRN     warfarin (COUMADIN) 2.5 MG tablet Take 2.5 mg by mouth daily on Tues & Fridays  AND take 5 mg AOD       REVIEW OF SYSTEMS:  10 point ROS of systems including Constitutional, Eyes, Respiratory, Cardiovascular, Gastroenterology, Genitourinary, Integumentary, Musculoskeletal, Psychiatric were all negative except for pertinent positives noted in my HPI.    Objective:  BP (!) 130/92   Pulse 64   Temp 98.7  F (37.1  C)   Resp 22   Ht 1.575 m (5' 2\")   Wt 84.6 kg (186 lb 9.6 oz)   SpO2 95%   BMI 34.13 kg/m    Exam:  GENERAL APPEARANCE:  Alert, in no distress, appears healthy, oriented, cooperative, elderly woman in recliner in apt  EYES:  EOM, conjunctivae, lids, pupils and irises normal  RESP:  respiratory effort and palpation of chest normal, lungs clear to auscultation , no respiratory distress, cough - none  CV:  Palpation and auscultation of heart done , regular rate and rhythm, no murmur, rub, or gallop, +2 pedal pulses, peripheral edema 1-2+ in BLE  ABDOMEN:  normal bowel sounds, soft, nontender, no hepatosplenomegaly or other masses  M/S:   Gait and station abnormal - KAIT 2/2 patient being in recliner  Digits and nails abnormal - arthritic changes present  SKIN:  Inspection of skin and subcutaneous tissue baseline, Palpation of skin and subcutaneous tissue baseline, skin thin and dry  NEURO:   Cranial nerves 2-12 are normal tested and " grossly at patient's baseline  PSYCH:  oriented to 2-3, normal insight, judgement and memory, affect and mood normal    Labs:   Recent labs in [facility] reviewed by me today.     ASSESSMENT/PLAN:  (G20) Parkinson's disease (tremor, stiffness, slow motion, unstable posture) (H)  (primary encounter diagnosis)  Comment: Chronic - limits mobility at baseline; no active tx regimen - receives linen/towel changes weekly; assist to the BR with use of 2 aides; assist with dressing BID; lunch/dinner meal trays; BID medication administration; assist of transfers with 1 aide; walking program 2d/week; daily safety checks; and 2x/weekly showers   Plan: Stable without medical intervention; continue placement and level of assistance as these remain appropriate based upon patient's needs and POC    (C18.9) Colon adenocarcinoma (H)  Comment: Per PMHx - no noted surgical intervention. No ongoing tx plan.  Plan: Stable without medical intervention    (E66.9) Obesity, unspecified classification, unspecified obesity type, unspecified whether serious comorbidity present  Comment: Chronic - no active interventions; meals per in-house for lunch/dinner  Wt Readings from Last 4 Encounters:   09/24/20 84.6 kg (186 lb 9.6 oz)   08/25/20 84.6 kg (186 lb 9.6 oz)   07/16/20 84.6 kg (186 lb 9.6 oz)   03/12/19 67.9 kg (149 lb 9.6 oz)   Plan: Weight gain since admission as noted above; ongoing monitoring and educations    (E78.5) Hyperlipidemia, unspecified hyperlipidemia type  Comment: Chronic - managed on regimen of Lipitor  LIPID PANEL W REFLEX MEASURED LDL (10/07/2019 3:12 PM CDT)  LIPID PANEL W REFLEX MEASURED LDL (10/07/2019 3:12 PM CDT)   Component Value Ref Range Performed At Pathologist Signature   CHOLESTEROL,TOTAL 149 100 - 199 mg/dL [facility]     TRIGLYCERIDES 452 (H) <150 mg/dL [facility]     HDL CHOLESTEROL 36 (L) >40 mg/dL [facility]      NON-HDL CHOLESTEROL 113 <145 mg/dl [facility]     CHOL/HDL RATIO  4.14 <4.50  [facility]     LDL CHOLESTEROL Comment: Invalid LDL when Trig >400, reflexed to measured LDL    [facility]    Plan: Continue medications as ordered; recheck annual FLP for ongoing assessment with elevation in Triglycerides as noted above    (E03.9) Hypothyroidism, unspecified type  Comment: Chronic - patient notes she gets her morning pills AFTER breakfast, thus she has not been receiving her Synthroid before food as needed for proper absorption  TSH   Date Value Ref Range Status   02/18/2020 1.20 0.40 - 4.00 mU/L Final   02/18/2020 1.20 0.40 - 4.00 mU/L Final   Plan: Continue medications as ordered; Recheck TSH with administration concerns - change AM med administration time to 0700    (I48.20) Chronic atrial fibrillation (H)  Comment: Chronic - managed on regimen of Diltiazem and Sotalol; anticoagulated on Coumadin  Pulse Readings from Last 4 Encounters:   09/24/20 64   08/25/20 64   07/16/20 64   06/11/20 60     Plan: Coumadin/INR as noted above with facility VS per protocol    (I79.8) Peripheral angiopathy in diseases classified elsewhere (H)  Comment: Chronic - Dilt/Sotalol as ordered; no reports of CP  Plan: Monitor - intervene as needed    (R60.0) Bilateral edema of lower extremity  Comment: Chronic - compression therapy and Lasix management  Plan: Continue medication/compression therapy as ordered with monitoring of BMP routinely    (N18.30) Stage 3 chronic kidney disease, unspecified whether stage 3a or 3b CKD  Comment: Chronic - voiding w/o concern   Ref. Range 2/18/2020 09:40 6/11/2020 07:05 6/30/2020 10:40   Sodium Latest Ref Range: 133 - 144 mmol/L 140 141 140   Potassium Latest Ref Range: 3.4 - 5.3 mmol/L 3.7 4.1 3.9   Chloride Latest Ref Range: 94 - 109 mmol/L 104 107 106   Carbon Dioxide Latest Ref Range: 20 - 32 mmol/L 30  "29 28   Urea Nitrogen Latest Ref Range: 7 - 30 mg/dL 22 25 26   Creatinine Latest Ref Range: 0.52 - 1.04 mg/dL 0.88 1.00 0.97   GFR Estimate Latest Ref Range: >60 mL/min/1.73_m2 61 52 (L) 54 (L)   GFR Estimate If Black Latest Ref Range: >60 mL/min/1.73_m2 70 60 (L) 62   Calcium Latest Ref Range: 8.5 - 10.1 mg/dL 9.4 8.9 9.1   Anion Gap Latest Ref Range: 3 - 14 mmol/L 6 5 6   Plan: Stable - continue medications as ordered with routine monitoring of kidney function    (Z96.652) Status post total left knee replacement  Comment: Per PMHx - no acute pain concerns; managed on regimen of scheduled and PRN Tylenol - she would prefer to receive bed time dose of Tylenol closer to bed time to help treat pain throughout the night  Plan: Adjust HS medication administration to 2100 for optimal pain management    (Z00.00) Encounter for annual wellness exam in Medicare patient  Annual Wellness Visit    Are you in the first 12 months of your Medicare Part B coverage?  No    Physical Health:    In general, how would you rate your overall physical health? good    Outside of work, how many days during the week do you exercise?6-7 days/week    Outside of work, approximately how many minutes a day do you exercise?30-45 minutes    If you drink alcohol do you typically have >3 drinks per day or >7 drinks per week? No    Do you usually eat at least 4 servings of fruit and vegetables a day, include whole grains & fiber and avoid regularly eating high fat or \"junk\" foods? Yes    Do you have any problems taking medications regularly? No    Do you have any side effects from medications? none    Needs assistance for the following daily activities: transportation, shopping, preparing meals, housework, bathing, laundry, money management and taking medicine    Which of the following safety concerns are present in your home?  none identified     Hearing impairment: No    Vision - follow-up with Opthamologist next Friday    In the past 6 months, have " you been bothered by leaking of urine? no    Mental Health:    In general, how would you rate your overall mental or emotional health? good  PHQ-2 Score:     PHQ-2 (  [employer]) 10/2/2020   Q1: Little interest or pleasure in doing things 0   Q2: Feeling down, depressed or hopeless 0   PHQ-2 Score 0        Do you feel safe in your environment? Yes    Have you ever done Advance Care Planning? (For example, a Health Directive, POLST, or a discussion with a medical provider or your loved ones about your wishes)? Yes, advance care planning is on file.    Fall risk:  Fallen 2 or more times in the past year?: No  Any fall with injury in the past year?: No    Cognitive Screenin) Repeat 3 items (Leader, Season, Table)  - Unable to name all three as given  2) Clock draw: ABNORMAL - Unable to draw 2/2 PD - Got 9/15 animals in 1 min  3) 3 item recall: Recalls 2 objects   Results: ABNORMAL clock, 1-2 items recalled: PROBABLE COGNITIVE IMPAIRMENT, **INFORM PROVIDER**    Mini-CogTM Copyright S [employer]. Licensed by the author for use in [facility]; reprinted with permission (soob@[facility]). All rights reserved.      Do you have sleep apnea, excessive snoring or daytime drowsiness?: yes    Current providers sharing in care for this patient include:     Patient Care Team:  Charla Pham APRN CNP as PCP - General (Nurse Practitioner - Gerontology)  Funmi Mendez MD as MD (Internal Medicine)  Doris Gonzalez as Other (see comments) (Gerontology)  [facility] ([facility] HEALTH AGENCY ([facility]), (HI))  Sugar Polanco APRN CNP as Assigned PCP    Charisse Lawson MA    Orders written by provider at facility  Move medication administration times to 0700 and 2100    Total time with an established patient visit in the assisted livin minutes including discussions about the POC and care coordination with the patient. Greater than 50% of total time spent with counseling and coordinating care due to medical  complexity; education re:medication administration times and planning for optimal treatment of pain and hypothyroidism; discussion re:diet; and ongoing care planning.    Electronically signed by:  IRASEMA Gonzalez, DNP, A/GNP-[facility]  [address] [place] [id number]     Cell: [phone]  Fax: [phone]  Email: [email or web address]                 Sincerely,        IRASEMA Paez CNP

## 2020-10-13 ENCOUNTER — HOSPITAL LABORATORY (OUTPATIENT)
Dept: OTHER | Facility: CLINIC | Age: 85
End: 2020-10-13

## 2020-10-14 LAB
SARS-COV-2 RNA SPEC QL NAA+PROBE: NOT DETECTED
SPECIMEN SOURCE: NORMAL

## 2020-10-15 ENCOUNTER — HOSPITAL LABORATORY (OUTPATIENT)
Dept: OTHER | Facility: CLINIC | Age: 85
End: 2020-10-15

## 2020-10-15 LAB — INR PPP: 2.14 (ref 0.86–1.14)

## 2020-10-22 ENCOUNTER — HOSPITAL LABORATORY (OUTPATIENT)
Dept: OTHER | Facility: CLINIC | Age: 85
End: 2020-10-22

## 2020-10-22 LAB
CHOLEST SERPL-MCNC: 158 MG/DL
HDLC SERPL-MCNC: 47 MG/DL
LDLC SERPL CALC-MCNC: 47 MG/DL
NONHDLC SERPL-MCNC: 111 MG/DL
TRIGL SERPL-MCNC: 321 MG/DL
TSH SERPL DL<=0.005 MIU/L-ACNC: 4.06 MU/L (ref 0.4–4)

## 2020-10-23 ENCOUNTER — ASSISTED LIVING VISIT (OUTPATIENT)
Dept: GERIATRICS | Facility: CLINIC | Age: 85
End: 2020-10-23
Payer: COMMERCIAL

## 2020-10-23 VITALS
TEMPERATURE: 97.7 F | HEART RATE: 60 BPM | HEIGHT: 62 IN | BODY MASS INDEX: 34.13 KG/M2 | SYSTOLIC BLOOD PRESSURE: 124 MMHG | RESPIRATION RATE: 20 BRPM | DIASTOLIC BLOOD PRESSURE: 84 MMHG

## 2020-10-23 DIAGNOSIS — Z53.9 ERRONEOUS ENCOUNTER--DISREGARD: Primary | ICD-10-CM

## 2020-10-23 NOTE — PROGRESS NOTES
Encounter opened in error. Please disregard.     Dr. Martha Pham, APRN, DNP, A/GNP-[facility]  [address] [address]     Cell: [phone]  Fax: [phone]  Email: [email or web address]

## 2020-11-03 ENCOUNTER — ASSISTED LIVING VISIT (OUTPATIENT)
Dept: GERIATRICS | Facility: CLINIC | Age: 85
End: 2020-11-03
Payer: COMMERCIAL

## 2020-11-03 VITALS — BODY MASS INDEX: 34.13 KG/M2 | HEIGHT: 62 IN | TEMPERATURE: 97.9 F

## 2020-11-03 DIAGNOSIS — Z95.0 PACEMAKER: ICD-10-CM

## 2020-11-03 DIAGNOSIS — G20.A1 PARKINSON'S DISEASE (TREMOR, STIFFNESS, SLOW MOTION, UNSTABLE POSTURE) (H): Primary | ICD-10-CM

## 2020-11-03 DIAGNOSIS — I48.20 CHRONIC ATRIAL FIBRILLATION (H): ICD-10-CM

## 2020-11-03 DIAGNOSIS — N18.30 STAGE 3 CHRONIC KIDNEY DISEASE, UNSPECIFIED WHETHER STAGE 3A OR 3B CKD (H): ICD-10-CM

## 2020-11-03 DIAGNOSIS — I50.32 CHRONIC DIASTOLIC CONGESTIVE HEART FAILURE (H): ICD-10-CM

## 2020-11-03 DIAGNOSIS — R60.0 BILATERAL EDEMA OF LOWER EXTREMITY: ICD-10-CM

## 2020-11-03 DIAGNOSIS — E66.9 OBESITY, UNSPECIFIED CLASSIFICATION, UNSPECIFIED OBESITY TYPE, UNSPECIFIED WHETHER SERIOUS COMORBIDITY PRESENT: ICD-10-CM

## 2020-11-03 DIAGNOSIS — R41.89 COGNITIVE IMPAIRMENT: ICD-10-CM

## 2020-11-03 DIAGNOSIS — E78.5 HYPERLIPIDEMIA, UNSPECIFIED HYPERLIPIDEMIA TYPE: ICD-10-CM

## 2020-11-03 DIAGNOSIS — E03.9 HYPOTHYROIDISM, UNSPECIFIED TYPE: ICD-10-CM

## 2020-11-03 NOTE — LETTER
11/3/2020        RE: Liberty Horta  [facility]  [address] [address] [address]        [place] [facility]  [place] Medical Record Number:  [id number]  Place of Service where encounter took place:  [facility] ([facility]) [[id number]]  Chief Complaint   Patient presents with     RECHECK       HPI:    Liberty Horta  is a 85 year old (1935), who is being seen today for an episodic care visit.  HPI information obtained from: facility chart records, facility staff, patient report, [facility] chart review and family/first contact sonMichele report. Today's concern is:    1. Parkinson's disease (tremor, stiffness, slow motion, unstable posture) (H)    2. Obesity, unspecified classification, unspecified obesity type, unspecified whether serious comorbidity present    3. Hyperlipidemia, unspecified hyperlipidemia type    4. Hypothyroidism, unspecified type    5. Chronic atrial fibrillation (H)    6. Stage 3 chronic kidney disease, unspecified whether stage 3a or 3b CKD    7. Bilateral edema of lower extremity    8. Pacemaker    9. Cognitive impairment    10. Chronic diastolic congestive heart failure (H)      Patient is a 85 year old female that was admitted to [facility] facility May 2019 after hospitalization for severe pain to her left buttock in which she was unable to bear weight.  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  Ortho saw her and recommended nonoperative management with WBAT and therapy.  Her Lumbar fracture was noted previously on CT imaging from March 2019 and conservative management and monitoring recommended.     She has PMH significant for a-fib, s/p pacer maker placement (on coumadin), hypothyroidism, HTN, GERD, OA, Anemia, HLD, urinary retention, Parkinson's.  She has had her left knee replaced and also had right hip ORIF in the past    She is seen today to follow-up on  "multiple co-morbidities and lab results from October.  She is in her apartment and tells me she is doing really well.  She is happy she has been able to visit with her family in person past couple of weeks.  She denies chest pain, shortness of breath, dizziness, lightheadedness, n/v, abdominal pain.  Speaking with her son, Michele, he has noticed her decline cognitively since the pandemic, and feels r/t isolation.  Staff are without concerns today.      Some swelling noted to her legs, no open areas, denies/no reports of difficulty breathing.  Her compression stockings are not in place.      Past Medical and Surgical History reviewed in Epic today.    MEDICATIONS:    Current Outpatient Medications   Medication Sig Dispense Refill     acetaminophen (TYLENOL) 500 MG tablet Take 2 tablets (1,000 mg) by mouth 2 times daily. May also take 2 tablets (1,000 mg) daily as needed for mild pain. AND 1000 mg daily at night PRN starting 02/17 120 tablet 11     atorvastatin (LIPITOR) 40 MG tablet TAKE 1 TABLET BY MOUTH AT BEDTIME 28 tablet PRN     DILT- MG 24 hr capsule TAKE 1 CAPSULE BY MOUTH ONCE DAILY 28 capsule PRN     furosemide (LASIX) 40 MG tablet Take 1 tablet (40 mg) by mouth daily starting Friday 02/21 90 tablet 11     levothyroxine (SYNTHROID/LEVOTHROID) 150 MCG tablet TAKE 1 TABLET BY MOUTH ONCE DAILY 28 tablet PRN     Multiple Vitamin (TAB-A-IRIS) TABS TAKE 1 TABLET BY MOUTH ONCE DAILY 28 tablet PRN     sotalol (BETAPACE) 80 MG tablet TAKE 1 TABLET BY MOUTH TWICE DAILY WITH MEALS 56 tablet PRN     warfarin (COUMADIN) 2.5 MG tablet Take 2.5 mg by mouth daily on Tues & Fridays  AND take 5 mg AOD           REVIEW OF SYSTEMS:  Limited secondary to cognitive impairment but as noted in HPI    Objective:  Temp 97.9  F (36.6  C)   Ht 1.575 m (5' 2\")   BMI 34.13 kg/m    Exam:  GENERAL APPEARANCE:  Alert, in no distress, cooperative  EYES:  PERRL, wearing corrective lenses  NECK:  No adenopathy,masses or " thyromegaly  RESP:  respiratory effort and palpation of chest normal, lungs clear to auscultation , no respiratory distress, on room air, no crackles or wheezing, no cough  CV:  Palpation and auscultation of heart done , paced rhythm, edema 1-2+ BLLE, no compression, no open areas noted  ABDOMEN:  soft, non-tender to light and deep palpation , no guarding or rebound, bowel sounds normal  M/S:   Gait and station abnormal generalized weakness, arthritic changes noted to hands   NEURO:   Cranial nerves 2-12 are normal tested and grossly at patient's baseline  PSYCH:  memory impaired , affect and mood normal    Labs:   Recent labs in [facility] reviewed by me today.     ASSESSMENT/PLAN:  (G20) Parkinson's disease (tremor, stiffness, slow motion, unstable posture) (H)  (primary encounter diagnosis)  Comment: no official neuro w/u but per symptoms of tremor, quiet speech, rigidity, slow movement, falls  -does not desire neuro involvement or medications  -no recent falls, no reports of difficulty swallowing   Plan: monitor, AL staff assist with cares, assist with all ambulation  -if falls or decrease in function staff to update to get therapy involved        (E66.9) Obesity, unspecified classification, unspecified obesity type, unspecified whether serious comorbidity present  Body mass index is 34.13 kg/m .   Plan: encourage activity as able and healthy eating choices    (E78.5) Hyperlipidemia, unspecified hyperlipidemia type  Comment: elevated triglycerides on lipid panel in Oct  -discussed with patient and family  -already on statin  -does not want med changes and feel this is very reasonable given age and goals of care   Plan: continue statin  -lipid panel 6 months-annually     (E03.9) Hypothyroidism, unspecified type  Comment:   Lab Results   Component Value Date    TSH 4.06 10/22/2020     -reasonable level given age  Plan: continue synthroid  -TSH recheck in 6 months     (I48.20) Chronic atrial fibrillation (H)  (Z95.0)  Pacemaker  Comment: per history, with pace maker  -goes to have device checked onsite Q6 months  Plan: continue pacer and checks per recommendation  -on diltiazem and sotalol   -anticoagulated with coumadin  -follow INR's  -does not follow with cards and no desire to per family     (N18.30) Stage 3 chronic kidney disease, unspecified whether stage 3a or 3b CKD  Comment:   Lab Results   Component Value Date    CR 0.97 06/30/2020       Plan: renally adjust  Meds, avoid nephrotoxic meds  -periodic BMP    Diastolic CHF  (R60.0) Bilateral edema of lower extremity  Comment: noted with increased edema and dyspnea with exertion June 2020  -lasix was increased at that time, echo ordered   -lungs clear, no shortness of breath reported  Plan: continue lasix and monitor  -has compression stockings, staff to assist with donning       (R41.89) Cognitive impairment  Comment: no formal testing, some STML and confusion noted, family notices decline since pandemic with social isolation  Plan: staff assist with cares and meds  -supportive family       Called and spoke with sonMichele regarding visit and POC    Electronically signed by:  IRASEMA Rivero CNP            Sincerely,        IRASEMA Rivero CNP

## 2020-11-03 NOTE — PROGRESS NOTES
[facility]  [facility] Record Number:  [id number]  Place of Service where encounter took place:  [facility] (S) [[id number]]  Chief Complaint   Patient presents with     RECHECK       HPI:    Liberty Horta  is a 85 year old (1935), who is being seen today for an episodic care visit.  HPI information obtained from: facility chart records, facility staff, patient report, [facility] chart review and family/first contact son, Michele report. Today's concern is:    1. Parkinson's disease (tremor, stiffness, slow motion, unstable posture) (H)    2. Obesity, unspecified classification, unspecified obesity type, unspecified whether serious comorbidity present    3. Hyperlipidemia, unspecified hyperlipidemia type    4. Hypothyroidism, unspecified type    5. Chronic atrial fibrillation (H)    6. Stage 3 chronic kidney disease, unspecified whether stage 3a or 3b CKD    7. Bilateral edema of lower extremity    8. Pacemaker    9. Cognitive impairment    10. Chronic diastolic congestive heart failure (H)      Patient is a 85 year old female that was admitted to [facility] facility May 2019 after hospitalization for severe pain to her left buttock in which she was unable to bear weight.  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  Ortho saw her and recommended nonoperative management with WBAT and therapy.  Her Lumbar fracture was noted previously on CT imaging from March 2019 and conservative management and monitoring recommended.     She has PMH significant for a-fib, s/p pacer maker placement (on coumadin), hypothyroidism, HTN, GERD, OA, Anemia, HLD, urinary retention, Parkinson's.  She has had her left knee replaced and also had right hip ORIF in the past    She is seen today to follow-up on multiple co-morbidities and lab results from October.  She is in her apartment and tells me she is doing really well.  " She is happy she has been able to visit with her family in person past couple of weeks.  She denies chest pain, shortness of breath, dizziness, lightheadedness, n/v, abdominal pain.  Speaking with her son, Michele, he has noticed her decline cognitively since the pandemic, and feels r/t isolation.  Staff are without concerns today.      Some swelling noted to her legs, no open areas, denies/no reports of difficulty breathing.  Her compression stockings are not in place.      Past Medical and Surgical History reviewed in Epic today.    MEDICATIONS:    Current Outpatient Medications   Medication Sig Dispense Refill     acetaminophen (TYLENOL) 500 MG tablet Take 2 tablets (1,000 mg) by mouth 2 times daily. May also take 2 tablets (1,000 mg) daily as needed for mild pain. AND 1000 mg daily at night PRN starting 02/17 120 tablet 11     atorvastatin (LIPITOR) 40 MG tablet TAKE 1 TABLET BY MOUTH AT BEDTIME 28 tablet PRN     DILT- MG 24 hr capsule TAKE 1 CAPSULE BY MOUTH ONCE DAILY 28 capsule PRN     furosemide (LASIX) 40 MG tablet Take 1 tablet (40 mg) by mouth daily starting Friday 02/21 90 tablet 11     levothyroxine (SYNTHROID/LEVOTHROID) 150 MCG tablet TAKE 1 TABLET BY MOUTH ONCE DAILY 28 tablet PRN     Multiple Vitamin (TAB-A-IRIS) TABS TAKE 1 TABLET BY MOUTH ONCE DAILY 28 tablet PRN     sotalol (BETAPACE) 80 MG tablet TAKE 1 TABLET BY MOUTH TWICE DAILY WITH MEALS 56 tablet PRN     warfarin (COUMADIN) 2.5 MG tablet Take 2.5 mg by mouth daily on Tues & Fridays  AND take 5 mg AOD           REVIEW OF SYSTEMS:  Limited secondary to cognitive impairment but as noted in HPI    Objective:  Temp 97.9  F (36.6  C)   Ht 1.575 m (5' 2\")   BMI 34.13 kg/m    Exam:  GENERAL APPEARANCE:  Alert, in no distress, cooperative  EYES:  PERRL, wearing corrective lenses  NECK:  No adenopathy,masses or thyromegaly  RESP:  respiratory effort and palpation of chest normal, lungs clear to auscultation , no respiratory distress, on room " air, no crackles or wheezing, no cough  CV:  Palpation and auscultation of heart done , paced rhythm, edema 1-2+ BLLE, no compression, no open areas noted  ABDOMEN:  soft, non-tender to light and deep palpation , no guarding or rebound, bowel sounds normal  M/S:   Gait and station abnormal generalized weakness, arthritic changes noted to hands   NEURO:   Cranial nerves 2-12 are normal tested and grossly at patient's baseline  PSYCH:  memory impaired , affect and mood normal    Labs:   Recent labs in [facility] reviewed by me today.     ASSESSMENT/PLAN:  (G20) Parkinson's disease (tremor, stiffness, slow motion, unstable posture) (H)  (primary encounter diagnosis)  Comment: no official neuro w/u but per symptoms of tremor, quiet speech, rigidity, slow movement, falls  -does not desire neuro involvement or medications  -no recent falls, no reports of difficulty swallowing   Plan: monitor, [facility] staff assist with cares, assist with all ambulation  -if falls or decrease in function staff to update to get therapy involved        (E66.9) Obesity, unspecified classification, unspecified obesity type, unspecified whether serious comorbidity present  Body mass index is 34.13 kg/m .   Plan: encourage activity as able and healthy eating choices    (E78.5) Hyperlipidemia, unspecified hyperlipidemia type  Comment: elevated triglycerides on lipid panel in Oct  -discussed with patient and family  -already on statin  -does not want med changes and feel this is very reasonable given age and goals of care   Plan: continue statin  -lipid panel 6 months-annually     (E03.9) Hypothyroidism, unspecified type  Comment:   Lab Results   Component Value Date    TSH 4.06 10/22/2020     -reasonable level given age  Plan: continue synthroid  -TSH recheck in 6 months     (I48.20) Chronic atrial fibrillation (H)  (Z95.0) Pacemaker  Comment: per history, with pace maker  -goes to have device checked onsite Q6 months  Plan: continue pacer and checks per  recommendation  -on diltiazem and sotalol   -anticoagulated with coumadin  -follow INR's  -does not follow with cards and no desire to per family     (N18.30) Stage 3 chronic kidney disease, unspecified whether stage 3a or 3b CKD  Comment:   Lab Results   Component Value Date    CR 0.97 06/30/2020       Plan: renally adjust  Meds, avoid nephrotoxic meds  -periodic BMP    Diastolic CHF  (R60.0) Bilateral edema of lower extremity  Comment: noted with increased edema and dyspnea with exertion June 2020  -lasix was increased at that time, echo ordered   -lungs clear, no shortness of breath reported  Plan: continue lasix and monitor  -has compression stockings, staff to assist with donning       (R41.89) Cognitive impairment  Comment: no formal testing, some STML and confusion noted, family notices decline since pandemic with social isolation  Plan: staff assist with cares and meds  -supportive family       Called and spoke with sonMichele regarding visit and POC    Electronically signed by:  IRASEMA Rivero CNP

## 2020-11-10 ENCOUNTER — HOSPITAL LABORATORY (OUTPATIENT)
Dept: OTHER | Facility: CLINIC | Age: 85
End: 2020-11-10

## 2020-11-10 ENCOUNTER — ASSISTED LIVING VISIT (OUTPATIENT)
Dept: GERIATRICS | Facility: CLINIC | Age: 85
End: 2020-11-10
Payer: COMMERCIAL

## 2020-11-10 VITALS — TEMPERATURE: 97.7 F

## 2020-11-10 DIAGNOSIS — Z79.01 LONG TERM (CURRENT) USE OF ANTICOAGULANTS: ICD-10-CM

## 2020-11-10 DIAGNOSIS — Z51.81 ENCOUNTER FOR THERAPEUTIC DRUG MONITORING: ICD-10-CM

## 2020-11-10 DIAGNOSIS — I48.20 CHRONIC ATRIAL FIBRILLATION (H): Primary | ICD-10-CM

## 2020-11-10 LAB — INR PPP: 2.36 (ref 0.86–1.14)

## 2020-11-10 NOTE — LETTER
"    11/10/2020        RE: Liberty Horta  Merged with [facility]  [id number] [place] [address]        [facility]  [facility] Record Number:  [id number]  Place of Service where encounter took place: [facility] ([facility]) [[id number]]    HPI:    Liberty Horta is a 85 year old  (1935), who is being seen today for an episodic care visit at the above location.   HPI information obtained from: {[facility] HPI:064401}. Today's concern is INR/Coumadin management for {INDICATION FOR ANTICOAGULATION:964553}    ROS/Subjective:  Bleeding Signs/Symptoms:  {NONESTARS:995864::\"None\"}  Thromboembolic Signs/Symptoms:  {NONESTARS:847628::\"None\"}  Medication Changes:  {YES/NO WITH DEFAULT:471969:a:\"No\"}  Dietary Changes:  {YES/NO WITH DEFAULT:790661:a:\"No\"}  Activity Changes: {YES/NO WITH DEFAULT:282196::\"No\"}  Bacterial/Viral Infection:  {YES/NO WITH DEFAULT:706951:a:\"No\"}  Missed Coumadin Doses:  {NONE/THIS WEEK/OVER WEEK:290281:a:\"None\"}  On ASA: { ASA:506101}  Other Concerns:  {YES/NO WITH DEFAULT:750506:a:\"No\"}    OBJECTIVE:  Temp 97.7  F (36.5  C)   Last INR: *** on ***  INR Today:  ***  Current Dose:  ***  {fgsinrtable:261089:x}    ASSESSMENT:  {[facility] DX:081077}  {THERAPEUTIC/SUBTHERAPEUTIC/SUPRATHERAPEUTIC:813630:a:\"Therapeutic\"} INR for goal of {NUMBERS 2-3/2.5/3.5/3-4:300513:a:\"2-3\"}    PLAN:   {fgsorders:709095}  New Dose: {NO CHANGE:706314::\"No Change\"}    Next INR: {TIME FRAME RECHECK:583164::\"1 month\"}  {Coumadin Dx/Z51.81/Z79.01***}    Electronically signed by:  Doris Gonzalez ***  {Providers Please double check the med list (in the plan section >> meds & orders tab) and Discontinue any of the meds flagged by the TC to be discontinued}      [facility]  [facility] Record Number:  [id number]  Place of Service where encounter took place: [facility] ([facility]) [[id number]]    HPI:    Liberty Horta is a 85 year old  (1935), who is being " seen today for an episodic care visit at the above location.   HPI information obtained from: facility chart records, facility staff and [facility] chart review. Today's concern is INR/Coumadin management for A. Fib    ROS/Subjective:  Bleeding Signs/Symptoms:  None  Thromboembolic Signs/Symptoms:  None  Medication Changes:  No  Dietary Changes:  No  Activity Changes: No  Bacterial/Viral Infection:  No  Missed Coumadin Doses:  None  On ASA: No  Other Concerns:  No    OBJECTIVE:  Temp 97.7  F (36.5  C)   Last INR: 2.14 on 10/15/20  INR Today:  2.36  Current Dose:  2.5mg PO QWednesday. 5mg PO QD on all other days      ASSESSMENT:  1. Chronic atrial fibrillation (H)    2. Encounter for therapeutic drug monitoring    3. Long term (current) use of anticoagulants      Therapeutic INR for goal of 2-3    PLAN:   Orders written by provider at facility  New Dose: No Change    Next INR: 12/15      Electronically signed by:  IRASEMA Rivero CNP          Sincerely,        IRASEMA Rivero CNP

## 2020-11-10 NOTE — LETTER
11/10/2020        RE: Liberty Horta  [facility]  [address] [address]        [facility]  [facility] Record Number:  [id number]  Place of Service where encounter took place: [facility] ([facility]) [[id number]]    HPI:    Liberty Horta is a 85 year old  (1935), who is being seen today for an episodic care visit at the above location.   HPI information obtained from: facility chart records, facility staff and [facility] chart review. Today's concern is INR/Coumadin management for A. Fib    ROS/Subjective:  Bleeding Signs/Symptoms:  None  Thromboembolic Signs/Symptoms:  None  Medication Changes:  No  Dietary Changes:  No  Activity Changes: No  Bacterial/Viral Infection:  No  Missed Coumadin Doses:  None  On ASA: No  Other Concerns:  No    OBJECTIVE:  Temp 97.7  F (36.5  C)   Last INR: 2.14 on 10/15/20  INR Today:  2.36  Current Dose:  2.5mg PO QWednesday. 5mg PO QD on all other days      ASSESSMENT:  1. Chronic atrial fibrillation (H)    2. Encounter for therapeutic drug monitoring    3. Long term (current) use of anticoagulants      Therapeutic INR for goal of 2-3    PLAN:   Orders written by provider at facility  New Dose: No Change    Next INR: 12/15      Electronically signed by:  IRASEMA Rivero CNP          Sincerely,        IRASEMA Rivero CNP

## 2020-11-10 NOTE — PROGRESS NOTES
[facility]  [facility] Record Number:  [id number]  Place of Service where encounter took place: [facility] ([facility]) [[id number]]    HPI:    Liberty Horta is a 85 year old  (1935), who is being seen today for an episodic care visit at the above location.   HPI information obtained from: facility chart records, facility staff and [facility] Epic chart review. Today's concern is INR/Coumadin management for A. Fib    ROS/Subjective:  Bleeding Signs/Symptoms:  None  Thromboembolic Signs/Symptoms:  None  Medication Changes:  No  Dietary Changes:  No  Activity Changes: No  Bacterial/Viral Infection:  No  Missed Coumadin Doses:  None  On ASA: No  Other Concerns:  No    OBJECTIVE:  Temp 97.7  F (36.5  C)   Last INR: 2.14 on 10/15/20  INR Today:  2.36  Current Dose:  2.5mg PO QWednesday. 5mg PO QD on all other days      ASSESSMENT:  1. Chronic atrial fibrillation (H)    2. Encounter for therapeutic drug monitoring    3. Long term (current) use of anticoagulants      Therapeutic INR for goal of 2-3    PLAN:   Orders written by provider at facility  New Dose: No Change    Next INR: 12/15      Electronically signed by:  IRASEMA Rivero CNP

## 2020-11-12 ENCOUNTER — HOSPITAL LABORATORY (OUTPATIENT)
Dept: OTHER | Facility: CLINIC | Age: 85
End: 2020-11-12

## 2020-11-14 LAB
SARS-COV-2 RNA SPEC QL NAA+PROBE: NOT DETECTED
SPECIMEN SOURCE: NORMAL

## 2020-11-23 ENCOUNTER — HOSPITAL LABORATORY (OUTPATIENT)
Dept: OTHER | Facility: CLINIC | Age: 85
End: 2020-11-23

## 2020-11-24 LAB
SARS-COV-2 RNA SPEC QL NAA+PROBE: NOT DETECTED
SPECIMEN SOURCE: NORMAL

## 2020-12-01 ENCOUNTER — HOSPITAL LABORATORY (OUTPATIENT)
Dept: OTHER | Facility: CLINIC | Age: 85
End: 2020-12-01

## 2020-12-02 DIAGNOSIS — G47.00 INSOMNIA, UNSPECIFIED TYPE: Primary | ICD-10-CM

## 2020-12-02 LAB
SARS-COV-2 RNA SPEC QL NAA+PROBE: NOT DETECTED
SPECIMEN SOURCE: NORMAL

## 2020-12-03 ENCOUNTER — HOSPITAL LABORATORY (OUTPATIENT)
Dept: OTHER | Facility: CLINIC | Age: 85
End: 2020-12-03

## 2020-12-03 LAB
ALBUMIN UR-MCNC: NEGATIVE MG/DL
APPEARANCE UR: CLEAR
BILIRUB UR QL STRIP: NEGATIVE
COLOR UR AUTO: ABNORMAL
GLUCOSE UR STRIP-MCNC: NEGATIVE MG/DL
HGB UR QL STRIP: NEGATIVE
KETONES UR STRIP-MCNC: NEGATIVE MG/DL
LEUKOCYTE ESTERASE UR QL STRIP: NEGATIVE
NITRATE UR QL: NEGATIVE
PH UR STRIP: 5.5 PH (ref 5–7)
RBC #/AREA URNS AUTO: 1 /HPF (ref 0–2)
SOURCE: ABNORMAL
SP GR UR STRIP: 1.02 (ref 1–1.03)
SQUAMOUS #/AREA URNS AUTO: 3 /HPF (ref 0–1)
UROBILINOGEN UR STRIP-MCNC: NORMAL MG/DL (ref 0–2)
WBC #/AREA URNS AUTO: 1 /HPF (ref 0–5)

## 2020-12-08 ENCOUNTER — HOSPITAL LABORATORY (OUTPATIENT)
Dept: OTHER | Facility: CLINIC | Age: 85
End: 2020-12-08

## 2020-12-09 LAB
SARS-COV-2 RNA SPEC QL NAA+PROBE: NOT DETECTED
SPECIMEN SOURCE: NORMAL

## 2020-12-15 ENCOUNTER — HOSPITAL LABORATORY (OUTPATIENT)
Dept: OTHER | Facility: CLINIC | Age: 85
End: 2020-12-15

## 2020-12-15 ENCOUNTER — ASSISTED LIVING VISIT (OUTPATIENT)
Dept: GERIATRICS | Facility: CLINIC | Age: 85
End: 2020-12-15
Payer: COMMERCIAL

## 2020-12-15 VITALS — HEIGHT: 62 IN | WEIGHT: 186.6 LBS | TEMPERATURE: 97.6 F | BODY MASS INDEX: 34.34 KG/M2

## 2020-12-15 DIAGNOSIS — Z79.01 LONG TERM (CURRENT) USE OF ANTICOAGULANTS: ICD-10-CM

## 2020-12-15 DIAGNOSIS — Z51.81 ENCOUNTER FOR THERAPEUTIC DRUG MONITORING: ICD-10-CM

## 2020-12-15 DIAGNOSIS — I48.20 CHRONIC ATRIAL FIBRILLATION (H): Primary | ICD-10-CM

## 2020-12-15 LAB — INR PPP: 2.38 (ref 0.86–1.14)

## 2020-12-15 ASSESSMENT — MIFFLIN-ST. JEOR: SCORE: 1254.66

## 2020-12-15 NOTE — PROGRESS NOTES
"[facility]  [facility] Record Number:  [id number]  Place of Service where encounter took place: [facility] ([facility]) [[id number]]    HPI:    Liberty Horta is a 85 year old  (1935), who is being seen today for an episodic care visit at the above location.   HPI information obtained from: facility chart records, facility staff and [facility] chart review. Today's concern is INR/Coumadin management for A. Fib    ROS/Subjective:  Bleeding Signs/Symptoms:  None  Thromboembolic Signs/Symptoms:  None  Medication Changes:  topical voltaren started  Dietary Changes:  No  Activity Changes: No  Bacterial/Viral Infection:  No  Missed Coumadin Doses:  None  On ASA: No  Other Concerns:  No    OBJECTIVE:  Temp 97.6  F (36.4  C)   Ht 1.575 m (5' 2\")   Wt 84.6 kg (186 lb 9.6 oz)   BMI 34.13 kg/m    Last INR: 2.36 on 11/10/20  INR Today:  2.38  Current Dose:  2.5mg PO QWednesday. 5mg PO QD on all other days      ASSESSMENT:  1. Chronic atrial fibrillation (H)    2. Encounter for therapeutic drug monitoring    3. Long term (current) use of anticoagulants      Therapeutic INR for goal of 2-3    PLAN:   Orders written by provider at facility  New Dose: No Change    Next INR: 4 weeks      Electronically signed by:  IRASEMA Rivero CNP   "

## 2020-12-15 NOTE — LETTER
"    12/15/2020        RE: Liberty Thornton [facility]  [address] [address]        [place] [facility]  [facility] Record Number:  [id number]  Place of Service where encounter took place: [facility] ([facility]) [[id number]]    HPI:    Liberty Horta is a 85 year old  (1935), who is being seen today for an episodic care visit at the above location.   HPI information obtained from: facility chart records, facility staff and [facility] chart review. Today's concern is INR/Coumadin management for A. Fib    ROS/Subjective:  Bleeding Signs/Symptoms:  None  Thromboembolic Signs/Symptoms:  None  Medication Changes:  topical voltaren started  Dietary Changes:  No  Activity Changes: No  Bacterial/Viral Infection:  No  Missed Coumadin Doses:  None  On ASA: No  Other Concerns:  No    OBJECTIVE:  Temp 97.6  F (36.4  C)   Ht 1.575 m (5' 2\")   Wt 84.6 kg (186 lb 9.6 oz)   BMI 34.13 kg/m    Last INR: 2.36 on 11/10/20  INR Today:  2.38  Current Dose:  2.5mg PO QWednesday. 5mg PO QD on all other days      ASSESSMENT:  1. Chronic atrial fibrillation (H)    2. Encounter for therapeutic drug monitoring    3. Long term (current) use of anticoagulants      Therapeutic INR for goal of 2-3    PLAN:   Orders written by provider at facility  New Dose: No Change    Next INR: 4 weeks      Electronically signed by:  IRASEMA Rivero CNP         Sincerely,        IRASEMA Rivero CNP    "

## 2020-12-16 ENCOUNTER — HOSPITAL LABORATORY (OUTPATIENT)
Dept: OTHER | Facility: CLINIC | Age: 85
End: 2020-12-16

## 2020-12-17 LAB
SARS-COV-2 RNA SPEC QL NAA+PROBE: NOT DETECTED
SPECIMEN SOURCE: NORMAL

## 2020-12-29 ENCOUNTER — HOSPITAL LABORATORY (OUTPATIENT)
Dept: OTHER | Facility: CLINIC | Age: 85
End: 2020-12-29

## 2020-12-31 LAB
SARS-COV-2 RNA SPEC QL NAA+PROBE: NOT DETECTED
SPECIMEN SOURCE: NORMAL

## 2021-01-01 ENCOUNTER — TELEPHONE (OUTPATIENT)
Dept: GERIATRICS | Facility: CLINIC | Age: 86
End: 2021-01-01

## 2021-01-01 ENCOUNTER — ASSISTED LIVING VISIT (OUTPATIENT)
Dept: GERIATRICS | Facility: CLINIC | Age: 86
End: 2021-01-01
Payer: COMMERCIAL

## 2021-01-01 ENCOUNTER — LAB REQUISITION (OUTPATIENT)
Dept: LAB | Facility: CLINIC | Age: 86
End: 2021-01-01
Payer: COMMERCIAL

## 2021-01-01 ENCOUNTER — NURSING HOME VISIT (OUTPATIENT)
Dept: GERIATRICS | Facility: CLINIC | Age: 86
End: 2021-01-01
Payer: COMMERCIAL

## 2021-01-01 ENCOUNTER — PATIENT OUTREACH (OUTPATIENT)
Dept: GERIATRIC MEDICINE | Facility: CLINIC | Age: 86
End: 2021-01-01

## 2021-01-01 ENCOUNTER — TRANSFERRED RECORDS (OUTPATIENT)
Dept: HEALTH INFORMATION MANAGEMENT | Facility: CLINIC | Age: 86
End: 2021-01-01

## 2021-01-01 ENCOUNTER — HOSPITAL LABORATORY (OUTPATIENT)
Dept: OTHER | Facility: CLINIC | Age: 86
End: 2021-01-01

## 2021-01-01 ENCOUNTER — DOCUMENTATION ONLY (OUTPATIENT)
Dept: OTHER | Facility: CLINIC | Age: 86
End: 2021-01-01

## 2021-01-01 VITALS
OXYGEN SATURATION: 96 % | TEMPERATURE: 97.6 F | SYSTOLIC BLOOD PRESSURE: 142 MMHG | RESPIRATION RATE: 18 BRPM | WEIGHT: 196.2 LBS | BODY MASS INDEX: 36.1 KG/M2 | HEART RATE: 67 BPM | HEIGHT: 62 IN | DIASTOLIC BLOOD PRESSURE: 82 MMHG

## 2021-01-01 VITALS
DIASTOLIC BLOOD PRESSURE: 83 MMHG | RESPIRATION RATE: 18 BRPM | BODY MASS INDEX: 34.76 KG/M2 | HEART RATE: 78 BPM | WEIGHT: 188.9 LBS | SYSTOLIC BLOOD PRESSURE: 141 MMHG | HEIGHT: 62 IN | OXYGEN SATURATION: 97 % | TEMPERATURE: 97.8 F

## 2021-01-01 VITALS
BODY MASS INDEX: 36.25 KG/M2 | OXYGEN SATURATION: 98 % | RESPIRATION RATE: 18 BRPM | DIASTOLIC BLOOD PRESSURE: 44 MMHG | HEART RATE: 61 BPM | WEIGHT: 197 LBS | SYSTOLIC BLOOD PRESSURE: 107 MMHG | HEIGHT: 62 IN | TEMPERATURE: 97.2 F

## 2021-01-01 VITALS
DIASTOLIC BLOOD PRESSURE: 82 MMHG | BODY MASS INDEX: 34.41 KG/M2 | HEART RATE: 62 BPM | HEIGHT: 62 IN | TEMPERATURE: 97.6 F | RESPIRATION RATE: 18 BRPM | WEIGHT: 187 LBS | OXYGEN SATURATION: 96 % | SYSTOLIC BLOOD PRESSURE: 154 MMHG

## 2021-01-01 VITALS
RESPIRATION RATE: 18 BRPM | TEMPERATURE: 97.9 F | SYSTOLIC BLOOD PRESSURE: 153 MMHG | HEIGHT: 62 IN | WEIGHT: 191.6 LBS | BODY MASS INDEX: 35.26 KG/M2 | OXYGEN SATURATION: 96 % | HEART RATE: 61 BPM | DIASTOLIC BLOOD PRESSURE: 77 MMHG

## 2021-01-01 VITALS
OXYGEN SATURATION: 95 % | TEMPERATURE: 97.8 F | WEIGHT: 191.8 LBS | HEART RATE: 78 BPM | DIASTOLIC BLOOD PRESSURE: 80 MMHG | BODY MASS INDEX: 35.3 KG/M2 | HEIGHT: 62 IN | SYSTOLIC BLOOD PRESSURE: 168 MMHG | RESPIRATION RATE: 18 BRPM

## 2021-01-01 VITALS
TEMPERATURE: 96.3 F | HEART RATE: 64 BPM | BODY MASS INDEX: 28.58 KG/M2 | SYSTOLIC BLOOD PRESSURE: 148 MMHG | DIASTOLIC BLOOD PRESSURE: 60 MMHG | HEIGHT: 62 IN | RESPIRATION RATE: 18 BRPM | WEIGHT: 155.3 LBS

## 2021-01-01 VITALS
RESPIRATION RATE: 18 BRPM | OXYGEN SATURATION: 98 % | DIASTOLIC BLOOD PRESSURE: 82 MMHG | HEIGHT: 62 IN | BODY MASS INDEX: 35.92 KG/M2 | WEIGHT: 195.2 LBS | SYSTOLIC BLOOD PRESSURE: 142 MMHG | TEMPERATURE: 97.2 F | HEART RATE: 61 BPM

## 2021-01-01 VITALS
DIASTOLIC BLOOD PRESSURE: 80 MMHG | WEIGHT: 193 LBS | BODY MASS INDEX: 35.51 KG/M2 | HEIGHT: 62 IN | RESPIRATION RATE: 18 BRPM | TEMPERATURE: 97.8 F | OXYGEN SATURATION: 96 % | HEART RATE: 78 BPM | SYSTOLIC BLOOD PRESSURE: 168 MMHG

## 2021-01-01 VITALS
OXYGEN SATURATION: 96 % | HEIGHT: 62 IN | BODY MASS INDEX: 34.85 KG/M2 | DIASTOLIC BLOOD PRESSURE: 74 MMHG | WEIGHT: 189.4 LBS | HEART RATE: 61 BPM | SYSTOLIC BLOOD PRESSURE: 138 MMHG | RESPIRATION RATE: 18 BRPM | TEMPERATURE: 97.8 F

## 2021-01-01 VITALS
BODY MASS INDEX: 34.85 KG/M2 | WEIGHT: 189.4 LBS | SYSTOLIC BLOOD PRESSURE: 128 MMHG | OXYGEN SATURATION: 97 % | HEART RATE: 61 BPM | TEMPERATURE: 98.6 F | DIASTOLIC BLOOD PRESSURE: 61 MMHG | HEIGHT: 62 IN | RESPIRATION RATE: 18 BRPM

## 2021-01-01 VITALS
HEART RATE: 64 BPM | RESPIRATION RATE: 18 BRPM | OXYGEN SATURATION: 95 % | WEIGHT: 195 LBS | BODY MASS INDEX: 35.88 KG/M2 | DIASTOLIC BLOOD PRESSURE: 82 MMHG | TEMPERATURE: 97.2 F | HEIGHT: 62 IN | SYSTOLIC BLOOD PRESSURE: 125 MMHG

## 2021-01-01 VITALS
RESPIRATION RATE: 18 BRPM | HEART RATE: 63 BPM | SYSTOLIC BLOOD PRESSURE: 150 MMHG | TEMPERATURE: 97.6 F | DIASTOLIC BLOOD PRESSURE: 83 MMHG | HEIGHT: 62 IN | BODY MASS INDEX: 35.85 KG/M2 | OXYGEN SATURATION: 96 % | WEIGHT: 194.8 LBS

## 2021-01-01 VITALS — HEIGHT: 62 IN | BODY MASS INDEX: 28.58 KG/M2 | TEMPERATURE: 98.4 F | WEIGHT: 155.3 LBS

## 2021-01-01 VITALS
HEART RATE: 64 BPM | OXYGEN SATURATION: 96 % | SYSTOLIC BLOOD PRESSURE: 139 MMHG | BODY MASS INDEX: 35.53 KG/M2 | DIASTOLIC BLOOD PRESSURE: 66 MMHG | HEIGHT: 62 IN | TEMPERATURE: 97.8 F | WEIGHT: 193.1 LBS | RESPIRATION RATE: 18 BRPM

## 2021-01-01 VITALS
DIASTOLIC BLOOD PRESSURE: 82 MMHG | TEMPERATURE: 98 F | RESPIRATION RATE: 18 BRPM | HEIGHT: 62 IN | WEIGHT: 190.8 LBS | SYSTOLIC BLOOD PRESSURE: 152 MMHG | BODY MASS INDEX: 35.11 KG/M2 | OXYGEN SATURATION: 95 % | HEART RATE: 65 BPM

## 2021-01-01 VITALS
WEIGHT: 191.8 LBS | HEART RATE: 62 BPM | RESPIRATION RATE: 18 BRPM | HEIGHT: 62 IN | DIASTOLIC BLOOD PRESSURE: 81 MMHG | TEMPERATURE: 98 F | OXYGEN SATURATION: 95 % | SYSTOLIC BLOOD PRESSURE: 140 MMHG | BODY MASS INDEX: 35.3 KG/M2

## 2021-01-01 VITALS
HEIGHT: 62 IN | SYSTOLIC BLOOD PRESSURE: 152 MMHG | OXYGEN SATURATION: 96 % | TEMPERATURE: 97.7 F | BODY MASS INDEX: 34.85 KG/M2 | HEART RATE: 60 BPM | WEIGHT: 189.4 LBS | RESPIRATION RATE: 18 BRPM | DIASTOLIC BLOOD PRESSURE: 70 MMHG

## 2021-01-01 VITALS
HEART RATE: 63 BPM | OXYGEN SATURATION: 96 % | HEIGHT: 62 IN | BODY MASS INDEX: 34.78 KG/M2 | SYSTOLIC BLOOD PRESSURE: 147 MMHG | WEIGHT: 189 LBS | RESPIRATION RATE: 18 BRPM | TEMPERATURE: 98.2 F | DIASTOLIC BLOOD PRESSURE: 86 MMHG

## 2021-01-01 VITALS
SYSTOLIC BLOOD PRESSURE: 150 MMHG | HEART RATE: 62 BPM | RESPIRATION RATE: 18 BRPM | OXYGEN SATURATION: 97 % | TEMPERATURE: 97.7 F | DIASTOLIC BLOOD PRESSURE: 83 MMHG | BODY MASS INDEX: 35.79 KG/M2 | WEIGHT: 194.5 LBS | HEIGHT: 62 IN

## 2021-01-01 VITALS
HEIGHT: 62 IN | DIASTOLIC BLOOD PRESSURE: 74 MMHG | SYSTOLIC BLOOD PRESSURE: 158 MMHG | BODY MASS INDEX: 37.1 KG/M2 | RESPIRATION RATE: 18 BRPM | WEIGHT: 201.6 LBS | OXYGEN SATURATION: 96 % | TEMPERATURE: 97.8 F | HEART RATE: 64 BPM

## 2021-01-01 VITALS
BODY MASS INDEX: 28.52 KG/M2 | RESPIRATION RATE: 18 BRPM | SYSTOLIC BLOOD PRESSURE: 148 MMHG | HEART RATE: 64 BPM | WEIGHT: 155 LBS | DIASTOLIC BLOOD PRESSURE: 60 MMHG | HEIGHT: 62 IN | TEMPERATURE: 92 F

## 2021-01-01 VITALS
TEMPERATURE: 98.1 F | SYSTOLIC BLOOD PRESSURE: 168 MMHG | HEIGHT: 62 IN | BODY MASS INDEX: 34.39 KG/M2 | RESPIRATION RATE: 18 BRPM | HEART RATE: 60 BPM | DIASTOLIC BLOOD PRESSURE: 84 MMHG | OXYGEN SATURATION: 94 % | WEIGHT: 186.9 LBS

## 2021-01-01 VITALS
DIASTOLIC BLOOD PRESSURE: 75 MMHG | RESPIRATION RATE: 18 BRPM | OXYGEN SATURATION: 96 % | TEMPERATURE: 97.5 F | SYSTOLIC BLOOD PRESSURE: 130 MMHG | HEIGHT: 62 IN | HEART RATE: 60 BPM | WEIGHT: 188.6 LBS | BODY MASS INDEX: 34.71 KG/M2

## 2021-01-01 VITALS
SYSTOLIC BLOOD PRESSURE: 131 MMHG | HEIGHT: 62 IN | HEART RATE: 60 BPM | DIASTOLIC BLOOD PRESSURE: 80 MMHG | TEMPERATURE: 97.8 F | WEIGHT: 187.6 LBS | RESPIRATION RATE: 18 BRPM | BODY MASS INDEX: 34.52 KG/M2 | OXYGEN SATURATION: 95 %

## 2021-01-01 VITALS — OXYGEN SATURATION: 93 % | TEMPERATURE: 96.4 F

## 2021-01-01 DIAGNOSIS — I50.9 ACUTE ON CHRONIC CONGESTIVE HEART FAILURE, UNSPECIFIED HEART FAILURE TYPE (H): ICD-10-CM

## 2021-01-01 DIAGNOSIS — Z79.01 LONG TERM (CURRENT) USE OF ANTICOAGULANTS: ICD-10-CM

## 2021-01-01 DIAGNOSIS — Z51.81 ENCOUNTER FOR THERAPEUTIC DRUG MONITORING: Primary | ICD-10-CM

## 2021-01-01 DIAGNOSIS — U07.1 COVID-19: ICD-10-CM

## 2021-01-01 DIAGNOSIS — F02.818 DEMENTIA DUE TO PARKINSON'S DISEASE WITH BEHAVIORAL DISTURBANCE (H): ICD-10-CM

## 2021-01-01 DIAGNOSIS — Z51.81 ENCOUNTER FOR THERAPEUTIC DRUG MONITORING: ICD-10-CM

## 2021-01-01 DIAGNOSIS — M15.0 PRIMARY OSTEOARTHRITIS INVOLVING MULTIPLE JOINTS: ICD-10-CM

## 2021-01-01 DIAGNOSIS — G20.A1 PARKINSON'S DISEASE (H): ICD-10-CM

## 2021-01-01 DIAGNOSIS — K11.20 SIALADENITIS: Primary | ICD-10-CM

## 2021-01-01 DIAGNOSIS — S22.41XD CLOSED FRACTURE OF MULTIPLE RIBS OF RIGHT SIDE WITH ROUTINE HEALING, SUBSEQUENT ENCOUNTER: ICD-10-CM

## 2021-01-01 DIAGNOSIS — I48.20 CHRONIC ATRIAL FIBRILLATION (H): ICD-10-CM

## 2021-01-01 DIAGNOSIS — G20.A1 DEMENTIA DUE TO PARKINSON'S DISEASE WITH BEHAVIORAL DISTURBANCE (H): ICD-10-CM

## 2021-01-01 DIAGNOSIS — I12.9 BENIGN HYPERTENSION WITH CKD (CHRONIC KIDNEY DISEASE) STAGE III (H): ICD-10-CM

## 2021-01-01 DIAGNOSIS — R19.5 LOOSE STOOLS: ICD-10-CM

## 2021-01-01 DIAGNOSIS — M48.00 CENTRAL STENOSIS OF SPINAL CANAL: ICD-10-CM

## 2021-01-01 DIAGNOSIS — F51.5 NIGHTMARES: ICD-10-CM

## 2021-01-01 DIAGNOSIS — M48.00 SPINAL STENOSIS, UNSPECIFIED SPINAL REGION: ICD-10-CM

## 2021-01-01 DIAGNOSIS — E03.9 HYPOTHYROIDISM, UNSPECIFIED TYPE: ICD-10-CM

## 2021-01-01 DIAGNOSIS — F41.9 ANXIETY: ICD-10-CM

## 2021-01-01 DIAGNOSIS — I50.22 CHRONIC SYSTOLIC (CONGESTIVE) HEART FAILURE (H): ICD-10-CM

## 2021-01-01 DIAGNOSIS — N18.30 STAGE 3 CHRONIC KIDNEY DISEASE, UNSPECIFIED WHETHER STAGE 3A OR 3B CKD (H): ICD-10-CM

## 2021-01-01 DIAGNOSIS — M15.0 PRIMARY OSTEOARTHRITIS INVOLVING MULTIPLE JOINTS: Primary | ICD-10-CM

## 2021-01-01 DIAGNOSIS — R41.89 COGNITIVE IMPAIRMENT: Primary | ICD-10-CM

## 2021-01-01 DIAGNOSIS — E66.01 MORBID OBESITY (H): ICD-10-CM

## 2021-01-01 DIAGNOSIS — I48.20 CHRONIC ATRIAL FIBRILLATION (H): Primary | ICD-10-CM

## 2021-01-01 DIAGNOSIS — E03.9 HYPOTHYROIDISM, UNSPECIFIED: ICD-10-CM

## 2021-01-01 DIAGNOSIS — G20.A1 PARKINSON'S DISEASE (H): Primary | ICD-10-CM

## 2021-01-01 DIAGNOSIS — R44.3 HALLUCINATIONS: ICD-10-CM

## 2021-01-01 DIAGNOSIS — I50.32 CHRONIC DIASTOLIC CONGESTIVE HEART FAILURE (H): ICD-10-CM

## 2021-01-01 DIAGNOSIS — N18.30 BENIGN HYPERTENSION WITH CKD (CHRONIC KIDNEY DISEASE) STAGE III (H): ICD-10-CM

## 2021-01-01 DIAGNOSIS — R44.3 HALLUCINATIONS, UNSPECIFIED: ICD-10-CM

## 2021-01-01 DIAGNOSIS — R07.81 RIB PAIN: ICD-10-CM

## 2021-01-01 DIAGNOSIS — G20.A1 PARKINSON'S DISEASE (TREMOR, STIFFNESS, SLOW MOTION, UNSTABLE POSTURE) (H): ICD-10-CM

## 2021-01-01 DIAGNOSIS — M25.511 ACUTE PAIN OF RIGHT SHOULDER: ICD-10-CM

## 2021-01-01 DIAGNOSIS — G47.00 INSOMNIA, UNSPECIFIED TYPE: Primary | ICD-10-CM

## 2021-01-01 DIAGNOSIS — G20.A1 DEMENTIA DUE TO PARKINSON'S DISEASE WITH BEHAVIORAL DISTURBANCE (H): Primary | ICD-10-CM

## 2021-01-01 DIAGNOSIS — R07.81 RIB PAIN: Primary | ICD-10-CM

## 2021-01-01 DIAGNOSIS — I48.11 LONGSTANDING PERSISTENT ATRIAL FIBRILLATION (H): Primary | ICD-10-CM

## 2021-01-01 DIAGNOSIS — I48.11 LONGSTANDING PERSISTENT ATRIAL FIBRILLATION (H): ICD-10-CM

## 2021-01-01 DIAGNOSIS — Z11.1 ENCOUNTER FOR SCREENING FOR RESPIRATORY TUBERCULOSIS: ICD-10-CM

## 2021-01-01 DIAGNOSIS — K11.20 SIALADENITIS: ICD-10-CM

## 2021-01-01 DIAGNOSIS — I50.9 HEART FAILURE, UNSPECIFIED (H): ICD-10-CM

## 2021-01-01 DIAGNOSIS — R52 PAIN: Primary | ICD-10-CM

## 2021-01-01 DIAGNOSIS — R41.89 COGNITIVE IMPAIRMENT: ICD-10-CM

## 2021-01-01 DIAGNOSIS — R19.5 LOOSE STOOLS: Primary | ICD-10-CM

## 2021-01-01 DIAGNOSIS — R53.81 PHYSICAL DECONDITIONING: ICD-10-CM

## 2021-01-01 DIAGNOSIS — F02.818 DEMENTIA DUE TO PARKINSON'S DISEASE WITH BEHAVIORAL DISTURBANCE (H): Primary | ICD-10-CM

## 2021-01-01 LAB
ALBUMIN SERPL-MCNC: 3.4 G/DL (ref 3.4–5)
ALP SERPL-CCNC: 131 U/L (ref 40–150)
ALT SERPL W P-5'-P-CCNC: 30 U/L (ref 0–50)
ANION GAP SERPL CALCULATED.3IONS-SCNC: 6 MMOL/L (ref 3–14)
ANION GAP SERPL CALCULATED.3IONS-SCNC: 6 MMOL/L (ref 3–14)
ANION GAP SERPL CALCULATED.3IONS-SCNC: 8 MMOL/L (ref 3–14)
ANION GAP SERPL CALCULATED.3IONS-SCNC: 9 MMOL/L (ref 3–14)
AST SERPL W P-5'-P-CCNC: 17 U/L (ref 0–45)
BILIRUB SERPL-MCNC: 0.6 MG/DL (ref 0.2–1.3)
BUN SERPL-MCNC: 13 MG/DL (ref 7–30)
BUN SERPL-MCNC: 19 MG/DL (ref 7–30)
BUN SERPL-MCNC: 20 MG/DL (ref 7–30)
BUN SERPL-MCNC: 23 MG/DL (ref 7–30)
CALCIUM SERPL-MCNC: 9.2 MG/DL (ref 8.5–10.1)
CALCIUM SERPL-MCNC: 9.3 MG/DL (ref 8.5–10.1)
CALCIUM SERPL-MCNC: 9.7 MG/DL (ref 8.5–10.1)
CALCIUM SERPL-MCNC: 9.8 MG/DL (ref 8.5–10.1)
CHLORIDE BLD-SCNC: 103 MMOL/L (ref 94–109)
CHLORIDE BLD-SCNC: 104 MMOL/L (ref 94–109)
CHLORIDE BLD-SCNC: 105 MMOL/L (ref 94–109)
CHLORIDE SERPL-SCNC: 105 MMOL/L (ref 94–109)
CO2 SERPL-SCNC: 26 MMOL/L (ref 20–32)
CO2 SERPL-SCNC: 27 MMOL/L (ref 20–32)
CO2 SERPL-SCNC: 29 MMOL/L (ref 20–32)
CO2 SERPL-SCNC: 30 MMOL/L (ref 20–32)
CREAT SERPL-MCNC: 0.79 MG/DL (ref 0.52–1.04)
CREAT SERPL-MCNC: 0.84 MG/DL (ref 0.52–1.04)
CREAT SERPL-MCNC: 0.91 MG/DL (ref 0.52–1.04)
CREAT SERPL-MCNC: 1 MG/DL (ref 0.52–1.04)
ERYTHROCYTE [DISTWIDTH] IN BLOOD BY AUTOMATED COUNT: 12.5 % (ref 10–15)
GAMMA INTERFERON BACKGROUND BLD IA-ACNC: 0.08 IU/ML
GFR SERPL CREATININE-BSD FRML MDRD: 52 ML/MIN/1.73M2
GFR SERPL CREATININE-BSD FRML MDRD: 58 ML/MIN/{1.73_M2}
GFR SERPL CREATININE-BSD FRML MDRD: 64 ML/MIN/1.73M2
GFR SERPL CREATININE-BSD FRML MDRD: 69 ML/MIN/1.73M2
GLUCOSE BLD-MCNC: 102 MG/DL (ref 70–99)
GLUCOSE BLD-MCNC: 115 MG/DL (ref 70–99)
GLUCOSE BLD-MCNC: 85 MG/DL (ref 70–99)
GLUCOSE SERPL-MCNC: 142 MG/DL (ref 70–99)
HCT VFR BLD AUTO: 42.8 % (ref 35–47)
HGB BLD-MCNC: 13.7 G/DL (ref 11.7–15.7)
INR PPP: 2.32 (ref 0.86–1.14)
INR PPP: 2.64 (ref 0.85–1.15)
M TB IFN-G BLD-IMP: NEGATIVE
M TB IFN-G CD4+ BCKGRND COR BLD-ACNC: 9.92 IU/ML
MCH RBC QN AUTO: 31.1 PG (ref 26.5–33)
MCHC RBC AUTO-ENTMCNC: 32 G/DL (ref 31.5–36.5)
MCV RBC AUTO: 97 FL (ref 78–100)
MITOGEN IGNF BCKGRD COR BLD-ACNC: -0.01 IU/ML
MITOGEN IGNF BCKGRD COR BLD-ACNC: -0.03 IU/ML
PLATELET # BLD AUTO: 194 10E3/UL (ref 150–450)
POTASSIUM BLD-SCNC: 3.9 MMOL/L (ref 3.4–5.3)
POTASSIUM BLD-SCNC: 4 MMOL/L (ref 3.4–5.3)
POTASSIUM BLD-SCNC: 4.1 MMOL/L (ref 3.4–5.3)
POTASSIUM SERPL-SCNC: 3.4 MMOL/L (ref 3.4–5.3)
PROT SERPL-MCNC: 6.4 G/DL (ref 6.8–8.8)
QUANTIFERON MITOGEN: 10 IU/ML
QUANTIFERON NIL TUBE: 0.08 IU/ML
QUANTIFERON TB1 TUBE: 0.07 IU/ML
QUANTIFERON TB2 TUBE: 0.05
RBC # BLD AUTO: 4.4 10E6/UL (ref 3.8–5.2)
SARS-COV-2 RNA RESP QL NAA+PROBE: NEGATIVE
SARS-COV-2 RNA RESP QL NAA+PROBE: NOT DETECTED
SODIUM SERPL-SCNC: 138 MMOL/L (ref 133–144)
SODIUM SERPL-SCNC: 139 MMOL/L (ref 133–144)
SODIUM SERPL-SCNC: 140 MMOL/L (ref 133–144)
SODIUM SERPL-SCNC: 141 MMOL/L (ref 133–144)
TSH SERPL DL<=0.005 MIU/L-ACNC: 1.03 MU/L (ref 0.4–4)
TSH SERPL DL<=0.005 MIU/L-ACNC: 3.05 MU/L (ref 0.4–4)
VIT B12 SERPL-MCNC: 362 PG/ML (ref 193–986)
WBC # BLD AUTO: 10.2 10E3/UL (ref 4–11)

## 2021-01-01 PROCEDURE — U0003 INFECTIOUS AGENT DETECTION BY NUCLEIC ACID (DNA OR RNA); SEVERE ACUTE RESPIRATORY SYNDROME CORONAVIRUS 2 (SARS-COV-2) (CORONAVIRUS DISEASE [COVID-19]), AMPLIFIED PROBE TECHNIQUE, MAKING USE OF HIGH THROUGHPUT TECHNOLOGIES AS DESCRIBED BY CMS-2020-01-R: HCPCS | Mod: ORL | Performed by: NURSE PRACTITIONER

## 2021-01-01 PROCEDURE — 85610 PROTHROMBIN TIME: CPT | Mod: ORL | Performed by: NURSE PRACTITIONER

## 2021-01-01 PROCEDURE — U0005 INFEC AGEN DETEC AMPLI PROBE: HCPCS | Mod: ORL | Performed by: NURSE PRACTITIONER

## 2021-01-01 PROCEDURE — 99308 SBSQ NF CARE LOW MDM 20: CPT | Performed by: NURSE PRACTITIONER

## 2021-01-01 PROCEDURE — 80048 BASIC METABOLIC PNL TOTAL CA: CPT | Mod: ORL | Performed by: NURSE PRACTITIONER

## 2021-01-01 PROCEDURE — 36415 COLL VENOUS BLD VENIPUNCTURE: CPT | Mod: ORL | Performed by: NURSE PRACTITIONER

## 2021-01-01 PROCEDURE — 99309 SBSQ NF CARE MODERATE MDM 30: CPT | Performed by: NURSE PRACTITIONER

## 2021-01-01 PROCEDURE — 99309 SBSQ NF CARE MODERATE MDM 30: CPT | Performed by: INTERNAL MEDICINE

## 2021-01-01 PROCEDURE — 84443 ASSAY THYROID STIM HORMONE: CPT | Mod: ORL | Performed by: NURSE PRACTITIONER

## 2021-01-01 PROCEDURE — 85027 COMPLETE CBC AUTOMATED: CPT | Mod: ORL | Performed by: NURSE PRACTITIONER

## 2021-01-01 PROCEDURE — 80053 COMPREHEN METABOLIC PANEL: CPT | Mod: ORL | Performed by: NURSE PRACTITIONER

## 2021-01-01 PROCEDURE — P9604 ONE-WAY ALLOW PRORATED TRIP: HCPCS | Mod: ORL | Performed by: NURSE PRACTITIONER

## 2021-01-01 PROCEDURE — 82607 VITAMIN B-12: CPT | Mod: ORL | Performed by: NURSE PRACTITIONER

## 2021-01-01 PROCEDURE — 99310 SBSQ NF CARE HIGH MDM 45: CPT | Performed by: NURSE PRACTITIONER

## 2021-01-01 PROCEDURE — 86481 TB AG RESPONSE T-CELL SUSP: CPT | Mod: ORL | Performed by: NURSE PRACTITIONER

## 2021-01-01 PROCEDURE — U0005 INFEC AGEN DETEC AMPLI PROBE: HCPCS | Mod: ORL | Performed by: INTERNAL MEDICINE

## 2021-01-01 RX ORDER — ACETAMINOPHEN 500 MG
1000 TABLET ORAL 3 TIMES DAILY
Qty: 112 TABLET
Start: 2021-01-01

## 2021-01-01 RX ORDER — POTASSIUM CHLORIDE 1500 MG/1
40 TABLET, EXTENDED RELEASE ORAL DAILY
Start: 2021-01-01

## 2021-01-01 RX ORDER — DONEPEZIL HYDROCHLORIDE 5 MG/1
5 TABLET, FILM COATED ORAL DAILY
Start: 2021-01-01 | End: 2021-01-01

## 2021-01-01 RX ORDER — POTASSIUM CHLORIDE 1500 MG/1
40 TABLET, EXTENDED RELEASE ORAL DAILY
Qty: 60 TABLET | Refills: 3 | Status: SHIPPED | OUTPATIENT
Start: 2021-01-01 | End: 2021-01-01

## 2021-01-01 RX ORDER — LEVOTHYROXINE SODIUM 150 UG/1
150 TABLET ORAL DAILY
Start: 2021-01-01

## 2021-01-01 RX ORDER — CHLORHEXIDINE GLUCONATE ORAL RINSE 1.2 MG/ML
15 SOLUTION DENTAL 2 TIMES DAILY
Start: 2021-01-01 | End: 2022-01-01

## 2021-01-01 RX ORDER — DONEPEZIL HYDROCHLORIDE 5 MG/1
5 TABLET, FILM COATED ORAL DAILY
Start: 2021-01-01 | End: 2022-01-01

## 2021-01-01 RX ORDER — FUROSEMIDE 40 MG
60 TABLET ORAL DAILY
Qty: 60 TABLET | Refills: 3 | Status: SHIPPED | OUTPATIENT
Start: 2021-01-01

## 2021-01-01 RX ORDER — POTASSIUM CHLORIDE 1500 MG/1
20 TABLET, EXTENDED RELEASE ORAL DAILY
Qty: 60 TABLET | Refills: 3
Start: 2021-01-01 | End: 2021-01-01

## 2021-01-01 RX ORDER — FUROSEMIDE 40 MG
80 TABLET ORAL DAILY
Qty: 60 TABLET | Refills: 4 | Status: SHIPPED | OUTPATIENT
Start: 2021-01-01 | End: 2021-01-01

## 2021-01-01 ASSESSMENT — MIFFLIN-ST. JEOR
SCORE: 1258.73
SCORE: 1293.21
SCORE: 1278.69
SCORE: 1317.7
SCORE: 1268.71
SCORE: 1260.09
SCORE: 1107.69
SCORE: 1285.5
SCORE: 1279.15
SCORE: 1262.36
SCORE: 1251.48
SCORE: 1296.84
SCORE: 1286.86
SCORE: 1273.25
SCORE: 1288.67
SCORE: 1273.25
SCORE: 1107.69
SCORE: 1251.02
SCORE: 1287.76
SCORE: 1262.36
SCORE: 1254.2
SCORE: 1262.36
SCORE: 1106.33
SCORE: 1260.55
SCORE: 1272.34

## 2021-01-07 ENCOUNTER — HOSPITAL LABORATORY (OUTPATIENT)
Dept: OTHER | Facility: CLINIC | Age: 86
End: 2021-01-07

## 2021-01-08 LAB
LABORATORY COMMENT REPORT: NORMAL
SARS-COV-2 RNA RESP QL NAA+PROBE: NEGATIVE
SARS-COV-2 RNA RESP QL NAA+PROBE: NORMAL
SPECIMEN SOURCE: NORMAL
SPECIMEN SOURCE: NORMAL

## 2021-01-11 ENCOUNTER — HOSPITAL LABORATORY (OUTPATIENT)
Dept: OTHER | Facility: CLINIC | Age: 86
End: 2021-01-11

## 2021-01-11 VITALS
WEIGHT: 186.6 LBS | DIASTOLIC BLOOD PRESSURE: 84 MMHG | HEIGHT: 62 IN | SYSTOLIC BLOOD PRESSURE: 124 MMHG | HEART RATE: 60 BPM | BODY MASS INDEX: 34.34 KG/M2 | RESPIRATION RATE: 20 BRPM | OXYGEN SATURATION: 97 %

## 2021-01-11 PROBLEM — I50.32 CHRONIC DIASTOLIC CONGESTIVE HEART FAILURE (H): Status: ACTIVE | Noted: 2021-01-11

## 2021-01-11 ASSESSMENT — MIFFLIN-ST. JEOR: SCORE: 1254.66

## 2021-01-11 NOTE — PROGRESS NOTES
[facility]  [facility] Record Number:  [id number]  Place of Service where encounter took place:  [facility] ([facility]) [[id number]]  Chief Complaint   Patient presents with     RECHECK     Clinic Care Coordination - Face To Face       HPI:    Liberty Horta  is a 85 year old (1935), who is being seen today for an episodic care visit.  HPI information obtained from: facility chart records, facility staff, patient report and [facility] chart review. Today's concern is:  1. Primary osteoarthritis involving multiple joints    2. Parkinson's disease (tremor, stiffness, slow motion, unstable posture) (H)    3. Chronic diastolic congestive heart failure (H)    4. Chronic atrial fibrillation (H)    5. Stage 3 chronic kidney disease, unspecified whether stage 3a or 3b CKD    6. Obesity, unspecified classification, unspecified obesity type, unspecified whether serious comorbidity present    7. Hypothyroidism, unspecified type    8. Benign hypertension with CKD (chronic kidney disease) stage III    9. Spinal stenosis, unspecified spinal region      Patient is a 85 year old female that was [facility] to [facility] after hospitalization for severe pain to her left buttock in which she was unable to bear weight.  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  Ortho saw her and recommended nonoperative management with WBAT and therapy.  Her Lumbar fracture was noted previously on CT imaging from March 2019 and conservative management and monitoring recommended.     She has PMH significant for a-fib, s/p pacer maker placement (on coumadin), hypothyroidism, HTN, GERD, OA, Anemia, HLD, urinary retention, Parkinson's.  She has had her left knee replaced and also had right hip ORIF in the past     She is seen today to follow-up on multiple co-morbidities and per request of therapy and staff for face to face  "visit.  Staff have reported increased difficult with transfers, particularly from bed.  She recently worked with therapy and was discharged but staff is requesting another eval due to concern for their safety when attempting to transfer her.  They also are requesting a hospital bed.      She is seen in her room today. She has no specific concerns, denies CP, shortness of breath, abdominal pain, n/v.  Reports generalized pain but managed ok with current POC.      Past Medical and Surgical History reviewed in Epic today.    MEDICATIONS:    Current Outpatient Medications   Medication Sig Dispense Refill     ACETAMINOPHEN EXTRA STRENGTH 500 MG tablet TAKE TWO TABLETS (1000MG) BY MOUTH TWICE DAILY;AND MAY TAKE TWO TABLETS (1000MG) BY MOUTH ONCE DAILY AS NEEDED 112 tablet PRN     atorvastatin (LIPITOR) 40 MG tablet TAKE 1 TABLET BY MOUTH AT BEDTIME 28 tablet PRN     DILT- MG 24 hr capsule TAKE 1 CAPSULE BY MOUTH ONCE DAILY 28 capsule PRN     furosemide (LASIX) 40 MG tablet Take 1 tablet (40 mg) by mouth daily starting Friday 02/21 90 tablet 11     levothyroxine (SYNTHROID/LEVOTHROID) 150 MCG tablet TAKE 1 TABLET BY MOUTH ONCE DAILY 28 tablet PRN     melatonin 1 MG TABS tablet Take 1 tablet (1 mg) by mouth At Bedtime 30 tablet 3     Multiple Vitamin (TAB-A-IRIS) TABS TAKE 1 TABLET BY MOUTH ONCE DAILY 28 tablet PRN     sotalol (BETAPACE) 80 MG tablet TAKE 1 TABLET BY MOUTH TWICE DAILY WITH MEALS 56 tablet PRN     warfarin (COUMADIN) 2.5 MG tablet Take 2.5 mg by mouth daily on Tues & Fridays  AND take 5 mg AOD           REVIEW OF SYSTEMS:  Limited secondary to cognitive impairment but as noted in HPI    Objective:  /84   Pulse 60   Resp 20   Ht 1.575 m (5' 2\")   Wt 84.6 kg (186 lb 9.6 oz)   SpO2 97%   BMI 34.13 kg/m    Exam:  GENERAL APPEARANCE:  Alert, in no distress, cooperative  EYES:  PERRL, wearing corrective lenses  NECK:  No adenopathy,masses or thyromegaly  RESP:  respiratory effort and palpation of " chest normal, lungs clear to auscultation , no respiratory distress, on room air, no crackles or wheezing, no cough  CV:  Palpation and auscultation of heart done , paced rhythm, edema 1+ BLLE, wearing compression no open areas noted  ABDOMEN:  soft, non-tender to light and deep palpation , no guarding or rebound, bowel sounds normal  M/S:   Gait and station abnormal generalized weakness, arthritic changes noted to hands   NEURO:   Cranial nerves 2-12 are normal tested and grossly at patient's baseline  Skin: no visible rashes or wound noted to anterior side  PSYCH:  memory impaired , affect and mood normal       Labs:   Recent labs in [facility] reviewed by me today.     ASSESSMENT/PLAN:  (M89.49) Primary osteoarthritis involving multiple joints  (primary encounter diagnosis)  Comment: to multiple joints, position changes made possible with hospital bed would improve pain management   Plan: APAP  -hospital bed     (G20) Parkinson's disease (tremor, stiffness, slow motion, unstable posture) (H)  Comment:  no official neuro w/u but per symptoms of tremor, quiet speech, rigidity, slow movement, falls  -does not desire neuro involvement or medications  -no recent falls, no reports of difficulty swallowing   -requires HOB to be elevated given aspiration risk  Plan: monitor, [facility] staff assist with cares, assist with all ambulation  -if falls or decrease in function staff to update to get therapy involved   -hospital bed, elevation needed to prevent aspiration risk    (I50.32) Chronic diastolic congestive heart failure (H)  (I12.9,  N18.30) Benign hypertension with CKD (chronic kidney disease) stage III  Comment: noted with increased edema and dyspnea with exertion June 2020  -lasix was increased at that time, echo ordered   -shortness of breath with exertion  -needs hospital bed  Plan: continue lasix and monitor  -has compression stockings, staff to assist with donning   -hospital bed to allow elevation       (I48.20) Chronic  atrial fibrillation (H)  Comment: per history, with pace maker  -goes to have device checked onsite Q6 months  Plan: continue pacer and checks per recommendation  -on diltiazem and sotalol   -anticoagulated with coumadin  -follow INR's  Lab Results   Component Value Date    INR 2.24 01/12/2021     Coumadin dosing to remain the same with recheck in 5 weeks   -does not follow with cards and no desire to per family     (N18.30) Stage 3 chronic kidney disease, unspecified whether stage 3a or 3b CKD  Comment:   Lab Results   Component Value Date    CR 0.97 06/30/2020       Plan: renally adjust  Meds, avoid nephrotoxic meds  -periodic BMP    (E66.9) Obesity, unspecified classification, unspecified obesity type, unspecified whether serious comorbidity present  Comment: Body mass index is 34.13 kg/m .  Plan: encourage activity as able, portion control, healthy meal choices    (E03.9) Hypothyroidism, unspecified type  Comment:   Lab Results   Component Value Date    TSH 4.06 10/22/2020       Plan: reasonable control given age  -TSH recheck in 6-12 months  -continue synthroid      Electronically signed by:  IRASEMA Rivero CNP         Documentation of Face-to-Face and Certification for Home Health Services     Patient: Liberty Horta   YOB: 1935  MR Number: [id number]  Today's Date: 1/11/2021    I certify that patient: Liberty Horta is under my care and that I, or a nurse practitioner or physician's assistant working with me, had a face-to-face encounter that meets the physician face-to-face encounter requirements with this patient on: 1/12/2021.    This encounter with the patient was in whole, or in part, for the following medical condition, which is the primary reason for home health care: CHF, OA, PD.    I certify that, based on my findings, the following services are medically necessary home health services: Occupational Therapy and Physical Therapy.    My clinical findings support  the need for the above services because: Occupational Therapy Services are needed to assess and treat cognitive ability and address ADL safety due to impairment in OA, PD, CHF. and Physical Therapy Services are needed to assess and treat the following functional impairments: OA,PD, CHF.    Further, I certify that my clinical findings support that this patient is homebound (i.e. absences from home require considerable and taxing effort and are for medical reasons or [employer] services or infrequently or of short duration when for other reasons) because: Requires assistance of another person or specialized equipment to access medical services because patient: Requires supervision of another for safe transfer. and  cognitive impairment ..    Based on the above findings. I certify that this patient is confined to the home and needs intermittent skilled nursing care, physical therapy and/or speech therapy.  The patient is under my care, and I have initiated the establishment of the plan of care.  This patient will be followed by a physician who will periodically review the plan of care.  Physician/Provider to provide follow up care: Marisa Morales    Responsible Medicare certified [place] Physician: Dr. Mendez  Physician Signature: See electronic signature associated with these discharge orders.  Date: 2021      Face to Face and Medical Necessity Statement for DME Provider visit    Demographic Information on Liberty Horta:  Gender: female  : 1935  [place] [address]  [phone] (home)     Medical Record: [id number]  Social Security Number: [id number]  Primary Care Provider: Marisa Morales  Insurance: Payor: [employer] / Plan: [facility] MEDICARE NON [place] PARTNERS / Product Type: HMO /     HPI:   Liberty Horta is a 85 year old  (1935), who is being seen today for a face to face provider visit at [facility]; medical necessity statement for  DME included. This patient requires the following:  DME Ordered and Medical Necessity Statement   Hospital bed: fully electronic with pair side rails    The patient does  require positioning of the body in ways not feasible with an ordinary bed due to a medical condition that is expected to last at least 1 month due to CHF, PDG20, OA M150, urinary incontinence R32, spinal stenosis .   The patient does  require, for the alleviation of pain, postioning of the body in ways not feasible with an ordinary bed. PD G20 and spinal stenosis   The patient does  require the head of bed elevated more than 30* most of the time due to CHF, chronic pulmonary disease or aspiration. CHF and aspiration risk from PD  The patient does not require traction that can only be attached to a hospital bed.  The patient does  require a bed height different than a fixed height hospital bed to permit tranfers to wheelchair or standing position.  In order to transfer patient t/from bed to wheelchair to then access bathroom safely with assist of caregivers   The patient does  require frequent or immediate changes in body position due to pain related to OA, PD, shortness of breath from CHF. Patient currently unable to change positions independently due to weakness, pain, and cognitive impairment.  She currently receives assistance from shelter caregivers and needs to change position every 2hrs due to pain         Pt needing above DME with expected length of need of 99   years  due to medical necessity associated with following diagnosis:     Parkinson's disease (tremor, stiffness, slow motion, unstable posture) (H)  Chronic diastolic congestive heart failure (H)  Chronic atrial fibrillation (H)  Stage 3 chronic kidney disease, unspecified whether stage 3a or 3b CKD  Primary osteoarthritis involving multiple joints  Obesity, unspecified classification, unspecified obesity type, unspecified whether serious comorbidity present  Hypothyroidism,  "unspecified type  Benign hypertension with CKD (chronic kidney disease) stage III  Spinal stenosis, unspecified spinal region      PMH   has a past medical history of Basal cell carcinoma, Chronic atrial fibrillation (H), DVT (deep vein thrombosis) in pregnancy, Esophageal reflux, Hypertension, Hypothyroid, Osteoarthritis, Pacemaker, and Renal insufficiency.    ROS:Limited secondary to cognitive impairment but as noted in HPI    EXAM  Vitals: /84   Pulse 60   Resp 20   Ht 1.575 m (5' 2\")   Wt 84.6 kg (186 lb 9.6 oz)   SpO2 97%   BMI 34.13 kg/m  ;BMI= Body mass index is 34.13 kg/m .     GENERAL APPEARANCE:  Alert, in no distress, cooperative  EYES:  PERRL, wearing corrective lenses  NECK:  No adenopathy,masses or thyromegaly  RESP:  respiratory effort and palpation of chest normal, lungs clear to auscultation , no respiratory distress, on room air, no crackles or wheezing, no cough  CV:  Palpation and auscultation of heart done , paced rhythm, edema 1+ BLLE, wearing compression no open areas noted  ABDOMEN:  soft, non-tender to light and deep palpation , no guarding or rebound, bowel sounds normal  M/S:   Gait and station abnormal generalized weakness, arthritic changes noted to hands   NEURO:   Cranial nerves 2-12 are normal tested and grossly at patient's baseline  Skin: no visible rashes or wound noted to anterior side  PSYCH:  memory impaired , affect and mood normal        ASSESSMENT/PLAN:  1. Primary osteoarthritis involving multiple joints    2. Parkinson's disease (tremor, stiffness, slow motion, unstable posture) (H)    3. Chronic diastolic congestive heart failure (H)    4. Chronic atrial fibrillation (H)    5. Stage 3 chronic kidney disease, unspecified whether stage 3a or 3b CKD    6. Obesity, unspecified classification, unspecified obesity type, unspecified whether serious comorbidity present    7. Hypothyroidism, unspecified type    8. Benign hypertension with CKD (chronic kidney disease) " stage III    9. Spinal stenosis, unspecified spinal region        Orders:  1. Facility staff/TC to contact DME company to get their order form for provider to fill out    ELECTRONICALLY SIGNED BY JAD CERTIFIED PROVIDER:  IRASEMA Rivero CNP   NPI: [id number]  [facility]  [address] [place] [address], [address]  [place] [address]

## 2021-01-12 ENCOUNTER — ASSISTED LIVING VISIT (OUTPATIENT)
Dept: GERIATRICS | Facility: CLINIC | Age: 86
End: 2021-01-12
Payer: COMMERCIAL

## 2021-01-12 ENCOUNTER — HOSPITAL LABORATORY (OUTPATIENT)
Dept: OTHER | Facility: CLINIC | Age: 86
End: 2021-01-12

## 2021-01-12 DIAGNOSIS — I12.9 BENIGN HYPERTENSION WITH CKD (CHRONIC KIDNEY DISEASE) STAGE III (H): ICD-10-CM

## 2021-01-12 DIAGNOSIS — I48.20 CHRONIC ATRIAL FIBRILLATION (H): ICD-10-CM

## 2021-01-12 DIAGNOSIS — E03.9 HYPOTHYROIDISM, UNSPECIFIED TYPE: ICD-10-CM

## 2021-01-12 DIAGNOSIS — I50.32 CHRONIC DIASTOLIC CONGESTIVE HEART FAILURE (H): ICD-10-CM

## 2021-01-12 DIAGNOSIS — N18.30 BENIGN HYPERTENSION WITH CKD (CHRONIC KIDNEY DISEASE) STAGE III (H): ICD-10-CM

## 2021-01-12 DIAGNOSIS — M48.00 SPINAL STENOSIS, UNSPECIFIED SPINAL REGION: ICD-10-CM

## 2021-01-12 DIAGNOSIS — E66.9 OBESITY, UNSPECIFIED CLASSIFICATION, UNSPECIFIED OBESITY TYPE, UNSPECIFIED WHETHER SERIOUS COMORBIDITY PRESENT: ICD-10-CM

## 2021-01-12 DIAGNOSIS — R32 URINARY INCONTINENCE, UNSPECIFIED TYPE: ICD-10-CM

## 2021-01-12 DIAGNOSIS — M15.0 PRIMARY OSTEOARTHRITIS INVOLVING MULTIPLE JOINTS: Primary | ICD-10-CM

## 2021-01-12 DIAGNOSIS — G20.A1 PARKINSON'S DISEASE (TREMOR, STIFFNESS, SLOW MOTION, UNSTABLE POSTURE) (H): ICD-10-CM

## 2021-01-12 DIAGNOSIS — N18.30 STAGE 3 CHRONIC KIDNEY DISEASE, UNSPECIFIED WHETHER STAGE 3A OR 3B CKD (H): ICD-10-CM

## 2021-01-12 LAB
INR PPP: 2.24 (ref 0.86–1.14)
SARS-COV-2 RNA RESP QL NAA+PROBE: NOT DETECTED
SPECIMEN SOURCE: NORMAL

## 2021-01-12 NOTE — LETTER
1/12/2021        RE: Liberty Horta  [facility]  [address] [facility][1] [address]        [place] [facility]  [place] Medical Record Number:  [id number]  Place of Service where encounter took place:  [facility] ([facility]) [[id number]]  Chief Complaint   Patient presents with     RECHECK     Clinic Care Coordination - Face To Face       HPI:    Liberty Horta  is a 85 year old (1935), who is being seen today for an episodic care visit.  HPI information obtained from: facility chart records, facility staff, patient report and [facility] chart review. Today's concern is:  1. Primary osteoarthritis involving multiple joints    2. Parkinson's disease (tremor, stiffness, slow motion, unstable posture) (H)    3. Chronic diastolic congestive heart failure (H)    4. Chronic atrial fibrillation (H)    5. Stage 3 chronic kidney disease, unspecified whether stage 3a or 3b CKD    6. Obesity, unspecified classification, unspecified obesity type, unspecified whether serious comorbidity present    7. Hypothyroidism, unspecified type    8. Benign hypertension with CKD (chronic kidney disease) stage III    9. Spinal stenosis, unspecified spinal region      Patient is a 85 year old female that was [facility] to [facility] facility [facility] after hospitalization for severe pain to her left buttock in which she was unable to bear weight.  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  Ortho saw her and recommended nonoperative management with WBAT and therapy.  Her Lumbar fracture was noted previously on CT imaging from March 2019 and conservative management and monitoring recommended.     She has PMH significant for a-fib, s/p pacer maker placement (on coumadin), hypothyroidism, HTN, GERD, OA, Anemia, HLD, urinary retention, Parkinson's.  She has had her left knee replaced and also had right hip ORIF in the  "past     She is seen today to follow-up on multiple co-morbidities and per request of therapy and staff for face to face visit.  Staff have reported increased difficult with transfers, particularly from bed.  She recently worked with therapy and was discharged but staff is requesting another eval due to concern for their safety when attempting to transfer her.  They also are requesting a hospital bed.      She is seen in her room today. She has no specific concerns, denies CP, shortness of breath, abdominal pain, n/v.  Reports generalized pain but managed ok with current POC.      Past Medical and Surgical History reviewed in Epic today.    MEDICATIONS:    Current Outpatient Medications   Medication Sig Dispense Refill     ACETAMINOPHEN EXTRA STRENGTH 500 MG tablet TAKE TWO TABLETS (1000MG) BY MOUTH TWICE DAILY;AND MAY TAKE TWO TABLETS (1000MG) BY MOUTH ONCE DAILY AS NEEDED 112 tablet PRN     atorvastatin (LIPITOR) 40 MG tablet TAKE 1 TABLET BY MOUTH AT BEDTIME 28 tablet PRN     DILT- MG 24 hr capsule TAKE 1 CAPSULE BY MOUTH ONCE DAILY 28 capsule PRN     furosemide (LASIX) 40 MG tablet Take 1 tablet (40 mg) by mouth daily starting Friday 02/21 90 tablet 11     levothyroxine (SYNTHROID/LEVOTHROID) 150 MCG tablet TAKE 1 TABLET BY MOUTH ONCE DAILY 28 tablet PRN     melatonin 1 MG TABS tablet Take 1 tablet (1 mg) by mouth At Bedtime 30 tablet 3     Multiple Vitamin (TAB-A-IRIS) TABS TAKE 1 TABLET BY MOUTH ONCE DAILY 28 tablet PRN     sotalol (BETAPACE) 80 MG tablet TAKE 1 TABLET BY MOUTH TWICE DAILY WITH MEALS 56 tablet PRN     warfarin (COUMADIN) 2.5 MG tablet Take 2.5 mg by mouth daily on Tues & Fridays  AND take 5 mg AOD           REVIEW OF SYSTEMS:  Limited secondary to cognitive impairment but as noted in HPI    Objective:  /84   Pulse 60   Resp 20   Ht 1.575 m (5' 2\")   Wt 84.6 kg (186 lb 9.6 oz)   SpO2 97%   BMI 34.13 kg/m    Exam:  GENERAL APPEARANCE:  Alert, in no distress, cooperative  EYES:  " PERRL, wearing corrective lenses  NECK:  No adenopathy,masses or thyromegaly  RESP:  respiratory effort and palpation of chest normal, lungs clear to auscultation , no respiratory distress, on room air, no crackles or wheezing, no cough  CV:  Palpation and auscultation of heart done , paced rhythm, edema 1+ BLLE, wearing compression no open areas noted  ABDOMEN:  soft, non-tender to light and deep palpation , no guarding or rebound, bowel sounds normal  M/S:   Gait and station abnormal generalized weakness, arthritic changes noted to hands   NEURO:   Cranial nerves 2-12 are normal tested and grossly at patient's baseline  Skin: no visible rashes or wound noted to anterior side  PSYCH:  memory impaired , affect and mood normal       Labs:   Recent labs in [facility] reviewed by me today.     ASSESSMENT/PLAN:  (M89.49) Primary osteoarthritis involving multiple joints  (primary encounter diagnosis)  Comment: to multiple joints, position changes made possible with hospital bed would improve pain management   Plan: APAP  -hospital bed     (G20) Parkinson's disease (tremor, stiffness, slow motion, unstable posture) (H)  Comment:  no official neuro w/u but per symptoms of tremor, quiet speech, rigidity, slow movement, falls  -does not desire neuro involvement or medications  -no recent falls, no reports of difficulty swallowing   -requires HOB to be elevated given aspiration risk  Plan: monitor, [facility] staff assist with cares, assist with all ambulation  -if falls or decrease in function staff to update to get therapy involved   -hospital bed, elevation needed to prevent aspiration risk    (I50.32) Chronic diastolic congestive heart failure (H)  (I12.9,  N18.30) Benign hypertension with CKD (chronic kidney disease) stage III  Comment: noted with increased edema and dyspnea with exertion June 2020  -lasix was increased at that time, echo ordered   -shortness of breath with exertion  -needs hospital bed  Plan: continue lasix and  monitor  -has compression stockings, staff to assist with donning   -hospital bed to allow elevation       (I48.20) Chronic atrial fibrillation (H)  Comment: per history, with pace maker  -goes to have device checked onsite Q6 months  Plan: continue pacer and checks per recommendation  -on diltiazem and sotalol   -anticoagulated with coumadin  -follow INR's  Lab Results   Component Value Date    INR 2.24 01/12/2021     Coumadin dosing to remain the same with recheck in 5 weeks   -does not follow with cards and no desire to per family     (N18.30) Stage 3 chronic kidney disease, unspecified whether stage 3a or 3b CKD  Comment:   Lab Results   Component Value Date    CR 0.97 06/30/2020       Plan: renally adjust  Meds, avoid nephrotoxic meds  -periodic BMP    (E66.9) Obesity, unspecified classification, unspecified obesity type, unspecified whether serious comorbidity present  Comment: Body mass index is 34.13 kg/m .  Plan: encourage activity as able, portion control, healthy meal choices    (E03.9) Hypothyroidism, unspecified type  Comment:   Lab Results   Component Value Date    TSH 4.06 10/22/2020       Plan: reasonable control given age  -TSH recheck in 6-12 months  -continue synthroid      Electronically signed by:  IRASEMA Rivero CNP         Documentation of Face-to-Face and Certification for Home Health Services     Patient: Liberty Horta   YOB: 1935  MR Number: [id number]  Today's Date: 1/11/2021    I certify that patient: Liberty Horta is under my care and that I, or a nurse practitioner or physician's assistant working with me, had a face-to-face encounter that meets the physician face-to-face encounter requirements with this patient on: 1/12/2021.    This encounter with the patient was in whole, or in part, for the following medical condition, which is the primary reason for home health care: CHF, OA, PD.    I certify that, based on my findings, the following services  are medically necessary home health services: Occupational Therapy and Physical Therapy.    My clinical findings support the need for the above services because: Occupational Therapy Services are needed to assess and treat cognitive ability and address ADL safety due to impairment in OA, PD, CHF. and Physical Therapy Services are needed to assess and treat the following functional impairments: OA,PD, CHF.    Further, I certify that my clinical findings support that this patient is homebound (i.e. absences from home require considerable and taxing effort and are for medical reasons or [facility] services or infrequently or of short duration when for other reasons) because: Requires assistance of another person or specialized equipment to access medical services because patient: Requires supervision of another for safe transfer. and  cognitive impairment ..    Based on the above findings. I certify that this patient is confined to the home and needs intermittent skilled nursing care, physical therapy and/or speech therapy.  The patient is under my care, and I have initiated the establishment of the plan of care.  This patient will be followed by a physician who will periodically review the plan of care.  Physician/Provider to provide follow up care: Marisa Morales    Responsible Medicare certified [place] Physician: Dr. Mendez  Physician Signature: See electronic signature associated with these discharge orders.  Date: 2021      Face to Face and Medical Necessity Statement for DME Provider visit    Demographic Information on Liberty Horta:  Gender: female  : 1935  [address] [address]  [phone] (home)     Medical Record: [id number]  Social Security Number: [id number]  Primary Care Provider: Marisa Morales  Insurance: Payor: [facility] / Plan: [employer] MEDICARE NON [employer] / Product Type: HMO /     HPI:   Liberty Horta is a 85 year old   (1935), who is being seen today for a face to face provider visit at [facility]; medical necessity statement for DME included. This patient requires the following:  DME Ordered and Medical Necessity Statement   Hospital bed: fully electronic with pair side rails    The patient does  require positioning of the body in ways not feasible with an ordinary bed due to a medical condition that is expected to last at least 1 month due to CHF, PDG20, OA M150, urinary incontinence R32, spinal stenosis .   The patient does  require, for the alleviation of pain, postioning of the body in ways not feasible with an ordinary bed. PD G20 and spinal stenosis   The patient does  require the head of bed elevated more than 30* most of the time due to CHF, chronic pulmonary disease or aspiration. CHF and aspiration risk from PD  The patient does not require traction that can only be attached to a hospital bed.  The patient does  require a bed height different than a fixed height hospital bed to permit tranfers to wheelchair or standing position.  In order to transfer patient t/from bed to wheelchair to then access bathroom safely with assist of caregivers   The patient does  require frequent or immediate changes in body position due to pain related to OA, PD, shortness of breath from CHF. Patient currently unable to change positions independently due to weakness, pain, and cognitive impairment.  She currently receives assistance from [facility] caregivers and needs to change position every 2hrs due to pain         Pt needing above DME with expected length of need of 99   years  due to medical necessity associated with following diagnosis:     Parkinson's disease (tremor, stiffness, slow motion, unstable posture) (H)  Chronic diastolic congestive heart failure (H)  Chronic atrial fibrillation (H)  Stage 3 chronic kidney disease, unspecified whether stage 3a or 3b CKD  Primary osteoarthritis involving multiple  "joints  Obesity, unspecified classification, unspecified obesity type, unspecified whether serious comorbidity present  Hypothyroidism, unspecified type  Benign hypertension with CKD (chronic kidney disease) stage III  Spinal stenosis, unspecified spinal region      PMH   has a past medical history of Basal cell carcinoma, Chronic atrial fibrillation (H), DVT (deep vein thrombosis) in pregnancy, Esophageal reflux, Hypertension, Hypothyroid, Osteoarthritis, Pacemaker, and Renal insufficiency.    ROS:Limited secondary to cognitive impairment but as noted in HPI    EXAM  Vitals: /84   Pulse 60   Resp 20   Ht 1.575 m (5' 2\")   Wt 84.6 kg (186 lb 9.6 oz)   SpO2 97%   BMI 34.13 kg/m  ;BMI= Body mass index is 34.13 kg/m .     GENERAL APPEARANCE:  Alert, in no distress, cooperative  EYES:  PERRL, wearing corrective lenses  NECK:  No adenopathy,masses or thyromegaly  RESP:  respiratory effort and palpation of chest normal, lungs clear to auscultation , no respiratory distress, on room air, no crackles or wheezing, no cough  CV:  Palpation and auscultation of heart done , paced rhythm, edema 1+ BLLE, wearing compression no open areas noted  ABDOMEN:  soft, non-tender to light and deep palpation , no guarding or rebound, bowel sounds normal  M/S:   Gait and station abnormal generalized weakness, arthritic changes noted to hands   NEURO:   Cranial nerves 2-12 are normal tested and grossly at patient's baseline  Skin: no visible rashes or wound noted to anterior side  PSYCH:  memory impaired , affect and mood normal        ASSESSMENT/PLAN:  1. Primary osteoarthritis involving multiple joints    2. Parkinson's disease (tremor, stiffness, slow motion, unstable posture) (H)    3. Chronic diastolic congestive heart failure (H)    4. Chronic atrial fibrillation (H)    5. Stage 3 chronic kidney disease, unspecified whether stage 3a or 3b CKD    6. Obesity, unspecified classification, unspecified obesity type, unspecified " whether serious comorbidity present    7. Hypothyroidism, unspecified type    8. Benign hypertension with CKD (chronic kidney disease) stage III    9. Spinal stenosis, unspecified spinal region        Orders:  1. Facility staff/TC to contact DME company to get their order form for provider to fill out    ELECTRONICALLY SIGNED BY JAD CERTIFIED PROVIDER:  IRASEMA Rivero CNP   NPI: [id number]  [place] [facility]  [address] [place] [address], [address]  [place] [address]            Sincerely,        IRASEMA Rivero CNP

## 2021-01-14 ENCOUNTER — HOSPITAL LABORATORY (OUTPATIENT)
Dept: OTHER | Facility: CLINIC | Age: 86
End: 2021-01-14

## 2021-01-15 LAB
SARS-COV-2 RNA RESP QL NAA+PROBE: NOT DETECTED
SPECIMEN SOURCE: NORMAL

## 2021-01-21 ENCOUNTER — HOSPITAL LABORATORY (OUTPATIENT)
Dept: OTHER | Facility: CLINIC | Age: 86
End: 2021-01-21

## 2021-01-22 LAB
SARS-COV-2 RNA RESP QL NAA+PROBE: NOT DETECTED
SPECIMEN SOURCE: NORMAL

## 2021-01-28 ENCOUNTER — HOSPITAL LABORATORY (OUTPATIENT)
Dept: OTHER | Facility: CLINIC | Age: 86
End: 2021-01-28

## 2021-01-28 ENCOUNTER — PATIENT OUTREACH (OUTPATIENT)
Dept: GERIATRIC MEDICINE | Facility: CLINIC | Age: 86
End: 2021-01-28

## 2021-01-28 NOTE — PROGRESS NOTES
[employer] Medicare Initial enrollment    Member was assigned to [facility] for [employer] Medicare Case Management on: 1-1-21  Review of member's Epic chart completed.  No triggering events noted  CC will follow up as needed.    Tianna Powell MA [facility] Care Coordinator   [phone] - enij cell phone   [phone] - qyqr fax

## 2021-02-03 ENCOUNTER — PATIENT OUTREACH (OUTPATIENT)
Dept: GERIATRIC MEDICINE | Facility: CLINIC | Age: 86
End: 2021-02-03

## 2021-02-03 NOTE — PROGRESS NOTES
CC reached out to member son to discuss the request for hospital bed per Epic note and son said he was not aware but if this was being recommended he would be willing to move forward with this.   CC said that find out the cost of the hospital bed per month for rental and then let him know.     He also had questions for his NP and CC said she would find out what number he could call and then let him know.  He was also going to reach out to RN staff at the AL with some follow up questions for them as well.  CC said to reach out to her if he needed anything more.     CC will work with [facility] for member hospital bed.   Tianna Powell MA [facility] Care Coordinator   [phone] - work cell phone   [phone] - work fax

## 2021-02-04 ENCOUNTER — HOSPITAL LABORATORY (OUTPATIENT)
Dept: OTHER | Facility: CLINIC | Age: 86
End: 2021-02-04

## 2021-02-05 ENCOUNTER — PATIENT OUTREACH (OUTPATIENT)
Dept: GERIATRIC MEDICINE | Facility: CLINIC | Age: 86
End: 2021-02-05

## 2021-02-05 NOTE — PROGRESS NOTES
CC contacted members son Michele and reviewed the estimated cost of the mattress and rental of the bed frame with him.    Per [facility] it was estimated that Medicare would pay 80% of the cost of both mattress and bed frame.    Mattress would have to be purchased at estimated cost of 45.00 and bed frame rented for 13 months at 20.00 per month.     Michele asked that billing be sent to him for this.  CC said that she will also have [facility] contact [facility] to make arrangements for delivery of the bed.     CC then emailed [facility] F2F visit for member and asked that they follow-up with CC if any more information is needed.   CC will have CMS put this on tracking sheet.     Tianna Powell MA [facility] Care Coordinator   [phone] - tkbs cell phone   [phone] - otjl fax

## 2021-02-09 ENCOUNTER — ASSISTED LIVING VISIT (OUTPATIENT)
Dept: GERIATRICS | Facility: CLINIC | Age: 86
End: 2021-02-09
Payer: COMMERCIAL

## 2021-02-09 ENCOUNTER — HOSPITAL LABORATORY (OUTPATIENT)
Dept: OTHER | Facility: CLINIC | Age: 86
End: 2021-02-09

## 2021-02-09 VITALS — WEIGHT: 186.6 LBS | BODY MASS INDEX: 34.34 KG/M2 | HEIGHT: 62 IN | TEMPERATURE: 97.7 F

## 2021-02-09 DIAGNOSIS — Z79.01 LONG TERM (CURRENT) USE OF ANTICOAGULANTS: ICD-10-CM

## 2021-02-09 DIAGNOSIS — Z51.81 ENCOUNTER FOR THERAPEUTIC DRUG MONITORING: ICD-10-CM

## 2021-02-09 DIAGNOSIS — I48.20 CHRONIC ATRIAL FIBRILLATION (H): Primary | ICD-10-CM

## 2021-02-09 LAB — INR PPP: 2.6 (ref 0.86–1.14)

## 2021-02-09 ASSESSMENT — MIFFLIN-ST. JEOR: SCORE: 1249.66

## 2021-02-09 NOTE — LETTER
"    2/9/2021        RE: Liberty Thornton [facility]  [id number] [place] [address] [address]  [facility] [address]        [place] [facility]  [place] Medical Record Number:  [id number]  Place of Service where encounter took place: [facility] ([facility]) [[id number]]    HPI:    Liberty Horta is a 86 year old  (1935), who is being seen today for an episodic care visit at the above location.   HPI information obtained from: facility chart records, facility staff and [facility] chart review. Today's concern is INR/Coumadin management for A. Fib    ROS/Subjective:  Bleeding Signs/Symptoms:  None  Thromboembolic Signs/Symptoms:  None  Medication Changes:  No  Dietary Changes:  No  Activity Changes: No  Bacterial/Viral Infection:  No  Missed Coumadin Doses:  None  On ASA: No  Other Concerns:  No    OBJECTIVE:  Temp 97.7  F (36.5  C)   Ht 1.575 m (5' 2\")   Wt 84.6 kg (186 lb 9.6 oz)   BMI 34.13 kg/m    Last INR: 2.24 on 1/12/21  INR Today:  2.60  Current Dose:  2.5mg PO QWednesday. 5mg PO QD on all other days    INR Flow sheet at SNF:    ASSESSMENT:  1. Chronic atrial fibrillation (H)    2. Encounter for therapeutic drug monitoring    3. Long term (current) use of anticoagulants      Therapeutic INR for goal of 2-3    PLAN:   Orders written by provider at facility  New Dose: No Change    Next INR: 3/9      Electronically signed by:  IRASEMA Rivero CNP        Sincerely,        IRASEMA Rivero CNP    "

## 2021-02-09 NOTE — PROGRESS NOTES
"[place] [facility]  [place] Medical Record Number:  [id number]  Place of Service where encounter took place: [facility] ([facility]) [[id number]]    HPI:    Liberty Horta is a 86 year old  (1935), who is being seen today for an episodic care visit at the above location.   HPI information obtained from: facility chart records, facility staff and [facility] chart review. Today's concern is INR/Coumadin management for A. Fib    ROS/Subjective:  Bleeding Signs/Symptoms:  None  Thromboembolic Signs/Symptoms:  None  Medication Changes:  No  Dietary Changes:  No  Activity Changes: No  Bacterial/Viral Infection:  No  Missed Coumadin Doses:  None  On ASA: No  Other Concerns:  No    OBJECTIVE:  Temp 97.7  F (36.5  C)   Ht 1.575 m (5' 2\")   Wt 84.6 kg (186 lb 9.6 oz)   BMI 34.13 kg/m    Last INR: 2.24 on 1/12/21  INR Today:  2.60  Current Dose:  2.5mg PO QWednesday. 5mg PO QD on all other days    INR Flow sheet at [facility]:    ASSESSMENT:  1. Chronic atrial fibrillation (H)    2. Encounter for therapeutic drug monitoring    3. Long term (current) use of anticoagulants      Therapeutic INR for goal of 2-3    PLAN:   Orders written by provider at facility  New Dose: No Change    Next INR: 3/9      Electronically signed by:  IRASEMA Rivero CNP  "

## 2021-02-18 ENCOUNTER — HOSPITAL LABORATORY (OUTPATIENT)
Dept: OTHER | Facility: CLINIC | Age: 86
End: 2021-02-18

## 2021-02-19 LAB
SARS-COV-2 RNA RESP QL NAA+PROBE: NOT DETECTED
SPECIMEN SOURCE: NORMAL

## 2021-02-25 ENCOUNTER — HOSPITAL LABORATORY (OUTPATIENT)
Dept: OTHER | Facility: CLINIC | Age: 86
End: 2021-02-25

## 2021-02-26 ENCOUNTER — PATIENT OUTREACH (OUTPATIENT)
Dept: GERIATRIC MEDICINE | Facility: CLINIC | Age: 86
End: 2021-02-26

## 2021-02-26 LAB
SARS-COV-2 RNA RESP QL NAA+PROBE: NORMAL
SPECIMEN SOURCE: NORMAL

## 2021-02-26 NOTE — PROGRESS NOTES
CC confirmed with NP that hospital bed was delivered to member from [facility] estella Powell MA [facility] Care Coordinator   [phone] - work cell phone   [phone] - work fax

## 2021-02-27 LAB
LABORATORY COMMENT REPORT: NORMAL
SARS-COV-2 RNA RESP QL NAA+PROBE: NEGATIVE
SPECIMEN SOURCE: NORMAL

## 2021-03-01 ENCOUNTER — TELEPHONE (OUTPATIENT)
Dept: CARE COORDINATION | Facility: CLINIC | Age: 86
End: 2021-03-01

## 2021-03-01 VITALS — HEIGHT: 62 IN | WEIGHT: 186 LBS | TEMPERATURE: 98 F | BODY MASS INDEX: 34.23 KG/M2

## 2021-03-01 ASSESSMENT — MIFFLIN-ST. JEOR: SCORE: 1246.94

## 2021-03-01 NOTE — TELEPHONE ENCOUNTER
Thank you for your referral to [facility]. The order received is for occupational therapy only, but unfortunately, per Medicare guidelines, OT is unable to stand alone as the only discipline seeing the patient. So in order to accept the referral there would need to be a skilled need for nursing, physical therapy, and/or SLP in addition to occupational therapy. Please review and advise if there is a skilled need for any of these other disciplines and if so your response will act as an order for that discipline to be added to the case. If there is no skilled need for any of those other disciplines then we are unable to accept the referral.    Thank you,  Nichole Fernandes, RN  [facility]  [phone]

## 2021-03-01 NOTE — PROGRESS NOTES
[facility]  [place] Medical Record Number:  [id number]  Place of Service where encounter took place:  [facility] ([facility]) [[id number]]  Chief Complaint   Patient presents with     RECHECK     Pressure ulcer, weakness, F2F therapy       HPI:    Liberty Horta  is a 86 year old (1935), who is being seen today for an episodic care visit.  HPI information obtained from: facility chart records, facility staff, patient report and [facility] chart review. Today's concern is:    1. Chronic atrial fibrillation (H)    2. Parkinson's disease (tremor, stiffness, slow motion, unstable posture) (H)    3. Chronic diastolic congestive heart failure (H)    4. Stage 3 chronic kidney disease, unspecified whether stage 3a or 3b CKD    5. Hypothyroidism, unspecified type    6. Spinal stenosis, unspecified spinal region    7. Cognitive impairment    8. Physical deconditioning    9. Pressure injury of contiguous region involving buttock and hip, stage 1, unspecified laterality      Patient is a 85 year old female that was admitted to Al facility May 2019 after hospitalization for severe pain to her left buttock in which she was unable to bear weight.  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  Ortho saw her and recommended nonoperative management with WBAT and therapy.  Her Lumbar fracture was noted previously on CT imaging from March 2019 and conservative management and monitoring recommended.     She has PMH significant for a-fib, s/p pacer maker placement (on coumadin), hypothyroidism, HTN, GERD, OA, Anemia, HLD, urinary retention, Parkinson's.  She has had her left knee replaced and also had right hip ORIF in the past     She is seen today at request of AL staff due to newly noted pressure injury to bilateral buttocks and upper thighs. Staff have also noticed increased physical weakness, worsening balance, and are  requesting therapy evaluate.  Patient spends majority of the day sitting in her recliner or w/c, contributing to pressure ulcer.       She is seen today in her apartment, her son Michele is present today.  Patient with increased shortness of breath with movement and also also orthopnea.  She gets nervous when her head is lowered as she feels she cannot breathe.  She is unaware of pressure sores, they do not bother her.  Son has noticed that patient has appeared more fatigued and sleeping more in the past year.  He still walks her when he visits twice weekly.  He would like therapy's opinion on safety of staff walking his mom daily.  Patient continues to report pain to her right knee.  She does not feel voltaren gel is helpful.  She has not tried biofreeze in the past, and agreeable to try     Past Medical and Surgical History reviewed in Epic today.    MEDICATIONS:    Current Outpatient Medications   Medication Sig Dispense Refill     ACETAMINOPHEN EXTRA STRENGTH 500 MG tablet TAKE TWO TABLETS (1000MG) BY MOUTH TWICE DAILY;AND MAY TAKE TWO TABLETS (1000MG) BY MOUTH ONCE DAILY AS NEEDED 112 tablet PRN     atorvastatin (LIPITOR) 40 MG tablet TAKE 1 TABLET BY MOUTH AT BEDTIME 28 tablet PRN     diclofenac (VOLTAREN) 1 % topical gel APPLY 4 GM TO EACH KNEE TWICE DAILY 100 g 10     DILT- MG 24 hr capsule TAKE 1 CAPSULE BY MOUTH ONCE DAILY 28 capsule PRN     furosemide (LASIX) 40 MG tablet TAKE 1 TABLET BY MOUTH ONCE DAILY STARTING 2/21 28 tablet 97     levothyroxine (SYNTHROID/LEVOTHROID) 150 MCG tablet TAKE 1 TABLET BY MOUTH ONCE DAILY 28 tablet PRN     melatonin 1 MG TABS tablet Take 1 tablet (1 mg) by mouth At Bedtime 30 tablet 3     Multiple Vitamin (TAB-A-IRIS) TABS TAKE 1 TABLET BY MOUTH ONCE DAILY 28 tablet PRN     sotalol (BETAPACE) 80 MG tablet TAKE 1 TABLET BY MOUTH TWICE DAILY WITH MEALS 56 tablet PRN     warfarin (COUMADIN) 2.5 MG tablet Take 2.5 mg by mouth daily on Tues & Fridays  AND take 5 mg AOD    "        REVIEW OF SYSTEMS:  Limited secondary to cognitive impairment but as noted in HPI     Objective:  Temp 98  F (36.7  C)   Ht 1.575 m (5' 2\")   Wt 84.4 kg (186 lb)   BMI 34.02 kg/m    Exam:  GENERAL APPEARANCE:  Alert, in no distress, cooperative, easily falls asleep   EYES:  PERRL, wearing corrective lenses  NECK:  No adenopathy,masses or thyromegaly  RESP:  respiratory effort and palpation of chest normal, lungs crackles to BLL, L?R, orthopnea, no wheezing, no cough  CV:  Palpation and auscultation of heart done , paced rhythm, edema 1-2+ BLLE, no compression, no open areas noted  ABDOMEN:  soft, non-tender to light and deep palpation , no guarding or rebound, bowel sounds normal  M/S:   Gait and station abnormal generalized weakness, arthritic changes noted to hands, difficulty going from sitting to standing, requires much encouragement to stand and assistance  Skin: pressure injury noted to bilateral buttocks and upper thighs, non-blanchable, no open areas noted   NEURO:   Cranial nerves 2-12 are normal tested and grossly at patient's baseline  PSYCH:  memory impaired , affect and mood normal    Labs:   Recent labs in [facility] reviewed by me today.     ASSESSMENT/PLAN:  (I48.20) Chronic atrial fibrillation (H)  (primary encounter diagnosis)  Comment: per history, with pace maker  -goes to have device checked onsite Q6 months: due for check in April per son  -on coumadin  Plan: continue pacer and checks per recommendation  -on diltiazem and sotalol   -anticoagulated with coumadin  -follow INR's  -does not follow with cards and no desire to per family     (G20) Parkinson's disease (tremor, stiffness, slow motion, unstable posture) (H)  Comment:no official neuro w/u but per symptoms of tremor, quiet speech, rigidity, slow movement, falls  -does not desire neuro involvement or medications  -worsening balance and increased weakness, staff having increased difficulty with transfers  Plan: monitor, AL staff assist " with cares  -homecare therapy to assess balance, transfers and work on strengthening    CHF exacerbation  Comment: increased orthopnea, shortness of breath with exertion and crackles to LL today  -weights not routinely monitored in AL, she has gained weight since pandemic per son, primarily thought to be r/t inactivity and increase po intake   Plan: increase lasix to 60mg X 4 days, then back to 40mg every day  -will also give 20meq KCL X 4 days  -BMP and CBC next week  -staff to update me on if no change in symptoms     (N18.30) Stage 3 chronic kidney disease, unspecified whether stage 3a or 3b CKD  Comment:   Lab Results   Component Value Date    CR 0.97 06/30/2020       Plan: BMP next week     (E03.9) Hypothyroidism, unspecified type  Comment:   Lab Results   Component Value Date    TSH 4.06 10/22/2020       Plan: reasonable control, due to son's report of increased sleepiness/fatigue, will check TSH again next week      (M48.00) Spinal stenosis, unspecified spinal region  Comment: history of severe buttock pain and inability to bear weight June 2019  -L5 lateral vertebral body fx and central stenosis L3-L4  -conservative management and WBAT  Plan:monitofr for neurological changes that would warrant neurosurgery involvement     (R41.89) Cognitive impairment  Comment: no formal testing, some STML and confusion noted, family notices decline since pandemic with social isolation  Plan: staff assist with cares and meds  -supportive family     (R53.81) Physical deconditioning  Comment: poor balance, weakness with ambulation   Plan: homecare therapy assessment     (L89.41) Pressure injury of contiguous region involving buttock and hip, stage 1, unspecified laterality  Comment: ACUTE, noted recently to bilateral buttocks and thighs, spends majority of day in chair  Plan: calmoseptine BID and with incontinence cares  -OT pressure mapping and assess for cushion     OA  Comment: still reporting pain to right knee  -has not  responded to injections in the past  -does not feel voltaren effective  Plan: continue APAP  -d'c voltaren  -try biofreeze BID and BID prn     Fatigue  Comment: reported by son, patient alert but does fall asleep during visit  -discussed with son, could be r/t CHF exacerbation, thyroid, age, anemia, depression and isolation associated with pandemic  Plan: TSH, CBC and BMP next week  -increase diuresis as noted under CHF  -continue to encourage patient to attend activities, go to meals, stimulate her       Electronically signed by:  IRASEMA Rivero CNP

## 2021-03-02 ENCOUNTER — ASSISTED LIVING VISIT (OUTPATIENT)
Dept: GERIATRICS | Facility: CLINIC | Age: 86
End: 2021-03-02
Payer: COMMERCIAL

## 2021-03-02 DIAGNOSIS — G20.A1 PARKINSON'S DISEASE (TREMOR, STIFFNESS, SLOW MOTION, UNSTABLE POSTURE) (H): ICD-10-CM

## 2021-03-02 DIAGNOSIS — R41.89 COGNITIVE IMPAIRMENT: ICD-10-CM

## 2021-03-02 DIAGNOSIS — R53.83 FATIGUE, UNSPECIFIED TYPE: ICD-10-CM

## 2021-03-02 DIAGNOSIS — I15.9 SECONDARY HYPERTENSION: ICD-10-CM

## 2021-03-02 DIAGNOSIS — N18.30 STAGE 3 CHRONIC KIDNEY DISEASE, UNSPECIFIED WHETHER STAGE 3A OR 3B CKD (H): ICD-10-CM

## 2021-03-02 DIAGNOSIS — E03.9 HYPOTHYROIDISM, UNSPECIFIED TYPE: ICD-10-CM

## 2021-03-02 DIAGNOSIS — L89.41 PRESSURE INJURY OF CONTIGUOUS REGION INVOLVING BUTTOCK AND HIP, STAGE 1, UNSPECIFIED LATERALITY: ICD-10-CM

## 2021-03-02 DIAGNOSIS — R53.81 PHYSICAL DECONDITIONING: ICD-10-CM

## 2021-03-02 DIAGNOSIS — M48.00 SPINAL STENOSIS, UNSPECIFIED SPINAL REGION: ICD-10-CM

## 2021-03-02 DIAGNOSIS — M15.0 PRIMARY OSTEOARTHRITIS INVOLVING MULTIPLE JOINTS: ICD-10-CM

## 2021-03-02 DIAGNOSIS — I50.9 ACUTE ON CHRONIC CONGESTIVE HEART FAILURE, UNSPECIFIED HEART FAILURE TYPE (H): ICD-10-CM

## 2021-03-02 DIAGNOSIS — I48.20 CHRONIC ATRIAL FIBRILLATION (H): Primary | ICD-10-CM

## 2021-03-02 DIAGNOSIS — I50.32 CHRONIC DIASTOLIC CONGESTIVE HEART FAILURE (H): ICD-10-CM

## 2021-03-02 RX ORDER — ANORECTAL OINTMENT 15.7; .44; 24; 20.6 G/100G; G/100G; G/100G; G/100G
OINTMENT TOPICAL
Start: 2021-03-02 | End: 2021-04-13

## 2021-03-02 RX ORDER — POTASSIUM CHLORIDE 1500 MG/1
20 TABLET, EXTENDED RELEASE ORAL DAILY
Qty: 4 TABLET | Refills: 0 | Status: SHIPPED | OUTPATIENT
Start: 2021-03-03 | End: 2021-03-07

## 2021-03-02 RX ORDER — FUROSEMIDE 40 MG
40 TABLET ORAL DAILY
Qty: 30 TABLET | Refills: 4 | Status: SHIPPED | OUTPATIENT
Start: 2021-03-08 | End: 2021-03-30

## 2021-03-02 RX ORDER — FUROSEMIDE 40 MG
60 TABLET ORAL DAILY
Qty: 28 TABLET | Refills: 97 | Status: SHIPPED | OUTPATIENT
Start: 2021-03-03 | End: 2021-03-09

## 2021-03-02 NOTE — LETTER
3/2/2021        RE: Liberty Horta  [facility]  [address]  [place] [address]        [facility]  [place] Medical Record Number:  [id number]  Place of Service where encounter took place:  [facility] ([facility] [[id number]]  Chief Complaint   Patient presents with     RECHECK     Pressure ulcer, weakness, F2F therapy       HPI:    Liberty Horta  is a 86 year old (1935), who is being seen today for an episodic care visit.  HPI information obtained from: facility chart records, facility staff, patient report and [facility] chart review. Today's concern is:    1. Chronic atrial fibrillation (H)    2. Parkinson's disease (tremor, stiffness, slow motion, unstable posture) (H)    3. Chronic diastolic congestive heart failure (H)    4. Stage 3 chronic kidney disease, unspecified whether stage 3a or 3b CKD    5. Hypothyroidism, unspecified type    6. Spinal stenosis, unspecified spinal region    7. Cognitive impairment    8. Physical deconditioning    9. Pressure injury of contiguous region involving buttock and hip, stage 1, unspecified laterality      Patient is a 85 year old female that was admitted to Al facility May 2019 after hospitalization for severe pain to her left buttock in which she was unable to bear weight.  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  Ortho saw her and recommended nonoperative management with WBAT and therapy.  Her Lumbar fracture was noted previously on CT imaging from March 2019 and conservative management and monitoring recommended.     She has PMH significant for a-fib, s/p pacer maker placement (on coumadin), hypothyroidism, HTN, GERD, OA, Anemia, HLD, urinary retention, Parkinson's.  She has had her left knee replaced and also had right hip ORIF in the past     She is seen today at request of AL staff due to newly noted pressure injury  to bilateral buttocks and upper thighs. Staff have also noticed increased physical weakness, worsening balance, and are requesting therapy evaluate.  Patient spends majority of the day sitting in her recliner or w/c, contributing to pressure ulcer.       She is seen today in her apartment, her son Michele is present today.  Patient with increased shortness of breath with movement and also also orthopnea.  She gets nervous when her head is lowered as she feels she cannot breathe.  She is unaware of pressure sores, they do not bother her.  Son has noticed that patient has appeared more fatigued and sleeping more in the past year.  He still walks her when he visits twice weekly.  He would like therapy's opinion on safety of staff walking his mom daily.  Patient continues to report pain to her right knee.  She does not feel voltaren gel is helpful.  She has not tried biofreeze in the past, and agreeable to try     Past Medical and Surgical History reviewed in Epic today.    MEDICATIONS:    Current Outpatient Medications   Medication Sig Dispense Refill     ACETAMINOPHEN EXTRA STRENGTH 500 MG tablet TAKE TWO TABLETS (1000MG) BY MOUTH TWICE DAILY;AND MAY TAKE TWO TABLETS (1000MG) BY MOUTH ONCE DAILY AS NEEDED 112 tablet PRN     atorvastatin (LIPITOR) 40 MG tablet TAKE 1 TABLET BY MOUTH AT BEDTIME 28 tablet PRN     diclofenac (VOLTAREN) 1 % topical gel APPLY 4 GM TO EACH KNEE TWICE DAILY 100 g 10     DILT- MG 24 hr capsule TAKE 1 CAPSULE BY MOUTH ONCE DAILY 28 capsule PRN     furosemide (LASIX) 40 MG tablet TAKE 1 TABLET BY MOUTH ONCE DAILY STARTING 2/21 28 tablet 97     levothyroxine (SYNTHROID/LEVOTHROID) 150 MCG tablet TAKE 1 TABLET BY MOUTH ONCE DAILY 28 tablet PRN     melatonin 1 MG TABS tablet Take 1 tablet (1 mg) by mouth At Bedtime 30 tablet 3     Multiple Vitamin (TAB-A-IRIS) TABS TAKE 1 TABLET BY MOUTH ONCE DAILY 28 tablet PRN     sotalol (BETAPACE) 80 MG tablet TAKE 1 TABLET BY MOUTH TWICE DAILY WITH MEALS 56  "tablet PRN     warfarin (COUMADIN) 2.5 MG tablet Take 2.5 mg by mouth daily on Tues & Fridays  AND take 5 mg AOD           REVIEW OF SYSTEMS:  Limited secondary to cognitive impairment but as noted in HPI     Objective:  Temp 98  F (36.7  C)   Ht 1.575 m (5' 2\")   Wt 84.4 kg (186 lb)   BMI 34.02 kg/m    Exam:  GENERAL APPEARANCE:  Alert, in no distress, cooperative, easily falls asleep   EYES:  PERRL, wearing corrective lenses  NECK:  No adenopathy,masses or thyromegaly  RESP:  respiratory effort and palpation of chest normal, lungs crackles to BLL, L?R, orthopnea, no wheezing, no cough  CV:  Palpation and auscultation of heart done , paced rhythm, edema 1-2+ BLLE, no compression, no open areas noted  ABDOMEN:  soft, non-tender to light and deep palpation , no guarding or rebound, bowel sounds normal  M/S:   Gait and station abnormal generalized weakness, arthritic changes noted to hands, difficulty going from sitting to standing, requires much encouragement to stand and assistance  Skin: pressure injury noted to bilateral buttocks and upper thighs, non-blanchable, no open areas noted   NEURO:   Cranial nerves 2-12 are normal tested and grossly at patient's baseline  PSYCH:  memory impaired , affect and mood normal    Labs:   Recent labs in [facility] reviewed by me today.     ASSESSMENT/PLAN:  (I48.20) Chronic atrial fibrillation (H)  (primary encounter diagnosis)  Comment: per history, with pace maker  -goes to have device checked onsite Q6 months: due for check in April per son  -on coumadin  Plan: continue pacer and checks per recommendation  -on diltiazem and sotalol   -anticoagulated with coumadin  -follow INR's  -does not follow with cards and no desire to per family     (G20) Parkinson's disease (tremor, stiffness, slow motion, unstable posture) (H)  Comment:no official neuro w/u but per symptoms of tremor, quiet speech, rigidity, slow movement, falls  -does not desire neuro involvement or " medications  -worsening balance and increased weakness, staff having increased difficulty with transfers  Plan: monitor, AL staff assist with cares  -homecare therapy to assess balance, transfers and work on strengthening    CHF exacerbation  Comment: increased orthopnea, shortness of breath with exertion and crackles to LL today  -weights not routinely monitored in AL, she has gained weight since pandemic per son, primarily thought to be r/t inactivity and increase po intake   Plan: increase lasix to 60mg X 4 days, then back to 40mg every day  -will also give 20meq KCL X 4 days  -BMP and CBC next week  -staff to update me on if no change in symptoms     (N18.30) Stage 3 chronic kidney disease, unspecified whether stage 3a or 3b CKD  Comment:   Lab Results   Component Value Date    CR 0.97 06/30/2020       Plan: BMP next week     (E03.9) Hypothyroidism, unspecified type  Comment:   Lab Results   Component Value Date    TSH 4.06 10/22/2020       Plan: reasonable control, due to son's report of increased sleepiness/fatigue, will check TSH again next week      (M48.00) Spinal stenosis, unspecified spinal region  Comment: history of severe buttock pain and inability to bear weight June 2019  -L5 lateral vertebral body fx and central stenosis L3-L4  -conservative management and WBAT  Plan:monitofr for neurological changes that would warrant neurosurgery involvement     (R41.89) Cognitive impairment  Comment: no formal testing, some STML and confusion noted, family notices decline since pandemic with social isolation  Plan: staff assist with cares and meds  -supportive family     (R53.81) Physical deconditioning  Comment: poor balance, weakness with ambulation   Plan: homecare therapy assessment     (L89.41) Pressure injury of contiguous region involving buttock and hip, stage 1, unspecified laterality  Comment: ACUTE, noted recently to bilateral buttocks and thighs, spends majority of day in chair  Plan: calmoseptine BID  and with incontinence cares  -OT pressure mapping and assess for cushion     OA  Comment: still reporting pain to right knee  -has not responded to injections in the past  -does not feel voltaren effective  Plan: continue APAP  -d'c voltaren  -try biofreeze BID and BID prn     Fatigue  Comment: reported by son, patient alert but does fall asleep during visit  -discussed with son, could be r/t CHF exacerbation, thyroid, age, anemia, depression and isolation associated with pandemic  Plan: TSH, CBC and BMP next week  -increase diuresis as noted under CHF  -continue to encourage patient to attend activities, go to meals, stimulate her       Electronically signed by:  IRASEMA Rivero CNP             Sincerely,        IRASEMA Rivero CNP

## 2021-03-04 ENCOUNTER — TELEPHONE (OUTPATIENT)
Dept: GERIATRICS | Facility: CLINIC | Age: 86
End: 2021-03-04

## 2021-03-04 NOTE — TELEPHONE ENCOUNTER
[facility] now requests orders and shares plan of care/discharge summaries for some patients through [employer].  Please REPLY TO THIS MESSAGE OR ROUTE BACK TO THE AUTHOR in order to give authorization for orders when needed.  This is considered a verbal order, you will still receive a faxed copy of orders for signature.  Thank you for your assistance in improving collaboration for our patients.    ORDER  Physical Therapy 1W1 Starting Week of 03/07/2021 for transfer training from seated surface.    Occupational Therapy to evaluate and treat for wheelchair positioning, assess for DME needs.

## 2021-03-09 ENCOUNTER — ASSISTED LIVING VISIT (OUTPATIENT)
Dept: GERIATRICS | Facility: CLINIC | Age: 86
End: 2021-03-09
Payer: COMMERCIAL

## 2021-03-09 ENCOUNTER — HOSPITAL LABORATORY (OUTPATIENT)
Dept: OTHER | Facility: CLINIC | Age: 86
End: 2021-03-09

## 2021-03-09 VITALS — BODY MASS INDEX: 34.23 KG/M2 | WEIGHT: 186 LBS | TEMPERATURE: 98.1 F | HEIGHT: 62 IN

## 2021-03-09 DIAGNOSIS — E03.9 HYPOTHYROIDISM, UNSPECIFIED TYPE: Primary | ICD-10-CM

## 2021-03-09 DIAGNOSIS — Z79.01 LONG TERM (CURRENT) USE OF ANTICOAGULANTS: ICD-10-CM

## 2021-03-09 DIAGNOSIS — Z51.81 ENCOUNTER FOR THERAPEUTIC DRUG MONITORING: ICD-10-CM

## 2021-03-09 DIAGNOSIS — I48.11 LONGSTANDING PERSISTENT ATRIAL FIBRILLATION (H): ICD-10-CM

## 2021-03-09 LAB
ANION GAP SERPL CALCULATED.3IONS-SCNC: 9 MMOL/L (ref 3–14)
BUN SERPL-MCNC: 22 MG/DL (ref 7–30)
CALCIUM SERPL-MCNC: 9.7 MG/DL (ref 8.5–10.1)
CHLORIDE SERPL-SCNC: 103 MMOL/L (ref 94–109)
CO2 SERPL-SCNC: 28 MMOL/L (ref 20–32)
CREAT SERPL-MCNC: 0.96 MG/DL (ref 0.52–1.04)
ERYTHROCYTE [DISTWIDTH] IN BLOOD BY AUTOMATED COUNT: 12.8 % (ref 10–15)
GFR SERPL CREATININE-BSD FRML MDRD: 54 ML/MIN/{1.73_M2}
GLUCOSE SERPL-MCNC: 139 MG/DL (ref 70–99)
HCT VFR BLD AUTO: 45.6 % (ref 35–47)
HGB BLD-MCNC: 14 G/DL (ref 11.7–15.7)
INR PPP: 2.38 (ref 0.86–1.14)
MCH RBC QN AUTO: 31.3 PG (ref 26.5–33)
MCHC RBC AUTO-ENTMCNC: 30.7 G/DL (ref 31.5–36.5)
MCV RBC AUTO: 102 FL (ref 78–100)
PLATELET # BLD AUTO: 178 10E9/L (ref 150–450)
POTASSIUM SERPL-SCNC: 3.7 MMOL/L (ref 3.4–5.3)
RBC # BLD AUTO: 4.48 10E12/L (ref 3.8–5.2)
SODIUM SERPL-SCNC: 140 MMOL/L (ref 133–144)
TSH SERPL DL<=0.005 MIU/L-ACNC: 5.91 MU/L (ref 0.4–4)
WBC # BLD AUTO: 6.5 10E9/L (ref 4–11)

## 2021-03-09 RX ORDER — LEVOTHYROXINE SODIUM 175 UG/1
175 TABLET ORAL DAILY
Qty: 30 TABLET | Refills: 3 | Status: SHIPPED | OUTPATIENT
Start: 2021-03-09 | End: 2021-01-01

## 2021-03-09 ASSESSMENT — MIFFLIN-ST. JEOR: SCORE: 1246.94

## 2021-03-09 NOTE — LETTER
"    3/9/2021        RE: Liberty Horta  [facility]  [address]  [place] [address]        [place] [facility]  [facility] Record Number:  [id number]  Place of Service where encounter took place: [facility] ([facility] [[id number]]    HPI:    Liberty Horta is a 86 year old  (1935), who is being seen today for an episodic care visit at the above location.   HPI information obtained from: facility chart records, facility staff and [facility] chart review. Today's concern is INR/Coumadin management for A. Fib    ROS/Subjective:  Bleeding Signs/Symptoms:  None  Thromboembolic Signs/Symptoms:  None  Medication Changes:  Yes: lasix burst, adjusting thyroid med today  Dietary Changes:  No  Activity Changes: No  Bacterial/Viral Infection:  No  Missed Coumadin Doses:  None  On ASA: No  Other Concerns:  No    OBJECTIVE:  Temp 98.1  F (36.7  C)   Ht 1.575 m (5' 2\")   Wt 84.4 kg (186 lb)   BMI 34.02 kg/m    Last INR: 2.6 on 2/9  INR Today:  2.38  Current Dose:  2.5mg PO QWednesday. 5mg PO QD on all other days    INR Flow sheet at [facility]:    ASSESSMENT:  1. Hypothyroidism, unspecified type    2. Longstanding persistent atrial fibrillation (H)    3. Encounter for therapeutic drug monitoring    4. Long term (current) use of anticoagulants      Lab Results   Component Value Date    TSH 5.91 03/09/2021     Comment: TSH elevated today      Therapeutic INR for goal of 2-3    PLAN:   Orders written by provider at facility  New Dose: No Change    Next INR: 4 weeks    Plan: increase synthroid to 175mcg po every day  -TSH recheck in 6 weeks      Electronically signed by:  IRASEMA Rivero CNP           Sincerely,        IRASEMA Rivero CNP    "

## 2021-03-09 NOTE — PROGRESS NOTES
"[facility]  [place] Medical Record Number:  [id number]  Place of Service where encounter took place: [facility] ([facility]) [[id number]]    HPI:    Liberty Horta is a 86 year old  (1935), who is being seen today for an episodic care visit at the above location.   HPI information obtained from: facility chart records, facility staff and [facility] chart review. Today's concern is INR/Coumadin management for A. Fib    ROS/Subjective:  Bleeding Signs/Symptoms:  None  Thromboembolic Signs/Symptoms:  None  Medication Changes:  Yes: lasix burst, adjusting thyroid med today  Dietary Changes:  No  Activity Changes: No  Bacterial/Viral Infection:  No  Missed Coumadin Doses:  None  On ASA: No  Other Concerns:  No    OBJECTIVE:  Temp 98.1  F (36.7  C)   Ht 1.575 m (5' 2\")   Wt 84.4 kg (186 lb)   BMI 34.02 kg/m    Last INR: 2.6 on 2/9  INR Today:  2.38  Current Dose:  2.5mg PO QWednesday. 5mg PO QD on all other days    INR Flow sheet at SNF:    ASSESSMENT:  1. Hypothyroidism, unspecified type    2. Longstanding persistent atrial fibrillation (H)    3. Encounter for therapeutic drug monitoring    4. Long term (current) use of anticoagulants      Lab Results   Component Value Date    TSH 5.91 03/09/2021     Comment: TSH elevated today      Therapeutic INR for goal of 2-3    PLAN:   Orders written by provider at facility  New Dose: No Change    Next INR: 4 weeks    Plan: increase synthroid to 175mcg po every day  -TSH recheck in 6 weeks      Electronically signed by:  IRASEMA Rivero CNP     "

## 2021-03-11 ENCOUNTER — HOSPITAL LABORATORY (OUTPATIENT)
Dept: OTHER | Facility: CLINIC | Age: 86
End: 2021-03-11

## 2021-03-11 LAB
SARS-COV-2 RNA RESP QL NAA+PROBE: NORMAL
SPECIMEN SOURCE: NORMAL

## 2021-03-26 ENCOUNTER — TELEPHONE (OUTPATIENT)
Dept: GERIATRICS | Facility: CLINIC | Age: 86
End: 2021-03-26

## 2021-03-26 NOTE — TELEPHONE ENCOUNTER
"[facility] BRIDGE COMMUNICATION DOCUMENTATION ENCOUNTER    Liberty Horta is a 86 year old  (1935), Nurse messaged [facility] via the \"Bridge\" today to report:     03/26/2021 2:34:17 PM - By: Cyndie Martinez: Resident has 10.8lb weight gain since 06/15/20. OT reported resident was very short of breath after walked few steps. She is not on daily weight. Would you like to add her on your Tuesday list?    Disposition:    Message sent to PCP to address      Requests:    Please reply with a \"Quicknote\" or open the encounter to reply with new orders and route back to Geriatrics Support Pool        Electronically signed by:   Yenny Xiao RN  "

## 2021-03-29 VITALS — TEMPERATURE: 98.4 F | HEIGHT: 62 IN | WEIGHT: 197.4 LBS | BODY MASS INDEX: 36.33 KG/M2

## 2021-03-29 PROBLEM — E66.01 MORBID OBESITY (H): Status: ACTIVE | Noted: 2021-03-29

## 2021-03-29 ASSESSMENT — MIFFLIN-ST. JEOR: SCORE: 1298.65

## 2021-03-29 NOTE — TELEPHONE ENCOUNTER
I plan to follow-up and see patient tomorrow when onsite.  Placed order on bridge already for daily weights    IRASEMA Rivero CNP

## 2021-03-29 NOTE — PROGRESS NOTES
[place] [facility]  [place] Medical Record Number:  [id number]  Place of Service where encounter took place:  [facility] (S) [[id number]]  Chief Complaint   Patient presents with     RECHECK     CHF Exacerbation       HPI:    Liberty Horta  is a 86 year old (1935), who is being seen today for an episodic care visit.  HPI information obtained from: facility chart records, facility staff, patient report and [facility] chart review. Today's concern is:    1. Longstanding persistent atrial fibrillation (H)    2. Morbid obesity (H)    3. Chronic atrial fibrillation (H)    4. Parkinson's disease (tremor, stiffness, slow motion, unstable posture) (H)    5. Stage 3 chronic kidney disease, unspecified whether stage 3a or 3b CKD    6. Acute on chronic diastolic congestive heart failure (H)      Patient is a 85 year old female that was admitted to Al facility May 2019 after hospitalization for severe pain to her left buttock in which she was unable to bear weight.  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  Ortho saw her and recommended nonoperative management with WBAT and therapy.  Her Lumbar fracture was noted previously on CT imaging from March 2019 and conservative management and monitoring recommended.     She has PMH significant for a-fib, s/p pacer maker placement (on coumadin), hypothyroidism, HTN, GERD, OA, Anemia, HLD, urinary retention, Parkinson's.  She has had her left knee replaced and also had right hip ORIF in the past     She is seen today after staff notified by home health therapy that patient with increasing shortness of breath with minimal exertion.  She is not on weight monitoring in the AL.  Patient is seen in her apartment today in her recliner chair.  She tells me that it is harder for her to breath even at rest, becomes worse with any exertion.  Afebrile, no cough, denies CP  "palpitations or dizziness.  Her son, Michele, has also noticed that she is needing her head more elevated to rest.    Past Medical and Surgical History reviewed in Epic today.    MEDICATIONS:    Current Outpatient Medications   Medication Sig Dispense Refill     ACETAMINOPHEN EXTRA STRENGTH 500 MG tablet TAKE TWO TABLETS (1000MG) BY MOUTH TWICE DAILY;AND MAY TAKE TWO TABLETS (1000MG) BY MOUTH ONCE DAILY AS NEEDED 112 tablet PRN     atorvastatin (LIPITOR) 40 MG tablet TAKE 1 TABLET BY MOUTH AT BEDTIME 28 tablet PRN     DILT- MG 24 hr capsule TAKE 1 CAPSULE BY MOUTH ONCE DAILY 28 capsule PRN     furosemide (LASIX) 40 MG tablet Take 1 tablet (40 mg) by mouth daily 30 tablet 4     levothyroxine (SYNTHROID/LEVOTHROID) 150 MCG tablet TAKE 1 TABLET BY MOUTH ONCE DAILY 28 tablet PRN     levothyroxine (SYNTHROID/LEVOTHROID) 175 MCG tablet Take 1 tablet (175 mcg) by mouth daily 30 tablet 3     melatonin 1 MG TABS tablet Take 1 tablet (1 mg) by mouth At Bedtime 30 tablet 3     Menthol, Topical Analgesic, 4 % GEL Externally apply 1 Application topically 2 times daily. May also externally apply 1 Application 2 times daily as needed. 89 mL 4     menthol-zinc oxide (CALMOSEPTINE) 0.44-20.6 % OINT ointment Apply a thin layer to affected area BID and may apply q2hrs prn with incontinence care       Multiple Vitamin (TAB-A-IRIS) TABS TAKE 1 TABLET BY MOUTH ONCE DAILY 28 tablet PRN     sotalol (BETAPACE) 80 MG tablet TAKE 1 TABLET BY MOUTH TWICE DAILY WITH MEALS 56 tablet PRN     warfarin (COUMADIN) 2.5 MG tablet Take 2.5 mg by mouth daily on Tues & Fridays  AND take 5 mg AOD           REVIEW OF SYSTEMS:  Limited secondary to cognitive impairment but as noted in HPI    Objective:  Temp 98.4  F (36.9  C)   Ht 1.575 m (5' 2\")   Wt 89.5 kg (197 lb 6.4 oz)   BMI 36.10 kg/m    Exam:  GENERAL APPEARANCE:  Alert, in no distress, cooperative  EYES:  PERRL, wearing corrective lenses  NECK:  No adenopathy,masses or " thyromegaly  RESP:  respiratory effort and palpation of chest normal, increased WOB with minimal exertion, lungs crackles to BLL,  orthopnea, no wheezing, no cough  CV:  Palpation and auscultation of heart done , paced rhythm, edema 1-2+ BLLE, no compression, no open areas noted  ABDOMEN:  soft, non-tender to light and deep palpation , no guarding or rebound, bowel sounds normal  M/S:   Gait and station abnormal generalized weakness, arthritic changes noted to hands  NEURO:   Cranial nerves 2-12 are normal tested and grossly at patient's baseline  PSYCH:  memory impaired , affect and mood normal       Labs:   Recent labs in [employer] reviewed by me today.     ASSESSMENT/PLAN:  (I48.11) Longstanding persistent atrial fibrillation (H)  (primary encounter diagnosis)  Comment: per history, with pace maker  -goes to have device checked onsite Q6 months: due for check in April per son  -on coumadin  Plan: continue pacer and checks per recommendation  -on diltiazem and sotalol   -anticoagulated with coumadin  -follow INR's  -does not follow with cards and no desire to per family     (E66.01) Morbid obesity (H)  Comment: Body mass index is 36.1 kg/m .  Plan: exacerbated by increased difficulty with ambulation and physical activity, sedentary  -encourage activity as safe and able  -encourage healthy foods  -monitor weights     (G20) Parkinson's disease (tremor, stiffness, slow motion, unstable posture) (H)  Comment:no official neuro w/u but per symptoms of tremor, quiet speech, rigidity, slow movement, falls  -does not desire neuro involvement or medications  -worsening balance and increased weakness earlier this month, home health therapies ordered   Plan: monitor, AL staff assist with cares  -homecare therapy involved     (N18.30) Stage 3 chronic kidney disease, unspecified whether stage 3a or 3b CKD  Comment:   Lab Results   Component Value Date    CR 0.96 03/09/2021       Plan: avoid nephrotoxic meds, renally adjust  meds  -monitor kidney function, especially with increased diuresis   -BMP next week     (I50.33) Acute on chronic diastolic congestive heart failure (H)  Comment: ACUTE, exacerbation  -continues with increase shortness of breath especially with exertion, orthopnea  -not on routine weights in AL due to cost, her weight is up about 10lbs from 1 year ago.  Likely not all fluid related.  Family has noted increased po intake and inactivity   -last years weight 186lbs, currently at 197lbs  Plan: daily weights X 2 weeks, then monthly, staff to update me next week with weights  -lasix 80mg 3/31 and 4/1, then lasix 60mg 4/2-4/5  -resume lasix 40mg every day 4/6: I plan to follow-up with patient to assess effectiveness   -KCL replacement 20meq po every day 3/31-4/5  -BMP next week     I was able to speak with patient's son, Michele, on the phone regarding assessment and POC    Electronically signed by:  IRASEMA Rivero CNP

## 2021-03-30 ENCOUNTER — ASSISTED LIVING VISIT (OUTPATIENT)
Dept: GERIATRICS | Facility: CLINIC | Age: 86
End: 2021-03-30
Payer: COMMERCIAL

## 2021-03-30 DIAGNOSIS — E66.01 MORBID OBESITY (H): ICD-10-CM

## 2021-03-30 DIAGNOSIS — I48.11 LONGSTANDING PERSISTENT ATRIAL FIBRILLATION (H): Primary | ICD-10-CM

## 2021-03-30 DIAGNOSIS — N18.30 STAGE 3 CHRONIC KIDNEY DISEASE, UNSPECIFIED WHETHER STAGE 3A OR 3B CKD (H): ICD-10-CM

## 2021-03-30 DIAGNOSIS — I50.33 ACUTE ON CHRONIC DIASTOLIC CONGESTIVE HEART FAILURE (H): ICD-10-CM

## 2021-03-30 DIAGNOSIS — I48.20 CHRONIC ATRIAL FIBRILLATION (H): ICD-10-CM

## 2021-03-30 DIAGNOSIS — G20.A1 PARKINSON'S DISEASE (TREMOR, STIFFNESS, SLOW MOTION, UNSTABLE POSTURE) (H): ICD-10-CM

## 2021-03-30 DIAGNOSIS — I50.9 ACUTE ON CHRONIC CONGESTIVE HEART FAILURE, UNSPECIFIED HEART FAILURE TYPE (H): ICD-10-CM

## 2021-03-30 RX ORDER — FUROSEMIDE 40 MG
TABLET ORAL
Qty: 10 TABLET | Refills: 0 | Status: SHIPPED | OUTPATIENT
Start: 2021-03-31 | End: 2021-04-06

## 2021-03-30 RX ORDER — FUROSEMIDE 40 MG
40 TABLET ORAL DAILY
Qty: 30 TABLET | Refills: 4
Start: 2021-04-06 | End: 2021-04-06

## 2021-03-30 RX ORDER — POTASSIUM CHLORIDE 1500 MG/1
20 TABLET, EXTENDED RELEASE ORAL DAILY
Qty: 5 TABLET | Refills: 0 | Status: SHIPPED | OUTPATIENT
Start: 2021-03-31 | End: 2021-04-06

## 2021-03-30 NOTE — LETTER
3/30/2021        RE: Liberty Horta  [facility]  [address]  [place] [address]        [facility]  [place] Medical Record Number:  [id number]  Place of Service where encounter took place:  [facility] ([facility] [[id number]]  Chief Complaint   Patient presents with     RECHECK     CHF Exacerbation       HPI:    Liberty Horta  is a 86 year old (1935), who is being seen today for an episodic care visit.  HPI information obtained from: facility chart records, facility staff, patient report and [facility] chart review. Today's concern is:    1. Longstanding persistent atrial fibrillation (H)    2. Morbid obesity (H)    3. Chronic atrial fibrillation (H)    4. Parkinson's disease (tremor, stiffness, slow motion, unstable posture) (H)    5. Stage 3 chronic kidney disease, unspecified whether stage 3a or 3b CKD    6. Acute on chronic diastolic congestive heart failure (H)      Patient is a 85 year old female that was admitted to Al facility May 2019 after hospitalization for severe pain to her left buttock in which she was unable to bear weight.  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  Ortho saw her and recommended nonoperative management with WBAT and therapy.  Her Lumbar fracture was noted previously on CT imaging from March 2019 and conservative management and monitoring recommended.     She has PMH significant for a-fib, s/p pacer maker placement (on coumadin), hypothyroidism, HTN, GERD, OA, Anemia, HLD, urinary retention, Parkinson's.  She has had her left knee replaced and also had right hip ORIF in the past     She is seen today after staff notified by home health therapy that patient with increasing shortness of breath with minimal exertion.  She is not on weight monitoring in the AL.  Patient is seen in her apartment today in her recliner chair.  She tells me  "that it is harder for her to breath even at rest, becomes worse with any exertion.  Afebrile, no cough, denies CP palpitations or dizziness.  Her son, Michele, has also noticed that she is needing her head more elevated to rest.    Past Medical and Surgical History reviewed in Epic today.    MEDICATIONS:    Current Outpatient Medications   Medication Sig Dispense Refill     ACETAMINOPHEN EXTRA STRENGTH 500 MG tablet TAKE TWO TABLETS (1000MG) BY MOUTH TWICE DAILY;AND MAY TAKE TWO TABLETS (1000MG) BY MOUTH ONCE DAILY AS NEEDED 112 tablet PRN     atorvastatin (LIPITOR) 40 MG tablet TAKE 1 TABLET BY MOUTH AT BEDTIME 28 tablet PRN     DILT- MG 24 hr capsule TAKE 1 CAPSULE BY MOUTH ONCE DAILY 28 capsule PRN     furosemide (LASIX) 40 MG tablet Take 1 tablet (40 mg) by mouth daily 30 tablet 4     levothyroxine (SYNTHROID/LEVOTHROID) 150 MCG tablet TAKE 1 TABLET BY MOUTH ONCE DAILY 28 tablet PRN     levothyroxine (SYNTHROID/LEVOTHROID) 175 MCG tablet Take 1 tablet (175 mcg) by mouth daily 30 tablet 3     melatonin 1 MG TABS tablet Take 1 tablet (1 mg) by mouth At Bedtime 30 tablet 3     Menthol, Topical Analgesic, 4 % GEL Externally apply 1 Application topically 2 times daily. May also externally apply 1 Application 2 times daily as needed. 89 mL 4     menthol-zinc oxide (CALMOSEPTINE) 0.44-20.6 % OINT ointment Apply a thin layer to affected area BID and may apply q2hrs prn with incontinence care       Multiple Vitamin (TAB-A-IRIS) TABS TAKE 1 TABLET BY MOUTH ONCE DAILY 28 tablet PRN     sotalol (BETAPACE) 80 MG tablet TAKE 1 TABLET BY MOUTH TWICE DAILY WITH MEALS 56 tablet PRN     warfarin (COUMADIN) 2.5 MG tablet Take 2.5 mg by mouth daily on Tues & Fridays  AND take 5 mg AOD           REVIEW OF SYSTEMS:  Limited secondary to cognitive impairment but as noted in HPI    Objective:  Temp 98.4  F (36.9  C)   Ht 1.575 m (5' 2\")   Wt 89.5 kg (197 lb 6.4 oz)   BMI 36.10 kg/m    Exam:  GENERAL APPEARANCE:  Alert, in no " distress, cooperative  EYES:  PERRL, wearing corrective lenses  NECK:  No adenopathy,masses or thyromegaly  RESP:  respiratory effort and palpation of chest normal, increased WOB with minimal exertion, lungs crackles to BLL,  orthopnea, no wheezing, no cough  CV:  Palpation and auscultation of heart done , paced rhythm, edema 1-2+ BLLE, no compression, no open areas noted  ABDOMEN:  soft, non-tender to light and deep palpation , no guarding or rebound, bowel sounds normal  M/S:   Gait and station abnormal generalized weakness, arthritic changes noted to hands  NEURO:   Cranial nerves 2-12 are normal tested and grossly at patient's baseline  PSYCH:  memory impaired , affect and mood normal       Labs:   Recent labs in [facility] reviewed by me today.     ASSESSMENT/PLAN:  (I48.11) Longstanding persistent atrial fibrillation (H)  (primary encounter diagnosis)  Comment: per history, with pace maker  -goes to have device checked onsite Q6 months: due for check in April per son  -on coumadin  Plan: continue pacer and checks per recommendation  -on diltiazem and sotalol   -anticoagulated with coumadin  -follow INR's  -does not follow with cards and no desire to per family     (E66.01) Morbid obesity (H)  Comment: Body mass index is 36.1 kg/m .  Plan: exacerbated by increased difficulty with ambulation and physical activity, sedentary  -encourage activity as safe and able  -encourage healthy foods  -monitor weights     (G20) Parkinson's disease (tremor, stiffness, slow motion, unstable posture) (H)  Comment:no official neuro w/u but per symptoms of tremor, quiet speech, rigidity, slow movement, falls  -does not desire neuro involvement or medications  -worsening balance and increased weakness earlier this month, home health therapies ordered   Plan: monitor, AL staff assist with cares  -homecare therapy involved     (N18.30) Stage 3 chronic kidney disease, unspecified whether stage 3a or 3b CKD  Comment:   Lab Results    Component Value Date    CR 0.96 03/09/2021       Plan: avoid nephrotoxic meds, renally adjust meds  -monitor kidney function, especially with increased diuresis   -BMP next week     (I50.33) Acute on chronic diastolic congestive heart failure (H)  Comment: ACUTE, exacerbation  -continues with increase shortness of breath especially with exertion, orthopnea  -not on routine weights in AL due to cost, her weight is up about 10lbs from 1 year ago.  Likely not all fluid related.  Family has noted increased po intake and inactivity   -last years weight 186lbs, currently at 197lbs  Plan: daily weights X 2 weeks, then monthly, staff to update me next week with weights  -lasix 80mg 3/31 and 4/1, then lasix 60mg 4/2-4/5  -resume lasix 40mg every day 4/6: I plan to follow-up with patient to assess effectiveness   -KCL replacement 20meq po every day 3/31-4/5  -BMP next week     I was able to speak with patient's son, Michele, on the phone regarding assessment and POC    Electronically signed by:  IRASEMA Rivero CNP           Sincerely,        Marisa Morales, IRASEMA CNP

## 2021-04-05 ASSESSMENT — MIFFLIN-ST. JEOR: SCORE: 1280.51

## 2021-04-05 NOTE — PROGRESS NOTES
[place] [facility]  [place] Medical Record Number:  [id number]  Place of Service where encounter took place:  [facility] ([facility] [[id number]]  Chief Complaint   Patient presents with     RECHECK     f/u med changes, CHF exacerbation       HPI:    Liberty Horta  is a 86 year old (1935), who is being seen today for an episodic care visit.  HPI information obtained from: facility chart records, facility staff, patient report and [facility] chart review. Today's concern is:    1. Acute on chronic diastolic congestive heart failure (H)    2. Chronic atrial fibrillation (H)    3. Parkinson's disease (tremor, stiffness, slow motion, unstable posture) (H)    4. Stage 3 chronic kidney disease, unspecified whether stage 3a or 3b CKD    5. Cognitive impairment      Patient is a 85 year old female with PMH significant for a-fib, s/p pacer maker placement (on coumadin), hypothyroidism, HTN, GERD, OA, Anemia, HLD, urinary retention, Parkinson's.  She has had her left knee replaced and also had right hip ORIF in the past    She is seen today for follow-up on medication changes made last week due to CHF exacerbation that presented with increased shortness of breath and weight gain, as well as worsening othopnea.  Her lasix was increased and she was placed on daily weights.      Her weight is down 4 lbs from last week.  She is not sure if she has noticed a change in her breathing, but she thinks it might be slightly better.  She denies CP, dizziness or lightheadedness.           Past Medical and Surgical History reviewed in Epic today.    MEDICATIONS:    Current Outpatient Medications   Medication Sig Dispense Refill     ACETAMINOPHEN EXTRA STRENGTH 500 MG tablet TAKE TWO TABLETS (1000MG) BY MOUTH TWICE DAILY;AND MAY TAKE TWO TABLETS (1000MG) BY MOUTH ONCE DAILY AS NEEDED 112 tablet PRN     atorvastatin (LIPITOR) 40 MG tablet TAKE 1 TABLET BY MOUTH AT BEDTIME 28 tablet PRN     DILT-  "MG 24 hr capsule TAKE 1 CAPSULE BY MOUTH ONCE DAILY 28 capsule PRN     furosemide (LASIX) 40 MG tablet Take 1 tablet (40 mg) by mouth daily 30 tablet 4     furosemide (LASIX) 40 MG tablet Take 2 tablets (80 mg) by mouth daily for 2 days, THEN 1.5 tablets (60 mg) daily for 4 days. Patient to resume 40mg every day dose 4/6 10 tablet 0     levothyroxine (SYNTHROID/LEVOTHROID) 175 MCG tablet Take 1 tablet (175 mcg) by mouth daily 30 tablet 3     melatonin 1 MG TABS tablet TAKE 1 TABLET BY MOUTH AT BEDTIME 28 tablet PRN     Menthol, Topical Analgesic, 4 % GEL Externally apply 1 Application topically 2 times daily. May also externally apply 1 Application 2 times daily as needed. 89 mL 4     menthol-zinc oxide (CALMOSEPTINE) 0.44-20.6 % OINT ointment Apply a thin layer to affected area BID and may apply q2hrs prn with incontinence care       Multiple Vitamins-Minerals (TAB-A-IRIS) TABS TAKE 1 TABLET BY MOUTH ONCE DAILY 28 tablet PRN     sotalol (BETAPACE) 80 MG tablet TAKE 1 TABLET BY MOUTH TWICE DAILY WITH MEALS 56 tablet PRN     warfarin (COUMADIN) 2.5 MG tablet Take 2.5 mg by mouth daily on Tues & Fridays  AND take 5 mg AOD           REVIEW OF SYSTEMS:  Limited secondary to cognitive impairment but as noted above    Objective:  BP (!) 147/73   Pulse 60   Temp 96.9  F (36.1  C)   Ht 1.575 m (5' 2\")   Wt 87.7 kg (193 lb 6.4 oz)   SpO2 95%   BMI 35.37 kg/m    Exam:  GENERAL APPEARANCE:  Alert, in no distress, cooperative  EYES:  PERRL, wearing corrective lenses  NECK:  No adenopathy,masses or thyromegaly  RESP:  respiratory effort and palpation of chest normal, lungs with slight crackles to BLL, improved  orthopnea, no wheezing, no cough  CV:  Palpation and auscultation of heart done , paced rhythm, edema 1+ BLLE, no compression, no open areas noted  ABDOMEN:  soft, non-tender to light and deep palpation , no guarding or rebound, bowel sounds normal  M/S:   Gait and station abnormal generalized weakness, arthritic " changes noted to hands  NEURO:   Cranial nerves 2-12 are normal tested and grossly at patient's baseline  PSYCH:  memory impaired , affect and mood normal    Labs:   BMP pending     ASSESSMENT/PLAN:  (I50.33) Acute on chronic diastolic congestive heart failure (H)  (primary encounter diagnosis)  Comment: ACUTE, exacerbation, lasix was increased the past week, still with excess fluid   -weight last week was 197lbs, today is 193lbs (not usually on daily weights due to cost, last week placed on daily weights for 2 weeks).  Last year's weight was 187lbs  -weight likely not all fluid related.  Family has noted increased po intake and inactivity   Plan: last week order for daily weights X 2 weeks, then monthly, staff to update me next week with weights  -lasix 80mg 4/7 and 4/8, then lasix 60mg 4/9-4/13  -resume lasix 40mg every day 4/14  -staff update with weight next Tuesday  -KCL replacement 40meq po every day 4/7-4/13  -BMP next week     (I48.20) Chronic atrial fibrillation (H)  Comment: per history, with pace maker  -goes to have device checked onsite Q6 months: due for check in April per son, per patient she is going tomorrow   -on coumadin  Plan: continue pacer and checks per recommendation  -on diltiazem and sotalol   -anticoagulated with coumadin  -follow INR's  -does not follow with cards and no desire to per family     (G20) Parkinson's disease (tremor, stiffness, slow motion, unstable posture) (H)  Comment:no official neuro w/u but per symptoms of tremor, quiet speech, rigidity, slow movement, falls  -does not desire neuro involvement or medications  -worsening balance and increased weakness earlier this month, home health therapies ordered   Plan: monitor, AL staff assist with cares  -homecare therapy involved     (N18.30) Stage 3 chronic kidney disease, unspecified whether stage 3a or 3b CKD  Comment:   Lab Results   Component Value Date    CR 0.96 03/09/2021     Lab Results   Component Value Date    CR 0.92  04/06/2021       -lasix was increased last week due to CHF exacerbation, kidneys tolerated increased dose well   Plan: avoid nephrotoxic meds, renally adjust meds  -periodic BMP     (R41.89) Cognitive impairment  Comment: likely related to PD, worsened with pandemic and isolation   Plan: involved and supportive family  -AL staff assist with cares, dispense meals and meds     Called patient's sonMichele to update on POC     Electronically signed by:  IRASEMA Rivero CNP

## 2021-04-06 ENCOUNTER — HOSPITAL LABORATORY (OUTPATIENT)
Dept: OTHER | Facility: CLINIC | Age: 86
End: 2021-04-06

## 2021-04-06 ENCOUNTER — ASSISTED LIVING VISIT (OUTPATIENT)
Dept: GERIATRICS | Facility: CLINIC | Age: 86
End: 2021-04-06
Payer: COMMERCIAL

## 2021-04-06 VITALS
WEIGHT: 193.4 LBS | BODY MASS INDEX: 35.59 KG/M2 | SYSTOLIC BLOOD PRESSURE: 147 MMHG | TEMPERATURE: 96.9 F | HEIGHT: 62 IN | OXYGEN SATURATION: 95 % | DIASTOLIC BLOOD PRESSURE: 73 MMHG | HEART RATE: 60 BPM

## 2021-04-06 DIAGNOSIS — I50.9 ACUTE ON CHRONIC CONGESTIVE HEART FAILURE, UNSPECIFIED HEART FAILURE TYPE (H): ICD-10-CM

## 2021-04-06 DIAGNOSIS — G20.A1 PARKINSON'S DISEASE (TREMOR, STIFFNESS, SLOW MOTION, UNSTABLE POSTURE) (H): ICD-10-CM

## 2021-04-06 DIAGNOSIS — R41.89 COGNITIVE IMPAIRMENT: ICD-10-CM

## 2021-04-06 DIAGNOSIS — I50.33 ACUTE ON CHRONIC DIASTOLIC CONGESTIVE HEART FAILURE (H): Primary | ICD-10-CM

## 2021-04-06 DIAGNOSIS — N18.30 STAGE 3 CHRONIC KIDNEY DISEASE, UNSPECIFIED WHETHER STAGE 3A OR 3B CKD (H): ICD-10-CM

## 2021-04-06 DIAGNOSIS — I48.20 CHRONIC ATRIAL FIBRILLATION (H): ICD-10-CM

## 2021-04-06 LAB
ANION GAP SERPL CALCULATED.3IONS-SCNC: 10 MMOL/L (ref 3–14)
BUN SERPL-MCNC: 19 MG/DL (ref 7–30)
CALCIUM SERPL-MCNC: 9.2 MG/DL (ref 8.5–10.1)
CHLORIDE SERPL-SCNC: 107 MMOL/L (ref 94–109)
CO2 SERPL-SCNC: 24 MMOL/L (ref 20–32)
CREAT SERPL-MCNC: 0.92 MG/DL (ref 0.52–1.04)
GFR SERPL CREATININE-BSD FRML MDRD: 57 ML/MIN/{1.73_M2}
GLUCOSE SERPL-MCNC: 166 MG/DL (ref 70–99)
INR PPP: 1.91 (ref 0.86–1.14)
POTASSIUM SERPL-SCNC: 3.4 MMOL/L (ref 3.4–5.3)
SODIUM SERPL-SCNC: 141 MMOL/L (ref 133–144)

## 2021-04-06 RX ORDER — POTASSIUM CHLORIDE 1500 MG/1
40 TABLET, EXTENDED RELEASE ORAL DAILY
Qty: 12 TABLET | Refills: 0 | Status: SHIPPED | OUTPATIENT
Start: 2021-04-07 | End: 2021-04-13

## 2021-04-06 RX ORDER — FUROSEMIDE 40 MG
TABLET ORAL
Qty: 12 TABLET | Refills: 0 | Status: SHIPPED | OUTPATIENT
Start: 2021-04-07 | End: 2021-04-13

## 2021-04-06 RX ORDER — FUROSEMIDE 40 MG
40 TABLET ORAL DAILY
Qty: 30 TABLET | Refills: 4
Start: 2021-04-14 | End: 2021-04-13

## 2021-04-06 NOTE — LETTER
4/6/2021        RE: Liberty Horta  [facility]  [address]  [facility] [address]        [place] [facility]  [place] Medical Record Number:  [id number]  Place of Service where encounter took place:  [facility] ([facility]) [[id number]]  Chief Complaint   Patient presents with     RECHECK     f/u med changes, CHF exacerbation       HPI:    Liberty Horta  is a 86 year old (1935), who is being seen today for an episodic care visit.  HPI information obtained from: facility chart records, facility staff, patient report and [facility] chart review. Today's concern is:    1. Acute on chronic diastolic congestive heart failure (H)    2. Chronic atrial fibrillation (H)    3. Parkinson's disease (tremor, stiffness, slow motion, unstable posture) (H)    4. Stage 3 chronic kidney disease, unspecified whether stage 3a or 3b CKD    5. Cognitive impairment      Patient is a 85 year old female with PMH significant for a-fib, s/p pacer maker placement (on coumadin), hypothyroidism, HTN, GERD, OA, Anemia, HLD, urinary retention, Parkinson's.  She has had her left knee replaced and also had right hip ORIF in the past    She is seen today for follow-up on medication changes made last week due to CHF exacerbation that presented with increased shortness of breath and weight gain, as well as worsening othopnea.  Her lasix was increased and she was placed on daily weights.      Her weight is down 4 lbs from last week.  She is not sure if she has noticed a change in her breathing, but she thinks it might be slightly better.  She denies CP, dizziness or lightheadedness.           Past Medical and Surgical History reviewed in Epic today.    MEDICATIONS:    Current Outpatient Medications   Medication Sig Dispense Refill     ACETAMINOPHEN EXTRA STRENGTH 500 MG tablet TAKE TWO TABLETS (1000MG) BY MOUTH TWICE DAILY;AND MAY TAKE TWO TABLETS (1000MG) BY MOUTH ONCE DAILY AS NEEDED  "112 tablet PRN     atorvastatin (LIPITOR) 40 MG tablet TAKE 1 TABLET BY MOUTH AT BEDTIME 28 tablet PRN     DILT- MG 24 hr capsule TAKE 1 CAPSULE BY MOUTH ONCE DAILY 28 capsule PRN     furosemide (LASIX) 40 MG tablet Take 1 tablet (40 mg) by mouth daily 30 tablet 4     furosemide (LASIX) 40 MG tablet Take 2 tablets (80 mg) by mouth daily for 2 days, THEN 1.5 tablets (60 mg) daily for 4 days. Patient to resume 40mg every day dose 4/6 10 tablet 0     levothyroxine (SYNTHROID/LEVOTHROID) 175 MCG tablet Take 1 tablet (175 mcg) by mouth daily 30 tablet 3     melatonin 1 MG TABS tablet TAKE 1 TABLET BY MOUTH AT BEDTIME 28 tablet PRN     Menthol, Topical Analgesic, 4 % GEL Externally apply 1 Application topically 2 times daily. May also externally apply 1 Application 2 times daily as needed. 89 mL 4     menthol-zinc oxide (CALMOSEPTINE) 0.44-20.6 % OINT ointment Apply a thin layer to affected area BID and may apply q2hrs prn with incontinence care       Multiple Vitamins-Minerals (TAB-A-IRIS) TABS TAKE 1 TABLET BY MOUTH ONCE DAILY 28 tablet PRN     sotalol (BETAPACE) 80 MG tablet TAKE 1 TABLET BY MOUTH TWICE DAILY WITH MEALS 56 tablet PRN     warfarin (COUMADIN) 2.5 MG tablet Take 2.5 mg by mouth daily on Tues & Fridays  AND take 5 mg AOD           REVIEW OF SYSTEMS:  Limited secondary to cognitive impairment but as noted above    Objective:  BP (!) 147/73   Pulse 60   Temp 96.9  F (36.1  C)   Ht 1.575 m (5' 2\")   Wt 87.7 kg (193 lb 6.4 oz)   SpO2 95%   BMI 35.37 kg/m    Exam:  GENERAL APPEARANCE:  Alert, in no distress, cooperative  EYES:  PERRL, wearing corrective lenses  NECK:  No adenopathy,masses or thyromegaly  RESP:  respiratory effort and palpation of chest normal, lungs with slight crackles to BLL, improved  orthopnea, no wheezing, no cough  CV:  Palpation and auscultation of heart done , paced rhythm, edema 1+ BLLE, no compression, no open areas noted  ABDOMEN:  soft, non-tender to light and deep " palpation , no guarding or rebound, bowel sounds normal  M/S:   Gait and station abnormal generalized weakness, arthritic changes noted to hands  NEURO:   Cranial nerves 2-12 are normal tested and grossly at patient's baseline  PSYCH:  memory impaired , affect and mood normal    Labs:   BMP pending     ASSESSMENT/PLAN:  (I50.33) Acute on chronic diastolic congestive heart failure (H)  (primary encounter diagnosis)  Comment: ACUTE, exacerbation, lasix was increased the past week, still with excess fluid   -weight last week was 197lbs, today is 193lbs (not usually on daily weights due to cost, last week placed on daily weights for 2 weeks).  Last year's weight was 187lbs  -weight likely not all fluid related.  Family has noted increased po intake and inactivity   Plan: last week order for daily weights X 2 weeks, then monthly, staff to update me next week with weights  -lasix 80mg 4/7 and 4/8, then lasix 60mg 4/9-4/13  -resume lasix 40mg every day 4/14  -staff update with weight next Tuesday  -KCL replacement 40meq po every day 4/7-4/13  -BMP next week     (I48.20) Chronic atrial fibrillation (H)  Comment: per history, with pace maker  -goes to have device checked onsite Q6 months: due for check in April per son, per patient she is going tomorrow   -on coumadin  Plan: continue pacer and checks per recommendation  -on diltiazem and sotalol   -anticoagulated with coumadin  -follow INR's  -does not follow with cards and no desire to per family     (G20) Parkinson's disease (tremor, stiffness, slow motion, unstable posture) (H)  Comment:no official neuro w/u but per symptoms of tremor, quiet speech, rigidity, slow movement, falls  -does not desire neuro involvement or medications  -worsening balance and increased weakness earlier this month, home health therapies ordered   Plan: monitor, AL staff assist with cares  -homecare therapy involved     (N18.30) Stage 3 chronic kidney disease, unspecified whether stage 3a or 3b  CKD  Comment:   Lab Results   Component Value Date    CR 0.96 03/09/2021     Lab Results   Component Value Date    CR 0.92 04/06/2021       -lasix was increased last week due to CHF exacerbation, kidneys tolerated increased dose well   Plan: avoid nephrotoxic meds, renally adjust meds  -periodic BMP     (R41.89) Cognitive impairment  Comment: likely related to PD, worsened with pandemic and isolation   Plan: involved and supportive family  -AL staff assist with cares, dispense meals and meds     Called patient's sonMichele to update on POC     Electronically signed by:  IRASEMA Rivero CNP           Sincerely,        IRASEMA Rivero CNP

## 2021-04-06 NOTE — LETTER
4/6/2021        RE: Liberty Horta  [facility]  [address]  [facility] [address]        [place] [facility]  [place] [facility] Record Number:  [id number]  Place of Service where encounter took place:  [facility] ([facility]) [130]  Chief Complaint   Patient presents with     RECHECK     f/u med changes, CHF exacerbation       HPI:    Liberty Horta  is a 86 year old (1935), who is being seen today for an episodic care visit.  HPI information obtained from: facility chart records, facility staff, patient report and [facility] chart review. Today's concern is:    1. Acute on chronic diastolic congestive heart failure (H)    2. Chronic atrial fibrillation (H)    3. Parkinson's disease (tremor, stiffness, slow motion, unstable posture) (H)    4. Stage 3 chronic kidney disease, unspecified whether stage 3a or 3b CKD    5. Cognitive impairment      Patient is a 85 year old female with PMH significant for a-fib, s/p pacer maker placement (on coumadin), hypothyroidism, HTN, GERD, OA, Anemia, HLD, urinary retention, Parkinson's.  She has had her left knee replaced and also had right hip ORIF in the past    She is seen today for follow-up on medication changes made last week due to CHF exacerbation that presented with increased shortness of breath and weight gain, as well as worsening othopnea.  Her lasix was increased and she was placed on daily weights,         Past Medical and Surgical History reviewed in Epic today.    MEDICATIONS:    Current Outpatient Medications   Medication Sig Dispense Refill     ACETAMINOPHEN EXTRA STRENGTH 500 MG tablet TAKE TWO TABLETS (1000MG) BY MOUTH TWICE DAILY;AND MAY TAKE TWO TABLETS (1000MG) BY MOUTH ONCE DAILY AS NEEDED 112 tablet PRN     atorvastatin (LIPITOR) 40 MG tablet TAKE 1 TABLET BY MOUTH AT BEDTIME 28 tablet PRN     DILT- MG 24 hr capsule TAKE 1 CAPSULE BY MOUTH ONCE DAILY 28 capsule PRN     furosemide  "(LASIX) 40 MG tablet Take 1 tablet (40 mg) by mouth daily 30 tablet 4     furosemide (LASIX) 40 MG tablet Take 2 tablets (80 mg) by mouth daily for 2 days, THEN 1.5 tablets (60 mg) daily for 4 days. Patient to resume 40mg every day dose 4/6 10 tablet 0     levothyroxine (SYNTHROID/LEVOTHROID) 175 MCG tablet Take 1 tablet (175 mcg) by mouth daily 30 tablet 3     melatonin 1 MG TABS tablet TAKE 1 TABLET BY MOUTH AT BEDTIME 28 tablet PRN     Menthol, Topical Analgesic, 4 % GEL Externally apply 1 Application topically 2 times daily. May also externally apply 1 Application 2 times daily as needed. 89 mL 4     menthol-zinc oxide (CALMOSEPTINE) 0.44-20.6 % OINT ointment Apply a thin layer to affected area BID and may apply q2hrs prn with incontinence care       Multiple Vitamins-Minerals (TAB-A-IRIS) TABS TAKE 1 TABLET BY MOUTH ONCE DAILY 28 tablet PRN     sotalol (BETAPACE) 80 MG tablet TAKE 1 TABLET BY MOUTH TWICE DAILY WITH MEALS 56 tablet PRN     warfarin (COUMADIN) 2.5 MG tablet Take 2.5 mg by mouth daily on Tues & Fridays  AND take 5 mg AOD           REVIEW OF SYSTEMS:  Limited secondary to cognitive impairment but as noted above    Objective:  Temp 96.9  F (36.1  C)   Ht 1.575 m (5' 2\")   Wt 87.7 kg (193 lb 6.4 oz)   SpO2 93%   BMI 35.37 kg/m    Exam:  GENERAL APPEARANCE:  Alert, in no distress, cooperative  EYES:  PERRL, wearing corrective lenses  NECK:  No adenopathy,masses or thyromegaly  RESP:  respiratory effort and palpation of chest normal, increased WOB with minimal exertion, lungs crackles to BLL,  orthopnea, no wheezing, no cough  CV:  Palpation and auscultation of heart done , paced rhythm, edema 1-2+ BLLE, no compression, no open areas noted  ABDOMEN:  soft, non-tender to light and deep palpation , no guarding or rebound, bowel sounds normal  M/S:   Gait and station abnormal generalized weakness, arthritic changes noted to hands  NEURO:   Cranial nerves 2-12 are normal tested and grossly at patient's " baseline  PSYCH:  memory impaired , affect and mood normal    Labs:   BMP pending     ASSESSMENT/PLAN:  (I50.33) Acute on chronic diastolic congestive heart failure (H)  (primary encounter diagnosis)  Comment: ACUTE, exacerbation, lasix was increased the past week  -weight last week was 197lbs, today is 193lbs (not usually on daily weights due to cost, last week placed on daily weights for 2 weeks).  Last year's weight was 187lbs  -weight likely not all fluid related.  Family has noted increased po intake and inactivity   Plan: daily weights X 2 weeks, then monthly, staff to update me next week with weights  -lasix 80mg 3/31 and 4/1, then lasix 60mg 4/2-4/5  -resume lasix 40mg every day 4/6: I plan to follow-up with patient to assess effectiveness   -KCL replacement 20meq po every day 3/31-4/5  -BMP next week     (I48.20) Chronic atrial fibrillation (H)  Comment: per history, with pace maker  -goes to have device checked onsite Q6 months: due for check in April per son  -on coumadin  Plan: continue pacer and checks per recommendation  -on diltiazem and sotalol   -anticoagulated with coumadin  -follow INR's  -does not follow with cards and no desire to per family     (G20) Parkinson's disease (tremor, stiffness, slow motion, unstable posture) (H)  Comment:no official neuro w/u but per symptoms of tremor, quiet speech, rigidity, slow movement, falls  -does not desire neuro involvement or medications  -worsening balance and increased weakness earlier this month, home health therapies ordered   Plan: monitor, AL staff assist with cares  -homecare therapy involved     (N18.30) Stage 3 chronic kidney disease, unspecified whether stage 3a or 3b CKD  Comment:   Lab Results   Component Value Date    CR 0.96 03/09/2021     -lasix was increased last week due to CHF exacerbation, BMP pending  Plan: avoid nephrotoxic meds, renally adjust meds  -monitor kidney function, especially with increased diuresis     (R41.89) Cognitive  impairment  Comment: likely related to PD, worsened with pandemic and isolation   Plan: involved and supportive family  -AL staff assist with cares, dispense meals and meds     {[email or web address]:[id number]}  ***    {[email or web address]:[id number]}  Electronically signed by:  Radha Small MA ***  {Providers Please double check the med list (in the plan section >> meds & orders tab) and Discontinue any of the meds flagged by the TC to be discontinued}        [facility] SERVICES  [place] Medical Record Number:  [id number]  Place of Service where encounter took place:  [facility] ([facility]) [130]  Chief Complaint   Patient presents with     RECHECK     f/u med changes, CHF exacerbation       HPI:    Liberty Horta  is a 86 year old (1935), who is being seen today for an episodic care visit.  HPI information obtained from: facility chart records, facility staff, patient report and [facility] chart review. Today's concern is:    1. Acute on chronic diastolic congestive heart failure (H)    2. Chronic atrial fibrillation (H)    3. Parkinson's disease (tremor, stiffness, slow motion, unstable posture) (H)    4. Stage 3 chronic kidney disease, unspecified whether stage 3a or 3b CKD    5. Cognitive impairment      Patient is a 85 year old female with PMH significant for a-fib, s/p pacer maker placement (on coumadin), hypothyroidism, HTN, GERD, OA, Anemia, HLD, urinary retention, Parkinson's.  She has had her left knee replaced and also had right hip ORIF in the past    She is seen today for follow-up on medication changes made last week due to CHF exacerbation that presented with increased shortness of breath and weight gain, as well as worsening othopnea.  Her lasix was increased and she was placed on daily weights.      Her weight is down 4 lbs from last week.  She is not sure if she has noticed a change in her breathing, but she thinks it might be slightly better.  She denies CP, dizziness or  "lightheadedness.           Past Medical and Surgical History reviewed in Epic today.    MEDICATIONS:    Current Outpatient Medications   Medication Sig Dispense Refill     ACETAMINOPHEN EXTRA STRENGTH 500 MG tablet TAKE TWO TABLETS (1000MG) BY MOUTH TWICE DAILY;AND MAY TAKE TWO TABLETS (1000MG) BY MOUTH ONCE DAILY AS NEEDED 112 tablet PRN     atorvastatin (LIPITOR) 40 MG tablet TAKE 1 TABLET BY MOUTH AT BEDTIME 28 tablet PRN     DILT- MG 24 hr capsule TAKE 1 CAPSULE BY MOUTH ONCE DAILY 28 capsule PRN     furosemide (LASIX) 40 MG tablet Take 1 tablet (40 mg) by mouth daily 30 tablet 4     furosemide (LASIX) 40 MG tablet Take 2 tablets (80 mg) by mouth daily for 2 days, THEN 1.5 tablets (60 mg) daily for 4 days. Patient to resume 40mg every day dose 4/6 10 tablet 0     levothyroxine (SYNTHROID/LEVOTHROID) 175 MCG tablet Take 1 tablet (175 mcg) by mouth daily 30 tablet 3     melatonin 1 MG TABS tablet TAKE 1 TABLET BY MOUTH AT BEDTIME 28 tablet PRN     Menthol, Topical Analgesic, 4 % GEL Externally apply 1 Application topically 2 times daily. May also externally apply 1 Application 2 times daily as needed. 89 mL 4     menthol-zinc oxide (CALMOSEPTINE) 0.44-20.6 % OINT ointment Apply a thin layer to affected area BID and may apply q2hrs prn with incontinence care       Multiple Vitamins-Minerals (TAB-A-IRIS) TABS TAKE 1 TABLET BY MOUTH ONCE DAILY 28 tablet PRN     sotalol (BETAPACE) 80 MG tablet TAKE 1 TABLET BY MOUTH TWICE DAILY WITH MEALS 56 tablet PRN     warfarin (COUMADIN) 2.5 MG tablet Take 2.5 mg by mouth daily on Tues & Fridays  AND take 5 mg AOD           REVIEW OF SYSTEMS:  Limited secondary to cognitive impairment but as noted above    Objective:  BP (!) 147/73   Pulse 60   Temp 96.9  F (36.1  C)   Ht 1.575 m (5' 2\")   Wt 87.7 kg (193 lb 6.4 oz)   SpO2 95%   BMI 35.37 kg/m    Exam:  GENERAL APPEARANCE:  Alert, in no distress, cooperative  EYES:  PERRL, wearing corrective lenses  NECK:  No " adenopathy,masses or thyromegaly  RESP:  respiratory effort and palpation of chest normal, lungs with slight crackles to BLL, improved  orthopnea, no wheezing, no cough  CV:  Palpation and auscultation of heart done , paced rhythm, edema 1+ BLLE, no compression, no open areas noted  ABDOMEN:  soft, non-tender to light and deep palpation , no guarding or rebound, bowel sounds normal  M/S:   Gait and station abnormal generalized weakness, arthritic changes noted to hands  NEURO:   Cranial nerves 2-12 are normal tested and grossly at patient's baseline  PSYCH:  memory impaired , affect and mood normal    Labs:   BMP pending     ASSESSMENT/PLAN:  (I50.33) Acute on chronic diastolic congestive heart failure (H)  (primary encounter diagnosis)  Comment: ACUTE, exacerbation, lasix was increased the past week, still with excess fluid   -weight last week was 197lbs, today is 193lbs (not usually on daily weights due to cost, last week placed on daily weights for 2 weeks).  Last year's weight was 187lbs  -weight likely not all fluid related.  Family has noted increased po intake and inactivity   Plan: last week order for daily weights X 2 weeks, then monthly, staff to update me next week with weights  -lasix 80mg 4/7 and 4/8, then lasix 60mg 4/9-4/13  -resume lasix 40mg every day 4/14  -staff update with weight next Tuesday  -KCL replacement 40meq po every day 4/7-4/13  -BMP next week     (I48.20) Chronic atrial fibrillation (H)  Comment: per history, with pace maker  -goes to have device checked onsite Q6 months: due for check in April per son, per patient she is going tomorrow   -on coumadin  Plan: continue pacer and checks per recommendation  -on diltiazem and sotalol   -anticoagulated with coumadin  -follow INR's  -does not follow with cards and no desire to per family     (G20) Parkinson's disease (tremor, stiffness, slow motion, unstable posture) (H)  Comment:no official neuro w/u but per symptoms of tremor, quiet speech,  rigidity, slow movement, falls  -does not desire neuro involvement or medications  -worsening balance and increased weakness earlier this month, home health therapies ordered   Plan: monitor, AL staff assist with cares  -homecare therapy involved     (N18.30) Stage 3 chronic kidney disease, unspecified whether stage 3a or 3b CKD  Comment:   Lab Results   Component Value Date    CR 0.96 03/09/2021     Lab Results   Component Value Date    CR 0.92 04/06/2021       -lasix was increased last week due to CHF exacerbation, kidneys tolerated increased dose well   Plan: avoid nephrotoxic meds, renally adjust meds  -periodic BMP     (R41.89) Cognitive impairment  Comment: likely related to PD, worsened with pandemic and isolation   Plan: involved and supportive family  -AL staff assist with cares, dispense meals and meds     Called patient's sonMichele to update on POC     Electronically signed by:  IRASEMA Rivero CNP           Sincerely,        IRASEMA Rivero CNP

## 2021-04-12 NOTE — PROGRESS NOTES
[place] [facility]  [place] Medical Record Number:  [id number]  Place of Service where encounter took place:  [facility] ([facility] [[id number]]  Chief Complaint   Patient presents with     RECHECK     F/U Med changes, CHF       HPI:    Liberty Horta  is a 86 year old (1935), who is being seen today for an episodic care visit.  HPI information obtained from: facility chart records, facility staff, patient report, [facility] chart review and family/first contact son, Michele report. Today's concern is:  1. Acute on chronic diastolic congestive heart failure (H)    2. Chronic atrial fibrillation (H)    3. Parkinson's disease (tremor, stiffness, slow motion, unstable posture) (H)    4. Stage 3 chronic kidney disease, unspecified whether stage 3a or 3b CKD    5. Loose stools    6. Spinal stenosis, unspecified spinal region    7. Primary osteoarthritis involving multiple joints      Patient is a 85 year old female with PMH significant for a-fib, s/p pacer maker placement (on coumadin), hypothyroidism, HTN, GERD, OA, Anemia, HLD, urinary retention, Parkinson's.  She has had her left knee replaced and also had right hip ORIF in the past     She is seen today for follow-up on medication changes made the past 2 weeks due to CHF exacerbation that presented with increased shortness of breath and weight gain, as well as worsening othopnea.  Her lasix was increased the past 2 weeks and she was placed on daily weights. Her family also has concerns of loose stools the past week and pain to her knees, neck, and back      She is seen in her apartment transferring with aid to her recliner.  Noticeable improvement in dyspnea with exertion.  She tells me that her breathing is better.  Reporting worsening pain to her right knee again.  Initially, biofreeze was helpful, but no longer seems to be helping.  Pain to shoulders and neck as well, shoulder pain most prevalent with use of EZ stand for  transfers.    Loose stool worsened about 2 weeks ago with lasix and K+ additions.  Staff have noted that when she drinks coffee with milk, this also causes issues.  She denies abdominal pain, n/v, changes in eating habits.  Afebrile.  Bowel movement is reported as 1X per day but urgent and loose     Past Medical and Surgical History reviewed in Epic today.    MEDICATIONS:    Current Outpatient Medications   Medication Sig Dispense Refill     ACETAMINOPHEN EXTRA STRENGTH 500 MG tablet TAKE TWO TABLETS (1000MG) BY MOUTH TWICE DAILY;AND MAY TAKE TWO TABLETS (1000MG) BY MOUTH ONCE DAILY AS NEEDED 112 tablet PRN     atorvastatin (LIPITOR) 40 MG tablet TAKE 1 TABLET BY MOUTH AT BEDTIME 28 tablet PRN     DILT- MG 24 hr capsule TAKE 1 CAPSULE BY MOUTH ONCE DAILY 28 capsule PRN     [START ON 4/14/2021] furosemide (LASIX) 40 MG tablet Take 1 tablet (40 mg) by mouth daily 30 tablet 4     furosemide (LASIX) 40 MG tablet Take 2 tablets (80 mg) by mouth daily for 2 days, THEN 1.5 tablets (60 mg) daily for 5 days. Patient to resume 40mg every day dose 4/14 12 tablet 0     levothyroxine (SYNTHROID/LEVOTHROID) 175 MCG tablet Take 1 tablet (175 mcg) by mouth daily 30 tablet 3     melatonin 1 MG TABS tablet TAKE 1 TABLET BY MOUTH AT BEDTIME 28 tablet PRN     Menthol, Topical Analgesic, 4 % GEL Externally apply 1 Application topically 2 times daily. May also externally apply 1 Application 2 times daily as needed. 89 mL 4     menthol-zinc oxide (CALMOSEPTINE) 0.44-20.6 % OINT ointment APPLY A THIN LAYER TO AFFECTED AREA(S) TWICE DAILY;& WITH EACH INCONTINENCE CARE 113 g 97     Multiple Vitamins-Minerals (TAB-A-IRIS) TABS TAKE 1 TABLET BY MOUTH ONCE DAILY 28 tablet PRN     potassium chloride ER (K-TAB) 20 MEQ CR tablet Take 2 tablets (40 mEq) by mouth daily for 6 days 12 tablet 0     sotalol (BETAPACE) 80 MG tablet TAKE 1 TABLET BY MOUTH TWICE DAILY WITH MEALS 56 tablet PRN     warfarin (COUMADIN) 2.5 MG tablet Take 2.5 mg by mouth  "daily on Tues & Fridays  AND take 5 mg AOD           REVIEW OF SYSTEMS:  Limited secondary to cognitive impairment but as noted in HPI    Objective:  Temp 96.8  F (36  C)   Ht 1.575 m (5' 2\")   Wt 87.5 kg (193 lb)   BMI 35.30 kg/m    Exam:  GENERAL APPEARANCE:  Alert, in no distress, cooperative  EYES:  PERRL, wearing corrective lenses  NECK:  No adenopathy,masses or thyromegaly  RESP:  respiratory effort and palpation of chest normal, lungs CTA bilaterally, improved  orthopnea, no wheezing, no cough  CV:  Palpation and auscultation of heart done , paced rhythm, edema 1+ BLLE, no compression, no open areas noted  ABDOMEN:  soft, non-tender to light and deep palpation , no guarding or rebound, bowel sounds normal  M/S:   Gait and station abnormal generalized weakness, arthritic changes noted to hands, discomfort when neck palpated, tense muscles to neck noted, no edema/erythema to right knee   NEURO:   Cranial nerves 2-12 are normal tested and grossly at patient's baseline  PSYCH:  memory impaired , affect and mood normal    Labs:   labs pending today    ASSESSMENT/PLAN:  (I50.33) Acute on chronic diastolic congestive heart failure (H)  (primary encounter diagnosis)  Comment: significantly improved, lungs CTA, orthopnea and dyspnea on exertion improved, lasix was increased the past 2 weeks   -weight remains the same today at 193lbs (not usually on daily weights due to cost, last week placed on daily weights for 2 weeks).  Last year's weight was 187lbs.  Appears new dry weight closer to 193lbs  -weight likely not all fluid related.  Family has noted increased po intake and inactivity   Plan:last 60mg po Thursday, lasix 40mg po AOD  -monthly weights, staff to update me if weight >198lbs or with worsening dyspnea, orthopnea, ect    (I48.20) Chronic atrial fibrillation (H)  Comment: per history, with pace maker  -goes to have device checked onsite Q6 months: due for check in April per son, per patient she is going " tomorrow   -on coumadin  Plan: continue pacer and checks per recommendation  -on diltiazem and sotalol   -anticoagulated with coumadin  -follow INR's  -does not follow with cards and no desire to per family    (G20) Parkinson's disease (tremor, stiffness, slow motion, unstable posture) (H)  Comment:no official neuro w/u but per symptoms of tremor, quiet speech, rigidity, slow movement, falls  -does not desire neuro involvement or medications  -worsening balance and increased weakness earlier this month, home health therapies ordered   Plan: monitor, AL staff assist with cares  -homecare therapy involved     (N18.30) Stage 3 chronic kidney disease, unspecified whether stage 3a or 3b CKD  Comment:   Lab Results   Component Value Date    CR 0.92 04/06/2021       -lasix was increased last week due to CHF exacerbation, kidneys tolerated increased dose well   Plan: avoid nephrotoxic     (R19.5) Loose stools  Comment: reported by family, no fever, denies abdominal pain, nausea  -staff have also noticed problem when patient has coffee with milk, she has also been on increased potassium doses past 2 weeks   -suspect could be r/t K+ supplementation, also discussed coffee   Plan: monitor at this time, staff/family to update if worsening or not resolving     (M48.00) Spinal stenosis, unspecified spinal region  (M89.49) Primary osteoarthritis involving multiple joints  Comment: history of stenosis and OA to multiple joints including knees, back, neck  -biofreeze was effective for a few weeks, no longer seems to be helping  -neck pain with EZ stand use   Plan: lidocaine patch to neck and right knee, continue with biofreeze to shoulders  -if able to safely transfer without EZ stand, prefer  -staff/family to update with no improvement       Spoke with sonMichele, regarding POC     Electronically signed by:  IRASEMA Rivero CNP

## 2021-04-13 ENCOUNTER — ASSISTED LIVING VISIT (OUTPATIENT)
Dept: GERIATRICS | Facility: CLINIC | Age: 86
End: 2021-04-13
Payer: COMMERCIAL

## 2021-04-13 ENCOUNTER — HOSPITAL LABORATORY (OUTPATIENT)
Dept: OTHER | Facility: CLINIC | Age: 86
End: 2021-04-13

## 2021-04-13 ENCOUNTER — TRANSFERRED RECORDS (OUTPATIENT)
Dept: HEALTH INFORMATION MANAGEMENT | Facility: CLINIC | Age: 86
End: 2021-04-13

## 2021-04-13 VITALS — WEIGHT: 193 LBS | TEMPERATURE: 96.8 F | BODY MASS INDEX: 35.51 KG/M2 | HEIGHT: 62 IN

## 2021-04-13 DIAGNOSIS — M15.0 PRIMARY OSTEOARTHRITIS INVOLVING MULTIPLE JOINTS: ICD-10-CM

## 2021-04-13 DIAGNOSIS — I48.20 CHRONIC ATRIAL FIBRILLATION (H): ICD-10-CM

## 2021-04-13 DIAGNOSIS — I50.33 ACUTE ON CHRONIC DIASTOLIC CONGESTIVE HEART FAILURE (H): Primary | ICD-10-CM

## 2021-04-13 DIAGNOSIS — M48.00 SPINAL STENOSIS, UNSPECIFIED SPINAL REGION: ICD-10-CM

## 2021-04-13 DIAGNOSIS — I50.9 ACUTE ON CHRONIC CONGESTIVE HEART FAILURE, UNSPECIFIED HEART FAILURE TYPE (H): ICD-10-CM

## 2021-04-13 DIAGNOSIS — G20.A1 PARKINSON'S DISEASE (TREMOR, STIFFNESS, SLOW MOTION, UNSTABLE POSTURE) (H): ICD-10-CM

## 2021-04-13 DIAGNOSIS — N18.30 STAGE 3 CHRONIC KIDNEY DISEASE, UNSPECIFIED WHETHER STAGE 3A OR 3B CKD (H): ICD-10-CM

## 2021-04-13 DIAGNOSIS — R19.5 LOOSE STOOLS: ICD-10-CM

## 2021-04-13 LAB
ANION GAP SERPL CALCULATED.3IONS-SCNC: 9 MMOL/L (ref 3–14)
BUN SERPL-MCNC: 22 MG/DL (ref 7–30)
CALCIUM SERPL-MCNC: 9.1 MG/DL (ref 8.5–10.1)
CHLORIDE SERPLBLD-SCNC: 105 MMOL/L (ref 94–109)
CO2 SERPL-SCNC: 27 MMOL/L (ref 20–32)
CREAT SERPL-MCNC: 1.03 MG/DL (ref 0.52–1.04)
GFR SERPL CREATININE-BSD FRML MDRD: 50 ML/MIN/{1.73_M2}
GLUCOSE SERPL-MCNC: 156 MG/DL (ref 70–99)
POTASSIUM SERPL-SCNC: 3.4 MMOL/L (ref 3.4–5.3)
SODIUM SERPL-SCNC: 141 MMOL/L (ref 133–144)

## 2021-04-13 RX ORDER — FUROSEMIDE 40 MG
TABLET ORAL
Qty: 60 TABLET | Refills: 4 | Status: SHIPPED | OUTPATIENT
Start: 2021-04-13 | End: 2021-06-01

## 2021-04-13 RX ORDER — LIDOCAINE 4 G/G
2 PATCH TOPICAL EVERY 24 HOURS
Qty: 60 PATCH | Refills: 3 | Status: SHIPPED | OUTPATIENT
Start: 2021-04-13 | End: 2021-01-01

## 2021-04-13 ASSESSMENT — MIFFLIN-ST. JEOR: SCORE: 1278.69

## 2021-04-13 NOTE — LETTER
4/13/2021        RE: Liberty Horta  [facility]  [address]  [place] [address]        [place] [facility]IATRIC [facility]  [place] Medical Record Number:  [id number]  Place of Service where encounter took place:  [facility] ([facility] [[id number]]  Chief Complaint   Patient presents with     RECHECK     F/U Med changes, CHF       HPI:    Liberty Horta  is a 86 year old (1935), who is being seen today for an episodic care visit.  HPI information obtained from: facility chart records, facility staff, patient report, [facility] chart review and family/first contact son, Michele report. Today's concern is:  1. Acute on chronic diastolic congestive heart failure (H)    2. Chronic atrial fibrillation (H)    3. Parkinson's disease (tremor, stiffness, slow motion, unstable posture) (H)    4. Stage 3 chronic kidney disease, unspecified whether stage 3a or 3b CKD    5. Loose stools    6. Spinal stenosis, unspecified spinal region    7. Primary osteoarthritis involving multiple joints      Patient is a 85 year old female with PMH significant for a-fib, s/p pacer maker placement (on coumadin), hypothyroidism, HTN, GERD, OA, Anemia, HLD, urinary retention, Parkinson's.  She has had her left knee replaced and also had right hip ORIF in the past     She is seen today for follow-up on medication changes made the past 2 weeks due to CHF exacerbation that presented with increased shortness of breath and weight gain, as well as worsening othopnea.  Her lasix was increased the past 2 weeks and she was placed on daily weights. Her family also has concerns of loose stools the past week and pain to her knees, neck, and back      She is seen in her apartment transferring with aid to her recliner.  Noticeable improvement in dyspnea with exertion.  She tells me that her breathing is better.  Reporting worsening pain to her right knee again.  Initially, biofreeze was helpful, but no longer seems  to be helping.  Pain to shoulders and neck as well, shoulder pain most prevalent with use of EZ stand for transfers.    Loose stool worsened about 2 weeks ago with lasix and K+ additions.  Staff have noted that when she drinks coffee with milk, this also causes issues.  She denies abdominal pain, n/v, changes in eating habits.  Afebrile.  Bowel movement is reported as 1X per day but urgent and loose     Past Medical and Surgical History reviewed in Epic today.    MEDICATIONS:    Current Outpatient Medications   Medication Sig Dispense Refill     ACETAMINOPHEN EXTRA STRENGTH 500 MG tablet TAKE TWO TABLETS (1000MG) BY MOUTH TWICE DAILY;AND MAY TAKE TWO TABLETS (1000MG) BY MOUTH ONCE DAILY AS NEEDED 112 tablet PRN     atorvastatin (LIPITOR) 40 MG tablet TAKE 1 TABLET BY MOUTH AT BEDTIME 28 tablet PRN     DILT- MG 24 hr capsule TAKE 1 CAPSULE BY MOUTH ONCE DAILY 28 capsule PRN     [START ON 4/14/2021] furosemide (LASIX) 40 MG tablet Take 1 tablet (40 mg) by mouth daily 30 tablet 4     furosemide (LASIX) 40 MG tablet Take 2 tablets (80 mg) by mouth daily for 2 days, THEN 1.5 tablets (60 mg) daily for 5 days. Patient to resume 40mg every day dose 4/14 12 tablet 0     levothyroxine (SYNTHROID/LEVOTHROID) 175 MCG tablet Take 1 tablet (175 mcg) by mouth daily 30 tablet 3     melatonin 1 MG TABS tablet TAKE 1 TABLET BY MOUTH AT BEDTIME 28 tablet PRN     Menthol, Topical Analgesic, 4 % GEL Externally apply 1 Application topically 2 times daily. May also externally apply 1 Application 2 times daily as needed. 89 mL 4     menthol-zinc oxide (CALMOSEPTINE) 0.44-20.6 % OINT ointment APPLY A THIN LAYER TO AFFECTED AREA(S) TWICE DAILY;& WITH EACH INCONTINENCE CARE 113 g 97     Multiple Vitamins-Minerals (TAB-A-IRIS) TABS TAKE 1 TABLET BY MOUTH ONCE DAILY 28 tablet PRN     potassium chloride ER (K-TAB) 20 MEQ CR tablet Take 2 tablets (40 mEq) by mouth daily for 6 days 12 tablet 0     sotalol (BETAPACE) 80 MG tablet TAKE 1  "TABLET BY MOUTH TWICE DAILY WITH MEALS 56 tablet PRN     warfarin (COUMADIN) 2.5 MG tablet Take 2.5 mg by mouth daily on Tues & Fridays  AND take 5 mg AOD           REVIEW OF SYSTEMS:  Limited secondary to cognitive impairment but as noted in HPI    Objective:  Temp 96.8  F (36  C)   Ht 1.575 m (5' 2\")   Wt 87.5 kg (193 lb)   BMI 35.30 kg/m    Exam:  GENERAL APPEARANCE:  Alert, in no distress, cooperative  EYES:  PERRL, wearing corrective lenses  NECK:  No adenopathy,masses or thyromegaly  RESP:  respiratory effort and palpation of chest normal, lungs CTA bilaterally, improved  orthopnea, no wheezing, no cough  CV:  Palpation and auscultation of heart done , paced rhythm, edema 1+ BLLE, no compression, no open areas noted  ABDOMEN:  soft, non-tender to light and deep palpation , no guarding or rebound, bowel sounds normal  M/S:   Gait and station abnormal generalized weakness, arthritic changes noted to hands, discomfort when neck palpated, tense muscles to neck noted, no edema/erythema to right knee   NEURO:   Cranial nerves 2-12 are normal tested and grossly at patient's baseline  PSYCH:  memory impaired , affect and mood normal    Labs:   labs pending today    ASSESSMENT/PLAN:  (I50.33) Acute on chronic diastolic congestive heart failure (H)  (primary encounter diagnosis)  Comment: significantly improved, lungs CTA, orthopnea and dyspnea on exertion improved, lasix was increased the past 2 weeks   -weight remains the same today at 193lbs (not usually on daily weights due to cost, last week placed on daily weights for 2 weeks).  Last year's weight was 187lbs.  Appears new dry weight closer to 193lbs  -weight likely not all fluid related.  Family has noted increased po intake and inactivity   Plan:last 60mg po Thursday, lasix 40mg po AOD  -monthly weights, staff to update me if weight >198lbs or with worsening dyspnea, orthopnea, ect    (I48.20) Chronic atrial fibrillation (H)  Comment: per history, with pace " maker  -goes to have device checked onsite Q6 months: due for check in April per son, per patient she is going tomorrow   -on coumadin  Plan: continue pacer and checks per recommendation  -on diltiazem and sotalol   -anticoagulated with coumadin  -follow INR's  -does not follow with cards and no desire to per family    (G20) Parkinson's disease (tremor, stiffness, slow motion, unstable posture) (H)  Comment:no official neuro w/u but per symptoms of tremor, quiet speech, rigidity, slow movement, falls  -does not desire neuro involvement or medications  -worsening balance and increased weakness earlier this month, home health therapies ordered   Plan: monitor, AL staff assist with cares  -homecare therapy involved     (N18.30) Stage 3 chronic kidney disease, unspecified whether stage 3a or 3b CKD  Comment:   Lab Results   Component Value Date    CR 0.92 04/06/2021       -lasix was increased last week due to CHF exacerbation, kidneys tolerated increased dose well   Plan: avoid nephrotoxic     (R19.5) Loose stools  Comment: reported by family, no fever, denies abdominal pain, nausea  -staff have also noticed problem when patient has coffee with milk, she has also been on increased potassium doses past 2 weeks   -suspect could be r/t K+ supplementation, also discussed coffee   Plan: monitor at this time, staff/family to update if worsening or not resolving     (M48.00) Spinal stenosis, unspecified spinal region  (M89.49) Primary osteoarthritis involving multiple joints  Comment: history of stenosis and OA to multiple joints including knees, back, neck  -biofreeze was effective for a few weeks, no longer seems to be helping  -neck pain with EZ stand use   Plan: lidocaine patch to neck and right knee, continue with biofreeze to shoulders  -if able to safely transfer without EZ stand, prefer  -staff/family to update with no improvement       Spoke with son, Michele, regarding POC     Electronically signed by:  Marisa GOYAL  IRASEMA Morales CNP         Sincerely,        IRASEMA Rivero CNP

## 2021-04-14 DIAGNOSIS — M15.0 PRIMARY OSTEOARTHRITIS INVOLVING MULTIPLE JOINTS: ICD-10-CM

## 2021-04-14 LAB
ANION GAP SERPL CALCULATED.3IONS-SCNC: 9 MMOL/L (ref 3–14)
BUN SERPL-MCNC: 22 MG/DL (ref 7–30)
CALCIUM SERPL-MCNC: 9.1 MG/DL (ref 8.5–10.1)
CHLORIDE SERPL-SCNC: 105 MMOL/L (ref 94–109)
CO2 SERPL-SCNC: 27 MMOL/L (ref 20–32)
CREAT SERPL-MCNC: 1.03 MG/DL (ref 0.52–1.04)
GFR SERPL CREATININE-BSD FRML MDRD: 50 ML/MIN/{1.73_M2}
GLUCOSE SERPL-MCNC: 156 MG/DL (ref 70–99)
NT-PROBNP SERPL-MCNC: 431 PG/ML (ref 0–1800)
POTASSIUM SERPL-SCNC: 3.4 MMOL/L (ref 3.4–5.3)
SODIUM SERPL-SCNC: 141 MMOL/L (ref 133–144)

## 2021-04-15 DIAGNOSIS — R19.7 DIARRHEA, UNSPECIFIED TYPE: Primary | ICD-10-CM

## 2021-04-15 RX ORDER — LOPERAMIDE HYDROCHLORIDE 2 MG/1
2 TABLET ORAL 4 TIMES DAILY PRN
Qty: 30 TABLET | Refills: 4 | Status: SHIPPED | OUTPATIENT
Start: 2021-04-15 | End: 2021-04-20

## 2021-04-20 ENCOUNTER — ASSISTED LIVING VISIT (OUTPATIENT)
Dept: GERIATRICS | Facility: CLINIC | Age: 86
End: 2021-04-20
Payer: COMMERCIAL

## 2021-04-20 ENCOUNTER — HOSPITAL LABORATORY (OUTPATIENT)
Dept: OTHER | Facility: CLINIC | Age: 86
End: 2021-04-20

## 2021-04-20 VITALS — HEIGHT: 62 IN | TEMPERATURE: 97.1 F | BODY MASS INDEX: 35.51 KG/M2 | WEIGHT: 193 LBS

## 2021-04-20 DIAGNOSIS — R19.7 DIARRHEA, UNSPECIFIED TYPE: ICD-10-CM

## 2021-04-20 DIAGNOSIS — Z51.81 ENCOUNTER FOR THERAPEUTIC DRUG MONITORING: Primary | ICD-10-CM

## 2021-04-20 DIAGNOSIS — Z79.01 LONG TERM (CURRENT) USE OF ANTICOAGULANTS: ICD-10-CM

## 2021-04-20 DIAGNOSIS — I48.20 CHRONIC ATRIAL FIBRILLATION (H): ICD-10-CM

## 2021-04-20 LAB
INR PPP: 2.49 (ref 0.86–1.14)
POTASSIUM SERPL-SCNC: 3.7 MMOL/L (ref 3.4–5.3)
TSH SERPL DL<=0.005 MIU/L-ACNC: 1.68 MU/L (ref 0.4–4)

## 2021-04-20 RX ORDER — LOPERAMIDE HYDROCHLORIDE 2 MG/1
TABLET ORAL
Qty: 30 TABLET | Refills: 4
Start: 2021-04-20 | End: 2021-04-27 | Stop reason: DRUGHIGH

## 2021-04-20 ASSESSMENT — MIFFLIN-ST. JEOR: SCORE: 1278.69

## 2021-04-20 NOTE — PROGRESS NOTES
"[place] [facility]ATR[facility]  [place] Medical Record Number:  [id number]  Place of Service where encounter took place: [facility] ([facility]) [[id number]]    HPI:    Liberty Horta is a 86 year old  (1935), who is being seen today for an episodic care visit at the above location.   HPI information obtained from: facility chart records, facility staff and [facility] chart review. Today's concern is INR/Coumadin management for A. Fib    ROS/Subjective:  Bleeding Signs/Symptoms:  None  Thromboembolic Signs/Symptoms:  None  Medication Changes:  Yes: lidocaine patch  Dietary Changes:  No  Activity Changes: No  Bacterial/Viral Infection:  No  Missed Coumadin Doses:  None  On ASA: Yes - 81 mg po q day  Other Concerns:  No    OBJECTIVE:  Temp 97.1  F (36.2  C)   Ht 1.575 m (5' 2\")   Wt 87.5 kg (193 lb)   BMI 35.30 kg/m    Last INR: 1.91 on 4/6/21  INR Today:  2.49  Current Dose:  2.5mg PO QWednesday. 5mg PO QD on all other days  INR Flow sheet at [facility]:    ASSESSMENT:  1. Encounter for therapeutic drug monitoring    2. Long term (current) use of anticoagulants    3. Chronic atrial fibrillation (H)    4. Diarrhea, unspecified type      Therapeutic INR for goal of 2-3    PLAN:     New Dose: No Change    Next INR: 4 weeks      Electronically signed by:  IRASEMA Rivero CNP   "

## 2021-04-20 NOTE — LETTER
"    4/20/2021        RE: Liberty Horta  [facility]  [address]  [place] [facility] [place] [facility] [address]        [place] [facility]IATR[facility]  [place] Medical Record Number:  [id number]  Place of Service where encounter took place: [facility] ([facility] [[id number]]    HPI:    Liberty Horta is a 86 year old  (1935), who is being seen today for an episodic care visit at the above location.   HPI information obtained from: facility chart records, facility staff and [facility] chart review. Today's concern is INR/Coumadin management for A. Fib    ROS/Subjective:  Bleeding Signs/Symptoms:  None  Thromboembolic Signs/Symptoms:  None  Medication Changes:  Yes: lidocaine patch  Dietary Changes:  No  Activity Changes: No  Bacterial/Viral Infection:  No  Missed Coumadin Doses:  None  On ASA: Yes - 81 mg po q day  Other Concerns:  No    OBJECTIVE:  Temp 97.1  F (36.2  C)   Ht 1.575 m (5' 2\")   Wt 87.5 kg (193 lb)   BMI 35.30 kg/m    Last INR: 1.91 on 4/6/21  INR Today:  2.49  Current Dose:  2.5mg PO QWednesday. 5mg PO QD on all other days  INR Flow sheet at [facility]:    ASSESSMENT:  1. Encounter for therapeutic drug monitoring    2. Long term (current) use of anticoagulants    3. Chronic atrial fibrillation (H)    4. Diarrhea, unspecified type      Therapeutic INR for goal of 2-3    PLAN:     New Dose: No Change    Next INR: 4 weeks      Electronically signed by:  IRASEMA Rivero CNP         Sincerely,        IRASEMA Rivero CNP    "

## 2021-04-23 ENCOUNTER — HOSPITAL ENCOUNTER (EMERGENCY)
Facility: CLINIC | Age: 86
Discharge: NURSING, REHAB OR LONG-TERM CARE | End: 2021-04-23
Attending: EMERGENCY MEDICINE | Admitting: EMERGENCY MEDICINE
Payer: COMMERCIAL

## 2021-04-23 ENCOUNTER — APPOINTMENT (OUTPATIENT)
Dept: CT IMAGING | Facility: CLINIC | Age: 86
End: 2021-04-23
Attending: EMERGENCY MEDICINE
Payer: COMMERCIAL

## 2021-04-23 ENCOUNTER — APPOINTMENT (OUTPATIENT)
Dept: GENERAL RADIOLOGY | Facility: CLINIC | Age: 86
End: 2021-04-23
Attending: EMERGENCY MEDICINE
Payer: COMMERCIAL

## 2021-04-23 VITALS
HEART RATE: 73 BPM | DIASTOLIC BLOOD PRESSURE: 65 MMHG | OXYGEN SATURATION: 98 % | TEMPERATURE: 98.5 F | RESPIRATION RATE: 16 BRPM | SYSTOLIC BLOOD PRESSURE: 124 MMHG

## 2021-04-23 DIAGNOSIS — M62.81 GENERALIZED MUSCLE WEAKNESS: ICD-10-CM

## 2021-04-23 LAB
ALBUMIN SERPL-MCNC: 3.3 G/DL (ref 3.4–5)
ALBUMIN UR-MCNC: NEGATIVE MG/DL
ALP SERPL-CCNC: 59 U/L (ref 40–150)
ALT SERPL W P-5'-P-CCNC: 32 U/L (ref 0–50)
ANION GAP SERPL CALCULATED.3IONS-SCNC: 2 MMOL/L (ref 3–14)
APPEARANCE UR: CLEAR
AST SERPL W P-5'-P-CCNC: 19 U/L (ref 0–45)
BASE EXCESS BLDV CALC-SCNC: 4.4 MMOL/L
BASOPHILS # BLD AUTO: 0 10E9/L (ref 0–0.2)
BASOPHILS NFR BLD AUTO: 0.5 %
BILIRUB SERPL-MCNC: 0.7 MG/DL (ref 0.2–1.3)
BILIRUB UR QL STRIP: NEGATIVE
BUN SERPL-MCNC: 14 MG/DL (ref 7–30)
CALCIUM SERPL-MCNC: 8.7 MG/DL (ref 8.5–10.1)
CHLORIDE SERPL-SCNC: 110 MMOL/L (ref 94–109)
CO2 SERPL-SCNC: 29 MMOL/L (ref 20–32)
COLOR UR AUTO: YELLOW
CREAT SERPL-MCNC: 0.8 MG/DL (ref 0.52–1.04)
DIFFERENTIAL METHOD BLD: NORMAL
EOSINOPHIL # BLD AUTO: 0.1 10E9/L (ref 0–0.7)
EOSINOPHIL NFR BLD AUTO: 1.9 %
ERYTHROCYTE [DISTWIDTH] IN BLOOD BY AUTOMATED COUNT: 12.8 % (ref 10–15)
FLUAV RNA RESP QL NAA+PROBE: NEGATIVE
FLUBV RNA RESP QL NAA+PROBE: NEGATIVE
GFR SERPL CREATININE-BSD FRML MDRD: 67 ML/MIN/{1.73_M2}
GLUCOSE BLDC GLUCOMTR-MCNC: 109 MG/DL (ref 70–99)
GLUCOSE SERPL-MCNC: 118 MG/DL (ref 70–99)
GLUCOSE UR STRIP-MCNC: NEGATIVE MG/DL
HCO3 BLDV-SCNC: 30 MMOL/L (ref 21–28)
HCT VFR BLD AUTO: 41.1 % (ref 35–47)
HGB BLD-MCNC: 13 G/DL (ref 11.7–15.7)
HGB UR QL STRIP: NEGATIVE
IMM GRANULOCYTES # BLD: 0 10E9/L (ref 0–0.4)
IMM GRANULOCYTES NFR BLD: 0.3 %
INR PPP: 3.05 (ref 0.86–1.14)
INTERPRETATION ECG - MUSE: NORMAL
KETONES UR STRIP-MCNC: NEGATIVE MG/DL
LABORATORY COMMENT REPORT: NORMAL
LACTATE BLD-SCNC: 1.4 MMOL/L (ref 0.7–2)
LEUKOCYTE ESTERASE UR QL STRIP: NEGATIVE
LYMPHOCYTES # BLD AUTO: 1.7 10E9/L (ref 0.8–5.3)
LYMPHOCYTES NFR BLD AUTO: 29.7 %
MCH RBC QN AUTO: 31.5 PG (ref 26.5–33)
MCHC RBC AUTO-ENTMCNC: 31.6 G/DL (ref 31.5–36.5)
MCV RBC AUTO: 100 FL (ref 78–100)
MONOCYTES # BLD AUTO: 0.6 10E9/L (ref 0–1.3)
MONOCYTES NFR BLD AUTO: 10.5 %
NEUTROPHILS # BLD AUTO: 3.3 10E9/L (ref 1.6–8.3)
NEUTROPHILS NFR BLD AUTO: 57.1 %
NITRATE UR QL: NEGATIVE
NRBC # BLD AUTO: 0 10*3/UL
NRBC BLD AUTO-RTO: 0 /100
O2/TOTAL GAS SETTING VFR VENT: ABNORMAL %
PCO2 BLDV: 45 MM HG (ref 40–50)
PH BLDV: 7.43 PH (ref 7.32–7.43)
PH UR STRIP: 5.5 PH (ref 5–7)
PLATELET # BLD AUTO: 151 10E9/L (ref 150–450)
PO2 BLDV: 48 MM HG (ref 25–47)
POTASSIUM SERPL-SCNC: 3.8 MMOL/L (ref 3.4–5.3)
PROT SERPL-MCNC: 6.1 G/DL (ref 6.8–8.8)
RBC # BLD AUTO: 4.13 10E12/L (ref 3.8–5.2)
RBC #/AREA URNS AUTO: <1 /HPF (ref 0–2)
RSV RNA SPEC QL NAA+PROBE: NORMAL
SARS-COV-2 RNA RESP QL NAA+PROBE: NEGATIVE
SODIUM SERPL-SCNC: 141 MMOL/L (ref 133–144)
SOURCE: NORMAL
SP GR UR STRIP: 1.02 (ref 1–1.03)
SPECIMEN SOURCE: NORMAL
SQUAMOUS #/AREA URNS AUTO: <1 /HPF (ref 0–1)
UROBILINOGEN UR STRIP-MCNC: NORMAL MG/DL (ref 0–2)
WBC # BLD AUTO: 5.8 10E9/L (ref 4–11)
WBC #/AREA URNS AUTO: <1 /HPF (ref 0–5)

## 2021-04-23 PROCEDURE — 85025 COMPLETE CBC W/AUTO DIFF WBC: CPT | Performed by: EMERGENCY MEDICINE

## 2021-04-23 PROCEDURE — 87636 SARSCOV2 & INF A&B AMP PRB: CPT | Performed by: EMERGENCY MEDICINE

## 2021-04-23 PROCEDURE — 87040 BLOOD CULTURE FOR BACTERIA: CPT | Performed by: EMERGENCY MEDICINE

## 2021-04-23 PROCEDURE — 999N001017 HC STATISTIC GLUCOSE BY METER IP

## 2021-04-23 PROCEDURE — 96360 HYDRATION IV INFUSION INIT: CPT

## 2021-04-23 PROCEDURE — 70450 CT HEAD/BRAIN W/O DYE: CPT

## 2021-04-23 PROCEDURE — 80053 COMPREHEN METABOLIC PANEL: CPT | Performed by: EMERGENCY MEDICINE

## 2021-04-23 PROCEDURE — 81001 URINALYSIS AUTO W/SCOPE: CPT | Performed by: EMERGENCY MEDICINE

## 2021-04-23 PROCEDURE — 99285 EMERGENCY DEPT VISIT HI MDM: CPT | Mod: 25

## 2021-04-23 PROCEDURE — 258N000003 HC RX IP 258 OP 636: Performed by: EMERGENCY MEDICINE

## 2021-04-23 PROCEDURE — 83605 ASSAY OF LACTIC ACID: CPT | Performed by: EMERGENCY MEDICINE

## 2021-04-23 PROCEDURE — 36415 COLL VENOUS BLD VENIPUNCTURE: CPT | Performed by: EMERGENCY MEDICINE

## 2021-04-23 PROCEDURE — 82803 BLOOD GASES ANY COMBINATION: CPT | Performed by: EMERGENCY MEDICINE

## 2021-04-23 PROCEDURE — C9803 HOPD COVID-19 SPEC COLLECT: HCPCS

## 2021-04-23 PROCEDURE — 93005 ELECTROCARDIOGRAM TRACING: CPT

## 2021-04-23 PROCEDURE — 85610 PROTHROMBIN TIME: CPT | Performed by: EMERGENCY MEDICINE

## 2021-04-23 PROCEDURE — 71045 X-RAY EXAM CHEST 1 VIEW: CPT

## 2021-04-23 RX ADMIN — SODIUM CHLORIDE 500 ML: 9 INJECTION, SOLUTION INTRAVENOUS at 11:37

## 2021-04-23 ASSESSMENT — ENCOUNTER SYMPTOMS
VOMITING: 0
COUGH: 0
SHORTNESS OF BREATH: 0

## 2021-04-23 NOTE — ED NOTES
Bed: ED14  Expected date: 4/23/21  Expected time: 10:39 AM  Means of arrival: Ambulance  Comments:  BV 86 F fever, altered MS

## 2021-04-23 NOTE — ED NOTES
Called and spoke with Kendell, staff from pt's facilty, and informed her that pt will be returning to facility.

## 2021-04-23 NOTE — ED PROVIDER NOTES
History   Chief Complaint:  Altered Mental Status     HPI   Liberty Horta is a 86 year old female with history of atrial fibrillation who presents with altered mental status.  Staff reports that patient has been more somnolent over the last 24 hours.  She has been fully oriented but appears more sedate.  Patient denies any symptoms and reports that she feels quite well. She denies any focal area of pain, shortness of breath, dysuria, urinary frequency, abdominal discomfort or any other concerns.  EMS reported she was warm to touch but did not have a defined fever.  Patient is a poor historian and limits further history.    Review of Systems   Respiratory: Negative for cough and shortness of breath.    Cardiovascular: Negative for chest pain.   Gastrointestinal: Negative for vomiting.   All other systems reviewed and are negative.    Allergies:  Meperidine  Propoxyphene N-Apap  Tramadol    Medications:  Lipitor  Dilt-XR  Levothyroxine  Imodium  Melatonin  Betapace  Coumadin    Past Medical History:    Basal cell carcinoma  Chronic A-fib  DVT  GERD  Hypertension  Hypothyroid  Osteoarthritis  Pacemaker  Renal insufficiency  Closed fracture of fifth lumbar vertebra, unspecified fracture morphology  Parkinson's disease  Central stenosis of spinal canal  CHF  Venous insufficiency  Osteoarthrosis involving lower leg  Disorder of bone and cartilage   Migraine  Paroxysmal supraventricular tachycardia  Obesity  Vitamin D deficiency  Central retinal vein occlusion  Iron deficiency anemia  Dermatophytosis of nail  Femur fracture, right  Supracondylar fracture of right humerus   Diffuse cystic mastopathy  Cataract  Herpes zoster   Measles  Mumps  Rubella  Varicella  Colon adenocarcinoma  Tremor    Past Surgical History:    Implant pacemaker  Open reduction fixation femur distal, right  Knee replacement, bilateral  Vein stripping  Appendectomy  Hernia repair  Colonoscopy   Coronary angiogram    Family History:    Father:  MI, CHF, CVA, Colon polyps, Glaucoma  Mother: MI    Social History:  Res[place] [facility] [facility]    Physical Exam     Patient Vitals for the past 24 hrs:   BP Temp Temp src Pulse Resp SpO2   04/23/21 1330 (!) 148/66 -- -- 59 -- 95 %   04/23/21 1315 (!) 158/62 -- -- 60 -- 95 %   04/23/21 1300 (!) 162/70 -- -- 60 -- 96 %   04/23/21 1245 (!) 170/69 -- -- 60 -- 97 %   04/23/21 1230 (!) 171/67 -- -- 60 -- 99 %   04/23/21 1215 125/65 -- -- 60 -- 96 %   04/23/21 1200 (!) 173/99 -- -- 60 -- 99 %   04/23/21 1145 (!) 177/75 -- -- 61 -- 97 %   04/23/21 1137 -- 98.5  F (36.9  C) Rectal -- -- --   04/23/21 1050 -- 99.3  F (37.4  C) Temporal 60 18 94 %       Physical Exam  General:   Mildly ill appearing  female  HEENT:    Oropharynx is moist  Eyes:    Conjunctiva normal     PERRL  Neck:    Supple, no meningismus.     CV:     Regular rate, irregular rhythm.      No murmurs, rubs or gallops.       No unilateral leg swelling.    PULM:    Clear to auscultation bilateral.       No respiratory distress.      Good air exchange.     No rales or wheezing  ABD:    Soft, non-tender, non-distended.       No pulsatile masses.       No rebound, guarding or rigidity.  MSK:     No gross deformity to all four extremities.   LYMPH:   No cervical lymphadenopathy.  NEURO:   Somnolent but awakens with verbal communication.     Oriented x 3     Strength globally depressed yet symmetric     No tremor      No clonus  Skin:    Hot, dry and intact.    Psych:    Flat affect    Emergency Department Course   ECG:  ECG taken at 1054, ECG read at 1103  Atrial-paced rhythm with prolonged AV conduction  ST&T wave abnormality, consider anterolateral ischemia  Abnormal ECG  Rate 60 bpm. AR interval 248 ms. QRS duration 86 ms. QT/QTc 438/438 ms. P-R-T axes -51 4 -15.    Imaging:  Head CT w/o contrast   Final Result   IMPRESSION:   1.  No mass, hemorrhage or stroke.   2.  Moderate presumed chronic small vessel ischemic change and   generalized volume  loss.   3.  No interval change.      Radiation dose for this scan was reduced using automated exposure   control, adjustment of the mA and/or kV according to patient size, or   iterative reconstruction technique      SALVADOR OLIVARES MD      XR Chest Port 1 View   Final Result   IMPRESSION:  Portable chest. Heart is enlarged. Lungs are clear. No   pneumothorax or significant pleural fluid. Implantable cardiac device   and both leads are unchanged in position.       SERGIO YOUNG MD          Laboratory:  UA with Microscopic: Negative    CBC: WBC 5.8, HGB 13.0,   CMP: Chloride 110 (H), Anion Gap 2 (L), Glucose 118 (H), Albumin 3.3 (L), Protein Total 6.1 (L) o/w WNL (Creatinine 0.80)    Lactic acid (Collected 1105): 1.4    Glucose by meter (Collected 1104): 118 (H)   Glucose by meter (Collected 1114): 109 (H)     Blood gas venous: pH: 7.43, PCO2: 45, PO2: 48 (H), Bicarbonate: 30 (H), FIO2 Room Air, Base Excess 4.4     INR: 305 (H)    Symptomatic COVID-19 Virus (Coronavirus) by PCR Nasopharyngeal swab: Pending    Blood Culture x2: Pending    Emergency Department Course:  Reviewed:  I reviewed nursing notes, vitals, past medical history and care everywhere    Assessments:  1058 I obtained history and examined the patient as noted above.     1307 I rechecked and updated the patient regarding the laboratory and imaging results. The patient is alert and oriented. Her son is at her bedside and states that she is at her baseline.    Interventions:  1137 NS 1L IV Bolus    Disposition:  The patient was discharged to home.     Impression & Plan   Covid-19  Liberty Horta was evaluated during a global COVID-19 pandemic, which necessitated consideration that the patient might be at risk for infection with the SARS-CoV-2 virus that causes COVID-19.   Applicable protocols for evaluation were followed during the patient's care.   COVID-19 was considered as part of the patient's evaluation. The plan for testing is:  a test was  obtained during this visit.    Medical Decision Makin-year-old female sent from skilled nursing facility for concerns of increased sedation/somnolence and tactile fever.  Patient was afebrile on presentation.  She was warm to touch but core temperature continues to be within normal limits.  She was worked up for infectious pathology with no evidence of soft tissue infection, pneumonia, COVID/influenza or UTI.  Basic laboratory studies are reassuring.  Given the lack of convincing source for her symptoms, CT scan of the head undertaken as patient is on warfarin.  CT scan reveals no acute pathology.      On reexamination, the patient is now awake and alert.  She is here with her son who reports she is at her baseline.  Given the lack of concerning pathology, no indication for admission or further work-up from the ED.  The exact source of her symptoms are uncertain although did report recent diarrheal illness contributing to her transient symptoms.  She is safe for discharge home and will be discharged back to care facility.    Diagnosis:    ICD-10-CM    1. Generalized muscle weakness  M62.81        Discharge Medications:  New Prescriptions    No medications on file       Scribe Disclosure:  LIV, Sae Oropeza, am serving as a scribe at 11:05 AM on 2021 to document services personally performed by Adrián Gonzalez MD based on my observations and the provider's statements to me.      Adrián Gonzalez MD  [phone]

## 2021-04-23 NOTE — ED TRIAGE NOTES
Pt presents from [facility] for increasing confusion x1 day. Pt also c/o dizziness. Hx of afib. ABCs intact.  
fall precautions

## 2021-04-26 ENCOUNTER — HOSPITAL LABORATORY (OUTPATIENT)
Dept: OTHER | Facility: CLINIC | Age: 86
End: 2021-04-26

## 2021-04-26 VITALS
TEMPERATURE: 96.7 F | SYSTOLIC BLOOD PRESSURE: 110 MMHG | HEIGHT: 62 IN | HEART RATE: 77 BPM | BODY MASS INDEX: 35.59 KG/M2 | RESPIRATION RATE: 20 BRPM | DIASTOLIC BLOOD PRESSURE: 70 MMHG | WEIGHT: 193.4 LBS

## 2021-04-26 LAB
SARS-COV-2 RNA RESP QL NAA+PROBE: NORMAL
SPECIMEN SOURCE: NORMAL

## 2021-04-26 ASSESSMENT — MIFFLIN-ST. JEOR: SCORE: 1280.51

## 2021-04-26 NOTE — PROGRESS NOTES
[place] [facility]  [facility] Record Number:  [id number]  Place of Service where encounter took place:  [facility] ([facility]) [[id number]]  Chief Complaint   Patient presents with     Hospital F/U     ER F/U       HPI:    Liberty Horta  is a 86 year old (1935), who is being seen today for an episodic care visit.  HPI information obtained from: facility chart records, facility staff, patient report, [facility] chart review and family/first contact sonMichele's report. Today's concern is:  1. Altered mental status, unspecified altered mental status type    2. Chronic atrial fibrillation (H)    3. Diarrhea, unspecified type    4. Primary osteoarthritis involving multiple joints    5. Parkinson's disease (tremor, stiffness, slow motion, unstable posture) (H)    6. Stage 3 chronic kidney disease, unspecified whether stage 3a or 3b CKD    7. Chronic diastolic congestive heart failure (H)      Patient is a 86 year old female with PMH significant for a-fib, s/p pacer maker placement (on coumadin), hypothyroidism, HTN, GERD, OA, Anemia, HLD, urinary retention, CHF, and Parkinson's.  She has had her left knee replaced and also had right hip ORIF in the past    She is seen today for follow-up from ER visit end of last week.  She was sent to the ER on 4/23 with increased somnolence, staff also reported she appeared hot to touch, but did not have a temperature.  No other symptoms were noted.  Head CT and chest x-ray clear, labs unremarkable, UA clear.  Review of preliminary blood cultures are negative.  She was given a bolus of IV fluid and sent back to AL    Patient is seen in her apartment today and appears at her baseline.  She reports feeling well. She does remember going to the ER and is glad she is feeling better.  Primitivo CP, shortness of breath, abdominal pain.  I was able to speak with staff today and they report she has appeared at her baseline since return.  No further episodes of  diarrhea.  She just had a BM the other day after a few days without one.  I was also able to speak with her son, Michele today.  NO further concerns    Past Medical and Surgical History reviewed in Epic today.    MEDICATIONS:    Current Outpatient Medications   Medication Sig Dispense Refill     ACETAMINOPHEN EXTRA STRENGTH 500 MG tablet TAKE TWO TABLETS (1000MG) BY MOUTH TWICE DAILY;AND MAY TAKE TWO TABLETS (1000MG) BY MOUTH ONCE DAILY AS NEEDED 112 tablet PRN     atorvastatin (LIPITOR) 40 MG tablet TAKE 1 TABLET BY MOUTH AT BEDTIME 28 tablet PRN     DILT- MG 24 hr capsule TAKE 1 CAPSULE BY MOUTH ONCE DAILY 28 capsule PRN     furosemide (LASIX) 40 MG tablet Take 60mg po every day on Thursdays, take 40mg po daily on AOD 60 tablet 4     levothyroxine (SYNTHROID/LEVOTHROID) 175 MCG tablet Take 1 tablet (175 mcg) by mouth daily 30 tablet 3     Lidocaine (LIDOCARE) 4 % Patch Place 2 patches onto the skin every 24 hours To prevent lidocaine toxicity, patient should be patch free for 12 hrs daily.  Place to neck and right knee, on at 8AM, off 8PM 60 patch 3     loperamide (IMODIUM A-D) 2 MG tablet Take 1 tablet (2 mg) by mouth daily before breakfast. May also take 1 tablet (2 mg) 4 times daily as needed for diarrhea. 30 tablet 4     melatonin 1 MG TABS tablet TAKE 1 TABLET BY MOUTH AT BEDTIME 28 tablet PRN     Menthol, Topical Analgesic, 4 % GEL Externally apply 1 Application topically 2 times daily. May also externally apply 1 Application 2 times daily as needed. 89 mL 4     menthol-zinc oxide (CALMOSEPTINE) 0.44-20.6 % OINT ointment APPLY A THIN LAYER TO AFFECTED AREA(S) TWICE DAILY;& WITH EACH INCONTINENCE CARE 113 g 97     Multiple Vitamins-Minerals (TAB-A-IRIS) TABS TAKE 1 TABLET BY MOUTH ONCE DAILY 28 tablet PRN     sotalol (BETAPACE) 80 MG tablet TAKE 1 TABLET BY MOUTH TWICE DAILY WITH MEALS 56 tablet PRN     warfarin (COUMADIN) 2.5 MG tablet Take 2.5 mg by mouth daily on Tues & Fridays  AND take 5 mg AOD    "        REVIEW OF SYSTEMS:  Limited secondary to cognitive impairment but as noted in HPI    Objective:  /70   Pulse 77   Temp 96.7  F (35.9  C)   Resp 20   Ht 1.575 m (5' 2\")   Wt 87.7 kg (193 lb 6.4 oz)   BMI 35.37 kg/m    Exam:  GENERAL APPEARANCE:  Alert, in no distress, cooperative  EYES:  PERRL, wearing corrective lenses  NECK:  No adenopathy,masses or thyromegaly  RESP:  respiratory effort and palpation of chest normal, lungs CTA bilaterally, no orthopnea observed, no wheezing, no cough  CV:  Palpation and auscultation of heart done , paced rhythm, edema 1+ BLLE, tubi , no open areas noted  ABDOMEN:  soft, non-tender to light and deep palpation , no guarding or rebound, bowel sounds normal  M/S:   Gait and station abnormal generalized weakness, arthritic changes noted to hands  NEURO:   Cranial nerves 2-12 are normal tested and grossly at patient's baseline  PSYCH:  memory impaired , affect and mood normal       Labs:   Recent labs in [facility] reviewed by me today.     ASSESSMENT/PLAN:  (R41.82) Altered mental status, unspecified altered mental status type  (primary encounter diagnosis)  Comment: reason for ER visit  -work-up in ER unremarkable, CT head and chest x-ray clear, labs without acute findings, UA clear  -appears at baseline today  Plan: monitor, staff update with concerns     (I48.20) Chronic atrial fibrillation (H)  Comment: per history with pacer  -on coumadin  Plan: continue pacer and checks per recommendation  -on diltiazem and sotalol   -anticoagulated with coumadin  -follow INR's  -does not follow with cards and no desire to per family    (R19.7) Diarrhea, unspecified type  Comment: resolved per staff  Plan:d'c scheduled imodium  -staff update with concerns    (M89.49) Primary osteoarthritis involving multiple joints  Comment: history of stenosis and OA to multiple joints including knees, back, neck    Plan: lidocaine patch to neck and right knee, continue with biofreeze to " shoulders  -if able to safely transfer without EZ stand, prefer  -staff/family to update with no improvement     (G20) Parkinson's disease (tremor, stiffness, slow motion, unstable posture) (H)  Comment: no official neuro w/u but per symptoms of tremor, quiet speech, rigidity, slow movement, falls  -does not desire neuro involvement or medications  -worsening balance and increased weakness earlier this month, home health therapies ordered   Plan: monitor, AL staff assist with cares  -homecare therapy involved     (N18.30) Stage 3 chronic kidney disease, unspecified whether stage 3a or 3b CKD  Comment:   Lab Results   Component Value Date    CR 0.80 04/23/2021       Plan: renally adjust meds, avoid nephrotoxic meds  -perioidc BMP    (I50.32) Chronic diastolic congestive heart failure (H)  Comment: was monitored more closely and diuresed a few weeks ago  -appears evolemic on exam today   Plan: :last 60mg po Thursday, lasix 40mg po AOD  -monthly weights, staff to update me if weight >198lbs or with worsening dyspnea, orthopnea, ect        Electronically signed by:  IRASEMA Rivero CNP

## 2021-04-27 ENCOUNTER — ASSISTED LIVING VISIT (OUTPATIENT)
Dept: GERIATRICS | Facility: CLINIC | Age: 86
End: 2021-04-27
Payer: COMMERCIAL

## 2021-04-27 DIAGNOSIS — G20.A1 PARKINSON'S DISEASE (TREMOR, STIFFNESS, SLOW MOTION, UNSTABLE POSTURE) (H): ICD-10-CM

## 2021-04-27 DIAGNOSIS — R41.82 ALTERED MENTAL STATUS, UNSPECIFIED ALTERED MENTAL STATUS TYPE: Primary | ICD-10-CM

## 2021-04-27 DIAGNOSIS — N18.30 STAGE 3 CHRONIC KIDNEY DISEASE, UNSPECIFIED WHETHER STAGE 3A OR 3B CKD (H): ICD-10-CM

## 2021-04-27 DIAGNOSIS — R19.7 DIARRHEA, UNSPECIFIED TYPE: ICD-10-CM

## 2021-04-27 DIAGNOSIS — M15.0 PRIMARY OSTEOARTHRITIS INVOLVING MULTIPLE JOINTS: ICD-10-CM

## 2021-04-27 DIAGNOSIS — I48.20 CHRONIC ATRIAL FIBRILLATION (H): ICD-10-CM

## 2021-04-27 DIAGNOSIS — I50.32 CHRONIC DIASTOLIC CONGESTIVE HEART FAILURE (H): ICD-10-CM

## 2021-04-27 RX ORDER — LOPERAMIDE HYDROCHLORIDE 2 MG/1
2 TABLET ORAL 4 TIMES DAILY PRN
Start: 2021-04-27

## 2021-04-27 NOTE — LETTER
4/27/2021        RE: Liberty Horta  [facility]  [address]  [place] [address]        [place] [facility]  [facility] Record Number:  [id number]  Place of Service where encounter took place:  [facility] ([facility]) [[id number]]  Chief Complaint   Patient presents with     Hospital F/U     ER F/U       HPI:    Liberty Horta  is a 86 year old (1935), who is being seen today for an episodic care visit.  HPI information obtained from: facility chart records, facility staff, patient report, [facility] chart review and family/first contact sonMichele's report. Today's concern is:  1. Altered mental status, unspecified altered mental status type    2. Chronic atrial fibrillation (H)    3. Diarrhea, unspecified type    4. Primary osteoarthritis involving multiple joints    5. Parkinson's disease (tremor, stiffness, slow motion, unstable posture) (H)    6. Stage 3 chronic kidney disease, unspecified whether stage 3a or 3b CKD    7. Chronic diastolic congestive heart failure (H)      Patient is a 86 year old female with PMH significant for a-fib, s/p pacer maker placement (on coumadin), hypothyroidism, HTN, GERD, OA, Anemia, HLD, urinary retention, CHF, and Parkinson's.  She has had her left knee replaced and also had right hip ORIF in the past    She is seen today for follow-up from ER visit end of last week.  She was sent to the ER on 4/23 with increased somnolence, staff also reported she appeared hot to touch, but did not have a temperature.  No other symptoms were noted.  Head CT and chest x-ray clear, labs unremarkable, UA clear.  Review of preliminary blood cultures are negative.  She was given a bolus of IV fluid and sent back to AL    Patient is seen in her apartment today and appears at her baseline.  She reports feeling well. She does remember going to the ER and is glad she is feeling better.  Primitivo CP, shortness of breath, abdominal pain.  I was  able to speak with staff today and they report she has appeared at her baseline since return.  No further episodes of diarrhea.  She just had a BM the other day after a few days without one.  I was also able to speak with her son, Michele today.  NO further concerns    Past Medical and Surgical History reviewed in Epic today.    MEDICATIONS:    Current Outpatient Medications   Medication Sig Dispense Refill     ACETAMINOPHEN EXTRA STRENGTH 500 MG tablet TAKE TWO TABLETS (1000MG) BY MOUTH TWICE DAILY;AND MAY TAKE TWO TABLETS (1000MG) BY MOUTH ONCE DAILY AS NEEDED 112 tablet PRN     atorvastatin (LIPITOR) 40 MG tablet TAKE 1 TABLET BY MOUTH AT BEDTIME 28 tablet PRN     DILT- MG 24 hr capsule TAKE 1 CAPSULE BY MOUTH ONCE DAILY 28 capsule PRN     furosemide (LASIX) 40 MG tablet Take 60mg po every day on Thursdays, take 40mg po daily on AOD 60 tablet 4     levothyroxine (SYNTHROID/LEVOTHROID) 175 MCG tablet Take 1 tablet (175 mcg) by mouth daily 30 tablet 3     Lidocaine (LIDOCARE) 4 % Patch Place 2 patches onto the skin every 24 hours To prevent lidocaine toxicity, patient should be patch free for 12 hrs daily.  Place to neck and right knee, on at 8AM, off 8PM 60 patch 3     loperamide (IMODIUM A-D) 2 MG tablet Take 1 tablet (2 mg) by mouth daily before breakfast. May also take 1 tablet (2 mg) 4 times daily as needed for diarrhea. 30 tablet 4     melatonin 1 MG TABS tablet TAKE 1 TABLET BY MOUTH AT BEDTIME 28 tablet PRN     Menthol, Topical Analgesic, 4 % GEL Externally apply 1 Application topically 2 times daily. May also externally apply 1 Application 2 times daily as needed. 89 mL 4     menthol-zinc oxide (CALMOSEPTINE) 0.44-20.6 % OINT ointment APPLY A THIN LAYER TO AFFECTED AREA(S) TWICE DAILY;& WITH EACH INCONTINENCE CARE 113 g 97     Multiple Vitamins-Minerals (TAB-A-IRIS) TABS TAKE 1 TABLET BY MOUTH ONCE DAILY 28 tablet PRN     sotalol (BETAPACE) 80 MG tablet TAKE 1 TABLET BY MOUTH TWICE DAILY WITH MEALS 56  "tablet PRN     warfarin (COUMADIN) 2.5 MG tablet Take 2.5 mg by mouth daily on Tues & Fridays  AND take 5 mg AOD           REVIEW OF SYSTEMS:  Limited secondary to cognitive impairment but as noted in HPI    Objective:  /70   Pulse 77   Temp 96.7  F (35.9  C)   Resp 20   Ht 1.575 m (5' 2\")   Wt 87.7 kg (193 lb 6.4 oz)   BMI 35.37 kg/m    Exam:  GENERAL APPEARANCE:  Alert, in no distress, cooperative  EYES:  PERRL, wearing corrective lenses  NECK:  No adenopathy,masses or thyromegaly  RESP:  respiratory effort and palpation of chest normal, lungs CTA bilaterally, no orthopnea observed, no wheezing, no cough  CV:  Palpation and auscultation of heart done , paced rhythm, edema 1+ BLLE, tubi , no open areas noted  ABDOMEN:  soft, non-tender to light and deep palpation , no guarding or rebound, bowel sounds normal  M/S:   Gait and station abnormal generalized weakness, arthritic changes noted to hands  NEURO:   Cranial nerves 2-12 are normal tested and grossly at patient's baseline  PSYCH:  memory impaired , affect and mood normal       Labs:   Recent labs in [facility] reviewed by me today.     ASSESSMENT/PLAN:  (R41.82) Altered mental status, unspecified altered mental status type  (primary encounter diagnosis)  Comment: reason for ER visit  -work-up in ER unremarkable, CT head and chest x-ray clear, labs without acute findings, UA clear  -appears at baseline today  Plan: monitor, staff update with concerns     (I48.20) Chronic atrial fibrillation (H)  Comment: per history with pacer  -on coumadin  Plan: continue pacer and checks per recommendation  -on diltiazem and sotalol   -anticoagulated with coumadin  -follow INR's  -does not follow with cards and no desire to per family    (R19.7) Diarrhea, unspecified type  Comment: resolved per staff  Plan:d'c scheduled imodium  -staff update with concerns    (M89.49) Primary osteoarthritis involving multiple joints  Comment: history of stenosis and OA to multiple " joints including knees, back, neck    Plan: lidocaine patch to neck and right knee, continue with biofreeze to shoulders  -if able to safely transfer without EZ stand, prefer  -staff/family to update with no improvement     (G20) Parkinson's disease (tremor, stiffness, slow motion, unstable posture) (H)  Comment: no official neuro w/u but per symptoms of tremor, quiet speech, rigidity, slow movement, falls  -does not desire neuro involvement or medications  -worsening balance and increased weakness earlier this month, home health therapies ordered   Plan: monitor, AL staff assist with cares  -homecare therapy involved     (N18.30) Stage 3 chronic kidney disease, unspecified whether stage 3a or 3b CKD  Comment:   Lab Results   Component Value Date    CR 0.80 04/23/2021       Plan: renally adjust meds, avoid nephrotoxic meds  -perioidc BMP    (I50.32) Chronic diastolic congestive heart failure (H)  Comment: was monitored more closely and diuresed a few weeks ago  -appears evolemic on exam today   Plan: :last 60mg po Thursday, lasix 40mg po AOD  -monthly weights, staff to update me if weight >198lbs or with worsening dyspnea, orthopnea, ect        Electronically signed by:  IRASEMA Rivero CNP               Sincerely,        IRASEMA Rivero CNP

## 2021-04-29 LAB
BACTERIA SPEC CULT: NO GROWTH
BACTERIA SPEC CULT: NO GROWTH
Lab: NORMAL
SPECIMEN SOURCE: NORMAL
SPECIMEN SOURCE: NORMAL

## 2021-05-03 ENCOUNTER — HOSPITAL LABORATORY (OUTPATIENT)
Dept: OTHER | Facility: CLINIC | Age: 86
End: 2021-05-03

## 2021-05-03 LAB
SARS-COV-2 RNA RESP QL NAA+PROBE: NORMAL
SPECIMEN SOURCE: NORMAL

## 2021-05-10 ENCOUNTER — HOSPITAL LABORATORY (OUTPATIENT)
Dept: OTHER | Facility: CLINIC | Age: 86
End: 2021-05-10

## 2021-05-11 ENCOUNTER — DOCUMENTATION ONLY (OUTPATIENT)
Dept: OTHER | Facility: CLINIC | Age: 86
End: 2021-05-11

## 2021-05-11 LAB
SARS-COV-2 RNA RESP QL NAA+PROBE: NOT DETECTED
SPECIMEN SOURCE: NORMAL

## 2021-05-18 ENCOUNTER — ASSISTED LIVING VISIT (OUTPATIENT)
Dept: GERIATRICS | Facility: CLINIC | Age: 86
End: 2021-05-18
Payer: COMMERCIAL

## 2021-05-18 ENCOUNTER — HOSPITAL LABORATORY (OUTPATIENT)
Dept: OTHER | Facility: CLINIC | Age: 86
End: 2021-05-18

## 2021-05-18 VITALS — HEIGHT: 62 IN | BODY MASS INDEX: 35.59 KG/M2 | WEIGHT: 193.4 LBS

## 2021-05-18 DIAGNOSIS — Z51.81 ENCOUNTER FOR THERAPEUTIC DRUG MONITORING: Primary | ICD-10-CM

## 2021-05-18 DIAGNOSIS — Z79.01 LONG TERM (CURRENT) USE OF ANTICOAGULANTS: ICD-10-CM

## 2021-05-18 DIAGNOSIS — I48.20 CHRONIC ATRIAL FIBRILLATION (H): ICD-10-CM

## 2021-05-18 LAB — INR PPP: 2.45 (ref 0.86–1.14)

## 2021-05-18 ASSESSMENT — MIFFLIN-ST. JEOR: SCORE: 1280.51

## 2021-05-18 NOTE — LETTER
"    5/18/2021        RE: Liberty Horta  [address]  [facility] [address]        [place] [facility]  [place] [facility] Record Number:  [id number]  Place of Service where encounter took place: [facility] ([facility]) [[id number]]    HPI:    Liberty Horta is a 86 year old  (1935), who is being seen today for an episodic care visit at the above location.   HPI information obtained from: {[facility] HPI:271972}. Today's concern is INR/Coumadin management for {INDICATION FOR ANTICOAGULATION:536411}    ROS/Subjective:  Bleeding Signs/Symptoms:  {NONESTARS:115488::\"None\"}  Thromboembolic Signs/Symptoms:  {NONESTARS:919957::\"None\"}  Medication Changes:  {YES/NO WITH DEFAULT:671549:a:\"No\"}  Dietary Changes:  {YES/NO WITH DEFAULT:221602:a:\"No\"}  Activity Changes: {YES/NO WITH DEFAULT:779183::\"No\"}  Bacterial/Viral Infection:  {YES/NO WITH DEFAULT:254622:a:\"No\"}  Missed Coumadin Doses:  {NONE/THIS WEEK/OVER WEEK:217418:a:\"None\"}  On ASA: { ASA:843211}  Other Concerns:  {YES/NO WITH DEFAULT:885385:a:\"No\"}    OBJECTIVE:  Ht 1.575 m (5' 2\")   Wt 87.7 kg (193 lb 6.4 oz)   BMI 35.37 kg/m    Last INR: *** on ***  INR Today:  ***  Current Dose:  ***  {fgsinrtable:109681:x}    ASSESSMENT:  {[facility] DX:364608}  {THERAPEUTIC/SUBTHERAPEUTIC/SUPRATHERAPEUTIC:690817:a:\"Therapeutic\"} INR for goal of {NUMBERS 2-3/2.5/3.5/3-4:421709:a:\"2-3\"}    PLAN:   {fgsorders:938298}  New Dose: {NO CHANGE:702387::\"No Change\"}    Next INR: {TIME FRAME RECHECK:451743::\"1 month\"}  {Coumadin Dx/Z51.81/Z79.01***}    Electronically signed by:  Mag Balbuena CNA ***  {Providers Please double check the med list (in the plan section >> meds & orders tab) and Discontinue any of the meds flagged by the TC to be discontinued}      [facility]  [place] [facility] Record Number:  [id number]  Place of Service where encounter took place: [facility] ([facility]) [[id number]]    HPI:  " "  Liberty Horta is a 86 year old  (1935), who is being seen today for an episodic care visit at the above location.   HPI information obtained from: facility chart records, facility staff and [facility] chart review. Today's concern is INR/Coumadin management for A. Fib    ROS/Subjective:  Bleeding Signs/Symptoms:  None  Thromboembolic Signs/Symptoms:  None  Medication Changes:  No  Dietary Changes:  No  Activity Changes: No  Bacterial/Viral Infection:  No  Missed Coumadin Doses:  None  On ASA: No  Other Concerns:  No    OBJECTIVE:  Ht 1.575 m (5' 2\")   Wt 87.7 kg (193 lb 6.4 oz)   BMI 35.37 kg/m    Last INR: 3.05 on 4/23  INR Today:  2.45  Current Dose:  2.5mg PO QWednesday. 5mg PO QD on all other days  INR Flow sheet at [facility]:    ASSESSMENT:  1. Encounter for therapeutic drug monitoring    2. Long term (current) use of anticoagulants    3. Chronic atrial fibrillation (H)      Therapeutic INR for goal of 2-3    PLAN:     New Dose: No Change    Next INR: 4 weeks      Electronically signed by:  IRASEMA Rivero CNP         Sincerely,        IRASEMA Rivero CNP    "

## 2021-05-18 NOTE — LETTER
"    5/18/2021        RE: Liberty Horta  [facility]  [address]  [facility] [address]        [place] [facility]  [place] [facility] Record Number:  [id number]  Place of Service where encounter took place: [facility] ([facility]) [[id number]]    HPI:    Liberty Horta is a 86 year old  (1935), who is being seen today for an episodic care visit at the above location.   HPI information obtained from: facility chart records, facility staff and [facility] chart review. Today's concern is INR/Coumadin management for A. Fib    ROS/Subjective:  Bleeding Signs/Symptoms:  None  Thromboembolic Signs/Symptoms:  None  Medication Changes:  No  Dietary Changes:  No  Activity Changes: No  Bacterial/Viral Infection:  No  Missed Coumadin Doses:  None  On ASA: No  Other Concerns:  No    OBJECTIVE:  Ht 1.575 m (5' 2\")   Wt 87.7 kg (193 lb 6.4 oz)   BMI 35.37 kg/m    Last INR: 3.05 on 4/23  INR Today:  2.45  Current Dose:  2.5mg PO QWednesday. 5mg PO QD on all other days  INR Flow sheet at SNF:    ASSESSMENT:  1. Encounter for therapeutic drug monitoring    2. Long term (current) use of anticoagulants    3. Chronic atrial fibrillation (H)      Therapeutic INR for goal of 2-3    PLAN:     New Dose: No Change    Next INR: 4 weeks      Electronically signed by:  IRASEMA Rivero CNP         Sincerely,        IRASEMA Rivero CNP    "

## 2021-05-18 NOTE — PROGRESS NOTES
"[place] [facility]  [place] [facility] Record Number:  [id number]  Place of Service where encounter took place: [facility] ([facility]) [[id number]]    HPI:    Liberty Horta is a 86 year old  (1935), who is being seen today for an episodic care visit at the above location.   HPI information obtained from: facility chart records, facility staff and [facility] chart review. Today's concern is INR/Coumadin management for A. Fib    ROS/Subjective:  Bleeding Signs/Symptoms:  None  Thromboembolic Signs/Symptoms:  None  Medication Changes:  No  Dietary Changes:  No  Activity Changes: No  Bacterial/Viral Infection:  No  Missed Coumadin Doses:  None  On ASA: No  Other Concerns:  No    OBJECTIVE:  Ht 1.575 m (5' 2\")   Wt 87.7 kg (193 lb 6.4 oz)   BMI 35.37 kg/m    Last INR: 3.05 on 4/23  INR Today:  2.45  Current Dose:  2.5mg PO QWednesday. 5mg PO QD on all other days  INR Flow sheet at [facility]:    ASSESSMENT:  1. Encounter for therapeutic drug monitoring    2. Long term (current) use of anticoagulants    3. Chronic atrial fibrillation (H)      Therapeutic INR for goal of 2-3    PLAN:     New Dose: No Change    Next INR: 4 weeks      Electronically signed by:  IRASEMA Rivero CNP   "

## 2021-06-01 ENCOUNTER — ASSISTED LIVING VISIT (OUTPATIENT)
Dept: GERIATRICS | Facility: CLINIC | Age: 86
End: 2021-06-01
Payer: COMMERCIAL

## 2021-06-01 VITALS
TEMPERATURE: 97.5 F | HEART RATE: 59 BPM | RESPIRATION RATE: 20 BRPM | BODY MASS INDEX: 35.77 KG/M2 | DIASTOLIC BLOOD PRESSURE: 84 MMHG | WEIGHT: 194.4 LBS | HEIGHT: 62 IN | SYSTOLIC BLOOD PRESSURE: 132 MMHG

## 2021-06-01 DIAGNOSIS — I48.11 LONGSTANDING PERSISTENT ATRIAL FIBRILLATION (H): ICD-10-CM

## 2021-06-01 DIAGNOSIS — I50.9 ACUTE ON CHRONIC CONGESTIVE HEART FAILURE, UNSPECIFIED HEART FAILURE TYPE (H): ICD-10-CM

## 2021-06-01 DIAGNOSIS — M15.0 PRIMARY OSTEOARTHRITIS INVOLVING MULTIPLE JOINTS: ICD-10-CM

## 2021-06-01 DIAGNOSIS — G20.A1 PARKINSON'S DISEASE (TREMOR, STIFFNESS, SLOW MOTION, UNSTABLE POSTURE) (H): ICD-10-CM

## 2021-06-01 DIAGNOSIS — R19.5 LOOSE STOOLS: Primary | ICD-10-CM

## 2021-06-01 DIAGNOSIS — N18.30 STAGE 3 CHRONIC KIDNEY DISEASE, UNSPECIFIED WHETHER STAGE 3A OR 3B CKD (H): ICD-10-CM

## 2021-06-01 RX ORDER — FUROSEMIDE 40 MG
TABLET ORAL
Qty: 60 TABLET | Refills: 4 | Status: SHIPPED | OUTPATIENT
Start: 2021-01-01 | End: 2021-01-01

## 2021-06-01 RX ORDER — POTASSIUM CHLORIDE 750 MG/1
10 TABLET, EXTENDED RELEASE ORAL DAILY
Qty: 5 TABLET | Refills: 0 | Status: SHIPPED | OUTPATIENT
Start: 2021-01-01 | End: 2021-01-01

## 2021-06-01 RX ORDER — FUROSEMIDE 40 MG
60 TABLET ORAL DAILY
Qty: 8 TABLET | Refills: 0 | Status: SHIPPED | OUTPATIENT
Start: 2021-01-01 | End: 2021-01-01

## 2021-06-01 ASSESSMENT — MIFFLIN-ST. JEOR: SCORE: 1285.04

## 2021-06-01 NOTE — LETTER
6/1/2021        RE: Liberty Horta  [facility]  [address]  [facility] [address]        [facility]    Chief Complaint   Patient presents with     NUVIABRAULIO       [facility] Medical Record Number:  [id number]  Place of Service where encounter took place:  [facility] ([facility]) [[id number]]    HPI:    Liberty Horta is a 86 year old  (1935), who is being seen today for an episodic care visit.  HPI information obtained from: facility chart records, facility staff, patient report, [facility] chart review and family/first contact son's  report.Today's concern is:    1. Loose stools    2. Primary osteoarthritis involving multiple joints    3. Parkinson's disease (tremor, stiffness, slow motion, unstable posture) (H)    4. Stage 3 chronic kidney disease, unspecified whether stage 3a or 3b CKD    5. Chronic diastolic congestive heart failure (H)    6. Longstanding persistent atrial fibrillation (H)    7. Acute on chronic congestive heart failure, unspecified heart failure type (H)      Patient is a 86 year old female with PMH significant for a-fib, s/p pacer maker placement (on coumadin), hypothyroidism, HTN, GERD, OA, Anemia, HLD, urinary retention, CHF, and Parkinson's.  She has had her left knee replaced and also had right hip ORIF in the past    She is seen today for follow-up on multiple co-morbidities as well as recent med change made last week.  Patient had history of loose stools, infectious causes were previously ruled out, she was started on imodium and then struggled with constipation 5/1, scheduled imodium was discontinued with prn in place.  Staff reporting she is having loose stools again in the morning.  It has been difficult to use the prn imodium, family requesting that something be done as this is distressing patient.  Imodium was scheduled MWF.  Speaking with staff today, patient's bowels have been rather sporadic, some  days she has loose stools and other days is constipation.  Patient denies abdominal pain, n/v, changes in appetite.  Staff also noted increased swelling to her legs and work of breathing with exertion, crackles heard on exam.  She is on monthly weights, weight has not been done for about 1 month now     I was able to speak with her son, Michele today regarding POC.     ALLERGIES: Meperidine, No clinical screening - see comments, Propoxyphene n-apap, and Tramadol  Past Medical, Surgical, Family and Social History reviewed and updated in [facility].    Current Outpatient Medications   Medication Sig Dispense Refill     [START ON 6/7/2021] furosemide (LASIX) 40 MG tablet Take 60mg po every day on Thursdays, take 40mg po daily on AOD 60 tablet 4     [START ON 6/2/2021] furosemide (LASIX) 40 MG tablet Take 1.5 tablets (60 mg) by mouth daily for 5 days To resume previous lasix dosing of 40mg every day except 60mg on Thursday begininng 6/7 8 tablet 0     psyllium (METAMUCIL/KONSYL) 0.52 g capsule Take 1 capsule by mouth 2 times daily 60 capsule 3     ACETAMINOPHEN EXTRA STRENGTH 500 MG tablet TAKE TWO TABLETS (1000MG) BY MOUTH TWICE DAILY;AND MAY TAKE TWO TABLETS (1000MG) BY MOUTH ONCE DAILY AS NEEDED 112 tablet PRN     atorvastatin (LIPITOR) 40 MG tablet TAKE 1 TABLET BY MOUTH AT BEDTIME 28 tablet PRN     DILT- MG 24 hr capsule TAKE 1 CAPSULE BY MOUTH ONCE DAILY 28 capsule PRN     levothyroxine (SYNTHROID/LEVOTHROID) 175 MCG tablet Take 1 tablet (175 mcg) by mouth daily 30 tablet 3     Lidocaine (LIDOCARE) 4 % Patch Place 2 patches onto the skin every 24 hours To prevent lidocaine toxicity, patient should be patch free for 12 hrs daily.  Place to neck and right knee, on at 8AM, off 8PM 60 patch 3     loperamide (IMODIUM A-D) 2 MG tablet Take 1 tablet (2 mg) by mouth 4 times daily as needed for diarrhea       melatonin 1 MG TABS tablet TAKE 1 TABLET BY MOUTH AT BEDTIME 28 tablet PRN     Menthol, Topical Analgesic, 4 % GEL  "Externally apply 1 Application topically 2 times daily. May also externally apply 1 Application 2 times daily as needed. 89 mL 4     menthol-zinc oxide (CALMOSEPTINE) 0.44-20.6 % OINT ointment APPLY A THIN LAYER TO AFFECTED AREA(S) TWICE DAILY;& WITH EACH INCONTINENCE CARE 113 g 97     Multiple Vitamins-Minerals (TAB-A-IRIS) TABS TAKE 1 TABLET BY MOUTH ONCE DAILY 28 tablet PRN     sotalol (BETAPACE) 80 MG tablet TAKE 1 TABLET BY MOUTH TWICE DAILY WITH MEALS 56 tablet PRN     warfarin (COUMADIN) 2.5 MG tablet Take 2.5 mg by mouth daily on Tues & Fridays  AND take 5 mg AOD       Medications reviewed:  Medications reconciled to facility chart and changes were made to reflect current medications as identified as above med list. Below are the changes that were made:   Medications stopped since last EPIC medication reconciliation:   There are no discontinued medications.    Medications started since last [facility] medication reconciliation:  No orders of the defined types were placed in this encounter.        REVIEW OF SYSTEMS:  Limited secondary to cognitive impairment but as noted in HPI    Physical Exam:  /84   Pulse 59   Temp 97.5  F (36.4  C)   Resp 20   Ht 1.575 m (5' 2\")   Wt 88.2 kg (194 lb 6.4 oz)   BMI 35.56 kg/m    GENERAL APPEARANCE:  Alert, in no distress, cooperative  EYES:  PERRL, wearing corrective lenses  NECK:  No adenopathy,masses or thyromegaly  RESP:  respiratory effort and palpation of chest normal, crackles to BLLL, no wheezing, no orthopnea observed, no wheezing, no cough  CV:  Palpation and auscultation of heart done , paced rhythm, edema 2+ BLLE, tubi , no open areas noted  ABDOMEN:  soft, non-tender to light and deep palpation , no guarding or rebound, bowel sounds normal  M/S:   Gait and station abnormal generalized weakness, arthritic changes noted to hands  NEURO:   Cranial nerves 2-12 are normal tested and grossly at patient's baseline  PSYCH:  memory impaired , affect and mood " normal    Recent Labs:     CBC RESULTS:   Recent Labs   Lab Test 04/23/21  1104 03/09/21  0900   WBC 5.8 6.5   RBC 4.13 4.48   HGB 13.0 14.0   HCT 41.1 45.6    102*   MCH 31.5 31.3   MCHC 31.6 30.7*   RDW 12.8 12.8    178       Last Basic Metabolic Panel:  Recent Labs   Lab Test 04/23/21  1104 04/20/21  0725 04/13/21  0955     --  141   POTASSIUM 3.8 3.7 3.4   CHLORIDE 110*  --  105   ARLENE 8.7  --  9.1   CO2 29  --  27   BUN 14  --  22   CR 0.80  --  1.03   *  --  156*       Liver Function Studies -   Recent Labs   Lab Test 04/23/21  1104 06/11/20  0705   PROTTOTAL 6.1* 6.8   ALBUMIN 3.3* 3.7   BILITOTAL 0.7 0.5   ALKPHOS 59 78   AST 19 18   ALT 32 30       TSH   Date Value Ref Range Status   04/20/2021 1.68 0.40 - 4.00 mU/L Final   03/09/2021 5.91 (H) 0.40 - 4.00 mU/L Final   ]    No results found for: A1C      Assessment/Plan:  (R19.5) Loose stools  (primary encounter diagnosis)  Comment: not well controlled  struggled with in past in the mornings, daily imodium caused constipation.  Staff struggling to use prn imodium  -imodium changed to daily on MWF, bowel sporadic and irregular  -discussed POC with sonMichele today  Plan: add psyllium daily to try help make bowels more regular, encouraged resident to take with full glass of water  -imodium change back to prn, discussed with family notifying staff when they know they will come to take patient on an outing so that prn imodium can be given in advance to reduce bowel accidents when she is gone doing activities  -family will update if plan not effective     (M89.49) Primary osteoarthritis involving multiple joints  Comment: history of stenosis and OA to multiple joints including knees, back, neck   -no reports of worsening pain today   Plan: lidocaine patch to neck and right knee, continue with biofreeze to shoulders  -if able to safely transfer without EZ stand, prefer  -staff/family to update with no improvement     (G20) Parkinson's  disease (tremor, stiffness, slow motion, unstable posture) (H)  Comment: no official neuro w/u but per symptoms of tremor, quiet speech, rigidity, slow movement, falls  -does not desire neuro involvement or medications  -worsening balance and increased weakness in April and worked with home care therapies.  In past 6 months, decline in function, walking less, transfer more difficult  Plan: monitor, AL staff assist with cares      (N18.30) Stage 3 chronic kidney disease, unspecified whether stage 3a or 3b CKD  Comment:   Lab Results   Component Value Date    CR 0.80 04/23/2021       Plan: renally adjust meds, avoid nephrotoxic meds  -perioidc BMP    Acute on chronic diastolic congestive heart failure (H)  Comment: exacerbation   was monitored more closely and diuresed more aggressively in early April  -crackles to BLL today and increased LE edema  -weights have been 193-194lbs but on monthly weights and weight has not been obtained for about 1 month now  Plan: : starting tomorrow, increased lasix to 60mg po every day X 5 days   -resume 60mg po Thursday, lasix 40mg po AOD next Monday  -monthly weights, staff to update me if weight >198lbs or with worsening dyspnea, orthopnea, ect, staff to obtain weight tomorrow morning and update me  -BMP next week   -KCL 10meq po every day X 5 days with lasix burst     (I48.11) Longstanding persistent atrial fibrillation (H)  Comment: per history with pacer  -on coumadin  Plan: continue pacer and checks per recommendation  -on diltiazem and sotalol   -anticoagulated with coumadin  -follow INR's  -does not follow with cards and no desire to per family      Electronically signed by  IRASEMA Rivero CNP                        Sincerely,        IRASEMA Rivero CNP

## 2021-06-01 NOTE — PROGRESS NOTES
[facility] GERIATRIC SERVICES    Chief Complaint   Patient presents with     MIRI       [facility] Medical Record Number:  [id number]  Place of Service where encounter took place:  [facility] ([facility] [[id number]]    HPI:    Liberty Horta is a 86 year old  (1935), who is being seen today for an episodic care visit.  HPI information obtained from: facility chart records, facility staff, patient report, [facility] chart review and family/first contact son's  report.Today's concern is:    1. Loose stools    2. Primary osteoarthritis involving multiple joints    3. Parkinson's disease (tremor, stiffness, slow motion, unstable posture) (H)    4. Stage 3 chronic kidney disease, unspecified whether stage 3a or 3b CKD    5. Chronic diastolic congestive heart failure (H)    6. Longstanding persistent atrial fibrillation (H)    7. Acute on chronic congestive heart failure, unspecified heart failure type (H)      Patient is a 86 year old female with PMH significant for a-fib, s/p pacer maker placement (on coumadin), hypothyroidism, HTN, GERD, OA, Anemia, HLD, urinary retention, CHF, and Parkinson's.  She has had her left knee replaced and also had right hip ORIF in the past    She is seen today for follow-up on multiple co-morbidities as well as recent med change made last week.  Patient had history of loose stools, infectious causes were previously ruled out, she was started on imodium and then struggled with constipation 5/1, scheduled imodium was discontinued with prn in place.  Staff reporting she is having loose stools again in the morning.  It has been difficult to use the prn imodium, family requesting that something be done as this is distressing patient.  Imodium was scheduled MWF.  Speaking with staff today, patient's bowels have been rather sporadic, some days she has loose stools and other days is constipation.  Patient denies abdominal pain, n/v, changes in appetite.   Staff also noted increased swelling to her legs and work of breathing with exertion, crackles heard on exam.  She is on monthly weights, weight has not been done for about 1 month now     I was able to speak with her son, Michele today regarding POC.     ALLERGIES: Meperidine, No clinical screening - see comments, Propoxyphene n-apap, and Tramadol  Past Medical, Surgical, Family and Social History reviewed and updated in [facility].    Current Outpatient Medications   Medication Sig Dispense Refill     [START ON 6/7/2021] furosemide (LASIX) 40 MG tablet Take 60mg po every day on Thursdays, take 40mg po daily on AOD 60 tablet 4     [START ON 6/2/2021] furosemide (LASIX) 40 MG tablet Take 1.5 tablets (60 mg) by mouth daily for 5 days To resume previous lasix dosing of 40mg every day except 60mg on Thursday begininng 6/7 8 tablet 0     psyllium (METAMUCIL/KONSYL) 0.52 g capsule Take 1 capsule by mouth 2 times daily 60 capsule 3     ACETAMINOPHEN EXTRA STRENGTH 500 MG tablet TAKE TWO TABLETS (1000MG) BY MOUTH TWICE DAILY;AND MAY TAKE TWO TABLETS (1000MG) BY MOUTH ONCE DAILY AS NEEDED 112 tablet PRN     atorvastatin (LIPITOR) 40 MG tablet TAKE 1 TABLET BY MOUTH AT BEDTIME 28 tablet PRN     DILT- MG 24 hr capsule TAKE 1 CAPSULE BY MOUTH ONCE DAILY 28 capsule PRN     levothyroxine (SYNTHROID/LEVOTHROID) 175 MCG tablet Take 1 tablet (175 mcg) by mouth daily 30 tablet 3     Lidocaine (LIDOCARE) 4 % Patch Place 2 patches onto the skin every 24 hours To prevent lidocaine toxicity, patient should be patch free for 12 hrs daily.  Place to neck and right knee, on at 8AM, off 8PM 60 patch 3     loperamide (IMODIUM A-D) 2 MG tablet Take 1 tablet (2 mg) by mouth 4 times daily as needed for diarrhea       melatonin 1 MG TABS tablet TAKE 1 TABLET BY MOUTH AT BEDTIME 28 tablet PRN     Menthol, Topical Analgesic, 4 % GEL Externally apply 1 Application topically 2 times daily. May also externally apply 1 Application 2 times daily as needed.  "89 mL 4     menthol-zinc oxide (CALMOSEPTINE) 0.44-20.6 % OINT ointment APPLY A THIN LAYER TO AFFECTED AREA(S) TWICE DAILY;& WITH EACH INCONTINENCE CARE 113 g 97     Multiple Vitamins-Minerals (TAB-A-IRIS) TABS TAKE 1 TABLET BY MOUTH ONCE DAILY 28 tablet PRN     sotalol (BETAPACE) 80 MG tablet TAKE 1 TABLET BY MOUTH TWICE DAILY WITH MEALS 56 tablet PRN     warfarin (COUMADIN) 2.5 MG tablet Take 2.5 mg by mouth daily on Tues & Fridays  AND take 5 mg AOD       Medications reviewed:  Medications reconciled to facility chart and changes were made to reflect current medications as identified as above med list. Below are the changes that were made:   Medications stopped since last EPIC medication reconciliation:   There are no discontinued medications.    Medications started since last [facility] medication reconciliation:  No orders of the defined types were placed in this encounter.        REVIEW OF SYSTEMS:  Limited secondary to cognitive impairment but as noted in HPI    Physical Exam:  /84   Pulse 59   Temp 97.5  F (36.4  C)   Resp 20   Ht 1.575 m (5' 2\")   Wt 88.2 kg (194 lb 6.4 oz)   BMI 35.56 kg/m    GENERAL APPEARANCE:  Alert, in no distress, cooperative  EYES:  PERRL, wearing corrective lenses  NECK:  No adenopathy,masses or thyromegaly  RESP:  respiratory effort and palpation of chest normal, crackles to BLLL, no wheezing, no orthopnea observed, no wheezing, no cough  CV:  Palpation and auscultation of heart done , paced rhythm, edema 2+ BLLE, tubi , no open areas noted  ABDOMEN:  soft, non-tender to light and deep palpation , no guarding or rebound, bowel sounds normal  M/S:   Gait and station abnormal generalized weakness, arthritic changes noted to hands  NEURO:   Cranial nerves 2-12 are normal tested and grossly at patient's baseline  PSYCH:  memory impaired , affect and mood normal    Recent Labs:     CBC RESULTS:   Recent Labs   Lab Test 04/23/21  1104 03/09/21  0900   WBC 5.8 6.5   RBC 4.13 " 4.48   HGB 13.0 14.0   HCT 41.1 45.6    102*   MCH 31.5 31.3   MCHC 31.6 30.7*   RDW 12.8 12.8    178       Last Basic Metabolic Panel:  Recent Labs   Lab Test 04/23/21  1104 04/20/21  0725 04/13/21  0955     --  141   POTASSIUM 3.8 3.7 3.4   CHLORIDE 110*  --  105   ARLENE 8.7  --  9.1   CO2 29  --  27   BUN 14  --  22   CR 0.80  --  1.03   *  --  156*       Liver Function Studies -   Recent Labs   Lab Test 04/23/21  1104 06/11/20  0705   PROTTOTAL 6.1* 6.8   ALBUMIN 3.3* 3.7   BILITOTAL 0.7 0.5   ALKPHOS 59 78   AST 19 18   ALT 32 30       TSH   Date Value Ref Range Status   04/20/2021 1.68 0.40 - 4.00 mU/L Final   03/09/2021 5.91 (H) 0.40 - 4.00 mU/L Final   ]    No results found for: A1C      Assessment/Plan:  (R19.5) Loose stools  (primary encounter diagnosis)  Comment: not well controlled  struggled with in past in the mornings, daily imodium caused constipation.  Staff struggling to use prn imodium  -imodium changed to daily on MWF, bowel sporadic and irregular  -discussed POC with sonMichele today  Plan: add psyllium daily to try help make bowels more regular, encouraged resident to take with full glass of water  -imodium change back to prn, discussed with family notifying staff when they know they will come to take patient on an outing so that prn imodium can be given in advance to reduce bowel accidents when she is gone doing activities  -family will update if plan not effective     (M89.49) Primary osteoarthritis involving multiple joints  Comment: history of stenosis and OA to multiple joints including knees, back, neck   -no reports of worsening pain today   Plan: lidocaine patch to neck and right knee, continue with biofreeze to shoulders  -if able to safely transfer without EZ stand, prefer  -staff/family to update with no improvement     (G20) Parkinson's disease (tremor, stiffness, slow motion, unstable posture) (H)  Comment: no official neuro w/u but per symptoms of tremor,  quiet speech, rigidity, slow movement, falls  -does not desire neuro involvement or medications  -worsening balance and increased weakness in April and worked with home care therapies.  In past 6 months, decline in function, walking less, transfer more difficult  Plan: monitor, AL staff assist with cares      (N18.30) Stage 3 chronic kidney disease, unspecified whether stage 3a or 3b CKD  Comment:   Lab Results   Component Value Date    CR 0.80 04/23/2021       Plan: renally adjust meds, avoid nephrotoxic meds  -perioidc BMP    Acute on chronic diastolic congestive heart failure (H)  Comment: exacerbation   was monitored more closely and diuresed more aggressively in early April  -crackles to BLL today and increased LE edema  -weights have been 193-194lbs but on monthly weights and weight has not been obtained for about 1 month now  Plan: : starting tomorrow, increased lasix to 60mg po every day X 5 days   -resume 60mg po Thursday, lasix 40mg po AOD next Monday  -monthly weights, staff to update me if weight >198lbs or with worsening dyspnea, orthopnea, ect, staff to obtain weight tomorrow morning and update me  -BMP next week   -KCL 10meq po every day X 5 days with lasix burst     (I48.11) Longstanding persistent atrial fibrillation (H)  Comment: per history with pacer  -on coumadin  Plan: continue pacer and checks per recommendation  -on diltiazem and sotalol   -anticoagulated with coumadin  -follow INR's  -does not follow with cards and no desire to per family      Electronically signed by  IRASEMA Rivero CNP

## 2021-06-01 NOTE — LETTER
6/1/2021        RE: Liberty Horta  [facility]  [address]  [place] [facility] [address]        [facility] GERIATRIC SERVICES    Chief Complaint   Patient presents with     NUVIABRAULIO       [facility] Medical Record Number:  [id number]  Place of Service where encounter took place:  [facility] ([facility] [[id number]]    HPI:    Liberty Horta is a 86 year old  (1935), who is being seen today for an episodic care visit.  HPI information obtained from: facility chart records, facility staff, patient report, [facility] chart review and family/first contact son's  report.Today's concern is:    1. Loose stools    2. Primary osteoarthritis involving multiple joints    3. Parkinson's disease (tremor, stiffness, slow motion, unstable posture) (H)    4. Stage 3 chronic kidney disease, unspecified whether stage 3a or 3b CKD    5. Chronic diastolic congestive heart failure (H)    6. Longstanding persistent atrial fibrillation (H)      Patient is a 86 year old female with PMH significant for a-fib, s/p pacer maker placement (on coumadin), hypothyroidism, HTN, GERD, OA, Anemia, HLD, urinary retention, CHF, and Parkinson's.  She has had her left knee replaced and also had right hip ORIF in the past    She is seen today for follow-up on multiple co-morbidities as well as recent med change made last week.  Patient had history of loose stools, infectious causes were previously ruled out, she was started on imodium and then struggled with constipation 5/1, scheduled imodium was discontinued with prn in place.  Staff reporting she is having loose stools again in the morning.  It has been difficult to use the prn imodium, family requesting that something be done as this is distressing patient.  Imodium was scheduled MWF    ALLERGIES: Meperidine, No clinical screening - see comments, Propoxyphene n-apap, and Tramadol  Past Medical, Surgical, Family and Social History reviewed and  updated in [facility].    Current Outpatient Medications   Medication Sig Dispense Refill     ACETAMINOPHEN EXTRA STRENGTH 500 MG tablet TAKE TWO TABLETS (1000MG) BY MOUTH TWICE DAILY;AND MAY TAKE TWO TABLETS (1000MG) BY MOUTH ONCE DAILY AS NEEDED 112 tablet PRN     atorvastatin (LIPITOR) 40 MG tablet TAKE 1 TABLET BY MOUTH AT BEDTIME 28 tablet PRN     DILT- MG 24 hr capsule TAKE 1 CAPSULE BY MOUTH ONCE DAILY 28 capsule PRN     furosemide (LASIX) 40 MG tablet Take 60mg po every day on Thursdays, take 40mg po daily on AOD 60 tablet 4     levothyroxine (SYNTHROID/LEVOTHROID) 175 MCG tablet Take 1 tablet (175 mcg) by mouth daily 30 tablet 3     Lidocaine (LIDOCARE) 4 % Patch Place 2 patches onto the skin every 24 hours To prevent lidocaine toxicity, patient should be patch free for 12 hrs daily.  Place to neck and right knee, on at 8AM, off 8PM 60 patch 3     loperamide (IMODIUM A-D) 2 MG tablet Take 1 tablet (2 mg) by mouth 4 times daily as needed for diarrhea       melatonin 1 MG TABS tablet TAKE 1 TABLET BY MOUTH AT BEDTIME 28 tablet PRN     Menthol, Topical Analgesic, 4 % GEL Externally apply 1 Application topically 2 times daily. May also externally apply 1 Application 2 times daily as needed. 89 mL 4     menthol-zinc oxide (CALMOSEPTINE) 0.44-20.6 % OINT ointment APPLY A THIN LAYER TO AFFECTED AREA(S) TWICE DAILY;& WITH EACH INCONTINENCE CARE 113 g 97     Multiple Vitamins-Minerals (TAB-A-IRIS) TABS TAKE 1 TABLET BY MOUTH ONCE DAILY 28 tablet PRN     sotalol (BETAPACE) 80 MG tablet TAKE 1 TABLET BY MOUTH TWICE DAILY WITH MEALS 56 tablet PRN     warfarin (COUMADIN) 2.5 MG tablet Take 2.5 mg by mouth daily on Tues & Fridays  AND take 5 mg AOD       Medications reviewed:  Medications reconciled to facility chart and changes were made to reflect current medications as identified as above med list. Below are the changes that were made:   Medications stopped since last EPIC medication reconciliation:   There are no  "discontinued medications.    Medications started since last [facility] medication reconciliation:  No orders of the defined types were placed in this encounter.        REVIEW OF SYSTEMS:  Limited secondary to cognitive impairment but as noted in HPI    Physical Exam:  /84   Pulse 59   Temp 97.5  F (36.4  C)   Resp 20   Ht 1.575 m (5' 2\")   Wt 88.2 kg (194 lb 6.4 oz)   BMI 35.56 kg/m    GENERAL APPEARANCE:  Alert, in no distress, cooperative  EYES:  PERRL, wearing corrective lenses  NECK:  No adenopathy,masses or thyromegaly  RESP:  respiratory effort and palpation of chest normal, lungs CTA bilaterally, no orthopnea observed, no wheezing, no cough  CV:  Palpation and auscultation of heart done , paced rhythm, edema 1+ BLLE, tubi , no open areas noted  ABDOMEN:  soft, non-tender to light and deep palpation , no guarding or rebound, bowel sounds normal  M/S:   Gait and station abnormal generalized weakness, arthritic changes noted to hands  NEURO:   Cranial nerves 2-12 are normal tested and grossly at patient's baseline  PSYCH:  memory impaired , affect and mood normal    Recent Labs:     CBC RESULTS:   Recent Labs   Lab Test 04/23/21  1104 03/09/21  0900   WBC 5.8 6.5   RBC 4.13 4.48   HGB 13.0 14.0   HCT 41.1 45.6    102*   MCH 31.5 31.3   MCHC 31.6 30.7*   RDW 12.8 12.8    178       Last Basic Metabolic Panel:  Recent Labs   Lab Test 04/23/21  1104 04/20/21  0725 04/13/21  0955     --  141   POTASSIUM 3.8 3.7 3.4   CHLORIDE 110*  --  105   ARLENE 8.7  --  9.1   CO2 29  --  27   BUN 14  --  22   CR 0.80  --  1.03   *  --  156*       Liver Function Studies -   Recent Labs   Lab Test 04/23/21  1104 06/11/20  0705   PROTTOTAL 6.1* 6.8   ALBUMIN 3.3* 3.7   BILITOTAL 0.7 0.5   ALKPHOS 59 78   AST 19 18   ALT 32 30       TSH   Date Value Ref Range Status   04/20/2021 1.68 0.40 - 4.00 mU/L Final   03/09/2021 5.91 (H) 0.40 - 4.00 mU/L Final   ]    No results found for: " A1C      Assessment/Plan:  (R19.5) Loose stools  (primary encounter diagnosis)  Comment: struggled with in past in the mornings, daily imodium caused constipation.  Staff struggling to use prn imodium  -imodium changed to daily on MWF  Plan: ***    (M89.49) Primary osteoarthritis involving multiple joints  Comment: history of stenosis and OA to multiple joints including knees, back, neck     Plan: lidocaine patch to neck and right knee, continue with biofreeze to shoulders  -if able to safely transfer without EZ stand, prefer  -staff/family to update with no improvement     (G20) Parkinson's disease (tremor, stiffness, slow motion, unstable posture) (H)  Comment: no official neuro w/u but per symptoms of tremor, quiet speech, rigidity, slow movement, falls  -does not desire neuro involvement or medications  -worsening balance and increased weakness in April and worked with home care therapies.  In past 6 months, decline in function, walking less, transfer more difficult  Plan: monitor, AL staff assist with cares      (N18.30) Stage 3 chronic kidney disease, unspecified whether stage 3a or 3b CKD  Comment:   Lab Results   Component Value Date    CR 0.80 04/23/2021       Plan: renally adjust meds, avoid nephrotoxic meds  -perioidc BMP    (I50.32) Chronic diastolic congestive heart failure (H)  Comment: was monitored more closely and diuresed more aggressively in early April  -appears evolemic on exam today   -weights have been 193-194lbs   Plan: :60mg po Thursday, lasix 40mg po AOD  -monthly weights, staff to update me if weight >198lbs or with worsening dyspnea, orthopnea, ect    (I48.11) Longstanding persistent atrial fibrillation (H)  Comment: per history with pacer  -on coumadin  Plan: continue pacer and checks per recommendation  -on diltiazem and sotalol   -anticoagulated with coumadin  -follow INR's  -does not follow with cards and no desire to per family      Electronically signed by  Marisa Morales  APRN CNP                    [facility] GERIATRIC SERVICES    Chief Complaint   Patient presents with     MIRI       [facility] Medical Record Number:  [id number]  Place of Service where encounter took place:  [facility] ([facility] [[id number]]    HPI:    Liberty Horta is a 86 year old  (1935), who is being seen today for an episodic care visit.  HPI information obtained from: facility chart records, facility staff, patient report, [facility] chart review and family/first contact son's  report.Today's concern is:    1. Loose stools    2. Primary osteoarthritis involving multiple joints    3. Parkinson's disease (tremor, stiffness, slow motion, unstable posture) (H)    4. Stage 3 chronic kidney disease, unspecified whether stage 3a or 3b CKD    5. Chronic diastolic congestive heart failure (H)    6. Longstanding persistent atrial fibrillation (H)    7. Acute on chronic congestive heart failure, unspecified heart failure type (H)      Patient is a 86 year old female with PMH significant for a-fib, s/p pacer maker placement (on coumadin), hypothyroidism, HTN, GERD, OA, Anemia, HLD, urinary retention, CHF, and Parkinson's.  She has had her left knee replaced and also had right hip ORIF in the past    She is seen today for follow-up on multiple co-morbidities as well as recent med change made last week.  Patient had history of loose stools, infectious causes were previously ruled out, she was started on imodium and then struggled with constipation 5/1, scheduled imodium was discontinued with prn in place.  Staff reporting she is having loose stools again in the morning.  It has been difficult to use the prn imodium, family requesting that something be done as this is distressing patient.  Imodium was scheduled MWF.  Speaking with staff today, patient's bowels have been rather sporadic, some days she has loose stools and other days is constipation.  Patient denies abdominal pain,  n/v, changes in appetite.  Staff also noted increased swelling to her legs and work of breathing with exertion, crackles heard on exam.  She is on monthly weights, weight has not been done for about 1 month now     I was able to speak with her son, Michele today regarding POC.     ALLERGIES: Meperidine, No clinical screening - see comments, Propoxyphene n-apap, and Tramadol  Past Medical, Surgical, Family and Social History reviewed and updated in [facility].    Current Outpatient Medications   Medication Sig Dispense Refill     [START ON 6/7/2021] furosemide (LASIX) 40 MG tablet Take 60mg po every day on Thursdays, take 40mg po daily on AOD 60 tablet 4     [START ON 6/2/2021] furosemide (LASIX) 40 MG tablet Take 1.5 tablets (60 mg) by mouth daily for 5 days To resume previous lasix dosing of 40mg every day except 60mg on Thursday begininng 6/7 8 tablet 0     psyllium (METAMUCIL/KONSYL) 0.52 g capsule Take 1 capsule by mouth 2 times daily 60 capsule 3     ACETAMINOPHEN EXTRA STRENGTH 500 MG tablet TAKE TWO TABLETS (1000MG) BY MOUTH TWICE DAILY;AND MAY TAKE TWO TABLETS (1000MG) BY MOUTH ONCE DAILY AS NEEDED 112 tablet PRN     atorvastatin (LIPITOR) 40 MG tablet TAKE 1 TABLET BY MOUTH AT BEDTIME 28 tablet PRN     DILT- MG 24 hr capsule TAKE 1 CAPSULE BY MOUTH ONCE DAILY 28 capsule PRN     levothyroxine (SYNTHROID/LEVOTHROID) 175 MCG tablet Take 1 tablet (175 mcg) by mouth daily 30 tablet 3     Lidocaine (LIDOCARE) 4 % Patch Place 2 patches onto the skin every 24 hours To prevent lidocaine toxicity, patient should be patch free for 12 hrs daily.  Place to neck and right knee, on at 8AM, off 8PM 60 patch 3     loperamide (IMODIUM A-D) 2 MG tablet Take 1 tablet (2 mg) by mouth 4 times daily as needed for diarrhea       melatonin 1 MG TABS tablet TAKE 1 TABLET BY MOUTH AT BEDTIME 28 tablet PRN     Menthol, Topical Analgesic, 4 % GEL Externally apply 1 Application topically 2 times daily. May also externally apply 1  "Application 2 times daily as needed. 89 mL 4     menthol-zinc oxide (CALMOSEPTINE) 0.44-20.6 % OINT ointment APPLY A THIN LAYER TO AFFECTED AREA(S) TWICE DAILY;& WITH EACH INCONTINENCE CARE 113 g 97     Multiple Vitamins-Minerals (TAB-A-IRIS) TABS TAKE 1 TABLET BY MOUTH ONCE DAILY 28 tablet PRN     sotalol (BETAPACE) 80 MG tablet TAKE 1 TABLET BY MOUTH TWICE DAILY WITH MEALS 56 tablet PRN     warfarin (COUMADIN) 2.5 MG tablet Take 2.5 mg by mouth daily on Tues & Fridays  AND take 5 mg AOD       Medications reviewed:  Medications reconciled to facility chart and changes were made to reflect current medications as identified as above med list. Below are the changes that were made:   Medications stopped since last EPIC medication reconciliation:   There are no discontinued medications.    Medications started since last [facility] medication reconciliation:  No orders of the defined types were placed in this encounter.        REVIEW OF SYSTEMS:  Limited secondary to cognitive impairment but as noted in HPI    Physical Exam:  /84   Pulse 59   Temp 97.5  F (36.4  C)   Resp 20   Ht 1.575 m (5' 2\")   Wt 88.2 kg (194 lb 6.4 oz)   BMI 35.56 kg/m    GENERAL APPEARANCE:  Alert, in no distress, cooperative  EYES:  PERRL, wearing corrective lenses  NECK:  No adenopathy,masses or thyromegaly  RESP:  respiratory effort and palpation of chest normal, crackles to BLLL, no wheezing, no orthopnea observed, no wheezing, no cough  CV:  Palpation and auscultation of heart done , paced rhythm, edema 2+ BLLE, tubi , no open areas noted  ABDOMEN:  soft, non-tender to light and deep palpation , no guarding or rebound, bowel sounds normal  M/S:   Gait and station abnormal generalized weakness, arthritic changes noted to hands  NEURO:   Cranial nerves 2-12 are normal tested and grossly at patient's baseline  PSYCH:  memory impaired , affect and mood normal    Recent Labs:     CBC RESULTS:   Recent Labs   Lab Test 04/23/21  1104 " 03/09/21  0900   WBC 5.8 6.5   RBC 4.13 4.48   HGB 13.0 14.0   HCT 41.1 45.6    102*   MCH 31.5 31.3   MCHC 31.6 30.7*   RDW 12.8 12.8    178       Last Basic Metabolic Panel:  Recent Labs   Lab Test 04/23/21  1104 04/20/21  0725 04/13/21  0955     --  141   POTASSIUM 3.8 3.7 3.4   CHLORIDE 110*  --  105   ARLENE 8.7  --  9.1   CO2 29  --  27   BUN 14  --  22   CR 0.80  --  1.03   *  --  156*       Liver Function Studies -   Recent Labs   Lab Test 04/23/21  1104 06/11/20  0705   PROTTOTAL 6.1* 6.8   ALBUMIN 3.3* 3.7   BILITOTAL 0.7 0.5   ALKPHOS 59 78   AST 19 18   ALT 32 30       TSH   Date Value Ref Range Status   04/20/2021 1.68 0.40 - 4.00 mU/L Final   03/09/2021 5.91 (H) 0.40 - 4.00 mU/L Final   ]    No results found for: A1C      Assessment/Plan:  (R19.5) Loose stools  (primary encounter diagnosis)  Comment: not well controlled  struggled with in past in the mornings, daily imodium caused constipation.  Staff struggling to use prn imodium  -imodium changed to daily on MWF, bowel sporadic and irregular  -discussed POC with sonMichele today  Plan: add psyllium daily to try help make bowels more regular, encouraged resident to take with full glass of water  -imodium change back to prn, discussed with family notifying staff when they know they will come to take patient on an outing so that prn imodium can be given in advance to reduce bowel accidents when she is gone doing activities  -family will update if plan not effective     (M89.49) Primary osteoarthritis involving multiple joints  Comment: history of stenosis and OA to multiple joints including knees, back, neck   -no reports of worsening pain today   Plan: lidocaine patch to neck and right knee, continue with biofreeze to shoulders  -if able to safely transfer without EZ stand, prefer  -staff/family to update with no improvement     (G20) Parkinson's disease (tremor, stiffness, slow motion, unstable posture) (H)  Comment: no official  neuro w/u but per symptoms of tremor, quiet speech, rigidity, slow movement, falls  -does not desire neuro involvement or medications  -worsening balance and increased weakness in April and worked with home care therapies.  In past 6 months, decline in function, walking less, transfer more difficult  Plan: monitor, AL staff assist with cares      (N18.30) Stage 3 chronic kidney disease, unspecified whether stage 3a or 3b CKD  Comment:   Lab Results   Component Value Date    CR 0.80 04/23/2021       Plan: renally adjust meds, avoid nephrotoxic meds  -perioidc BMP    Acute on chronic diastolic congestive heart failure (H)  Comment: exacerbation   was monitored more closely and diuresed more aggressively in early April  -crackles to BLL today and increased LE edema  -weights have been 193-194lbs but on monthly weights and weight has not been obtained for about 1 month now  Plan: : starting tomorrow, increased lasix to 60mg po every day X 5 days   -resume 60mg po Thursday, lasix 40mg po AOD next Monday  -monthly weights, staff to update me if weight >198lbs or with worsening dyspnea, orthopnea, ect, staff to obtain weight tomorrow morning and update me  -BMP next week   -KCL 10meq po every day X 5 days with lasix burst     (I48.11) Longstanding persistent atrial fibrillation (H)  Comment: per history with pacer  -on coumadin  Plan: continue pacer and checks per recommendation  -on diltiazem and sotalol   -anticoagulated with coumadin  -follow INR's  -does not follow with cards and no desire to per family      Electronically signed by  IRASEMA Rivero CNP                        Sincerely,        IRASEMA Rivero CNP

## 2021-06-07 NOTE — PROGRESS NOTES
[place] [facility]  [place] Medical Record Number:  [id number]  Place of Service where encounter took place:  [facility] ([facility]) [[id number]]  Chief Complaint   Patient presents with     RECHECK     med change f/u       HPI:    Liberty Horta  is a 86 year old (1935), who is being seen today for an episodic care visit.  HPI information obtained from: facility chart records, facility staff, patient report and [facility] chart review. Today's concern is:  1. Loose stools    2. Acute on chronic congestive heart failure, unspecified heart failure type (H)    3. Longstanding persistent atrial fibrillation (H)    4. Stage 3 chronic kidney disease, unspecified whether stage 3a or 3b CKD    5. Parkinson's disease (tremor, stiffness, slow motion, unstable posture) (H)      Patient is a 86 year old female with PMH significant for a-fib, s/p pacer maker placement (on coumadin), hypothyroidism, HTN, GERD, OA, Anemia, HLD, urinary retention, CHF, and Parkinson's.  She has had her left knee replaced and also had right hip ORIF in the past     She is seen today for follow-up on med changes made last week due to CHF exacerbation and continued loose stools.  Last week imodium was changed back to prn, family asked to notify staff prior to outings so prn dose can be given.  We also decided to try adding psyllium.  She was placed on a 5 day lasix burst for CHF exacerbation     Patient is seen in her apartment.  She reports feeling well.  She denies any trouble with her bowels but is a poor historian.  Staff report one episode of loose stools in the morning that responded well to prn imodium.  No reports of increased shortness of breath or work of breathing       Past Medical and Surgical History reviewed in Epic today.    MEDICATIONS:    Current Outpatient Medications   Medication Sig Dispense Refill     ACETAMINOPHEN EXTRA STRENGTH 500 MG tablet TAKE TWO TABLETS (1000MG) BY MOUTH TWICE DAILY;AND MAY  TAKE TWO TABLETS (1000MG) BY MOUTH ONCE DAILY AS NEEDED 112 tablet PRN     atorvastatin (LIPITOR) 40 MG tablet TAKE 1 TABLET BY MOUTH AT BEDTIME 28 tablet PRN     DILT- MG 24 hr capsule TAKE 1 CAPSULE BY MOUTH ONCE DAILY 28 capsule PRN     furosemide (LASIX) 40 MG tablet Take 60mg po every day on Thursdays, take 40mg po daily on AOD 60 tablet 4     furosemide (LASIX) 40 MG tablet Take 1.5 tablets (60 mg) by mouth daily for 5 days To resume previous lasix dosing of 40mg every day except 60mg on Thursday begininng 6/7 8 tablet 0     levothyroxine (SYNTHROID/LEVOTHROID) 175 MCG tablet Take 1 tablet (175 mcg) by mouth daily 30 tablet 3     Lidocaine (LIDOCARE) 4 % Patch Place 2 patches onto the skin every 24 hours To prevent lidocaine toxicity, patient should be patch free for 12 hrs daily.  Place to neck and right knee, on at 8AM, off 8PM 60 patch 3     loperamide (IMODIUM A-D) 2 MG tablet Take 1 tablet (2 mg) by mouth 4 times daily as needed for diarrhea       melatonin 1 MG TABS tablet TAKE 1 TABLET BY MOUTH AT BEDTIME 28 tablet PRN     Menthol, Topical Analgesic, 4 % GEL Externally apply 1 Application topically 2 times daily. May also externally apply 1 Application 2 times daily as needed. 89 mL 4     menthol-zinc oxide (CALMOSEPTINE) 0.44-20.6 % OINT ointment APPLY A THIN LAYER TO AFFECTED AREA(S) TWICE DAILY;& WITH EACH INCONTINENCE CARE 113 g 97     Multiple Vitamins-Minerals (TAB-A-IRIS) TABS TAKE 1 TABLET BY MOUTH ONCE DAILY 28 tablet PRN     psyllium (METAMUCIL/KONSYL) 0.52 g capsule Take 1 capsule by mouth 2 times daily 60 capsule 3     sotalol (BETAPACE) 80 MG tablet TAKE 1 TABLET BY MOUTH TWICE DAILY WITH MEALS 56 tablet PRN     warfarin (COUMADIN) 2.5 MG tablet Take 2.5 mg by mouth daily on Tues & Fridays  AND take 5 mg AOD           REVIEW OF SYSTEMS:  Limited secondary to cognitive impairment but as noted in HPI     Objective:  /44   Pulse 61   Temp 97.2  F (36.2  C)   Resp 18   Ht 1.575  "m (5' 2\")   Wt 89.4 kg (197 lb)   SpO2 98%   BMI 36.03 kg/m    Exam  GENERAL APPEARANCE:  Alert, in no distress, cooperative  EYES:  PERRL, wearing corrective lenses  NECK:  No adenopathy,masses or thyromegaly  RESP:  respiratory effort and palpation of chest normal, CTA bilaterally, decreased to LL, no wheezing, no orthopnea observed, no wheezing, no cough  CV:  Palpation and auscultation of heart done , paced rhythm, edema 1-2+ BLLE, tubi , no open areas noted  ABDOMEN:  soft, non-tender to light and deep palpation , no guarding or rebound, bowel sounds normal  M/S:   Gait and station abnormal generalized weakness, arthritic changes noted to hands  NEURO:   Cranial nerves 2-12 are normal tested and grossly at patient's baseline  PSYCH:  memory impaired , affect and mood normal    Labs:   Recent labs in [facility] reviewed by me today.     ASSESSMENT/PLAN:  (R19.5) Loose stools  (primary encounter diagnosis)  Comment:  -psyllium added last week  -struggled with in past in the mornings, daily imodium caused constipation.  Staff struggling to use prn imodium so family asked to notify staff when they plan to do outings so prn dose can be given  -imodium daily on MWF not effective either, bowel sporadic and irregular  -appears management has been ok past week   Plan:  -continue psyllium  -imodium prn,  -family will update if plan not effective    (I50.9) Acute on chronic congestive heart failure, unspecified heart failure type (H)  Comment: meds adjusted last week, improved on exam today  -was monitored more closely and diuresed more aggressively in early April  -weights have been 193-194lbs but on monthly weights, weight last week was 197lbs with crackles to BLL.  Today lungs clear   Plan: :   -resume 60mg po Thursday, lasix 40mg po AOD next Monday  -monthly weights, staff to update me if weight >198lbs or with worsening dyspnea, orthopnea, ect, staff to obtain weight tomorrow morning and update me  -BMP from today " at baseline       (I48.11) Longstanding persistent atrial fibrillation (H)  Comment: per history with pacer  -on coumadin  Plan: continue pacer and checks per recommendation  -on diltiazem and sotalol   -anticoagulated with coumadin  -follow INR's  -does not follow with cards and no desire to per family      (N18.30) Stage 3 chronic kidney disease, unspecified whether stage 3a or 3b CKD  Comment: per history  -lasix burst past 5 days  Plan: renally adjust meds, avoid nephrotoxic meds  -BMP pending today    (G20) Parkinson's disease (tremor, stiffness, slow motion, unstable posture) (H)  Comment: not official neuro w/u but per symptoms of tremor, quiet speech, rigidity, slow movement, falls  -does not desire neuro involvement or medications  -worsening balance and increased weakness in April and worked with home care therapies.  In past 6 months, decline in function, walking less, transfer more difficult  Plan: monitor, AL staff assist with cares    Electronically signed by:  IRASEMA Rivero CNP

## 2021-06-08 NOTE — LETTER
6/8/2021        RE: Liberty Horta  [facility]  [address]  [place] [address]        [place] [facility]  [place] Medical Record Number:  [id number]  Place of Service where encounter took place:  [facility] ([facility] [[id number]]  Chief Complaint   Patient presents with     RECHECK     med change f/u       HPI:    Liberty Horta  is a 86 year old (1935), who is being seen today for an episodic care visit.  HPI information obtained from: facility chart records, facility staff, patient report and [facility] chart review. Today's concern is:  1. Loose stools    2. Acute on chronic congestive heart failure, unspecified heart failure type (H)    3. Longstanding persistent atrial fibrillation (H)    4. Stage 3 chronic kidney disease, unspecified whether stage 3a or 3b CKD    5. Parkinson's disease (tremor, stiffness, slow motion, unstable posture) (H)      Patient is a 86 year old female with PMH significant for a-fib, s/p pacer maker placement (on coumadin), hypothyroidism, HTN, GERD, OA, Anemia, HLD, urinary retention, CHF, and Parkinson's.  She has had her left knee replaced and also had right hip ORIF in the past     She is seen today for follow-up on med changes made last week due to CHF exacerbation and continued loose stools.  Last week imodium was changed back to prn, family asked to notify staff prior to outings so prn dose can be given.  We also decided to try adding psyllium.  She was placed on a 5 day lasix burst for CHF exacerbation     Patient is seen in her apartment.  She reports feeling well.  She denies any trouble with her bowels but is a poor historian.  Staff report one episode of loose stools in the morning that responded well to prn imodium.  No reports of increased shortness of breath or work of breathing       Past Medical and Surgical History reviewed in Epic today.    MEDICATIONS:    Current Outpatient Medications   Medication Sig  Dispense Refill     ACETAMINOPHEN EXTRA STRENGTH 500 MG tablet TAKE TWO TABLETS (1000MG) BY MOUTH TWICE DAILY;AND MAY TAKE TWO TABLETS (1000MG) BY MOUTH ONCE DAILY AS NEEDED 112 tablet PRN     atorvastatin (LIPITOR) 40 MG tablet TAKE 1 TABLET BY MOUTH AT BEDTIME 28 tablet PRN     DILT- MG 24 hr capsule TAKE 1 CAPSULE BY MOUTH ONCE DAILY 28 capsule PRN     furosemide (LASIX) 40 MG tablet Take 60mg po every day on Thursdays, take 40mg po daily on AOD 60 tablet 4     furosemide (LASIX) 40 MG tablet Take 1.5 tablets (60 mg) by mouth daily for 5 days To resume previous lasix dosing of 40mg every day except 60mg on Thursday begininng 6/7 8 tablet 0     levothyroxine (SYNTHROID/LEVOTHROID) 175 MCG tablet Take 1 tablet (175 mcg) by mouth daily 30 tablet 3     Lidocaine (LIDOCARE) 4 % Patch Place 2 patches onto the skin every 24 hours To prevent lidocaine toxicity, patient should be patch free for 12 hrs daily.  Place to neck and right knee, on at 8AM, off 8PM 60 patch 3     loperamide (IMODIUM A-D) 2 MG tablet Take 1 tablet (2 mg) by mouth 4 times daily as needed for diarrhea       melatonin 1 MG TABS tablet TAKE 1 TABLET BY MOUTH AT BEDTIME 28 tablet PRN     Menthol, Topical Analgesic, 4 % GEL Externally apply 1 Application topically 2 times daily. May also externally apply 1 Application 2 times daily as needed. 89 mL 4     menthol-zinc oxide (CALMOSEPTINE) 0.44-20.6 % OINT ointment APPLY A THIN LAYER TO AFFECTED AREA(S) TWICE DAILY;& WITH EACH INCONTINENCE CARE 113 g 97     Multiple Vitamins-Minerals (TAB-A-IRIS) TABS TAKE 1 TABLET BY MOUTH ONCE DAILY 28 tablet PRN     psyllium (METAMUCIL/KONSYL) 0.52 g capsule Take 1 capsule by mouth 2 times daily 60 capsule 3     sotalol (BETAPACE) 80 MG tablet TAKE 1 TABLET BY MOUTH TWICE DAILY WITH MEALS 56 tablet PRN     warfarin (COUMADIN) 2.5 MG tablet Take 2.5 mg by mouth daily on Tues & Fridays  AND take 5 mg AOD           REVIEW OF SYSTEMS:  Limited secondary to cognitive  "impairment but as noted in HPI     Objective:  /44   Pulse 61   Temp 97.2  F (36.2  C)   Resp 18   Ht 1.575 m (5' 2\")   Wt 89.4 kg (197 lb)   SpO2 98%   BMI 36.03 kg/m    Exam  GENERAL APPEARANCE:  Alert, in no distress, cooperative  EYES:  PERRL, wearing corrective lenses  NECK:  No adenopathy,masses or thyromegaly  RESP:  respiratory effort and palpation of chest normal, CTA bilaterally, decreased to LL, no wheezing, no orthopnea observed, no wheezing, no cough  CV:  Palpation and auscultation of heart done , paced rhythm, edema 1-2+ BLLE, tubi , no open areas noted  ABDOMEN:  soft, non-tender to light and deep palpation , no guarding or rebound, bowel sounds normal  M/S:   Gait and station abnormal generalized weakness, arthritic changes noted to hands  NEURO:   Cranial nerves 2-12 are normal tested and grossly at patient's baseline  PSYCH:  memory impaired , affect and mood normal    Labs:   Recent labs in [facility] reviewed by me today.     ASSESSMENT/PLAN:  (R19.5) Loose stools  (primary encounter diagnosis)  Comment:  -psyllium added last week  -struggled with in past in the mornings, daily imodium caused constipation.  Staff struggling to use prn imodium so family asked to notify staff when they plan to do outings so prn dose can be given  -imodium daily on MWF not effective either, bowel sporadic and irregular  -appears management has been ok past week   Plan:  -continue psyllium  -imodium prn,  -family will update if plan not effective    (I50.9) Acute on chronic congestive heart failure, unspecified heart failure type (H)  Comment: meds adjusted last week, improved on exam today  -was monitored more closely and diuresed more aggressively in early April  -weights have been 193-194lbs but on monthly weights, weight last week was 197lbs with crackles to BLL.  Today lungs clear   Plan: :   -resume 60mg po Thursday, lasix 40mg po AOD next Monday  -monthly weights, staff to update me if weight " >198lbs or with worsening dyspnea, orthopnea, ect, staff to obtain weight tomorrow morning and update me  -BMP from today at baseline       (I48.11) Longstanding persistent atrial fibrillation (H)  Comment: per history with pacer  -on coumadin  Plan: continue pacer and checks per recommendation  -on diltiazem and sotalol   -anticoagulated with coumadin  -follow INR's  -does not follow with cards and no desire to per family      (N18.30) Stage 3 chronic kidney disease, unspecified whether stage 3a or 3b CKD  Comment: per history  -lasix burst past 5 days  Plan: renally adjust meds, avoid nephrotoxic meds  -BMP pending today    (G20) Parkinson's disease (tremor, stiffness, slow motion, unstable posture) (H)  Comment: not official neuro w/u but per symptoms of tremor, quiet speech, rigidity, slow movement, falls  -does not desire neuro involvement or medications  -worsening balance and increased weakness in April and worked with home care therapies.  In past 6 months, decline in function, walking less, transfer more difficult  Plan: monitor, AL staff assist with cares    Electronically signed by:  IRASEMA Rivero CNP              Sincerely,        IRASEMA Rivero CNP

## 2021-06-22 NOTE — PROGRESS NOTES
[place] [facility]  [place] [facility] Record Number:  [id number]  Place of Service where encounter took place: [facility] ([facility]) [[id number]]    HPI:    Liberty Horta is a 86 year old  (1935), who is being seen today for an episodic care visit at the above location.   HPI information obtained from: [place] Epic chart review. Today's concern is INR/Coumadin management for A. Fib    ROS/Subjective:  Bleeding Signs/Symptoms:  None  Thromboembolic Signs/Symptoms:  None  Medication Changes:  No  Dietary Changes:  No  Activity Changes: No  Bacterial/Viral Infection:  No  Missed Coumadin Doses:  None  On ASA: No  Other Concerns:  No    OBJECTIVE:  Temp 96.4  F (35.8  C)   SpO2 93%   Last INR: 2.45 on 5/18/21  INR Today:  2.31  Current Dose:  2.5mg PO QWednesday. 5mg PO QD on all other days  INR Flow sheet at [facility]:    ASSESSMENT:  1. Longstanding persistent atrial fibrillation (H)    2. Encounter for therapeutic drug monitoring    3. Long term (current) use of anticoagulants      Therapeutic INR for goal of 2-3    PLAN:     New Dose: No Change    Next INR: 4 weeks      Electronically signed by:  IRASEMA Rivero CNP

## 2021-06-22 NOTE — LETTER
6/22/2021        RE: Liberty Horta  [facility]  [address]  [place] [address]        [place] [facility]  [place] Medical Record Number:  [id number]  Place of Service where encounter took place: [facility] ([facility]) [[id number]]    HPI:    Liberty Horta is a 86 year old  (1935), who is being seen today for an episodic care visit at the above location.   HPI information obtained from: [facility] chart review. Today's concern is INR/Coumadin management for A. Fib    ROS/Subjective:  Bleeding Signs/Symptoms:  None  Thromboembolic Signs/Symptoms:  None  Medication Changes:  No  Dietary Changes:  No  Activity Changes: No  Bacterial/Viral Infection:  No  Missed Coumadin Doses:  None  On ASA: No  Other Concerns:  No    OBJECTIVE:  Temp 96.4  F (35.8  C)   SpO2 93%   Last INR: 2.45 on 5/18/21  INR Today:  2.31  Current Dose:  2.5mg PO QWednesday. 5mg PO QD on all other days  [facility]R Flow sheet at [facility]:    ASSESSMENT:  1. Longstanding persistent atrial fibrillation (H)    2. Encounter for therapeutic drug monitoring    3. Long term (current) use of anticoagulants      Therapeutic INR for goal of 2-3    PLAN:     New Dose: No Change    Next INR: 4 weeks      Electronically signed by:  IRASEMA Rivero CNP         Sincerely,        IRASEMA Rivero CNP

## 2021-07-19 NOTE — PROGRESS NOTES
"[facility]    Chief Complaint   Patient presents with     RECHECK     HPI:  Liberty Horta is a 86 year old  (1935), who is being seen today for an episodic care visit at: [facility] [[id number]]. Today's concern is:     Patient is a 86 year old female with PMH significant for a-fib, s/p pacer maker placement (on coumadin), hypothyroidism, HTN, GERD, OA, Anemia, HLD, urinary retention, CHF, and Parkinson's.  She has had her left knee replaced and also had right hip ORIF in the past    She is seen today at request of staff due to insomnia. Per staff, patient has not been sleeping well at night.  We tried scheduling melatonin 3mg with poor effect.  Patient typically is sleepy during the day and often sleeps most of the day.  I did discuss with staff today, optimal to try keep her more awake and active during the day so she is more tired at night.  Did attempt to reach out to family today to discuss, but was not able to reach them today    Also with coumadin for a-fib, INR pending today    Struggles with pain to multiple joints, knees, back and neck most problematic.  We have done several pain med adjustments, and per staff, has been effective recently     Allergies, and PMH/PSH reviewed in [facility] today.  REVIEW OF SYSTEMS:  Unobtainable secondary to cognitive impairment.     Objective:   Temp 98.4  F (36.9  C)   Ht 1.575 m (5' 2\")   Wt 70.4 kg (155 lb 4.8 oz)   BMI 28.40 kg/m    GENERAL APPEARANCE:  sleeping, in no distress  RESP:  respiratory effort and palpation of chest normal, CTA bilaterally, decreased to LL, no wheezing, no orthopnea observed, no wheezing, no cough  CV:  Palpation and auscultation of heart done , paced rhythm, edema 1-2+ BLLE, tubi , no open areas noted  M/S:   Gait and station abnormal generalized weakness, arthritic changes noted to hands  NEURO:   no facial asymmetry, normal strength and tone, able to move all extremities   PSYCH:  memory " impaired , affect and mood normal       Assessment/Plan:  (G47.00) Insomnia, unspecified type  (primary encounter diagnosis)  Comment: increasingly problematic per staff, patient awake and calling out at night  -sleeps a lot during the day  -melatonin has not been effective   Plan: d'c melatonin  -discussed with family and staff, best to keep her awake and active during the day so more tired at night   -left message for family, if desire to try a different medication, would suggest Trazodone, but will not start this unless I am able to speak with family     (I48.11) Longstanding persistent atrial fibrillation (H)  (Z51.81) Encounter for therapeutic drug monitoring  (Z79.01) Long term (current) use of anticoagulants  Comment: long-standing, rate controlled with pacer   -continue sotalol and diltiazem  -follow INRs   INR today 2.64  -stable on dose of 2.5mg PO QWednesday. 5mg PO QD on all other days  Plan: continue coumadin as above and INR recheck in 4 weeks     (M48.00) Spinal stenosis, unspecified spinal region  (M89.49) Primary osteoarthritis involving multiple joints  Comment: significant pain in past particularily to neck, back, knees  -meds adjusted several times previously, per staff has been helpful   Plan: continue biofreeze, aspercreme and APAP     Electronically signed by: IRASEMA Rivero CNP

## 2021-07-20 NOTE — LETTER
[facility]  Health Care Home  Patient Care Plan  About Me  Patient Name:  Liberty Horta    YOB: 1935  Age:   86 year old   Damian MRN: [id number] Telephone Information:   Home Phone [phone]   Mobile Not on file.       Address:    [address] [place] [address] Email address:  No e-mail address on record      Emergency Contact(s)  Name Relationship Lgl Grd Work Phone Home Phone Mobile Phone   1. MIAN ECHEVERRIA Daughter No  [phone]   2. ABDIFATAH WASSERMAN Daughter No  [phone]   3. PORFIRIO MI* Son No   [phone]   4. PORFIRIO * Son No   [phone]   5. SEAN ECHEVERRIA*    [phone]            Primary language:  English     needed?     [place] [facility]:  [phone] op. 1  Other communication barriers:    Current living arrangement:    Mobility Status/ Medical Equipment:    Other information to know about me:      Health Maintenance  Immunizations:     Cancer Screening:       My Access Plan  Medical Emergency 911   Primary Clinic Line     24/7 After Hours Line [phone]   Preferred Hospital     Preferred Pharmacy [id number] - [place], [place] - [address] AT [address] OF [facility]     Behavioral Health Crisis Line Crisis Connection, [phone] or 911     My Care Team Members  Patient Care Team       Relationship Specialty Notifications    Marisa Morales, APRN CNP PCP - General Family Medicine All results, Admissions    Start: 11/10/20    Phone: [phone] Fax: [phone]       [address] [place] [address]    Funmi Mendez MD MD Internal Medicine     Start: 2/4/20    Phone: [phone] Fax: [phone]       [address] [place] [address]    Mag Balbuena CNA NST Neurology All results, Admissions    Start: 1/8/21    NP Geriatric Services     Phone: [phone] Fax:  [phone]       [address] [facility] [address]    Rina Smith Nursing Assistant      Start: 1/8/21    MD Geriatric Services     Tianna Powell, LSW  Primary Care - CC Admissions    Start: 1/8/21    [facility] [place] Medicare Plans    Phone: [phone] Fax: [phone]       [address] [facility] [address]    Agnieszka Latham Case Management Specialist Primary Care - CC Admissions    Start: 1/8/21    Phone: [phone]       Marjorie Claros Case Management Specialist Primary Care - CC Admissions    Start: 1/8/21    Phone: [phone] Fax: [phone]      Charla Pham APRN CNP Assigned PCP      Start: 6/13/21    Phone: [phone] Fax: [phone]       [address] [facility] [address]           My Care Plans  Self Management and Treatment Plan      Action Plans on File:    Advance Care Plans/Directives on file:                My Medical and Care Information  Problem List   Patient Active Problem List   Diagnosis     Femur fracture, right (H)     Left leg pain     Acute traumatic internal derangement of left knee, initial encounter     Closed fracture of fifth lumbar vertebra, unspecified fracture morphology, initial encounter (H)     Longstanding persistent atrial fibrillation (H)     Chronic atrial fibrillation (H)     Hypothyroidism     Esophageal reflux     Essential hypertension     Primary osteoarthritis involving multiple joints     Central stenosis of spinal canal     Parkinson's disease (tremor, stiffness, slow motion, unstable posture) (H)     Iron deficiency anemia     Hyperlipidemia     Atrial fibrillation (H)     CKD (chronic kidney disease) stage 3, GFR 30-59 ml/min     Aftercare following surgery     Allergic rhinitis     Anticoagulation monitoring, special range     Bilateral edema of lower extremity     Central retinal vein occlusion     Dermatophytosis of nail     Disorder of bone and cartilage     Family history of ischemic  heart disease     History of deep venous thrombosis (DVT) of distal vein of left lower extremity     Malignant basal cell neoplasm of skin     Migraine headache     Osteoarthrosis involving lower leg     Obesity, unspecified     Pacemaker     Paroxysmal supraventricular tachycardia (H)     Renal insufficiency syndrome     S/P TKR (total knee replacement)     Supracondylar fracture of right humerus with routine healing     Tremor     Venous (peripheral) insufficiency     Vitamin D deficiency     Chronic diastolic congestive heart failure (H)     Morbid obesity (H)      Current Medications and Allergies:  See printed Medication Report.    Health Care Home Complexity Tier:      Care Coordination Start Date:     Form Last Updated: 07/21/2021

## 2021-07-20 NOTE — LETTER
"    7/20/2021        RE: Liberty Horta  [facility]  [address]  [facility] [address]        [facility] GERIATRIC SERVICES    Chief Complaint   Patient presents with     RECHECK     HPI:  Liberty Horta is a 86 year old  (1935), who is being seen today for an episodic care visit at: [facility] (S) [[id number]]. Today's concern is:     Patient is a 86 year old female with PMH significant for a-fib, s/p pacer maker placement (on coumadin), hypothyroidism, HTN, GERD, OA, Anemia, HLD, urinary retention, CHF, and Parkinson's.  She has had her left knee replaced and also had right hip ORIF in the past    She is seen today at request of staff due to insomnia. Per staff, patient has not been sleeping well at night.  We tried scheduling melatonin 3mg with poor effect.  Patient typically is sleepy during the day and often sleeps most of the day.  I did discuss with staff today, optimal to try keep her more awake and active during the day so she is more tired at night.  Did attempt to reach out to family today to discuss, but was not able to reach them today    Also with coumadin for a-fib, INR pending today    Struggles with pain to multiple joints, knees, back and neck most problematic.  We have done several pain med adjustments, and per staff, has been effective recently     Allergies, and PMH/PSH reviewed in [facility] today.  REVIEW OF SYSTEMS:  Unobtainable secondary to cognitive impairment.     Objective:   Temp 98.4  F (36.9  C)   Ht 1.575 m (5' 2\")   Wt 70.4 kg (155 lb 4.8 oz)   BMI 28.40 kg/m    GENERAL APPEARANCE:  sleeping, in no distress  RESP:  respiratory effort and palpation of chest normal, CTA bilaterally, decreased to LL, no wheezing, no orthopnea observed, no wheezing, no cough  CV:  Palpation and auscultation of heart done , paced rhythm, edema 1-2+ BLLE, tubi , no open areas noted  M/S:   Gait and station abnormal generalized weakness, arthritic changes " noted to hands  NEURO:   no facial asymmetry, normal strength and tone, able to move all extremities   PSYCH:  memory impaired , affect and mood normal       Assessment/Plan:  (G47.00) Insomnia, unspecified type  (primary encounter diagnosis)  Comment: increasingly problematic per staff, patient awake and calling out at night  -sleeps a lot during the day  -melatonin has not been effective   Plan: d'c melatonin  -discussed with family and staff, best to keep her awake and active during the day so more tired at night   -left message for family, if desire to try a different medication, would suggest Trazodone, but will not start this unless I am able to speak with family     (I48.11) Longstanding persistent atrial fibrillation (H)  (Z51.81) Encounter for therapeutic drug monitoring  (Z79.01) Long term (current) use of anticoagulants  Comment: long-standing, rate controlled with pacer   -continue sotalol and diltiazem  -follow INRs   INR today 2.64  -stable on dose of 2.5mg PO QWednesday. 5mg PO QD on all other days  Plan: continue coumadin as above and INR recheck in 4 weeks     (M48.00) Spinal stenosis, unspecified spinal region  (M89.49) Primary osteoarthritis involving multiple joints  Comment: significant pain in past particularily to neck, back, knees  -meds adjusted several times previously, per staff has been helpful   Plan: continue biofreeze, aspercreme and APAP     Electronically signed by: IRASEMA Rivero CNP             Sincerely,        IRASEMA Rivero CNP
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
## 2021-08-10 NOTE — Clinical Note
Ajith Wynn,    This patient would really benefit from moving to a different facility.  She needs more time and attention then staff can give her here.  Responds well to verbal interaction and reassurance with increasing anxiety.  Does not do well in isolation.  Can you assist son, Michele, in looking into some possible options for her?    Thank you!  Cyndie Morales
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
## 2021-08-11 NOTE — PROGRESS NOTES
"Ortho Nursing home visit    Liberty Horta is a 86 year old female who resides at [facility]    Patient is seen today for x-ray results of right lower rib fracture; possibly from transfer with [facility] lift; unclear as patient has not reported injury,      Past Medical History:   Diagnosis Date     Basal cell carcinoma      Chronic atrial fibrillation (H)      DVT (deep vein thrombosis) in pregnancy      Esophageal reflux      Hypertension      Hypothyroid      Osteoarthritis      Pacemaker      Renal insufficiency       Past Surgical History:   Procedure Laterality Date     IMPLANT PACEMAKER       OPEN REDUCTION INTERNAL FIXATION FEMUR DISTAL Right 10/6/2016    Procedure: OPEN REDUCTION INTERNAL FIXATION FEMUR DISTAL;  Surgeon: Filipe Coburn MD;  Location:  OR     ORTHOPEDIC SURGERY      Knee replacement left in 2011        Allergies   Allergen Reactions     Meperidine Visual Disturbance     No Clinical Screening - See Comments Other (See Comments)     Patient says she is sensitive to narcotics: \" a little goes a long way\"       Propoxyphene N-Apap Visual Disturbance     Tramadol Other (See Comments)     Patient described feeling \"squirrely\"      There were no vitals taken for this visit.     Exam: Discussed above with son, prior to seeing patient, she is resting well today in dining area eating lunch, stating her pain is controlled, she has limited use of right UE as she had prior fracture proximal humerus per her son some year back.  No evidence of SOB today , understanding of pain management, If EASY STAND is causing more pain I would rec; El lift till symptoms resolve, as the chest strap for the easy stand may contribute to more pain,      X-rays show : Poor bone quality, Rib fracture and prior humerus fracture Right;    ASSESSMENT / PLAN:   Will continue to follow also discussed today LTC VS Memory care for this patient as her son feels, more care will be needed " soon.    [id number]          JAlejandro OPA-C  [phone] Cell

## 2021-08-11 NOTE — PROGRESS NOTES
[facility] GERIATRIC SERVICES DISCHARGE SUMMARY  PATIENT'S NAME: Liberty Horta  YOB: 1935  MEDICAL RECORD NUMBER:  [id number]  Place of Service where encounter took place:  [facility] ([facility]) [[id number]]    PRIMARY CARE PROVIDER AND CLINIC RESPONSIBLE AFTER TRANSFER:   No primary care provider on file., No primary physician on file.    [facility] Provider     Transferring providers: IRASEMA Rivero CNP, Funmi Mendez MD  Recent Hospitalization/ED:  Home , admitted on 2/11/2020.  Date of SNF Admission: February 11, 2020  Date of SNF (anticipated) Discharge: August 17, 2021  Discharged to: new skilled nursing facility [facility]  Cognitive Scores: no recent testing, known Parkinson's dementia  Physical Function: primarily w/c bound, EZ stand transfers  DME: no new needed DME    CODE STATUS/ADVANCE DIRECTIVES DISCUSSION:  No CPR- Do NOT Intubate   ALLERGIES: Meperidine, No clinical screening - see comments, Propoxyphene n-apap, and Tramadol    NURSING FACILITY COURSE   Medication Changes/Rationale:     Lasix burst last week for CHF exacerbation increased chronic dose from 40mg to 60mg    Imodium prn for loose bowels, infrequently used     Melatonin increased insomnia    Summary of nursing facility stay:     Patient is a very sweet 86 year old female admitted to AL facility May 2019 after hospitalization for severe pain to her left buttock in which she was unable to bear weight.  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  Other PMH significant for  a-fib, s/p pacer maker placement (on coumadin), hypothyroidism, HTN, GERD, OA, Anemia, HLD, urinary retention, Parkinson's.  She has had her left knee replaced and also had right hip ORIF in the past    She has had a significant decline since the pandemic.  Decreased mobility, therapy has been ordered on a couple occasions without much improvement,  her memory has declined and recently she has had increased anxiety particularly in the evenings, has not slept well. Have discussed with family, and not inclined to try stronger medication then melatonin,  Patient has been very responsive to reassurance, presence of another person and also feels comforted by holding hands.  Current AL facility not staffed to accommodate these needs in the evenings    Last week patient reported rib and shoulder pain (see encounter) old shoulder fracture and new rib fractures noted on right side, ortho made visit last week.  Pain has been manageable      (I50.9) Acute on chronic congestive heart failure, unspecified heart failure type (H)  Comment: lasix burst last week due to increase LE edema and increased crackles to lungs  -not on daily weights in AL due to cost  -edema and lungs sounds improved on exam today  -BMP ordered today and at baseline  Plan: lasix 60mg po every day  -can be on daily weights in LTC, staff to notify if >2lbs/day or 5lbs from admission weight     (S22.41XD) Closed fracture of multiple ribs of right side with routine healing, subsequent encounter  Comment: noted last week on x-ray taken after complaints of rib and shoulder pain  -ortho saw  -reports pain manageable  -ortho recommended elza instead of EZ stand due to rib pain, patient dislikes and has wanted to continue EZ stand  Plan: icy hot to affected area  -APAP    (I48.11) Longstanding persistent atrial fibrillation (H)  Comment: per history with pacer  -on coumadin  -INR today is 2.9 which is a little higher then her typical readings.  She has been on 2.5mg PO QWednesday. 5mg PO QD on all other days for sometime  Plan: continue pacer and checks per recommendation  -on diltiazem and sotalol   -anticoagulated with coumadin  -follow INR's, due for repeat INR in 2 weeks  -does not follow with cards and no desire to per family    (M89.49) Primary osteoarthritis involving multiple joints  (M48.00) Central  stenosis of spinal canal  (M48.00) Spinal stenosis, unspecified spinal region  Comment: significant pain in past particularily to neck, back, knees  -meds adjusted several times previously, per staff has been helpful   Plan: continue biofreeze, aspercreme and APAP     (G20) Parkinson's disease (tremor, stiffness, slow motion, unstable posture) (H)  Dementia  Comment: not official neuro w/u but per symptoms of tremor, quiet speech, rigidity, slow movement, falls  -does not desire neuro involvement or medications  -worsening balance and increased weakness in April and worked with home care therapies.  In past 6 months, decline in function, walking less, transfer more difficult, increasing anxiety  Plan: moving to LTC for more assistance with cares       (E03.9) Hypothyroidism, unspecified type  Comment:   Lab Results   Component Value Date    TSH 1.68 04/20/2021       Plan: on synthroid    (N18.30) Stage 3 chronic kidney disease, unspecified whether stage 3a or 3b CKD  Comment:   Lab Results   Component Value Date    CR 1.00 08/17/2021    CR 0.91 06/08/2021       Plan: renally adjust meds, avoid nephrotoxic meds    (R19.5) Loose stools  Comment:  -psyllium added recently due to ongoing concerns of loose stools  -struggled with in past in the mornings, daily imodium caused constipation.  Staff struggling to use prn imodium so family asked to notify staff when they plan to do outings so prn dose can be given  Plan:  -continue psyllium  -imodium prn,      (I12.9,  N18.30) Benign hypertension with CKD (chronic kidney disease) stage III  Comment:   BP Readings from Last 3 Encounters:   08/17/21 (!) 148/60   08/10/21 (!) 148/60   06/07/21 107/44     Plan: continue current POC  -monitor BP    Discharge Medications:    Current Outpatient Medications   Medication Sig Dispense Refill     ACETAMINOPHEN EXTRA STRENGTH 500 MG tablet TAKE TWO TABLETS (1000MG) BY MOUTH TWICE DAILY;AND MAY TAKE TWO TABLETS (1000MG) BY MOUTH ONCE  DAILY AS NEEDED 112 tablet PRN     ASPERCREME LIDOCAINE 4 % Patch APPLY 1 PATCH TOPICALLY TO NECK AND APPLY 1 PATCH TOPICALLY TO RIGHT KNEE ONCE DAILY (ON FOR 12 HOURS, OFF FOR 12 HOURS) 30 patch 97     atorvastatin (LIPITOR) 40 MG tablet TAKE 1 TABLET BY MOUTH AT BEDTIME 28 tablet PRN     DILT- MG 24 hr capsule TAKE 1 CAPSULE BY MOUTH ONCE DAILY 28 capsule PRN     furosemide (LASIX) 40 MG tablet Take 2 tablets (80 mg) by mouth daily for 3 doses 60 tablet 4     furosemide (LASIX) 40 MG tablet Take 1.5 tablets (60 mg) by mouth daily 60 tablet 3     levothyroxine (SYNTHROID/LEVOTHROID) 175 MCG tablet TAKE 1 TABLET (175 MCG) BY MOUTH DAILY 28 tablet PRN     loperamide (IMODIUM A-D) 2 MG tablet Take 1 tablet (2 mg) by mouth 4 times daily as needed for diarrhea       Menthol, Topical Analgesic, 4 % GEL Externally apply 1 Application topically 2 times daily. May also externally apply 1 Application 2 times daily as needed. 89 mL 4     menthol-zinc oxide (CALMOSEPTINE) 0.44-20.6 % OINT ointment APPLY A THIN LAYER TO AFFECTED AREA(S) TWICE DAILY;& WITH EACH INCONTINENCE CARE 113 g 97     Multiple Vitamins-Minerals (TAB-A-IRIS) TABS TAKE 1 TABLET BY MOUTH ONCE DAILY 28 tablet PRN     potassium chloride ER (K-TAB) 20 MEQ CR tablet Take 2 tablets (40 mEq) by mouth daily 60 tablet 3     psyllium (METAMUCIL/KONSYL) 0.52 g capsule Take 1 capsule by mouth 2 times daily 60 capsule 3     sotalol (BETAPACE) 80 MG tablet TAKE 1 TABLET BY MOUTH TWICE DAILY WITH MEALS 56 tablet PRN     warfarin (COUMADIN) 2.5 MG tablet Take 2.5 mg by mouth daily on Tues & Fridays  AND take 5 mg AOD            Controlled medications:   not applicable/none     Past Medical History:   Past Medical History:   Diagnosis Date     Basal cell carcinoma      Chronic atrial fibrillation (H)      DVT (deep vein thrombosis) in pregnancy      Esophageal reflux      Hypertension      Hypothyroid      Osteoarthritis      Pacemaker      Renal insufficiency   "    Physical Exam:   Vitals: BP (!) 148/60   Pulse 64   Temp (!) 96.3  F (35.7  C)   Resp 18   Ht 1.575 m (5' 2\")   Wt 70.4 kg (155 lb 4.8 oz)   BMI 28.40 kg/m     BMI= Body mass index is 28.4 kg/m .  GENERAL APPEARANCE:  alert, in no distress   RESP:  respiratory effort and palpation of chest normal, faint crackles to BLL improved from last week, no wheezing, no cough  CV:  Palpation and auscultation of heart done , paced rhythm, edema 2+ BLLE, tubi , no open areas noted  M/S:   Gait and station abnormal generalized weakness, EZ stand for transfers, arthritic changes noted to hands  NEURO:   no facial asymmetry, resting tremor  PSYCH:  memory impaired , affect and mood calm today     SNF labs:   Recent Results (from the past 240 hour(s))   Basic metabolic panel    Collection Time: 08/17/21  7:28 AM   Result Value Ref Range    Sodium 138 133 - 144 mmol/L    Potassium 4.1 3.4 - 5.3 mmol/L    Chloride 103 94 - 109 mmol/L    Carbon Dioxide (CO2) 29 20 - 32 mmol/L    Anion Gap 6 3 - 14 mmol/L    Urea Nitrogen 19 7 - 30 mg/dL    Creatinine 1.00 0.52 - 1.04 mg/dL    Calcium 9.7 8.5 - 10.1 mg/dL    Glucose 102 (H) 70 - 99 mg/dL    GFR Estimate 52 (L) >60 mL/min/1.73m2       DISCHARGE PLAN:    Follow up labs: INR in 2 weeks    Medical Follow Up:      Follow up with primary care provider in 1-2 weeks    [facility] scheduled appointments:       Discharge Services: No therapy or home care recommended.     Discharge Instructions Verbalized to Patient at Discharge:     Weigh yourself daily in the morning and keep a record. Call your primary clinic: a) if you are more short of breath, or b) if your weight changes more than 2 pounds in one day or more than 5 pounds in one week.     INR recheck in 2 weeks    TOTAL DISCHARGE TIME:   Greater than 30 minutes  Electronically signed by:  IRASEMA Rivero CNP                 "

## 2021-08-17 NOTE — Clinical Note
Ajith Azar,    I believe this patient will be discharging to LTC earlier next week.  She and her family are absolute sweethearts!  I feel she just needs more TLC then they can give her in AL setting, especially overnight.  Please don't hesitate to reach out with any questions.  I am going to miss her    Cyndie

## 2021-08-17 NOTE — LETTER
8/17/2021        RE: Liberty Horta  [facility]  [address]  [facility] [address]        [facility] GERIATRIC SERVICES DISCHARGE SUMMARY  PATIENT'S NAME: Liberty Horta  YOB: 1935  MEDICAL RECORD NUMBER:  [id number]  Place of Service where encounter took place:  [facility] ([facility]) [[id number]]    PRIMARY CARE PROVIDER AND CLINIC RESPONSIBLE AFTER TRANSFER:   No primary care provider on file., No primary physician on file.    [facility] Provider     Transferring providers: IRASEMA Rivero CNP, Funmi Mendez MD  Recent Hospitalization/ED:  Home , admitted on 2/11/2020.  Date of SNF Admission: February 11, 2020  Date of SNF (anticipated) Discharge: August 17, 2021  Discharged to: [facility] skilled nursing facility [facility]  Cognitive Scores: no recent testing, known Parkinson's dementia  Physical Function: primarily w/c bound, EZ stand transfers  DME: no new needed DME    CODE STATUS/ADVANCE DIRECTIVES DISCUSSION:  No CPR- Do NOT Intubate   ALLERGIES: Meperidine, No clinical screening - see comments, Propoxyphene n-apap, and Tramadol    NURSING FACILITY COURSE   Medication Changes/Rationale:     Lasix burst last week for CHF exacerbation increased chronic dose from 40mg to 60mg    Imodium prn for loose bowels, infrequently used     Melatonin increased insomnia    Summary of nursing facility stay:     Patient is a very sweet 86 year old female admitted to AL facility May 2019 after hospitalization for severe pain to her left buttock in which she was unable to bear weight.  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  Other PMH significant for  a-fib, s/p pacer maker placement (on coumadin), hypothyroidism, HTN, GERD, OA, Anemia, HLD, urinary retention, Parkinson's.  She has had her left knee replaced and also had right hip ORIF in the past    She has had a significant  decline since the pandemic.  Decreased mobility, therapy has been ordered on a couple occasions without much improvement, her memory has declined and recently she has had increased anxiety particularly in the evenings, has not slept well. Have discussed with family, and not inclined to try stronger medication then melatonin,  Patient has been very responsive to reassurance, presence of another person and also feels comforted by holding hands.  Current AL facility not staffed to accommodate these needs in the evenings    Last week patient reported rib and shoulder pain (see encounter) old shoulder fracture and new rib fractures noted on right side, ortho made visit last week.  Pain has been manageable      (I50.9) Acute on chronic congestive heart failure, unspecified heart failure type (H)  Comment: lasix burst last week due to increase LE edema and increased crackles to lungs  -not on daily weights in AL due to cost  -edema and lungs sounds improved on exam today  -BMP ordered today and at baseline  Plan: lasix 60mg po every day  -can be on daily weights in LTC, staff to notify if >2lbs/day or 5lbs from admission weight     (S22.41XD) Closed fracture of multiple ribs of right side with routine healing, subsequent encounter  Comment: noted last week on x-ray taken after complaints of rib and shoulder pain  -ortho saw  -reports pain manageable  -ortho recommended elza instead of EZ stand due to rib pain, patient dislikes and has wanted to continue EZ stand  Plan: icy hot to affected area  -APAP    (I48.11) Longstanding persistent atrial fibrillation (H)  Comment: per history with pacer  -on coumadin  -INR today is 2.9 which is a little higher then her typical readings.  She has been on 2.5mg PO QWednesday. 5mg PO QD on all other days for sometime  Plan: continue pacer and checks per recommendation  -on diltiazem and sotalol   -anticoagulated with coumadin  -follow INR's, due for repeat INR in 2 weeks  -does not  follow with cards and no desire to per family    (M89.49) Primary osteoarthritis involving multiple joints  (M48.00) Central stenosis of spinal canal  (M48.00) Spinal stenosis, unspecified spinal region  Comment: significant pain in past particularily to neck, back, knees  -meds adjusted several times previously, per staff has been helpful   Plan: continue biofreeze, aspercreme and APAP     (G20) Parkinson's disease (tremor, stiffness, slow motion, unstable posture) (H)  Dementia  Comment: not official neuro w/u but per symptoms of tremor, quiet speech, rigidity, slow movement, falls  -does not desire neuro involvement or medications  -worsening balance and increased weakness in April and worked with home care therapies.  In past 6 months, decline in function, walking less, transfer more difficult, increasing anxiety  Plan: moving to LTC for more assistance with cares       (E03.9) Hypothyroidism, unspecified type  Comment:   Lab Results   Component Value Date    TSH 1.68 04/20/2021       Plan: on synthroid    (N18.30) Stage 3 chronic kidney disease, unspecified whether stage 3a or 3b CKD  Comment:   Lab Results   Component Value Date    CR 1.00 08/17/2021    CR 0.91 06/08/2021       Plan: renally adjust meds, avoid nephrotoxic meds    (R19.5) Loose stools  Comment:  -psyllium added recently due to ongoing concerns of loose stools  -struggled with in past in the mornings, daily imodium caused constipation.  Staff struggling to use prn imodium so family asked to notify staff when they plan to do outings so prn dose can be given  Plan:  -continue psyllium  -imodium prn,      (I12.9,  N18.30) Benign hypertension with CKD (chronic kidney disease) stage III  Comment:   BP Readings from Last 3 Encounters:   08/17/21 (!) 148/60   08/10/21 (!) 148/60   06/07/21 107/44     Plan: continue current POC  -monitor BP    Discharge Medications:    Current Outpatient Medications   Medication Sig Dispense Refill     ACETAMINOPHEN  EXTRA STRENGTH 500 MG tablet TAKE TWO TABLETS (1000MG) BY MOUTH TWICE DAILY;AND MAY TAKE TWO TABLETS (1000MG) BY MOUTH ONCE DAILY AS NEEDED 112 tablet PRN     ASPERCREME LIDOCAINE 4 % Patch APPLY 1 PATCH TOPICALLY TO NECK AND APPLY 1 PATCH TOPICALLY TO RIGHT KNEE ONCE DAILY (ON FOR 12 HOURS, OFF FOR 12 HOURS) 30 patch 97     atorvastatin (LIPITOR) 40 MG tablet TAKE 1 TABLET BY MOUTH AT BEDTIME 28 tablet PRN     DILT- MG 24 hr capsule TAKE 1 CAPSULE BY MOUTH ONCE DAILY 28 capsule PRN     furosemide (LASIX) 40 MG tablet Take 2 tablets (80 mg) by mouth daily for 3 doses 60 tablet 4     furosemide (LASIX) 40 MG tablet Take 1.5 tablets (60 mg) by mouth daily 60 tablet 3     levothyroxine (SYNTHROID/LEVOTHROID) 175 MCG tablet TAKE 1 TABLET (175 MCG) BY MOUTH DAILY 28 tablet PRN     loperamide (IMODIUM A-D) 2 MG tablet Take 1 tablet (2 mg) by mouth 4 times daily as needed for diarrhea       Menthol, Topical Analgesic, 4 % GEL Externally apply 1 Application topically 2 times daily. May also externally apply 1 Application 2 times daily as needed. 89 mL 4     menthol-zinc oxide (CALMOSEPTINE) 0.44-20.6 % OINT ointment APPLY A THIN LAYER TO AFFECTED AREA(S) TWICE DAILY;& WITH EACH INCONTINENCE CARE 113 g 97     Multiple Vitamins-Minerals (TAB-A-IRIS) TABS TAKE 1 TABLET BY MOUTH ONCE DAILY 28 tablet PRN     potassium chloride ER (K-TAB) 20 MEQ CR tablet Take 2 tablets (40 mEq) by mouth daily 60 tablet 3     psyllium (METAMUCIL/KONSYL) 0.52 g capsule Take 1 capsule by mouth 2 times daily 60 capsule 3     sotalol (BETAPACE) 80 MG tablet TAKE 1 TABLET BY MOUTH TWICE DAILY WITH MEALS 56 tablet PRN     warfarin (COUMADIN) 2.5 MG tablet Take 2.5 mg by mouth daily on Tues & Fridays  AND take 5 mg AOD            Controlled medications:   not applicable/none     Past Medical History:   Past Medical History:   Diagnosis Date     Basal cell carcinoma      Chronic atrial fibrillation (H)      DVT (deep vein thrombosis) in pregnancy   "    Esophageal reflux      Hypertension      Hypothyroid      Osteoarthritis      Pacemaker      Renal insufficiency      Physical Exam:   Vitals: BP (!) 148/60   Pulse 64   Temp (!) 96.3  F (35.7  C)   Resp 18   Ht 1.575 m (5' 2\")   Wt 70.4 kg (155 lb 4.8 oz)   BMI 28.40 kg/m     BMI= Body mass index is 28.4 kg/m .  GENERAL APPEARANCE:  alert, in no distress   RESP:  respiratory effort and palpation of chest normal, faint crackles to BLL improved from last week, no wheezing, no cough  CV:  Palpation and auscultation of heart done , paced rhythm, edema 2+ BLLE, tubi , no open areas noted  M/S:   Gait and station abnormal generalized weakness, EZ stand for transfers, arthritic changes noted to hands  NEURO:   no facial asymmetry, resting tremor  PSYCH:  memory impaired , affect and mood calm today     SNF labs:   Recent Results (from the past 240 hour(s))   Basic metabolic panel    Collection Time: 08/17/21  7:28 AM   Result Value Ref Range    Sodium 138 133 - 144 mmol/L    Potassium 4.1 3.4 - 5.3 mmol/L    Chloride 103 94 - 109 mmol/L    Carbon Dioxide (CO2) 29 20 - 32 mmol/L    Anion Gap 6 3 - 14 mmol/L    Urea Nitrogen 19 7 - 30 mg/dL    Creatinine 1.00 0.52 - 1.04 mg/dL    Calcium 9.7 8.5 - 10.1 mg/dL    Glucose 102 (H) 70 - 99 mg/dL    GFR Estimate 52 (L) >60 mL/min/1.73m2       DISCHARGE PLAN:    Follow up labs: INR in 2 weeks    Medical Follow Up:      Follow up with primary care provider in 1-2 weeks    [facility] scheduled appointments:       Discharge Services: No therapy or home care recommended.     Discharge Instructions Verbalized to Patient at Discharge:     Weigh yourself daily in the morning and keep a record. Call your primary clinic: a) if you are more short of breath, or b) if your weight changes more than 2 pounds in one day or more than 5 pounds in one week.     INR recheck in 2 weeks    TOTAL DISCHARGE TIME:   Greater than 30 minutes  Electronically signed by:  Marisa GOYAL" IRASEMA Morales CNP                       Sincerely,        IRASEMA Rivero CNP

## 2021-08-17 NOTE — LETTER
8/17/2021        RE: Liberty Horta  [facility]  [address]  [place] [facility] [facility] [address]        [facility] GERIATRIC SERVICES DISCHARGE SUMMARY  PATIENT'S NAME: Liberty Horta  YOB: 1935  MEDICAL RECORD NUMBER:  [id number]  Place of Service where encounter took place:  [facility] ([facility]) [[id number]]    PRIMARY CARE PROVIDER AND CLINIC RESPONSIBLE AFTER TRANSFER:   No primary care provider on file., No primary physician on file.    [facility] Provider     Transferring providers: IRASEMA Rivero CNP, Funmi Mendez MD  Recent Hospitalization/ED:  Home , admitted on 2/11/2020.  Date of SNF Admission: February 11, 2020  Date of SNF (anticipated) Discharge: August 17, 2021  Discharged to: [facility] skilled nursing facility [facility]  Cognitive Scores: no recent testing, known Parkinson's dementia  Physical Function: primarily w/c bound, EZ stand transfers  DME: no new needed DME    CODE STATUS/ADVANCE DIRECTIVES DISCUSSION:  No CPR- Do NOT Intubate {Provider, verify code status is accurate as an order in Epic}  ALLERGIES: Meperidine, No clinical screening - see comments, Propoxyphene n-apap, and Tramadol    NURSING FACILITY COURSE   Medication Changes/Rationale:     Lasix burst last week for CHF exacerbation increased chronic dose from 40mg to 60mg    Imodium prn for loose bowels, infrequently used     Melatonin increased insomnia    Summary of nursing facility stay:     Patient is a very sweet 86 year old female admitted to AL facility May 2019 after hospitalization for severe pain to her left buttock in which she was unable to bear weight.  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  Other PMH significant for  a-fib, s/p pacer maker placement (on coumadin), hypothyroidism, HTN, GERD, OA, Anemia, HLD, urinary retention, Parkinson's.  She has had her left knee replaced and also  had right hip ORIF in the past    She has had a significant decline since the pandemic.  Decreased mobility, therapy has been ordered on a couple occasions without much improvement, her memory has declined and recently she has had increased anxiety particularly in the evenings, has not slept well. Have discussed with family, and not inclined to try stronger medication then melatonin,  Patient has been very responsive to reassurance, presence of another person and also feels comforted by holding hands.  Current AL facility not staffed to accommodate these needs in the evenings    Last week patient reported rib and shoulder pain (see encounter) old shoulder fracture and new rib fractures noted on right side, ortho made visit last week.  Pain has been manageable      (I50.9) Acute on chronic congestive heart failure, unspecified heart failure type (H)  Comment: lasix burst last week due to increase LE edema and increased crackles to lungs  -not on daily weights in AL due to cost  -edema and lungs sounds improved on exam today  -BMP ordered today and at baseline  Plan: lasix 60mg po every day  -can be on daily weights in LTC, staff to notify if >2lbs/day or 5lbs from admission weight     (S22.41XD) Closed fracture of multiple ribs of right side with routine healing, subsequent encounter  Comment: noted last week on x-ray taken after complaints of rib and shoulder pain  -ortho saw  -reports pain manageable  -ortho recommended elza instead of EZ stand due to rib pain, patient dislikes and has wanted to continue EZ stand  Plan: icy hot to affected area  -APAP    (I48.11) Longstanding persistent atrial fibrillation (H)  Comment: per history with pacer  -on coumadin  -INR today is 2.9 which is a little higher then her typical readings.  She has been on 2.5mg PO QWednesday. 5mg PO QD on all other days for sometime  Plan: continue pacer and checks per recommendation  -on diltiazem and sotalol   -anticoagulated with  coumadin  -follow INR's, due for repeat INR in 2 weeks  -does not follow with cards and no desire to per family    (M89.49) Primary osteoarthritis involving multiple joints  (M48.00) Central stenosis of spinal canal  (M48.00) Spinal stenosis, unspecified spinal region  Comment: significant pain in past particularily to neck, back, knees  -meds adjusted several times previously, per staff has been helpful   Plan: continue biofreeze, aspercreme and APAP     (G20) Parkinson's disease (tremor, stiffness, slow motion, unstable posture) (H)  Dementia  Comment: not official neuro w/u but per symptoms of tremor, quiet speech, rigidity, slow movement, falls  -does not desire neuro involvement or medications  -worsening balance and increased weakness in April and worked with home care therapies.  In past 6 months, decline in function, walking less, transfer more difficult, increasing anxiety  Plan: moving to LTC for more assistance with cares       (E03.9) Hypothyroidism, unspecified type  Comment:   Lab Results   Component Value Date    TSH 1.68 04/20/2021       Plan: on synthroid    (N18.30) Stage 3 chronic kidney disease, unspecified whether stage 3a or 3b CKD  Comment:   Lab Results   Component Value Date    CR 1.00 08/17/2021    CR 0.91 06/08/2021       Plan: renally adjust meds, avoid nephrotoxic meds    (R19.5) Loose stools  Comment:  -psyllium added recently due to ongoing concerns of loose stools  -struggled with in past in the mornings, daily imodium caused constipation.  Staff struggling to use prn imodium so family asked to notify staff when they plan to do outings so prn dose can be given  Plan:  -continue psyllium  -imodium prn,      (I12.9,  N18.30) Benign hypertension with CKD (chronic kidney disease) stage III  Comment:   BP Readings from Last 3 Encounters:   08/17/21 (!) 148/60   08/10/21 (!) 148/60   06/07/21 107/44     Plan: continue current POC  -monitor BP    Discharge Medications:    Current Outpatient  Medications   Medication Sig Dispense Refill     ACETAMINOPHEN EXTRA STRENGTH 500 MG tablet TAKE TWO TABLETS (1000MG) BY MOUTH TWICE DAILY;AND MAY TAKE TWO TABLETS (1000MG) BY MOUTH ONCE DAILY AS NEEDED 112 tablet PRN     ASPERCREME LIDOCAINE 4 % Patch APPLY 1 PATCH TOPICALLY TO NECK AND APPLY 1 PATCH TOPICALLY TO RIGHT KNEE ONCE DAILY (ON FOR 12 HOURS, OFF FOR 12 HOURS) 30 patch 97     atorvastatin (LIPITOR) 40 MG tablet TAKE 1 TABLET BY MOUTH AT BEDTIME 28 tablet PRN     DILT- MG 24 hr capsule TAKE 1 CAPSULE BY MOUTH ONCE DAILY 28 capsule PRN     furosemide (LASIX) 40 MG tablet Take 2 tablets (80 mg) by mouth daily for 3 doses 60 tablet 4     furosemide (LASIX) 40 MG tablet Take 1.5 tablets (60 mg) by mouth daily 60 tablet 3     levothyroxine (SYNTHROID/LEVOTHROID) 175 MCG tablet TAKE 1 TABLET (175 MCG) BY MOUTH DAILY 28 tablet PRN     loperamide (IMODIUM A-D) 2 MG tablet Take 1 tablet (2 mg) by mouth 4 times daily as needed for diarrhea       Menthol, Topical Analgesic, 4 % GEL Externally apply 1 Application topically 2 times daily. May also externally apply 1 Application 2 times daily as needed. 89 mL 4     menthol-zinc oxide (CALMOSEPTINE) 0.44-20.6 % OINT ointment APPLY A THIN LAYER TO AFFECTED AREA(S) TWICE DAILY;& WITH EACH INCONTINENCE CARE 113 g 97     Multiple Vitamins-Minerals (TAB-A-IRIS) TABS TAKE 1 TABLET BY MOUTH ONCE DAILY 28 tablet PRN     potassium chloride ER (K-TAB) 20 MEQ CR tablet Take 2 tablets (40 mEq) by mouth daily 60 tablet 3     psyllium (METAMUCIL/KONSYL) 0.52 g capsule Take 1 capsule by mouth 2 times daily 60 capsule 3     sotalol (BETAPACE) 80 MG tablet TAKE 1 TABLET BY MOUTH TWICE DAILY WITH MEALS 56 tablet PRN     warfarin (COUMADIN) 2.5 MG tablet Take 2.5 mg by mouth daily on Tues & Fridays  AND take 5 mg AOD            Controlled medications:   not applicable/none     Past Medical History:   Past Medical History:   Diagnosis Date     Basal cell carcinoma      Chronic atrial  "fibrillation (H)      DVT (deep vein thrombosis) in pregnancy      Esophageal reflux      Hypertension      Hypothyroid      Osteoarthritis      Pacemaker      Renal insufficiency      Physical Exam:   Vitals: BP (!) 148/60   Pulse 64   Temp (!) 96.3  F (35.7  C)   Resp 18   Ht 1.575 m (5' 2\")   Wt 70.4 kg (155 lb 4.8 oz)   BMI 28.40 kg/m     BMI= Body mass index is 28.4 kg/m .  GENERAL APPEARANCE:  alert, in no distress   RESP:  respiratory effort and palpation of chest normal, faint crackles to BLL improved from last week, no wheezing, no cough  CV:  Palpation and auscultation of heart done , paced rhythm, edema 2+ BLLE, tubi , no open areas noted  M/S:   Gait and station abnormal generalized weakness, EZ stand for transfers, arthritic changes noted to hands  NEURO:   no facial asymmetry, resting tremor  PSYCH:  memory impaired , affect and mood calm today     SNF labs:   Recent Results (from the past 240 hour(s))   Basic metabolic panel    Collection Time: 08/17/21  7:28 AM   Result Value Ref Range    Sodium 138 133 - 144 mmol/L    Potassium 4.1 3.4 - 5.3 mmol/L    Chloride 103 94 - 109 mmol/L    Carbon Dioxide (CO2) 29 20 - 32 mmol/L    Anion Gap 6 3 - 14 mmol/L    Urea Nitrogen 19 7 - 30 mg/dL    Creatinine 1.00 0.52 - 1.04 mg/dL    Calcium 9.7 8.5 - 10.1 mg/dL    Glucose 102 (H) 70 - 99 mg/dL    GFR Estimate 52 (L) >60 mL/min/1.73m2       DISCHARGE PLAN:    Follow up labs: INR in 2 weeks    Medical Follow Up:      Follow up with primary care provider in 1-2 weeks    [facility] scheduled appointments:       Discharge Services: No therapy or home care recommended.     Discharge Instructions Verbalized to Patient at Discharge:     Weigh yourself daily in the morning and keep a record. Call your primary clinic: a) if you are more short of breath, or b) if your weight changes more than 2 pounds in one day or more than 5 pounds in one week.     INR recheck in 2 weeks    TOTAL DISCHARGE TIME:  "  Greater than 30 minutes  Electronically signed by:  IRASEMA Rivero CNP                       Sincerely,        IRASEMA Rivero CNP

## 2021-08-24 NOTE — TELEPHONE ENCOUNTER
[facility] INR Nurse Triage    Provider: IRASEMA Bender CNP  Facility: [facility]   Facility Type:  [facility]    Caller: Dewey  Call Back Number: [phone]  Reason for call: INR  Diagnosis/Goal: A. Fib    Todays INR: 3.1  Last INR 2.9 (8/17), 2.5 mg on Wed and 5 mg AOD.      Heparin/Lovenox:  No  Currently on ABX?: No  Other interacting medication:  None  Missed or refused doses: No    Verbal Order/Direction given by Provider: Hold Coumadin tonight and recheck INR tomorrow.     Provider Giving Order:  IRAESMA Olguin CNP     Verbal Order given to: Sandee Goyal RN

## 2021-08-25 NOTE — PROGRESS NOTES
"[facility] GERIATRIC SERVICES  PRIMARY CARE PROVIDER AND CLINIC:  Nissa George, IRASEMA CNP, [address] [facility] [address] / [facility] [address]  Chief Complaint   Patient presents with     [facility] Medical Record Number:  [id number]  Place of Service where encounter took place:  [facility]  () [[id number]]    Liberty Horta  is a 86 year old  (1935), admitted to the above facility from  [facility]. Hospital stay 2/11/20 through 8/17/21.  HPI:    She has a medical history significant for Parkinson's disease with associated dementia, chronic afib on coumadin, pacemaker, chronic diastolic heart failure, LE edema, osteoarthritis, spinal stenosis, right rib fractures and old right humerus fracture on XR 8/11/2021, CKD stage 3, HTN, hypothyroidism and admitted to this facility for long term care. She has lived at the AL since 2/2020 and has had a decline in both functional status and cognition over the past several months.     She reports feeling \"very tired\" after the move. Worked with therapy for a short time this morning. Reports  \"chronic pain everywhere\" but no significant pain at this time. Denies cough, shortness of breath or chest pain. Fair appetite. Family is coming from out of state later today and she's looking forward to their visit.   Wheelchair bound and EZ stand is used for transfers. Requires max assist of 1-2 with bed mobility and cares.       CODE STATUS/ADVANCE DIRECTIVES DISCUSSION:  No CPR- Do NOT Intubate  DNR / DNI  ALLERGIES:   Allergies   Allergen Reactions     Meperidine Visual Disturbance     No Clinical Screening - See Comments Other (See Comments)     Patient says she is sensitive to narcotics: \" a little goes a long way\"       Propoxyphene N-Apap Visual Disturbance     Tramadol Other (See Comments)     Patient described feeling \"squirrely\"      PAST MEDICAL HISTORY:   Past Medical History:   Diagnosis Date     Basal cell carcinoma      " Chronic atrial fibrillation (H)      DVT (deep vein thrombosis) in pregnancy      Esophageal reflux      Hypertension      Hypothyroid      Osteoarthritis      Pacemaker      Renal insufficiency       PAST SURGICAL HISTORY:   has a past surgical history that includes orthopedic surgery; Open reduction internal fixation femur distal (Right, 10/6/2016); and Implant pacemaker.  FAMILY HISTORY: Family history is unknown by patient.  SOCIAL HISTORY:   reports that she has never smoked. She has never used smokeless tobacco. She reports that she does not drink alcohol and does not use drugs.  Patient's living condition: lives in a skilled nursing facility. , has 7 children.     Post Discharge Medication Reconciliation Status: discharge medications reconciled, continue medications without change  Current Outpatient Medications   Medication Sig     ACETAMINOPHEN EXTRA STRENGTH 500 MG tablet TAKE TWO TABLETS (1000MG) BY MOUTH TWICE DAILY;AND MAY TAKE TWO TABLETS (1000MG) BY MOUTH ONCE DAILY AS NEEDED     ASPERCREME LIDOCAINE 4 % Patch APPLY 1 PATCH TOPICALLY TO NECK AND APPLY 1 PATCH TOPICALLY TO RIGHT KNEE ONCE DAILY (ON FOR 12 HOURS, OFF FOR 12 HOURS)     atorvastatin (LIPITOR) 40 MG tablet TAKE 1 TABLET BY MOUTH AT BEDTIME     DILT- MG 24 hr capsule TAKE 1 CAPSULE BY MOUTH ONCE DAILY     furosemide (LASIX) 40 MG tablet Take 1.5 tablets (60 mg) by mouth daily     levothyroxine (SYNTHROID/LEVOTHROID) 175 MCG tablet TAKE 1 TABLET (175 MCG) BY MOUTH DAILY     loperamide (IMODIUM A-D) 2 MG tablet Take 1 tablet (2 mg) by mouth 4 times daily as needed for diarrhea     Menthol, Topical Analgesic, 4 % GEL Externally apply 1 Application topically 2 times daily. May also externally apply 1 Application 2 times daily as needed.     menthol-zinc oxide (CALMOSEPTINE) 0.44-20.6 % OINT ointment APPLY A THIN LAYER TO AFFECTED AREA(S) TWICE DAILY;& WITH EACH INCONTINENCE CARE     METAMUCIL 0.52 g capsule TAKE 1 CAPSULE BY MOUTH  "TWICE DAILY     Multiple Vitamins-Minerals (TAB-A-IRIS) TABS TAKE 1 TABLET BY MOUTH ONCE DAILY     potassium chloride ER (K-TAB) 20 MEQ CR tablet Take 1 tablet (20 mEq) by mouth daily (Patient taking differently: Take 40 mEq by mouth daily )     sotalol (BETAPACE) 80 MG tablet TAKE 1 TABLET BY MOUTH TWICE DAILY WITH MEALS     Warfarin Therapy Reminder 1 each continuous prn (per INR)     No current facility-administered medications for this visit.       ROS:  4 point ROS including Respiratory, CV, GI and , other than that noted in the HPI,  is negative    Vitals:  /82   Pulse 64   Temp 97.2  F (36.2  C)   Resp 18   Ht 1.575 m (5' 2\")   Wt 88.5 kg (195 lb)   SpO2 95%   BMI 35.67 kg/m    Exam:  GENERAL APPEARANCE:  Alert, in no distress, appears fatigued   ENT:  [place], oropharynx clear  EYES:  EOM normal, conjunctiva and lids normal  NECK: no adenopathy,masses or thyromegaly  RESP: slightly diminished both bases, no crackles or wheezes   CV:  regular rate and rhythm, soft  murmur,  +2 pedal pulses, peripheral edema 1+ in both LE  ABDOMEN:  soft, non-tender, no distension, no masses  M/S:   in recliner with legs elevated. Rigidity  of extremities, resting hand  Tremor   SKIN:  no visible rashes or open areas  PSYCH:  oriented to self, place, situation, insight and judgement impaired, memory impaired , affect and mood normal    Lab/Diagnostic data:  Recent labs in [facility] reviewed by me today.     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 No longer able to manage her high care needs in AL. She has declined sinemet in the past-we did not discuss this today.   Plan: 24 hr care in long term care.     (I50.32) Chronic diastolic congestive heart failure (H)  Comment: she received additional lasix " earlier this month for CHF exacerbation. Based on notes, LE edema has improved. No dyspnea   Plan: continue lasix 60 mg daily. CBC, BMP next week. Daily weight. Low sodium diet. Compression stockings and elevate legs     (I48.20) Chronic atrial fibrillation (H)  Comment: pacemaker. HR 60 today  INR 3.2 today. Coumadin was held last night for INR 3.1. No bleeding.   INR was 2.9 on 8/17/2021. Usual coumadin dose is 2.5 mg on Wed, 5 mg all other days.   Plan: continue sotalol and diltiazem. Hold coumadin X 2 and recheck INR 8/27/2021.     (E66.01) Morbid obesity (H)  Comment: likely to affect her nursing care needs   Plan: dietician to consult     (S22.41XD) Closed fracture of multiple ribs of right side with routine healing, subsequent encounter  (M89.49) Primary osteoarthritis involving multiple joints  (M48.00) Spinal stenosis, unspecified spinal region  Comment: chronic pain and acute right rib pain appears fairly well managed   Plan: continue tylenol, lidocaine patch, menthol gel.     (I12.9,  N18.30) Benign hypertension with CKD (chronic kidney disease) stage III  Comment: BP controlled. Renal function at baseline with creatinine 1.00 on 8/17/2021.   Plan: continue diltiazem, lasix. Monitor VS and adjust meds as indicated. BMP. Avoid nephrotoxins      (R53.81) Physical deconditioning  Comment: due to multiple comorbidities   Plan: PHYSICAL THERAPY/OT eval and treat for transfers and ADL safety         Electronically signed by:  IRASEMA Taveras CNP

## 2021-08-25 NOTE — LETTER
"    8/25/2021        RE: Liberty Horta  [facility]  [address]  [place] [address]        [facility]  PRIMARY CARE PROVIDER AND CLINIC:  IRASEMA Delarosa CNP, [address] [facility] [address] / [place] [address]  Chief Complaint   Patient presents with     [facility] Medical Record Number:  [id number]  Place of Service where encounter took place:  [facility]  () [[id number]]    Liberty Horta  is a 86 year old  (1935), admitted to the above facility from  [facility]. Hospital stay 2/11/20 through 8/17/21.  HPI:    She has a medical history significant for Parkinson's disease with associated dementia, chronic afib on coumadin, pacemaker, chronic diastolic heart failure, LE edema, osteoarthritis, spinal stenosis, right rib fractures and old right humerus fracture on XR 8/11/2021, CKD stage 3, HTN, hypothyroidism and admitted to this facility for long term care. She has lived at the AL since 2/2020 and has had a decline in both functional status and cognition over the past several months.     She reports feeling \"very tired\" after the move. Worked with therapy for a short time this morning. Reports  \"chronic pain everywhere\" but no significant pain at this time. Denies cough, shortness of breath or chest pain. Fair appetite. Family is coming from out of state later today and she's looking forward to their visit.   Wheelchair bound and EZ stand is used for transfers. Requires max assist of 1-2 with bed mobility and cares.       CODE STATUS/ADVANCE DIRECTIVES DISCUSSION:  No CPR- Do NOT Intubate  DNR / DNI  ALLERGIES:   Allergies   Allergen Reactions     Meperidine Visual Disturbance     No Clinical Screening - See Comments Other (See Comments)     Patient says she is sensitive to narcotics: \" a little goes a long way\"       Propoxyphene N-Apap Visual Disturbance     Tramadol Other (See Comments)     Patient described " "feeling \"squirrely\"      PAST MEDICAL HISTORY:   Past Medical History:   Diagnosis Date     Basal cell carcinoma      Chronic atrial fibrillation (H)      DVT (deep vein thrombosis) in pregnancy      Esophageal reflux      Hypertension      Hypothyroid      Osteoarthritis      Pacemaker      Renal insufficiency       PAST SURGICAL HISTORY:   has a past surgical history that includes orthopedic surgery; Open reduction internal fixation femur distal (Right, 10/6/2016); and Implant pacemaker.  FAMILY HISTORY: Family history is unknown by patient.  SOCIAL HISTORY:   reports that she has never smoked. She has never used smokeless tobacco. She reports that she does not drink alcohol and does not use drugs.  Patient's living condition: lives in a skilled nursing facility. , has 7 children.     Post Discharge Medication Reconciliation Status: discharge medications reconciled, continue medications without change  Current Outpatient Medications   Medication Sig     ACETAMINOPHEN EXTRA STRENGTH 500 MG tablet TAKE TWO TABLETS (1000MG) BY MOUTH TWICE DAILY;AND MAY TAKE TWO TABLETS (1000MG) BY MOUTH ONCE DAILY AS NEEDED     ASPERCREME LIDOCAINE 4 % Patch APPLY 1 PATCH TOPICALLY TO NECK AND APPLY 1 PATCH TOPICALLY TO RIGHT KNEE ONCE DAILY (ON FOR 12 HOURS, OFF FOR 12 HOURS)     atorvastatin (LIPITOR) 40 MG tablet TAKE 1 TABLET BY MOUTH AT BEDTIME     DILT- MG 24 hr capsule TAKE 1 CAPSULE BY MOUTH ONCE DAILY     furosemide (LASIX) 40 MG tablet Take 1.5 tablets (60 mg) by mouth daily     levothyroxine (SYNTHROID/LEVOTHROID) 175 MCG tablet TAKE 1 TABLET (175 MCG) BY MOUTH DAILY     loperamide (IMODIUM A-D) 2 MG tablet Take 1 tablet (2 mg) by mouth 4 times daily as needed for diarrhea     Menthol, Topical Analgesic, 4 % GEL Externally apply 1 Application topically 2 times daily. May also externally apply 1 Application 2 times daily as needed.     menthol-zinc oxide (CALMOSEPTINE) 0.44-20.6 % OINT ointment APPLY A THIN " "LAYER TO AFFECTED AREA(S) TWICE DAILY;& WITH EACH INCONTINENCE CARE     METAMUCIL 0.52 g capsule TAKE 1 CAPSULE BY MOUTH TWICE DAILY     Multiple Vitamins-Minerals (TAB-A-IRIS) TABS TAKE 1 TABLET BY MOUTH ONCE DAILY     potassium chloride ER (K-TAB) 20 MEQ CR tablet Take 1 tablet (20 mEq) by mouth daily (Patient taking differently: Take 40 mEq by mouth daily )     sotalol (BETAPACE) 80 MG tablet TAKE 1 TABLET BY MOUTH TWICE DAILY WITH MEALS     Warfarin Therapy Reminder 1 each continuous prn (per INR)     No current facility-administered medications for this visit.       ROS:  4 point ROS including Respiratory, CV, GI and , other than that noted in the HPI,  is negative    Vitals:  /82   Pulse 64   Temp 97.2  F (36.2  C)   Resp 18   Ht 1.575 m (5' 2\")   Wt 88.5 kg (195 lb)   SpO2 95%   BMI 35.67 kg/m    Exam:  GENERAL APPEARANCE:  Alert, in no distress, appears fatigued   ENT:  [place], oropharynx clear  EYES:  EOM normal, conjunctiva and lids normal  NECK: no adenopathy,masses or thyromegaly  RESP: slightly diminished both bases, no crackles or wheezes   CV:  regular rate and rhythm, soft  murmur,  +2 pedal pulses, peripheral edema 1+ in both LE  ABDOMEN:  soft, non-tender, no distension, no masses  M/S:   in recliner with legs elevated. Rigidity  of extremities, resting hand  Tremor   SKIN:  no visible rashes or open areas  PSYCH:  oriented to self, place, situation, insight and judgement impaired, memory impaired , affect and mood normal    Lab/Diagnostic data:  Recent labs in [facility] reviewed by me today.     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 No longer able to manage her high care needs in AL. She has declined sinemet in the past-we did not discuss this today.   Plan: 24 hr care " in long term care.     (I50.32) Chronic diastolic congestive heart failure (H)  Comment: she received additional lasix earlier this month for CHF exacerbation. Based on notes, LE edema has improved. No dyspnea   Plan: continue lasix 60 mg daily. CBC, BMP next week. Daily weight. Low sodium diet. Compression stockings and elevate legs     (I48.20) Chronic atrial fibrillation (H)  Comment: pacemaker. HR 60 today  INR 3.2 today. Coumadin was held last night for INR 3.1. No bleeding.   INR was 2.9 on 8/17/2021. Usual coumadin dose is 2.5 mg on Wed, 5 mg all other days.   Plan: continue sotalol and diltiazem. Hold coumadin X 2 and recheck INR 8/27/2021.     (E66.01) Morbid obesity (H)  Comment: likely to affect her nursing care needs   Plan: dietician to consult     (S22.41XD) Closed fracture of multiple ribs of right side with routine healing, subsequent encounter  (M89.49) Primary osteoarthritis involving multiple joints  (M48.00) Spinal stenosis, unspecified spinal region  Comment: chronic pain and acute right rib pain appears fairly well managed   Plan: continue tylenol, lidocaine patch, menthol gel.     (I12.9,  N18.30) Benign hypertension with CKD (chronic kidney disease) stage III  Comment: BP controlled. Renal function at baseline with creatinine 1.00 on 8/17/2021.   Plan: continue diltiazem, lasix. Monitor VS and adjust meds as indicated. BMP. Avoid nephrotoxins      (R53.81) Physical deconditioning  Comment: due to multiple comorbidities   Plan: PHYSICAL THERAPY/OT eval and treat for transfers and ADL safety         Electronically signed by:  IRASEMA Taveras CNP                       Sincerely,        IRASEMA Taveras CNP

## 2021-08-27 NOTE — PROGRESS NOTES
"[facility] GERIATRIC SERVICES  [facility] Medical Record Number:  [id number]  Place of Service where encounter took place: [facility]  () [[id number]]    HPI:    Liberty Horta is a 86 year old  (1935), who is being seen today for an episodic care visit at the above location. Today's concern is INR/Coumadin management for A. Fib    ROS/Subjective:  Bleeding Signs/Symptoms:  None  Thromboembolic Signs/Symptoms:  None  Medication Changes:  No  Dietary Changes:  No  Activity Changes: No  Bacterial/Viral Infection:  No  Missed Coumadin Doses:  None  On ASA: No  Other Concerns:  No    OBJECTIVE:  BP (!) 158/74   Pulse 64   Temp 97.8  F (36.6  C)   Resp 18   Ht 1.575 m (5' 2\")   Wt 91.4 kg (201 lb 9.6 oz)   SpO2 96%   BMI 36.87 kg/m    Previous INR 3.6 on 8/23, warfarin held x2d  Last INR: 3.2 on 8/25  INR Today:  1.6  Current Dose:  Held x2 days  INR Flow sheet at [facility]:    ASSESSMENT:     Encounter for therapeutic drug monitoring  Long term (current) use of anticoagulants  Chronic atrial fibrillation (H)  Subtherapeutic INR for goal of 2-3    PLAN/ORDERS:     New Dose: 5mg Fri/Sat    Next INR: Sun 8/29    Of note, previous PTA dose 2.5mg x2 and 5mg ROW      Electronically signed by:  IRASEMA Cotter CNP     "

## 2021-08-30 NOTE — PROGRESS NOTES
"[facility] GERIATRIC SERVICES  PRIMARY CARE PROVIDER AND CLINIC:  Nissa George, IRASEMA CNP, [address] [facility] [address] / [facility] [address]  Chief Complaint   Patient presents with     snf Acute      [facility] Medical Record Number:  [id number]  Place of Service where encounter took place:  [facility]  () [[id number]]    Liberty Horta  is a 86 year old  (1935), admitted to the above facility from  [facility] where she resided from 2/11/20 through 8/17/21.    HPI:    Liberty Horta is 86 year old female with PMH significant for atrial fib on coumadin, s/p pace maker, hypothyroidism, HTN, OA, spinal stenosis, GERD, anemia. HLD, urinary retention, parkinson's, CHF, CKD3.    Recently admitted at [facility]. Seen today for routine follow up.   1. Encounter for therapeutic drug monitoring    2. Long term (current) use of anticoagulants    3. Chronic atrial fibrillation (H)    4. Acute on chronic congestive heart failure, unspecified heart failure type (H)    5. Stage 3 chronic kidney disease, unspecified whether stage 3a or 3b CKD      INR today 1.3. Recently treated for CHF exacerbation by PCP. Today Liberty resting in bed. Reports feeling SOB with talking a lot, with occasional cough, increased edema to legs. Denies any bleeding. No dysuria. Bowels moving well.     CODE STATUS/ADVANCE DIRECTIVES DISCUSSION:  No CPR- Do NOT Intubate  DNR / DNI  ALLERGIES:   Allergies   Allergen Reactions     Meperidine Visual Disturbance     No Clinical Screening - See Comments Other (See Comments)     Patient says she is sensitive to narcotics: \" a little goes a long way\"       Propoxyphene N-Apap Visual Disturbance     Tramadol Other (See Comments)     Patient described feeling \"squirrely\"      PAST MEDICAL HISTORY:   Past Medical History:   Diagnosis Date     Basal cell carcinoma      Chronic atrial fibrillation (H)      DVT (deep vein thrombosis) in pregnancy      Esophageal " reflux      Hypertension      Hypothyroid      Osteoarthritis      Pacemaker      Renal insufficiency       PAST SURGICAL HISTORY:   has a past surgical history that includes orthopedic surgery; Open reduction internal fixation femur distal (Right, 10/6/2016); and Implant pacemaker.  FAMILY HISTORY: Family history is unknown by patient.  SOCIAL HISTORY:   reports that she has never smoked. She has never used smokeless tobacco. She reports that she does not drink alcohol and does not use drugs.  Patient's living condition: lives in a skilled nursing facility    Post Discharge Medication Reconciliation Status: discharge medications reconciled and changed, per note/orders  Current Outpatient Medications   Medication Sig     ACETAMINOPHEN EXTRA STRENGTH 500 MG tablet TAKE TWO TABLETS (1000MG) BY MOUTH TWICE DAILY;AND MAY TAKE TWO TABLETS (1000MG) BY MOUTH ONCE DAILY AS NEEDED     ASPERCREME LIDOCAINE 4 % Patch APPLY 1 PATCH TOPICALLY TO NECK AND APPLY 1 PATCH TOPICALLY TO RIGHT KNEE ONCE DAILY (ON FOR 12 HOURS, OFF FOR 12 HOURS)     atorvastatin (LIPITOR) 40 MG tablet TAKE 1 TABLET BY MOUTH AT BEDTIME     DILT- MG 24 hr capsule TAKE 1 CAPSULE BY MOUTH ONCE DAILY     furosemide (LASIX) 40 MG tablet Take 1.5 tablets (60 mg) by mouth daily     levothyroxine (SYNTHROID/LEVOTHROID) 175 MCG tablet TAKE 1 TABLET (175 MCG) BY MOUTH DAILY     loperamide (IMODIUM A-D) 2 MG tablet Take 1 tablet (2 mg) by mouth 4 times daily as needed for diarrhea     Menthol, Topical Analgesic, 4 % GEL Externally apply 1 Application topically 2 times daily. May also externally apply 1 Application 2 times daily as needed.     menthol-zinc oxide (CALMOSEPTINE) 0.44-20.6 % OINT ointment APPLY A THIN LAYER TO AFFECTED AREA(S) TWICE DAILY;& WITH EACH INCONTINENCE CARE     METAMUCIL 0.52 g capsule TAKE 1 CAPSULE BY MOUTH TWICE DAILY     Multiple Vitamins-Minerals (TAB-A-IRIS) TABS TAKE 1 TABLET BY MOUTH ONCE DAILY     potassium chloride ER (K-TAB)  "20 MEQ CR tablet Take 1 tablet (20 mEq) by mouth daily (Patient taking differently: Take 40 mEq by mouth daily )     sotalol (BETAPACE) 80 MG tablet TAKE 1 TABLET BY MOUTH TWICE DAILY WITH MEALS     Warfarin Therapy Reminder 1 each continuous prn (per INR)     No current facility-administered medications for this visit.       ROS:  4 point ROS including Respiratory, CV, GI and , other than that noted in the HPI,  is negative    Vitals:  BP (!) 168/80   Pulse 78   Temp 97.8  F (36.6  C)   Resp 18   Ht 1.575 m (5' 2\")   Wt 87.5 kg (193 lb)   SpO2 96%   BMI 35.30 kg/m    Exam:  GENERAL APPEARANCE:  Alert, in NAD  HEENT: normocephalic, moist mucous membranes, nose without drainage or crusting  RESP:  respiratory effort normal, no respiratory distress, Lung sounds crackles to bilateral bases, patient is on RA  CV: auscultation of heart done, rate and rhythm regular  ABDOMEN: + bowel sounds, soft, nontender, no grimacing or guarding with palpation.  M/S: trace lower extremity edema  PSYCH: not oriented to where she is, but is able to tell me some information today about her health including some of the medications she takes;  affect and mood ok    Lab/Diagnostic data:  Labs done in SNF are in [place] [employer]. Please refer to them using [employer] Everywhere. and Recent labs in [facility] reviewed by me today.     ASSESSMENT/PLAN:  (Z51.81) Encounter for therapeutic drug monitoring  (primary encounter diagnosis)  (Z79.01) Long term (current) use of anticoagulants  (I48.20) Chronic atrial fibrillation (H)  Comment: INR today subtherapeutic 1.3; HR 78, 60, 60  INR 3.6 on 8/23- coumadin held x2  INR 3.2 on 8/25- coumadin held x2  INR 1.6 8/27- coumadin 5 mg 8/27-8/28  INR 1.2 8/29- coumadin 10 mg yesterday  -PTA dose 2.5 mg wed and 5 mg rest of days.   -Coumadin 10 mg today. INR tomorrow.  -Continue dilt  mg daily, sotalol 80 mg BID    (I50.9) Acute on chronic congestive heart failure, unspecified heart failure type " (H)  Comment: with crackles to lungs, some SOB and increased edema  -PTA lasix dose recently increased to 60 mg daily by PCP  -Increase lasix to 80 mg daily 8/31, 9/1, 9/2 then resume lasix 60 mg daily.   -BMP 9/2  -Daily weights. Notify provider with weight gain >2 lbs in a day, >5 lbs in on week.  -TONG diet.  -Tubi  on in AM, off in PM     (N18.30) Stage 3 chronic kidney disease, unspecified whether stage 3a or 3b CKD  Comment: chronic,   -baseline creatinine 0.9-1  -BMP 9/3  -Avoid nephrotoxins and renally dose medications      Electronically signed by:  IRASEMA Delarosa CNP

## 2021-08-30 NOTE — LETTER
"    8/30/2021        RE: Liberty Horta  [facility]  [address]  [place] [address]        [facility]  PRIMARY CARE PROVIDER AND CLINIC:  IRASEMA Delarosa CNP, [address] [place] [address] [address] / [place] [address]  Chief Complaint   Patient presents with     USP Acute      [place] Medical Record Number:  [id number]  Place of Service where encounter took place:  [facility]  () [[id number]]    Liberty Horta  is a 86 year old  (1935), admitted to the above facility from  [facility] where she resided from 2/11/20 through 8/17/21.    HPI:    Liberty Horta is 86 year old female with PMH significant for atrial fib on coumadin, s/p pace maker, hypothyroidism, HTN, OA, spinal stenosis, GERD, anemia. HLD, urinary retention, parkinson's, CHF, CKD3.    Recently admitted at [facility]. Seen today for routine follow up.   1. Encounter for therapeutic drug monitoring    2. Long term (current) use of anticoagulants    3. Chronic atrial fibrillation (H)    4. Acute on chronic congestive heart failure, unspecified heart failure type (H)    5. Stage 3 chronic kidney disease, unspecified whether stage 3a or 3b CKD      INR today 1.3. Recently treated for CHF exacerbation by PCP. Today Liberty resting in bed. Reports feeling SOB with talking a lot, with occasional cough, increased edema to legs. Denies any bleeding. No dysuria. Bowels moving well.     CODE STATUS/ADVANCE DIRECTIVES DISCUSSION:  No CPR- Do NOT Intubate  DNR / DNI  ALLERGIES:   Allergies   Allergen Reactions     Meperidine Visual Disturbance     No Clinical Screening - See Comments Other (See Comments)     Patient says she is sensitive to narcotics: \" a little goes a long way\"       Propoxyphene N-Apap Visual Disturbance     Tramadol Other (See Comments)     Patient described feeling \"squirrely\"      PAST MEDICAL HISTORY:   Past Medical History:   Diagnosis Date     " Basal cell carcinoma      Chronic atrial fibrillation (H)      DVT (deep vein thrombosis) in pregnancy      Esophageal reflux      Hypertension      Hypothyroid      Osteoarthritis      Pacemaker      Renal insufficiency       PAST SURGICAL HISTORY:   has a past surgical history that includes orthopedic surgery; Open reduction internal fixation femur distal (Right, 10/6/2016); and Implant pacemaker.  FAMILY HISTORY: Family history is unknown by patient.  SOCIAL HISTORY:   reports that she has never smoked. She has never used smokeless tobacco. She reports that she does not drink alcohol and does not use drugs.  Patient's living condition: lives in a skilled nursing facility    Post Discharge Medication Reconciliation Status: discharge medications reconciled and changed, per note/orders  Current Outpatient Medications   Medication Sig     ACETAMINOPHEN EXTRA STRENGTH 500 MG tablet TAKE TWO TABLETS (1000MG) BY MOUTH TWICE DAILY;AND MAY TAKE TWO TABLETS (1000MG) BY MOUTH ONCE DAILY AS NEEDED     ASPERCREME LIDOCAINE 4 % Patch APPLY 1 PATCH TOPICALLY TO NECK AND APPLY 1 PATCH TOPICALLY TO RIGHT KNEE ONCE DAILY (ON FOR 12 HOURS, OFF FOR 12 HOURS)     atorvastatin (LIPITOR) 40 MG tablet TAKE 1 TABLET BY MOUTH AT BEDTIME     DILT- MG 24 hr capsule TAKE 1 CAPSULE BY MOUTH ONCE DAILY     furosemide (LASIX) 40 MG tablet Take 1.5 tablets (60 mg) by mouth daily     levothyroxine (SYNTHROID/LEVOTHROID) 175 MCG tablet TAKE 1 TABLET (175 MCG) BY MOUTH DAILY     loperamide (IMODIUM A-D) 2 MG tablet Take 1 tablet (2 mg) by mouth 4 times daily as needed for diarrhea     Menthol, Topical Analgesic, 4 % GEL Externally apply 1 Application topically 2 times daily. May also externally apply 1 Application 2 times daily as needed.     menthol-zinc oxide (CALMOSEPTINE) 0.44-20.6 % OINT ointment APPLY A THIN LAYER TO AFFECTED AREA(S) TWICE DAILY;& WITH EACH INCONTINENCE CARE     METAMUCIL 0.52 g capsule TAKE 1 CAPSULE BY MOUTH TWICE  "DAILY     Multiple Vitamins-Minerals (TAB-A-IRIS) TABS TAKE 1 TABLET BY MOUTH ONCE DAILY     potassium chloride ER (K-TAB) 20 MEQ CR tablet Take 1 tablet (20 mEq) by mouth daily (Patient taking differently: Take 40 mEq by mouth daily )     sotalol (BETAPACE) 80 MG tablet TAKE 1 TABLET BY MOUTH TWICE DAILY WITH MEALS     Warfarin Therapy Reminder 1 each continuous prn (per INR)     No current facility-administered medications for this visit.       ROS:  4 point ROS including Respiratory, CV, GI and , other than that noted in the HPI,  is negative    Vitals:  BP (!) 168/80   Pulse 78   Temp 97.8  F (36.6  C)   Resp 18   Ht 1.575 m (5' 2\")   Wt 87.5 kg (193 lb)   SpO2 96%   BMI 35.30 kg/m    Exam:  GENERAL APPEARANCE:  Alert, in NAD  HEENT: normocephalic, moist mucous membranes, nose without drainage or crusting  RESP:  respiratory effort normal, no respiratory distress, Lung sounds crackles to bilateral bases, patient is on RA  CV: auscultation of heart done, rate and rhythm regular  ABDOMEN: + bowel sounds, soft, nontender, no grimacing or guarding with palpation.  M/S: trace lower extremity edema  PSYCH: not oriented to where she is, but is able to tell me some information today about her health including some of the medications she takes;  affect and mood ok    Lab/Diagnostic data:  Labs done in SNF are in [facility]. Please refer to them using [employer]/Care Everywhere. and Recent labs in [facility] reviewed by me today.     ASSESSMENT/PLAN:  (Z51.81) Encounter for therapeutic drug monitoring  (primary encounter diagnosis)  (Z79.01) Long term (current) use of anticoagulants  (I48.20) Chronic atrial fibrillation (H)  Comment: INR today subtherapeutic 1.3; HR 78, 60, 60  INR 3.6 on 8/23- coumadin held x2  INR 3.2 on 8/25- coumadin held x2  INR 1.6 8/27- coumadin 5 mg 8/27-8/28  INR 1.2 8/29- coumadin 10 mg yesterday  -PTA dose 2.5 mg wed and 5 mg rest of days.   -Coumadin 10 mg today. INR tomorrow.  -Continue dilt "  mg daily, sotalol 80 mg BID    (I50.9) Acute on chronic congestive heart failure, unspecified heart failure type (H)  Comment: with crackles to lungs, some SOB and increased edema  -PTA lasix dose recently increased to 60 mg daily by PCP  -Increase lasix to 80 mg daily 8/31, 9/1, 9/2 then resume lasix 60 mg daily.   -BMP 9/2  -Daily weights. Notify provider with weight gain >2 lbs in a day, >5 lbs in on week.  -TONG diet.  -Tubi  on in AM, off in PM     (N18.30) Stage 3 chronic kidney disease, unspecified whether stage 3a or 3b CKD  Comment: chronic,   -baseline creatinine 0.9-1  -BMP 9/3  -Avoid nephrotoxins and renally dose medications      Electronically signed by:  IRASEMA Delarosa CNP                       Sincerely,        IRASEMA Delarosa CNP

## 2021-08-30 NOTE — PATIENT INSTRUCTIONS
Orders  Liberty Horta  1935  1) Coumadin 10 mg today. INR tomorrow.  2) Increase lasix to 80 mg daily 8/31, 9/1, 9/2 then resume lasix 60 mg daily. Diagnosis: CHF  3) BMP 9/2 Diagnosis CHF  4) Tubi  on in AM, off in PM   IRASEMA Delarosa CNP on 8/30/2021 at 12:07 PM

## 2021-08-31 NOTE — PROGRESS NOTES
"[facility] GERIATRIC SERVICES  [place] Medical Record Number:  [id number]  Place of Service where encounter took place: [facility]  () [[id number]]    HPI:    Liberty Horta is a 86 year old  (1935), who is being seen today for an episodic care visit at the above location. Today's concern is INR/Coumadin management for A. Fib    ROS/Subjective:  Bleeding Signs/Symptoms:  None  Thromboembolic Signs/Symptoms:  None  Medication Changes:  No  Dietary Changes:  Yes: recent move to facility  Activity Changes: No  Bacterial/Viral Infection:  No  Missed Coumadin Doses:  Held 8/24-8/26  On ASA: No  Other Concerns:  No    OBJECTIVE:  BP (!) 168/80   Pulse 78   Temp 97.8  F (36.6  C)   Resp 18   Ht 1.575 m (5' 2\")   Wt 87 kg (191 lb 12.8 oz)   SpO2 95%   BMI 35.08 kg/m    Last INR: 1.3 on 8/30/21  INR Today:  1.5  Current Dose:  Coumadin 10 mg daily  INR Flow sheet at [facility]:    ASSESSMENT:  (Z51.81) Encounter for therapeutic drug monitoring  (primary encounter diagnosis)  (Z79.01) Long term (current) use of anticoagulants  (I48.20) Chronic atrial fibrillation (H)    Subtherapeutic INR for goal of 2-3    PLAN/ORDERS:     New Dose: Coumadin 5 mg today  Next INR: tomorrow    Electronically signed by:  IRASEMA Delarosa CNP     "

## 2021-08-31 NOTE — LETTER
"    8/31/2021        RE: Liberty Horta  [address]  [facility] [address]        [facility] SERVICES  [place] Medical Record Number:  [id number]  Place of Service where encounter took place: [facility]  () [[id number]]    HPI:    Liberty Horta is a 86 year old  (1935), who is being seen today for an episodic care visit at the above location. Today's concern is INR/Coumadin management for A. Fib    ROS/Subjective:  Bleeding Signs/Symptoms:  None  Thromboembolic Signs/Symptoms:  None  Medication Changes:  No  Dietary Changes:  Yes: recent move to facility  Activity Changes: No  Bacterial/Viral Infection:  No  Missed Coumadin Doses:  Held 8/24-8/26  On ASA: No  Other Concerns:  No    OBJECTIVE:  BP (!) 168/80   Pulse 78   Temp 97.8  F (36.6  C)   Resp 18   Ht 1.575 m (5' 2\")   Wt 87 kg (191 lb 12.8 oz)   SpO2 95%   BMI 35.08 kg/m    Last INR: 1.3 on 8/30/21  INR Today:  1.5  Current Dose:  Coumadin 10 mg daily  INR Flow sheet at [facility]:    ASSESSMENT:  (Z51.81) Encounter for therapeutic drug monitoring  (primary encounter diagnosis)  (Z79.01) Long term (current) use of anticoagulants  (I48.20) Chronic atrial fibrillation (H)    Subtherapeutic INR for goal of 2-3    PLAN/ORDERS:     New Dose: Coumadin 5 mg today  Next INR: tomorrow    Electronically signed by:  IRASEMA Delarosa CNP           Sincerely,        IRASEMA Delarosa CNP    "

## 2021-09-01 NOTE — PROGRESS NOTES
"[facility] GERIATRIC SERVICES  [place] Medical Record Number:  [id number]  Place of Service where encounter took place: [facility]  () [[id number]]    HPI:    Liberty Horta is a 86 year old  (1935), who is being seen today for an episodic care visit at the above location. Today's concern is INR/Coumadin management for A. Fib    ROS/Subjective:  Bleeding Signs/Symptoms:  None  Thromboembolic Signs/Symptoms:  None  Medication Changes:  No  Dietary Changes:  Yes: new to facility  Activity Changes: No  Bacterial/Viral Infection:  No  Missed Coumadin Doses:  Held 8/24-8/26  On ASA: No  Other Concerns:  No    OBJECTIVE:  BP (!) 140/81   Pulse 62   Temp 98  F (36.7  C)   Resp 18   Ht 1.575 m (5' 2\")   Wt 87 kg (191 lb 12.8 oz)   SpO2 95%   BMI 35.08 kg/m    Last INR: 1.5 on 8/31/21  INR Today:  2.5  Current Dose:  Coumadin 5 mg   INR Flow sheet at [facility]:    ASSESSMENT:  (Z51.81) Encounter for therapeutic drug monitoring  (primary encounter diagnosis)  (Z79.01) Long term (current) use of anticoagulants  (I48.20) Chronic atrial fibrillation (H)    Therapeutic INR for goal of 2-3    PLAN/ORDERS:     New Dose: Hold today    Next INR: tomorrow    Electronically signed by:  IRASEMA Delarosa CNP     "

## 2021-09-01 NOTE — LETTER
"    9/1/2021        RE: Liberty Horta  [address]  [facility] [address]        [facility] SERVICES  [place] Medical Record Number:  [id number]  Place of Service where encounter took place: [facility]  () [[id number]]    HPI:    Liberty Horta is a 86 year old  (1935), who is being seen today for an episodic care visit at the above location. Today's concern is INR/Coumadin management for A. Fib    ROS/Subjective:  Bleeding Signs/Symptoms:  None  Thromboembolic Signs/Symptoms:  None  Medication Changes:  No  Dietary Changes:  Yes: new to facility  Activity Changes: No  Bacterial/Viral Infection:  No  Missed Coumadin Doses:  Held 8/24-8/26  On ASA: No  Other Concerns:  No    OBJECTIVE:  BP (!) 140/81   Pulse 62   Temp 98  F (36.7  C)   Resp 18   Ht 1.575 m (5' 2\")   Wt 87 kg (191 lb 12.8 oz)   SpO2 95%   BMI 35.08 kg/m    Last INR: 1.5 on 8/31/21  INR Today:  2.5  Current Dose:  Coumadin 5 mg   INR Flow sheet at [facility]:    ASSESSMENT:  (Z51.81) Encounter for therapeutic drug monitoring  (primary encounter diagnosis)  (Z79.01) Long term (current) use of anticoagulants  (I48.20) Chronic atrial fibrillation (H)    Therapeutic INR for goal of 2-3    PLAN/ORDERS:     New Dose: Hold today    Next INR: tomorrow    Electronically signed by:  IRASEMA Delarosa CNP           Sincerely,        IRASEMA Delarosa CNP    "

## 2021-09-02 NOTE — PROGRESS NOTES
"[facility] SERVICES  [place] Medical Record Number:  [id number]  Place of Service where encounter took place: [facility]  () [[id number]]    HPI:    Liberty Horta is a 86 year old  (1935), who is being seen today for an episodic care visit at the above location. Today's concern is INR/Coumadin management for A. Fib    ROS/Subjective:  Bleeding Signs/Symptoms:  None  Thromboembolic Signs/Symptoms:  None  Medication Changes:  No  Dietary Changes:  Yes: at new facility  Activity Changes: No  Bacterial/Viral Infection:  No  Missed Coumadin Doses:  Held 8/24-8/26, Held 9/1  On ASA: No  Other Concerns:  No    OBJECTIVE:  BP (!) 153/77   Pulse 61   Temp 97.9  F (36.6  C)   Resp 18   Ht 1.575 m (5' 2\")   Wt 86.9 kg (191 lb 9.6 oz)   SpO2 96%   BMI 35.04 kg/m    Last INR: 2.5 on 9/1/21  INR Today:  2.3  Current Dose:  Held 9/1  INR Flow sheet at [facility]:    ASSESSMENT:  (Z51.81) Encounter for therapeutic drug monitoring  (primary encounter diagnosis)  (Z79.01) Long term (current) use of anticoagulants  (I48.20) Chronic atrial fibrillation (H)    Therapeutic INR for goal of 2-3    PLAN/ORDERS:     New Dose: Coumadin 2.5 Friday and Tuesday and 5 mg rest of days    Next INR: Next INR 9/7    Electronically signed by:  IRASEMA Delarosa CNP     "

## 2021-09-02 NOTE — LETTER
"    9/2/2021        RE: Liberty Horta  [address]  [facility] [address]        M [facility] SERVICES  [place] Medical Record Number:  [id number]  Place of Service where encounter took place: [facility]  () [[id number]]    HPI:    Liberty Horta is a 86 year old  (1935), who is being seen today for an episodic care visit at the above location. Today's concern is INR/Coumadin management for A. Fib    ROS/Subjective:  Bleeding Signs/Symptoms:  None  Thromboembolic Signs/Symptoms:  None  Medication Changes:  No  Dietary Changes:  Yes: at new facility  Activity Changes: No  Bacterial/Viral Infection:  No  Missed Coumadin Doses:  Held 8/24-8/26, Held 9/1  On ASA: No  Other Concerns:  No    OBJECTIVE:  BP (!) 153/77   Pulse 61   Temp 97.9  F (36.6  C)   Resp 18   Ht 1.575 m (5' 2\")   Wt 86.9 kg (191 lb 9.6 oz)   SpO2 96%   BMI 35.04 kg/m    Last INR: 2.5 on 9/1/21  INR Today:  2.3  Current Dose:  Held 9/1  INR Flow sheet at [facility]:    ASSESSMENT:  (Z51.81) Encounter for therapeutic drug monitoring  (primary encounter diagnosis)  (Z79.01) Long term (current) use of anticoagulants  (I48.20) Chronic atrial fibrillation (H)    Therapeutic INR for goal of 2-3    PLAN/ORDERS:     New Dose: Coumadin 2.5 Friday and Tuesday and 5 mg rest of days    Next INR: Next INR 9/7    Electronically signed by:  IRASEMA Delarosa CNP           Sincerely,        IRASEMA Delarosa CNP    " Unknown

## 2021-09-07 NOTE — LETTER
"    9/7/2021        RE: Liberty Horta  [address]  [facility] [address]         HEALTH GERIATRIC SERVICES    Chief Complaint   Patient presents with     Nursing Home Acute     HPI:  Liberty Horta is a 86 year old  (1935), who is being seen today for an episodic care visit at: [facility]  () [[id number]].     Liberty Horta is 86 year old female with PMH significant for atrial fib on coumadin, s/p pace maker, hypothyroidism, HTN, OA, spinal stenosis, GERD, anemia. HLD, urinary retention, parkinson's, CHF, CKD3.    Today's concern is:   1. Encounter for therapeutic drug monitoring    2. Long term (current) use of anticoagulants    3. Chronic atrial fibrillation (H)    4. Acute on chronic congestive heart failure, unspecified heart failure type (H)    5. Stage 3 chronic kidney disease, unspecified whether stage 3a or 3b CKD      Liberty seen today for episodic follow up. Last week received burst of lasix for crackles in lungs, CHF exacerbation. Today Liberty denies SOB, CP, dizziness. Reports ongoing edema to legs. INR 2.6. Denies any bleeding.    Allergies, and PMH/PSH reviewed in EPIC today.  REVIEW OF SYSTEMS:  4 point ROS including Respiratory, CV, GI and , other than that noted in the HPI,  is negative    Objective:   /61   Pulse 61   Temp 98.6  F (37  C)   Resp 18   Ht 1.575 m (5' 2\")   Wt 85.9 kg (189 lb 6.4 oz)   SpO2 97%   BMI 34.64 kg/m    GENERAL APPEARANCE:  Alert, in NAD  HEENT: normocephalic, moist mucous membranes, nose without drainage or crusting  RESP:  respiratory effort normal, no respiratory distress, Lung sounds clear bilaterally, patient is on RA  CV: auscultation of heart done, rate and rhythm regular  M/S: trace lower extremity edema with tubi  in place  PSYCH: affect and mood ok    Labs done in SNF are in [facility] EPIC. Please refer to them using [employer]/Care Everywhere. and Recent labs in [facility] reviewed by me " today.     Assessment/Plan:  (Z51.81) Encounter for therapeutic drug monitoring  (primary encounter diagnosis)  (Z79.01) Long term (current) use of anticoagulants  (I48.20) Chronic atrial fibrillation (H)  Comment: INR today therapeutic 2.6; HR 61, 60 63  -Last INR 2.5 mg on Friday and 5 mg rest of days.   Plan: Continue coumadin 2.5 mg on Friday and 5 mg rest of days. INR 9/15. If within goal next week will extend recheck to every other week. Continue dilt  mg daily, sotalol 80 mg BID    (I50.9) Acute on chronic congestive heart failure, unspecified heart failure type (H)  Comment: lungs clear, trace to legs- weight is trending down since admission (down ~5 lbs since admit)  -PTA lasix dose recently increased to 60 mg daily by PCP  -received lasix 80 mg 8/31-9/2  Plan: Resume lasix 60 mg daily. Daily weights. Notify provider with weight gain >2 lbs in a day, >5 lbs in on week. TONG diet. Tubi  on in AM, off in PM     (N18.30) Stage 3 chronic kidney disease, unspecified whether stage 3a or 3b CKD  Comment: chronic, creatinine at baseline at last check  -baseline creatinine 0.9-1  Creatinine   Date Value Ref Range Status   09/02/2021 0.84 0.52 - 1.04 mg/dL Final   06/08/2021 0.91 0.52 - 1.04 mg/dL Final   Estimated Creatinine Clearance: 50.7 mL/min (based on SCr of 0.84 mg/dL).    Plan: BMP PRN. Avoid nephrotoxins and renally dose medications    Electronically signed by: IRASEMA Delarosa CNP             Sincerely,        IRASEMA Delarosa CNP

## 2021-09-07 NOTE — PATIENT INSTRUCTIONS
Lamar Horta  1935  1) Continue coumadin 2.5 mg on Friday and 5 mg rest of days. INR 9/15.  IRASEMA Delarosa CNP on 9/7/2021 at 10:27 AM

## 2021-09-07 NOTE — PROGRESS NOTES
"[facility] GERIATRIC SERVICES    Chief Complaint   Patient presents with     Nursing Home Acute     HPI:  Liberty Horta is a 86 year old  (1935), who is being seen today for an episodic care visit at: [facility]  () [[id number]].     Liberty Horta is 86 year old female with PMH significant for atrial fib on coumadin, s/p pace maker, hypothyroidism, HTN, OA, spinal stenosis, GERD, anemia. HLD, urinary retention, parkinson's, CHF, CKD3.    Today's concern is:   1. Encounter for therapeutic drug monitoring    2. Long term (current) use of anticoagulants    3. Chronic atrial fibrillation (H)    4. Acute on chronic congestive heart failure, unspecified heart failure type (H)    5. Stage 3 chronic kidney disease, unspecified whether stage 3a or 3b CKD      Liberty seen today for episodic follow up. Last week received burst of lasix for crackles in lungs, CHF exacerbation. Today Liberty denies SOB, CP, dizziness. Reports ongoing edema to legs. INR 2.6. Denies any bleeding.    Allergies, and PMH/PSH reviewed in EPIC today.  REVIEW OF SYSTEMS:  4 point ROS including Respiratory, CV, GI and , other than that noted in the HPI,  is negative    Objective:   /61   Pulse 61   Temp 98.6  F (37  C)   Resp 18   Ht 1.575 m (5' 2\")   Wt 85.9 kg (189 lb 6.4 oz)   SpO2 97%   BMI 34.64 kg/m    GENERAL APPEARANCE:  Alert, in NAD  HEENT: normocephalic, moist mucous membranes, nose without drainage or crusting  RESP:  respiratory effort normal, no respiratory distress, Lung sounds clear bilaterally, patient is on RA  CV: auscultation of heart done, rate and rhythm regular  M/S: trace lower extremity edema with tubi  in place  PSYCH: affect and mood ok    Labs done in SNF are in [facility]. Please refer to them using [employer]/Care Everywhere. and Recent labs in EPIC reviewed by me today.     Assessment/Plan:  (Z51.81) Encounter for therapeutic drug monitoring  (primary encounter " diagnosis)  (Z79.01) Long term (current) use of anticoagulants  (I48.20) Chronic atrial fibrillation (H)  Comment: INR today therapeutic 2.6; HR 61, 60 63  -Last INR 2.5 mg on Friday and 5 mg rest of days.   Plan: Continue coumadin 2.5 mg on Friday and 5 mg rest of days. INR 9/15. If within goal next week will extend recheck to every other week. Continue dilt  mg daily, sotalol 80 mg BID    (I50.9) Acute on chronic congestive heart failure, unspecified heart failure type (H)  Comment: lungs clear, trace to legs- weight is trending down since admission (down ~5 lbs since admit)  -PTA lasix dose recently increased to 60 mg daily by PCP  -received lasix 80 mg 8/31-9/2  Plan: Resume lasix 60 mg daily. Daily weights. Notify provider with weight gain >2 lbs in a day, >5 lbs in on week. TONG diet. Tubi  on in AM, off in PM     (N18.30) Stage 3 chronic kidney disease, unspecified whether stage 3a or 3b CKD  Comment: chronic, creatinine at baseline at last check  -baseline creatinine 0.9-1  Creatinine   Date Value Ref Range Status   09/02/2021 0.84 0.52 - 1.04 mg/dL Final   06/08/2021 0.91 0.52 - 1.04 mg/dL Final   Estimated Creatinine Clearance: 50.7 mL/min (based on SCr of 0.84 mg/dL).    Plan: BMP PRN. Avoid nephrotoxins and renally dose medications    Electronically signed by: IRASEMA Delarosa CNP

## 2021-09-13 NOTE — LETTER
9/13/2021        RE: Liberty Horta  [facility]  [address]  [facility] [address]        Liberty Horta is a 86 year old female seen September 13, 2021 at [facility] where she has resided for one month (admit 8/2021) [place]TC [place] for ongoing cognitive and functional decline.   She is seen on the unit up to WC, some distress related to right knee pain, difficult to get much history    By chart review, patient last hospitalized in June 2019 with a nondisplaced L5 lateral vertebral body fracture and severe canal stenosis at L3-4.  She has significant OA/DJD with h/o left TKA and right hip ORIF.    No longer ambulatory.   She has had Parkinsonism noted by prior physician, but pt and family have declined any further workup or medication.  She also has longstanding atrial fibrillation and has a pacemaker, anticoagulated with warfarin    She has had device checks, and followed by Cardiology clinic, not sure when she was last seen there.      Past Medical History:   Diagnosis Date     Basal cell carcinoma      Chronic atrial fibrillation (H)      DVT (deep vein thrombosis) in pregnancy      Esophageal reflux      Hypertension      Hypothyroid      Osteoarthritis      Pacemaker      Renal insufficiency    Parkinsonism    Past Surgical History:   Procedure Laterality Date     IMPLANT PACEMAKER       OPEN REDUCTION INTERNAL FIXATION FEMUR DISTAL Right 10/6/2016    Procedure: OPEN REDUCTION INTERNAL FIXATION FEMUR DISTAL;  Surgeon: Filipe Coburn MD;  Location:  OR     ORTHOPEDIC SURGERY      Knee replacement left in 2011     SH:   for 30 years.   She has 7 children.  Son Romaine is POA  Pt resided at [facility] 2/2020-8/2021    Non smoker    ROS: ambulatory with 4WW, or uses WC at times  Wt Readings from Last 5 Encounters:   09/16/21 88.2 kg (194 lb 8 oz)   09/15/21 88.4 kg (194 lb 12.8 oz)   09/13/21 87.6 kg (193 lb 1.6 oz)   09/07/21 85.9 kg (189  "lb 6.4 oz)   09/02/21 86.9 kg (191 lb 9.6 oz)      EXAM: Up to WC, mild distress  /66   Pulse 64   Temp 97.8  F (36.6  C)   Resp 18   Ht 1.575 m (5' 2\")   Wt 87.6 kg (193 lb 1.6 oz)   SpO2 96%   BMI 35.32 kg/m     +dysphonia  Scabbed over lesion left chest wall, \"where they put my pacemaker in.\"   No erythema or drainage     Neck supple without adenopathy   Lungs clear bilaterally with fair air movement   Heart irreg irreg at 70, 1/6 NELLY  Abd soft, NT, no distention or guarding, +BS  Ext 1+ ankle edema wearing tubi-, right knee with gross changes of OA, +sponginess, decreased ROM.   +onychymycosis  Neuro: confused history, increased tone diffusely, left hand resting tremor    Psych: a little anxious    Last Comprehensive Metabolic Panel:  Sodium   Date Value Ref Range Status   09/02/2021 139 133 - 144 mmol/L Final   06/08/2021 141 133 - 144 mmol/L Final     Potassium   Date Value Ref Range Status   09/02/2021 4.0 3.4 - 5.3 mmol/L Final   06/08/2021 3.4 3.4 - 5.3 mmol/L Final     Chloride   Date Value Ref Range Status   09/02/2021 104 94 - 109 mmol/L Final   06/08/2021 105 94 - 109 mmol/L Final     Carbon Dioxide   Date Value Ref Range Status   06/08/2021 30 20 - 32 mmol/L Final     Carbon Dioxide (CO2)   Date Value Ref Range Status   09/02/2021 27 20 - 32 mmol/L Final     Anion Gap   Date Value Ref Range Status   09/02/2021 8 3 - 14 mmol/L Final   06/08/2021 6 3 - 14 mmol/L Final     Glucose   Date Value Ref Range Status   09/02/2021 115 (H) 70 - 99 mg/dL Final   06/08/2021 142 (H) 70 - 99 mg/dL Final     Urea Nitrogen   Date Value Ref Range Status   09/02/2021 23 7 - 30 mg/dL Final   06/08/2021 13 7 - 30 mg/dL Final     Creatinine   Date Value Ref Range Status   09/02/2021 0.84 0.52 - 1.04 mg/dL Final   06/08/2021 0.91 0.52 - 1.04 mg/dL Final     GFR Estimate   Date Value Ref Range Status   09/02/2021 64 >60 mL/min/1.73m2 Final     Comment:     As of July 11, 2021, eGFR is calculated by the " CKD-EPI creatinine equation, without race adjustment. eGFR can be influenced by muscle mass, exercise, and diet. The reported eGFR is an estimation only and is only applicable if the renal function is stable.   06/08/2021 58 (L) >60 mL/min/[1.73_m2] Final     Comment:     Non  GFR Calc  Starting 12/18/2018, serum creatinine based estimated GFR (eGFR) will be   calculated using the Chronic Kidney Disease Epidemiology Collaboration   (CKD-EPI) equation.       Calcium   Date Value Ref Range Status   09/02/2021 9.8 8.5 - 10.1 mg/dL Final   06/08/2021 9.2 8.5 - 10.1 mg/dL Final     Lab Results   Component Value Date    AST 19 04/23/2021      ALBUMIN 3.3 04/23/2021      ALKPHOS 59 04/23/2021     Lab Results   Component Value Date    WBC 5.8 04/23/2021      HGB 13.0 04/23/2021       04/23/2021       04/23/2021     ECHO 2016   Interpretation Summary  Left ventricular systolic function is low normal.  The visual ejection fraction is estimated at 50-55%.  The left ventricle is normal in size.  There is mild concentric left ventricular hypertrophy.  No regional wall motion abnormalities noted.  The right ventricle is normal in structure, function and size.  Doppler interrogation does not demonstrate significant stenosis or insufficiency involving cardiac valves.      IMP/PLAN:   (M17.11) Primary osteoarthritis of right knee    (M89.49) Primary osteoarthritis involving multiple joints  Comment: persistent right knee pain   Now EZ stand lift for transfers and WC bound       Plan: diclofenac gel / Aspercreme lidocaine patch  Continue scheduled acetaminophen bid with prn available.  Given level of distress today, may need to look at stronger pain medication for patient      (I48.11) Longstanding persistent atrial fibrillation (H)  Comment: has had a pacemaker for several years, monitor site ?pt scratching there.    HR 60s  Plan: sotalol 80 mg bid with meals and diltiazem 180 mg/day for VR control.    Warfarin per INR for stroke prophylaxis.       (I12.9,  N18.3) Benign hypertension with CKD (chronic kidney disease) stage III (H)  Comment:  CrCl 50  Plan: diltiazem 180 mg/day, follow bps and BMP      (G20) Parkinsonism, unspecified Parkinsonism type (H)  Comment: clear symptoms with dysphonia, left hand resting tremor, rigidity and decline in mobility   Plan: she has been seen by [facility] PHYSICAL THERAPY / OCCUPATIONAL THERAPY with [place] tx in the past, has ongoing physical decline, now EZ stand transfers and WC bound.     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

## 2021-09-15 NOTE — PROGRESS NOTES
"[facility] GERIATRIC SERVICES  [place] Medical Record Number:  [id number]  Place of Service where encounter took place: [facility]  () [[id number]]    HPI:    Liberty Horta is a 86 year old  (1935), who is being seen today for an episodic care visit at the above location. Today's concern is INR/Coumadin management for A. Fib    ROS/Subjective:  Bleeding Signs/Symptoms:  None  Thromboembolic Signs/Symptoms:  None  Medication Changes:  No  Dietary Changes:  recent move to LTC  Activity Changes: recent move to [facility]  Bacterial/Viral Infection:  No  Missed Coumadin Doses:  Held 9/1  On ASA: No  Other Concerns:  No    OBJECTIVE:  BP (!) 150/83   Pulse 63   Temp 97.6  F (36.4  C)   Resp 18   Ht 1.575 m (5' 2\")   Wt 88.4 kg (194 lb 12.8 oz)   SpO2 96%   BMI 35.63 kg/m    Last INR: 2.6 on 9/7  INR Today:  3.6  Current Dose:  Coumadin 2.5 mg on Friday and 5 mg rest of days  INR Flow sheet at [facility]:    ASSESSMENT:  (Z51.81) Encounter for therapeutic drug monitoring  (primary encounter diagnosis)  (Z79.01) Long term (current) use of anticoagulants  (I48.20) Chronic atrial fibrillation (H)    Supratherapeutic INR for goal of 2-3    PLAN/ORDERS:     New Dose: Hold today    Next INR: tomorrow      Electronically signed by:  IRASEMA Delarosa CNP     The health plan new enrollment has happened. I have reviewed the  MDS, the preventative needs,  and facility care plan. The level of care is appropriate. I have reviewed the code status/advanced directives.   "

## 2021-09-15 NOTE — LETTER
"    9/15/2021        RE: Liberty Horta  [facility]  [address] [address] [address]        [facility] SERVICES  [place] Medical Record Number:  [id number]  Place of Service where encounter took place: [facility]  () [[id number]]    HPI:    Liberty Horta is a 86 year old  (1935), who is being seen today for an episodic care visit at the above location. Today's concern is INR/Coumadin management for A. Fib    ROS/Subjective:  Bleeding Signs/Symptoms:  None  Thromboembolic Signs/Symptoms:  None  Medication Changes:  No  Dietary Changes:  recent move to LTC  Activity Changes: recent move to C  Bacterial/Viral Infection:  No  Missed Coumadin Doses:  Held 9/1  On ASA: No  Other Concerns:  No    OBJECTIVE:  BP (!) 150/83   Pulse 63   Temp 97.6  F (36.4  C)   Resp 18   Ht 1.575 m (5' 2\")   Wt 88.4 kg (194 lb 12.8 oz)   SpO2 96%   BMI 35.63 kg/m    Last INR: 2.6 on 9/7  INR Today:  3.6  Current Dose:  Coumadin 2.5 mg on Friday and 5 mg rest of days  INR Flow sheet at [facility]:    ASSESSMENT:  (Z51.81) Encounter for therapeutic drug monitoring  (primary encounter diagnosis)  (Z79.01) Long term (current) use of anticoagulants  (I48.20) Chronic atrial fibrillation (H)    Supratherapeutic INR for goal of 2-3    PLAN/ORDERS:     New Dose: Hold today    Next INR: tomorrow      Electronically signed by:  IRASEMA Delarosa CNP     The health plan new enrollment has happened. I have reviewed the  MDS, the preventative needs,  and facility care plan. The level of care is appropriate. I have reviewed the code status/advanced directives.         Sincerely,        IRASEMA Delarosa CNP    "

## 2021-09-16 NOTE — PROGRESS NOTES
"[facility] GERIATRIC SERVICES  [place] Medical Record Number:  [id number]  Place of Service where encounter took place: [facility]  () [[id number]]    HPI:    Liberty Horta is a 86 year old  (1935), who is being seen today for an episodic care visit at the above location. Today's concern is INR/Coumadin management for A. Fib    ROS/Subjective:  Bleeding Signs/Symptoms:  None  Thromboembolic Signs/Symptoms:  None  Medication Changes:  No  Dietary Changes:  Recent move to LTC  Activity Changes: Recent move to [place]  Bacterial/Viral Infection:  No  Missed Coumadin Doses:  Held yesterday and 9/1  On ASA: No  Other Concerns:  No    OBJECTIVE:  BP (!) 150/83   Pulse 62   Temp 97.7  F (36.5  C)   Resp 18   Ht 1.575 m (5' 2\")   Wt 88.2 kg (194 lb 8 oz)   SpO2 97%   BMI 35.57 kg/m    Last INR: 3.6 on 9/15  INR Today:  2.7  Current Dose:  Held yesterday  INR Flow sheet at [facility]:    ASSESSMENT:  (Z51.81) Encounter for therapeutic drug monitoring  (primary encounter diagnosis)  (Z79.01) Long term (current) use of anticoagulants  (I48.20) Chronic atrial fibrillation (H)    Therapeutic INR for goal of 2-3    PLAN/ORDERS:     New Dose: Coumadin 5 mg M,W,F and 4 mg rest of days    Next INR: 1 week      Electronically signed by:  IRASEMA Delarosa CNP     "

## 2021-09-16 NOTE — PATIENT INSTRUCTIONS
Lamar Horta  1935  1) Coumadin 5 mg M,W,F and 4 mg rest of days. Next INR 9/22    IRASEMA Delarosa CNP on 9/16/2021 at 11:14 AM

## 2021-09-16 NOTE — LETTER
"    9/16/2021        RE: Liberty Horta  [facility]  [address] [address] [address]        [facility] SERVICES  [place] Medical Record Number:  [id number]  Place of Service where encounter took place: [facility]  () [[id number]]    HPI:    Liberty Horta is a 86 year old  (1935), who is being seen today for an episodic care visit at the above location. Today's concern is INR/Coumadin management for A. Fib    ROS/Subjective:  Bleeding Signs/Symptoms:  None  Thromboembolic Signs/Symptoms:  None  Medication Changes:  No  Dietary Changes:  Recent move to LTC  Activity Changes: Recent move to [facility]  Bacterial/Viral Infection:  No  Missed Coumadin Doses:  Held yesterday and 9/1  On ASA: No  Other Concerns:  No    OBJECTIVE:  BP (!) 150/83   Pulse 62   Temp 97.7  F (36.5  C)   Resp 18   Ht 1.575 m (5' 2\")   Wt 88.2 kg (194 lb 8 oz)   SpO2 97%   BMI 35.57 kg/m    Last INR: 3.6 on 9/15  INR Today:  2.7  Current Dose:  Held yesterday  INR Flow sheet at [facility]:    ASSESSMENT:  (Z51.81) Encounter for therapeutic drug monitoring  (primary encounter diagnosis)  (Z79.01) Long term (current) use of anticoagulants  (I48.20) Chronic atrial fibrillation (H)    Therapeutic INR for goal of 2-3    PLAN/ORDERS:     New Dose: Coumadin 5 mg M,W,F and 4 mg rest of days    Next INR: 1 week      Electronically signed by:  IRASEMA Delarosa CNP           Sincerely,        IRASEMA Delarosa CNP    "

## 2021-09-19 NOTE — PROGRESS NOTES
Liberty Horta is a 86 year old female seen September 13, 2021 at [facility] where she has resided for one month (admit 8/2021) transferred to LTC from [place] for ongoing cognitive and functional decline.   She is seen on the unit up to WC, some distress related to right knee pain, difficult to get much history    By chart review, patient last hospitalized in June 2019 with a nondisplaced L5 lateral vertebral body fracture and severe canal stenosis at L3-4.  She has significant OA/DJD with h/o left TKA and right hip ORIF.    No longer ambulatory.   She has had Parkinsonism noted by prior physician, but pt and family have declined any further workup or medication.  She also has longstanding atrial fibrillation and has a pacemaker, anticoagulated with warfarin    She has had device checks, and followed by Cardiology clinic, not sure when she was last seen there.      Past Medical History:   Diagnosis Date     Basal cell carcinoma      Chronic atrial fibrillation (H)      DVT (deep vein thrombosis) in pregnancy      Esophageal reflux      Hypertension      Hypothyroid      Osteoarthritis      Pacemaker      Renal insufficiency    Parkinsonism    Past Surgical History:   Procedure Laterality Date     IMPLANT PACEMAKER       OPEN REDUCTION INTERNAL FIXATION FEMUR DISTAL Right 10/6/2016    Procedure: OPEN REDUCTION INTERNAL FIXATION FEMUR DISTAL;  Surgeon: Filipe Coburn MD;  Location:  OR     ORTHOPEDIC SURGERY      Knee replacement left in 2011     SH:   for 30 years.   She has 7 children.  Son Romaine is POA  Pt resided at [facility] 2/2020-8/2021    Non smoker    ROS: ambulatory with 4WW, or uses WC at times  Wt Readings from Last 5 Encounters:   09/16/21 88.2 kg (194 lb 8 oz)   09/15/21 88.4 kg (194 lb 12.8 oz)   09/13/21 87.6 kg (193 lb 1.6 oz)   09/07/21 85.9 kg (189 lb 6.4 oz)   09/02/21 86.9 kg (191 lb 9.6 oz)      EXAM: Up to WC, mild distress  /66   Pulse 64   Temp 97.8  F  "(36.6  C)   Resp 18   Ht 1.575 m (5' 2\")   Wt 87.6 kg (193 lb 1.6 oz)   SpO2 96%   BMI 35.32 kg/m     +dysphonia  Scabbed over lesion left chest wall, \"where they put my pacemaker in.\"   No erythema or drainage     Neck supple without adenopathy   Lungs clear bilaterally with fair air movement   Heart irreg irreg at 70, 1/6 NELLY  Abd soft, NT, no distention or guarding, +BS  Ext 1+ ankle edema wearing tubi-, right knee with gross changes of OA, +sponginess, decreased ROM.   +onychymycosis  Neuro: confused history, increased tone diffusely, left hand resting tremor    Psych: a little anxious    Last Comprehensive Metabolic Panel:  Sodium   Date Value Ref Range Status   09/02/2021 139 133 - 144 mmol/L Final   06/08/2021 141 133 - 144 mmol/L Final     Potassium   Date Value Ref Range Status   09/02/2021 4.0 3.4 - 5.3 mmol/L Final   06/08/2021 3.4 3.4 - 5.3 mmol/L Final     Chloride   Date Value Ref Range Status   09/02/2021 104 94 - 109 mmol/L Final   06/08/2021 105 94 - 109 mmol/L Final     Carbon Dioxide   Date Value Ref Range Status   06/08/2021 30 20 - 32 mmol/L Final     Carbon Dioxide (CO2)   Date Value Ref Range Status   09/02/2021 27 20 - 32 mmol/L Final     Anion Gap   Date Value Ref Range Status   09/02/2021 8 3 - 14 mmol/L Final   06/08/2021 6 3 - 14 mmol/L Final     Glucose   Date Value Ref Range Status   09/02/2021 115 (H) 70 - 99 mg/dL Final   06/08/2021 142 (H) 70 - 99 mg/dL Final     Urea Nitrogen   Date Value Ref Range Status   09/02/2021 23 7 - 30 mg/dL Final   06/08/2021 13 7 - 30 mg/dL Final     Creatinine   Date Value Ref Range Status   09/02/2021 0.84 0.52 - 1.04 mg/dL Final   06/08/2021 0.91 0.52 - 1.04 mg/dL Final     GFR Estimate   Date Value Ref Range Status   09/02/2021 64 >60 mL/min/1.73m2 Final     Comment:     As of July 11, 2021, eGFR is calculated by the CKD-EPI creatinine equation, without race adjustment. eGFR can be influenced by muscle mass, exercise, and diet. The reported " eGFR is an estimation only and is only applicable if the renal function is stable.   06/08/2021 58 (L) >60 mL/min/[1.73_m2] Final     Comment:     Non  GFR Calc  Starting 12/18/2018, serum creatinine based estimated GFR (eGFR) will be   calculated using the Chronic Kidney Disease Epidemiology Collaboration   (CKD-EPI) equation.       Calcium   Date Value Ref Range Status   09/02/2021 9.8 8.5 - 10.1 mg/dL Final   06/08/2021 9.2 8.5 - 10.1 mg/dL Final     Lab Results   Component Value Date    AST 19 04/23/2021      ALBUMIN 3.3 04/23/2021      ALKPHOS 59 04/23/2021     Lab Results   Component Value Date    WBC 5.8 04/23/2021      HGB 13.0 04/23/2021       04/23/2021       04/23/2021     ECHO 2016   Interpretation Summary  Left ventricular systolic function is low normal.  The visual ejection fraction is estimated at 50-55%.  The left ventricle is normal in size.  There is mild concentric left ventricular hypertrophy.  No regional wall motion abnormalities noted.  The right ventricle is normal in structure, function and size.  Doppler interrogation does not demonstrate significant stenosis or insufficiency involving cardiac valves.      IMP/PLAN:   (M17.11) Primary osteoarthritis of right knee    (M89.49) Primary osteoarthritis involving multiple joints  Comment: persistent right knee pain   Now EZ stand lift for transfers and WC bound       Plan: diclofenac gel / Aspercreme lidocaine patch  Continue scheduled acetaminophen bid with prn available.  Given level of distress today, may need to look at stronger pain medication for patient      (I48.11) Longstanding persistent atrial fibrillation (H)  Comment: has had a pacemaker for several years, monitor site ?pt scratching there.    HR 60s  Plan: sotalol 80 mg bid with meals and diltiazem 180 mg/day for VR control.   Warfarin per INR for stroke prophylaxis.       (I12.9,  N18.3) Benign hypertension with CKD (chronic kidney disease) stage III  (H)  Comment:  CrCl 50  Plan: diltiazem 180 mg/day, follow bps and BMP      (G20) Parkinsonism, unspecified Parkinsonism type (H)  Comment: clear symptoms with dysphonia, left hand resting tremor, rigidity and decline in mobility   Plan: she has been seen by [facility] PHYSICAL THERAPY / OCCUPATIONAL THERAPY with [place] tx in the past, has ongoing physical decline, now EZ stand transfers and WC bound.     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

## 2021-09-22 NOTE — LETTER
"    9/22/2021        RE: Liberty Horta  [facility]  [address] [address] [address]        [facility] SERVICES  [place] Medical Record Number:  [id number]  Place of Service where encounter took place: [facility]  () [[id number]]    HPI:    Liberty Horta is a 86 year old  (1935), who is being seen today for an episodic care visit at the above location. Today's concern is INR/Coumadin management for A. Fib    ROS/Subjective:  Bleeding Signs/Symptoms:  None  Thromboembolic Signs/Symptoms:  None  Medication Changes:  No  Dietary Changes:  No  Activity Changes: No  Bacterial/Viral Infection:  No  Missed Coumadin Doses:  Held 9/15  On ASA: No  Other Concerns:  No    OBJECTIVE:  BP (!) 142/82   Pulse 67   Temp 97.6  F (36.4  C)   Resp 18   Ht 1.575 m (5' 2\")   Wt 89 kg (196 lb 3.2 oz)   SpO2 96%   BMI 35.89 kg/m    Last INR: 2.7 on 9/16  INR Today:  2.3  Current Dose:  Coumadin 5 mg M, W, F and 4 mg rest of days  INR Flow sheet at [facility]:    ASSESSMENT:  (Z51.81) Encounter for therapeutic drug monitoring  (primary encounter diagnosis)  (Z79.01) Long term (current) use of anticoagulants  (I48.20) Chronic atrial fibrillation (H)    Therapeutic INR for goal of 2-3    PLAN/ORDERS:   New Dose: No Change    Next INR: 1 week      Electronically signed by:  IRASEMA Delarosa CNP          Sincerely,        IRASEMA Delarosa CNP    "

## 2021-09-22 NOTE — PATIENT INSTRUCTIONS
Orders  Liberty Horta  1935  1) Clarification. INR to be checked next on 9/29  IRASEMA Delarosa CNP on 9/22/2021 at 9:52 AM

## 2021-09-22 NOTE — PROGRESS NOTES
"[facility] GERIATRIC SERVICES  [place] Medical Record Number:  [id number]  Place of Service where encounter took place: [facility]  () [[id number]]    HPI:    Liberty Horta is a 86 year old  (1935), who is being seen today for an episodic care visit at the above location. Today's concern is INR/Coumadin management for A. Fib    ROS/Subjective:  Bleeding Signs/Symptoms:  None  Thromboembolic Signs/Symptoms:  None  Medication Changes:  No  Dietary Changes:  No  Activity Changes: No  Bacterial/Viral Infection:  No  Missed Coumadin Doses:  Held 9/15  On ASA: No  Other Concerns:  No    OBJECTIVE:  BP (!) 142/82   Pulse 67   Temp 97.6  F (36.4  C)   Resp 18   Ht 1.575 m (5' 2\")   Wt 89 kg (196 lb 3.2 oz)   SpO2 96%   BMI 35.89 kg/m    Last INR: 2.7 on 9/16  INR Today:  2.3  Current Dose:  Coumadin 5 mg M, W, F and 4 mg rest of days  INR Flow sheet at [facility]:    ASSESSMENT:  (Z51.81) Encounter for therapeutic drug monitoring  (primary encounter diagnosis)  (Z79.01) Long term (current) use of anticoagulants  (I48.20) Chronic atrial fibrillation (H)    Therapeutic INR for goal of 2-3    PLAN/ORDERS:   New Dose: No Change    Next INR: 1 week      Electronically signed by:  IRASEMA Delarosa CNP    "

## 2021-09-23 NOTE — LETTER
"    9/23/2021        RE: Liberty Horta  [facility]  [address] [address] [address]        [facility] GERIATRIC SERVICES    Chief Complaint   Patient presents with     Nursing Home Acute     HPI:  Liberty Horta is a 86 year old  (1935), who is being seen today for an episodic care visit at: [facility]  () [[id number]].     Liberty Horta is 86 year old female with PMH significant for atrial fib on coumadin, s/p pace maker, hypothyroidism, HTN, OA, spinal stenosis, GERD, anemia. HLD, urinary retention, parkinson's, CHF, CKD3.    Liberty was seen today for episodic follow up initially for pain management. Nursing staff reported Liberty being tearful and reporting pain \"Everywhere.\" Nurse tells me majority of her pain is in her right knee and bilateral shoulders, but has increased pain with movement. Her tylenol dose was increased to TID. I spoke to son, Michele today and he also reported noticing the increased pain, which was improved today. He tells me she has not tolerated narcotics well in the past and they have resulted in hallucinations.     Michele also reported that Liberty has been having hallucinations and bad dreams that are distressing to her. Additionally reports chronic anxiety symptoms (which she has always refused antidepressants for in the past.) They have noticed heightened anxiety symptoms recently. Michele reports him and his siblings goals for Liberty are for comfort. They are open to discussion about medication options to help control symptoms as above.    I did go to reevaluate Liberty after speaking to Michele today to assess the hallucinations, anxiety, and ask about bad dreams. She denies fevers/ chills. Denies feeling like she has UTI- increased frequency, dysuria- however she did also think she had a zavaleta catheter in place. She tells me she does not feel sick. Denies cough, SOB. Reports her edema is ok and not very bad. Reports bad dreams occur with " "sleeping for naps as well.    Allergies, and PMH/PSH reviewed in EPIC today.  REVIEW OF SYSTEMS:  4 point ROS including Respiratory, CV, GI and , other than that noted in the HPI,  is negative    Objective:   BP (!) 142/82   Pulse 61   Temp 97.2  F (36.2  C)   Resp 18   Ht 1.575 m (5' 2\")   Wt 88.5 kg (195 lb 3.2 oz)   SpO2 98%   BMI 35.70 kg/m    GENERAL APPEARANCE:  Alert, in NAD  HEENT: normocephalic, moist mucous membranes, nose without drainage or crusting  RESP:  respiratory effort normal, no respiratory distress, Lung sounds with some crackles present bilaterally, patient is on RA  CV: auscultation of heart done, rate and rhythm regular.   ABDOMEN: + bowel sounds, soft, nontender, no grimacing or guarding with palpation.  M/S: generalized lower extremity edema, tubi  in place  NEURO: cranial nerves 2-12 grossly intact and at patient's baseline; able to move extremities freely  PSYCH: insight and judgement impaired, memory impaired, affect and mood ok      Labs done in SNF are in [facility]. Please refer to them using [facility]/Care Everywhere. and Recent labs in [facility] reviewed by me today.     Assessment/Plan:  (R52) Pain  (primary encounter diagnosis)  (M89.49) Primary osteoarthritis involving multiple joints  Comment: chronic, pain is better today  -mostly to shoulders bilaterally and right knee, but is allover at times  -does not tolerate narcotics- with history of hallucinations in the past  Plan: Continue tylenol TID, topical medications like menthol, Aspercreme. Discussed with Michele another option to try is gabapentin for pain down the road if symptoms come back.  Could also try changing to voltaren or lidocaine patch if symptoms return.      (R44.3) Hallucinations  (F51.5) Nightmares  Comment: acute, newer symptoms that are distressing to patient  -staff was not aware Liberty was having these symptoms, when I spoke to her primary nurse. Liberty has family visiting on very regular basis who is " very involved  -with concern this is related to infection (though patient has been afebrile and without s/sx of infection per report above) vs possible lewy body dementia- as patient has parkinson disease  -is not on offending medications  Plan: CBC, CMP, TSH, vitamin B12 to rule out infection, possible contributing causes.     (F41.9) Anxiety  Comment: acute on chronic, with increased symptoms lately- is tearful at times- suspect recent move is contributing to symptoms  Plan: Discussed with son that could consider starting antidepressant. Discussed options like sertraline, celexa. He tells me she has not taken anything in the past for these symptoms. Will await labs and he will discuss with his siblings.       Total time spent with patient visit at the skilled nursing facility was 37 minutes including patient visit, review of past records and phone call to patient contact. Greater than 50% of total time spent with counseling and coordinating care due to pain, symptoms of hallucinations/ vivid dreams/ anxiety- as well as multiple visits with patient to assess, multiple conversations with nursing staff, phone conversation with son Michele.       Electronically signed by: IRASEMA Delarosa CNP             Sincerely,        IRASEMA Delarosa CNP

## 2021-09-25 NOTE — CONFIDENTIAL NOTE
Spoke to sonMichele today to review lab results from yesterday.  -TSH low, so going to reduce levothyroxine to 150 mcg daily. Will plan to recheck TSH November 22. Discussed this is likely not contributing to hallucinations/bad dreams, though could be contributing to anxiety symptoms she is having  -Other lab work up unrevealing and within normal limits   - protein was slightly low- but essentially the same as earlier this year when checked   -vitamin B12 was ok  -Suspect Liberty may have early stages of lewy body dementia that is causing the hallucinations, bad dream and increased anxiety symptoms. Her cognition does wax and wane. She also has diagnosis of parkinson disease. I educated son and we discussed this. Explained that could not make a formal diagnosis, as she would need to be seen by neurologist, but suspect this is going on. Spoke to extensively to geriatric pharmacist today and she recommended donepezil trial 5 mg daily to see if that helps her symptoms. We reviewed medications to ensure no interactions. I discussed possible side effects with son Michele for this medication- like GI side effects, possibility for trouble sleeping or risk that it could make dreams worse. Also will need to monitor for bradycardia. And discussed risk for prolonged QT. Reviewed recent EKG (from April 2021) and QTc was within normal limits at that time. Answered Michele's questions today. We also discussed options for treating anxiety with selective serotonin reuptake inhibitor like celexa or citalopram. He is going to talk to his siblings about what we discussed and let me know what they would like to do.   Nissa George, IRASEMA CNP on 9/25/2021 at 5:25 AM

## 2021-09-27 NOTE — CONFIDENTIAL NOTE
Received voicemail from son Michele. Family would like Liberty to be started on donepezil. Orders given to facility.

## 2021-09-28 NOTE — TELEPHONE ENCOUNTER
"[facility] Nurse Triage Telephone Note    Provider: IRASEMA Bender CNP  Facility: [facility]   Facility Type:  [facility]    Caller: Dewey  Call Back Number: [phone]    Allergies   Allergen Reactions     Meperidine Visual Disturbance     No Clinical Screening - See Comments Other (See Comments)     Patient says she is sensitive to narcotics: \" a little goes a long way\"       Propoxyphene N-Apap Visual Disturbance     Tramadol Other (See Comments)     Patient described feeling \"squirrely\"       Reason for call: Nurse reports that after breakfast this morning pt's tooth crown fell off, denies pain, no swelling or redness - pt son notified and plans to come to facility today. Pt did c/o headache this morning prior to scheduled Tylenol 1000mg was administered, and now nurse noted swelling underneath the left ear in mastoid area. Pt does not have any c/o pain at site, no swelling noted in lymph nodes. No redness, bruising or recent falls. Pt is afebrile. ROM and neuro status intact WNL.   Vitals: BP:  117/71  P:: 60  R:: 18  SPO2: 96% R/A Temp.:  97.4      Verbal Order/Direction given by Provider: Provider to visit patient and assess.     Provider giving Order:  IRASEMA Bender CNP    Verbal Order given to: Dewey Balbuena RN  "

## 2021-09-29 NOTE — PATIENT INSTRUCTIONS
Orders  Liberty Horta  1935  1) Lemon drops- please administer 1 lemon drop 5 times a day and PRN to increase saliva. Son Michele will bring in Diagnosis: Sialadenitis  2) Apply warm compress to area BID. Diagnosis: Sialadenitis  3) Start peridex 0.12% mouthwash. Swish and spit 15 ml BID. Diagnosis: Sialadenitis  4) Update provider if patient develops temps, signs that Sialadenitis is causing infection  IRASEMA Delarosa CNP on 9/29/2021 at 11:45 AM

## 2021-09-29 NOTE — LETTER
"    9/29/2021        RE: Liberty Horta  [facility]  [address] [address] [address]         HEALTH GERIATRIC SERVICES    Chief Complaint   Patient presents with     Nursing Home Acute     HPI:  Liberty Horta is a 86 year old  (1935), who is being seen today for an episodic care visit at: [facility]  () [[id number]].     Liberty Horta is 86 year old female with PMH significant for atrial fib on coumadin, s/p pace maker, hypothyroidism, HTN, OA, spinal stenosis, GERD, anemia. HLD, urinary retention, parkinson's, CHF, CKD3.    Today's concern is: Seen for episodic follow up after she developed swelling under left ear yesterday. Liberty denies pain today. Denies any trouble hearing. She is afebrile. She did have a crown fall off one of her teeth yesterday as well. INR today 2.9. She has no bleeding or other concerns.     Allergies, and PMH/PSH reviewed in EPIC today.  REVIEW OF SYSTEMS:  4 point ROS including Respiratory, CV, GI and , other than that noted in the HPI,  is negative    Objective:   /80   Pulse 60   Temp 97.8  F (36.6  C)   Resp 18   Ht 1.575 m (5' 2\")   Wt 85.1 kg (187 lb 9.6 oz)   SpO2 95%   BMI 34.31 kg/m    GENERAL APPEARANCE:  Alert, in NAD  HEENT: normocephalic, moist mucous membranes, nose without drainage or crusting, is able to hear me today ok, large firm lump below left ear near jawline-no tenderness with palpation  RESP:  respiratory effort normal, no respiratory distress, patient is on RA  SKIN:  Inspection and palpation of skin and subcutaneous tissue: skin warm, dry without rashes;  no erythema or warmth to firm nodule near jawline  NEURO: able to move extremities freely  PSYCH: affect and mood ok      Labs done in SNF are in [place] EPIC. Please refer to them using EPIC/Care Everywhere. and Recent labs in EPIC reviewed by me today.     Assessment/Plan:  (K11.20) Sialadenitis  (primary encounter diagnosis)  Comment: " acute, no current signs of infection  -though with poor dentition so is at risk for infection  Plan: Lemon drops- please administer 1 lemon drop 5 times a day and PRN to increase saliva. Apply warm compress to area BID. Start peridex mouthwash BID. Monitor for s/s of infection- if develops consider course of antibiotics    (Z51.81) Encounter for therapeutic drug monitoring  (Z79.01) Long term (current) use of anticoagulants  (I48.20) Chronic atrial fibrillation (H)  Comment: INR 2.9 therapeutic for goal INR 2-3  Last INR 2.3 on 9/22. Has been receiving Coumadin 5 mg M, W, F and 4 mg rest of days  Plan: Continue Coumadin 5 mg M, W, F and 4 mg rest of days. INR 10/6.    Updated son, Michele today who is going to bring in some lemon drops- answered all questions today    Electronically signed by: IRASEMA Delarosa CNP             Sincerely,        IRASEMA Delarosa CNP

## 2021-09-29 NOTE — PROGRESS NOTES
"[facility] GERIATRIC SERVICES    Chief Complaint   Patient presents with     Nursing Home Acute     HPI:  Liberty Horta is a 86 year old  (1935), who is being seen today for an episodic care visit at: [facility]  () [[id number]].     Liberty Horta is 86 year old female with PMH significant for atrial fib on coumadin, s/p pace maker, hypothyroidism, HTN, OA, spinal stenosis, GERD, anemia. HLD, urinary retention, parkinson's, CHF, CKD3.    Today's concern is: Seen for episodic follow up after she developed swelling under left ear yesterday. Liberty denies pain today. Denies any trouble hearing. She is afebrile. She did have a crown fall off one of her teeth yesterday as well. INR today 2.9. She has no bleeding or other concerns.     Allergies, and PMH/PSH reviewed in EPIC today.  REVIEW OF SYSTEMS:  4 point ROS including Respiratory, CV, GI and , other than that noted in the HPI,  is negative    Objective:   /80   Pulse 60   Temp 97.8  F (36.6  C)   Resp 18   Ht 1.575 m (5' 2\")   Wt 85.1 kg (187 lb 9.6 oz)   SpO2 95%   BMI 34.31 kg/m    GENERAL APPEARANCE:  Alert, in NAD  HEENT: normocephalic, moist mucous membranes, nose without drainage or crusting, is able to hear me today ok, large firm lump below left ear near jawline-no tenderness with palpation  RESP:  respiratory effort normal, no respiratory distress, patient is on RA  SKIN:  Inspection and palpation of skin and subcutaneous tissue: skin warm, dry without rashes;  no erythema or warmth to firm nodule near jawline  NEURO: able to move extremities freely  PSYCH: affect and mood ok      Labs done in SNF are in [facility] EPIC. Please refer to them using EPIC/Care Everywhere. and Recent labs in EPIC reviewed by me today.     Assessment/Plan:  (K11.20) Sialadenitis  (primary encounter diagnosis)  Comment: acute, no current signs of infection  -though with poor dentition so is at risk for infection  Plan: Lemon drops- please " administer 1 lemon drop 5 times a day and PRN to increase saliva. Apply warm compress to area BID. Start peridex mouthwash BID. Monitor for s/s of infection- if develops consider course of antibiotics    (Z51.81) Encounter for therapeutic drug monitoring  (Z79.01) Long term (current) use of anticoagulants  (I48.20) Chronic atrial fibrillation (H)  Comment: INR 2.9 therapeutic for goal INR 2-3  Last INR 2.3 on 9/22. Has been receiving Coumadin 5 mg M, W, F and 4 mg rest of days  Plan: Continue Coumadin 5 mg M, W, F and 4 mg rest of days. INR 10/6.    Updated son, Michele today who is going to bring in some lemon drops- answered all questions today    Electronically signed by: IRASEMA Delarosa CNP

## 2021-10-04 NOTE — LETTER
10/4/2021        RE: Liberty Horta  [facility]  [address] [facility] [address]         HEALTH GERIATRIC SERVICES  Chief Complaint   Patient presents with     [facility] Medical Record Number:  [id number]  Place of Service where encounter took place:  [facility]  () [[id number]]    HPI:    Liberty Horta  is 86 year old (1935), who is being seen today for a federally mandated E/M visit.     Liberty Horta is 86 year old female with PMH significant for atrial fib on coumadin, s/p pace maker, hypothyroidism, HTN, OA, spinal stenosis, GERD, anemia. HLD, urinary retention, parkinson's, CHF, CKD3.    Today's concerns are:  1. Sialadenitis    2. Chronic diastolic congestive heart failure (H)    3. Benign hypertension with CKD (chronic kidney disease) stage III (H)    4. Parkinson's disease (H)    5. Hallucinations    6. Cognitive impairment    7. Anxiety    8. Nightmares    9. Hypothyroidism, unspecified type      Liberty lying in bed at time of visit. Denies SOB, CP. Reports chronic edema to legs that is at baseline. Denies dysuria, frequency, troubles urinating. Bowels moving ok. Is anxious during visit today. 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 Nursing staff have not noted improvement in anxious symptoms since starting this medication. Family recently reported patient having hallucinations and bad dreams- nursing staff tell me they have not been aware of this. Developed sialadenitis last week. Liberty has been using lemon drops for treatment. She denies any pain to area, fevers/ chills or symptoms of overall not feeling well   Wt Readings from Last 4 Encounters:   10/07/21 85.9 kg (189 lb 6.4 oz)   10/06/21 85.5 kg (188 lb 9.6 oz)   10/04/21 85.9 kg (189 lb 6.4 oz)   09/29/21 85.1 kg (187 lb 9.6  oz)       ALLERGIES:Meperidine, No clinical screening - see comments, Propoxyphene n-apap, and Tramadol  PAST MEDICAL HISTORY:   Past Medical History:   Diagnosis Date     Basal cell carcinoma      Chronic atrial fibrillation (H)      DVT (deep vein thrombosis) in pregnancy      Esophageal reflux      Hypertension      Hypothyroid      Osteoarthritis      Pacemaker      Renal insufficiency      PAST SURGICAL HISTORY:   has a past surgical history that includes orthopedic surgery; Open reduction internal fixation femur distal (Right, 10/6/2016); and Implant pacemaker.  FAMILY HISTORY: Family history is unknown by patient.  SOCIAL HISTORY:  reports that she has never smoked. She has never used smokeless tobacco. She reports that she does not drink alcohol and does not use drugs.    MEDICATIONS:     Review of your medicines          Accurate as of October 4, 2021 11:59 PM. If you have any questions, ask your nurse or doctor.            CONTINUE these medicines which have NOT CHANGED      Dose / Directions   acetaminophen 500 MG tablet  Commonly known as: Acetaminophen Extra Strength  Used for: Primary osteoarthritis involving multiple joints      Dose: 1,000 mg  Take 2 tablets (1,000 mg) by mouth 3 times daily  Quantity: 112 tablet  Refills: PRN     Aspercreme Lidocaine 4 % Patch  Used for: Spinal stenosis, unspecified spinal region, Primary osteoarthritis involving multiple joints  Generic drug: Lidocaine      APPLY 1 PATCH TOPICALLY TO NECK AND APPLY 1 PATCH TOPICALLY TO RIGHT KNEE ONCE DAILY (ON FOR 12 HOURS, OFF FOR 12 HOURS)  Quantity: 30 patch  Refills: 97     atorvastatin 40 MG tablet  Commonly known as: LIPITOR  Used for: Hyperlipidemia, unspecified hyperlipidemia type      TAKE 1 TABLET BY MOUTH AT BEDTIME  Quantity: 28 tablet  Refills: PRN     Calmoseptine 0.44-20.6 % Oint ointment  Used for: Pressure injury of contiguous region involving buttock and hip, stage 1, unspecified laterality  Generic drug:  menthol-zinc oxide      APPLY A THIN LAYER TO AFFECTED AREA(S) TWICE DAILY;& WITH EACH INCONTINENCE CARE  Quantity: 113 g  Refills: 97     chlorhexidine 0.12 % solution  Commonly known as: PERIDEX  Used for: Sialadenitis      Dose: 15 mL  Swish and spit 15 mLs in mouth 2 times daily  Refills: 0     Dilt- MG 24 hr capsule  Used for: Longstanding persistent atrial fibrillation (H)  Generic drug: diltiazem ER      TAKE 1 CAPSULE BY MOUTH ONCE DAILY  Quantity: 28 capsule  Refills: PRN     donepezil 5 MG tablet  Commonly known as: ARICEPT  Used for: Cognitive impairment      Dose: 5 mg  Take 1 tablet (5 mg) by mouth daily  Refills: 0     furosemide 40 MG tablet  Commonly known as: LASIX  Used for: Acute on chronic congestive heart failure, unspecified heart failure type (H)      Dose: 60 mg  Take 1.5 tablets (60 mg) by mouth daily  Quantity: 60 tablet  Refills: 3     levothyroxine 150 MCG tablet  Commonly known as: SYNTHROID/LEVOTHROID  Used for: Hypothyroidism, unspecified type      Dose: 150 mcg  Take 1 tablet (150 mcg) by mouth daily  Refills: 0     loperamide 2 MG tablet  Commonly known as: IMODIUM A-D  Used for: Diarrhea, unspecified type      Dose: 2 mg  Take 1 tablet (2 mg) by mouth 4 times daily as needed for diarrhea  Quantity:    Refills: 0     Menthol (Topical Analgesic) 4 % Gel  Used for: Primary osteoarthritis involving multiple joints      Externally apply 1 Application topically 2 times daily. May also externally apply 1 Application 2 times daily as needed.  Quantity: 89 mL  Refills: 4     Metamucil 0.52 g capsule  Used for: Loose stools  Generic drug: psyllium      TAKE 1 CAPSULE BY MOUTH TWICE DAILY  Quantity: 56 capsule  Refills: PRN     potassium chloride ER 20 MEQ CR tablet  Commonly known as: K-TAB  Used for: Acute on chronic congestive heart failure, unspecified heart failure type (H)      Dose: 40 mEq  Take 2 tablets (40 mEq) by mouth daily  Refills: 0     pyrithione zinc 1 % external  "shampoo  Commonly known as: SELSUN BLUE/HEAD AND SHOULDERS      Dose: 1 applicator  Apply 1 mL topically daily as needed for irritation  Refills: 0     sotalol 80 MG tablet  Commonly known as: BETAPACE  Used for: Longstanding persistent atrial fibrillation (H)      TAKE 1 TABLET BY MOUTH TWICE DAILY WITH MEALS  Quantity: 56 tablet  Refills: PRN     Tab-A-Edgar Tabs  Used for: Hyperlipidemia, unspecified hyperlipidemia type      TAKE 1 TABLET BY MOUTH ONCE DAILY  Quantity: 28 tablet  Refills: PRN     Warfarin Therapy Reminder  Used for: Chronic atrial fibrillation (H)      Dose: 1 each  1 each continuous prn (per INR)  Refills: 0          Case Management:  I have reviewed the care plan and MDS and do agree with the plan. Patient's desire to return to the community is present, but is not able due to care needs . Information reviewed:  Medications, vital signs, orders, and nursing notes.    ROS:  4 point ROS including Respiratory, CV, GI and , other than that noted in the HPI,  is negative    Vitals:  /74   Pulse 61   Temp 97.8  F (36.6  C)   Resp 18   Ht 1.575 m (5' 2\")   Wt 85.9 kg (189 lb 6.4 oz)   SpO2 96%   BMI 34.64 kg/m    Body mass index is 34.64 kg/m .  Exam:  GENERAL APPEARANCE:  Alert, in NAD  HEENT: normocephalic, nose without drainage or crusting; rubbery, soft lump below jawline with no tenderness on palpation  RESP:  respiratory effort normal, no respiratory distress, Lung sounds with some scattered crackles bilaterally, patient is on RA  CV: auscultation of heart done, rate and rhythm irregular.   ABDOMEN: + bowel sounds, soft, nontender, no grimacing or guarding with palpation.  M/S:  Generalized lower extremity edema  PSYCH:  affect and mood anxious       Lab/Diagnostic data:   Labs done in SNF are in [facility] EPIC. Please refer to them using EPIC/Care Everywhere. and Recent labs in EPIC reviewed by me today.     ASSESSMENT/PLAN  (K11.20) Sialadenitis  (primary encounter " diagnosis)  Comment: soft today- improving- no s/sx of infection  Plan: Continue current treatment with Lemon drops- please administer 1 lemon drop 5 times a day and PRN to increase saliva. Apply warm compress to area BID. Continue peridex mouthwash BID. Monitor for s/s of infection- if develops consider course of antibiotics    (I50.32) Chronic diastolic congestive heart failure (H)  (I12.9,  N18.30) Benign hypertension with CKD (chronic kidney disease) stage III (H)  Comment: appears euvolemic, BP controlled 128/72, 138/74, 137/75; HR 61, 62, 62  -creatinine stable at baseline  Creatinine   Date Value Ref Range Status   09/24/2021 0.79 0.52 - 1.04 mg/dL Final   06/08/2021 0.91 0.52 - 1.04 mg/dL Final     Plan: Continue lasix 60 mg daily, potassium 40 meq daily, diltiazem 180 mg daily, sotalol 80 mg BID, atorvastatin 40 mg daily. Daily weights. Notify provider with weight gain >2 lbs in a day, >5 lbs in on week. Tubi .TONG diet.   VS per policy. Adjust medications as needed. BMP PRN.     (G20) Parkinson's disease (H)  Comment: patient and family had declined further workup or medication in the past for this  -currently with EZ stand transfers and WHEELCHAIR bound  Plan: Monitor symptoms and consider trial of sinemet if ability of hands is impaired.     (R44.3) Hallucinations  (R41.89) Cognitive impairment  (F41.9) Anxiety  (F51.5) Nightmares  Comment: ongoing, with anxious symptoms noted today during visit  -hallucinations, nightmares noted by family since move here  -cognitive function overall waxes and wanes since I've met Liberty  -symptoms in setting of Parkinson disease are consistent with early lewy body dementia-has not had formal diagnosis by neurologist  Plan: Continue donepezil 5 mg daily. May need to consider another agent for anxious symptoms.  LTC to assist with cares, meals, medication assistance, activities. Discussed neurology referral and at this time Michele does not want his mom to go out for  this.     (E03.9) Hypothyroidism, unspecified type  Comment: chronic  TSH   Date Value Ref Range Status   09/24/2021 1.03 0.40 - 4.00 mU/L Final   04/20/2021 1.68 0.40 - 4.00 mU/L Final   -levothyroxine dose reduced on 9/25  Plan: Continue levothyroxine 150 mcg daily. TSH 11/22.     Plan above discussed with son Michele today    Electronically signed by:  IRASEMA Delarosa CNP              Sincerely,        IRASEMA Delarosa CNP

## 2021-10-04 NOTE — PROGRESS NOTES
[facility] GERIATRIC SERVICES  Chief Complaint   Patient presents with     snf Regulatory     [place] Medical Record Number:  [id number]  Place of Service where encounter took place:  [facility]  () [[id number]]    HPI:    Liberty Horta  is 86 year old (1935), who is being seen today for a federally mandated E/M visit.     Liberty Horta is 86 year old female with PMH significant for atrial fib on coumadin, s/p pace maker, hypothyroidism, HTN, OA, spinal stenosis, GERD, anemia. HLD, urinary retention, parkinson's, CHF, CKD3.    Today's concerns are:  1. Sialadenitis    2. Chronic diastolic congestive heart failure (H)    3. Benign hypertension with CKD (chronic kidney disease) stage III (H)    4. Parkinson's disease (H)    5. Hallucinations    6. Cognitive impairment    7. Anxiety    8. Nightmares    9. Hypothyroidism, unspecified type      Liberty lying in bed at time of visit. Denies SOB, CP. Reports chronic edema to legs that is at baseline. Denies dysuria, frequency, troubles urinating. Bowels moving ok. Is anxious during visit today. 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 Nursing staff have not noted improvement in anxious symptoms since starting this medication. Family recently reported patient having hallucinations and bad dreams- nursing staff tell me they have not been aware of this. Developed sialadenitis last week. Liberty has been using lemon drops for treatment. She denies any pain to area, fevers/ chills or symptoms of overall not feeling well   Wt Readings from Last 4 Encounters:   10/07/21 85.9 kg (189 lb 6.4 oz)   10/06/21 85.5 kg (188 lb 9.6 oz)   10/04/21 85.9 kg (189 lb 6.4 oz)   09/29/21 85.1 kg (187 lb 9.6 oz)       ALLERGIES:Meperidine, No clinical screening - see comments, Propoxyphene n-apap, and Tramadol  PAST MEDICAL  HISTORY:   Past Medical History:   Diagnosis Date     Basal cell carcinoma      Chronic atrial fibrillation (H)      DVT (deep vein thrombosis) in pregnancy      Esophageal reflux      Hypertension      Hypothyroid      Osteoarthritis      Pacemaker      Renal insufficiency      PAST SURGICAL HISTORY:   has a past surgical history that includes orthopedic surgery; Open reduction internal fixation femur distal (Right, 10/6/2016); and Implant pacemaker.  FAMILY HISTORY: Family history is unknown by patient.  SOCIAL HISTORY:  reports that she has never smoked. She has never used smokeless tobacco. She reports that she does not drink alcohol and does not use drugs.    MEDICATIONS:     Review of your medicines          Accurate as of October 4, 2021 11:59 PM. If you have any questions, ask your nurse or doctor.            CONTINUE these medicines which have NOT CHANGED      Dose / Directions   acetaminophen 500 MG tablet  Commonly known as: Acetaminophen Extra Strength  Used for: Primary osteoarthritis involving multiple joints      Dose: 1,000 mg  Take 2 tablets (1,000 mg) by mouth 3 times daily  Quantity: 112 tablet  Refills: PRN     Aspercreme Lidocaine 4 % Patch  Used for: Spinal stenosis, unspecified spinal region, Primary osteoarthritis involving multiple joints  Generic drug: Lidocaine      APPLY 1 PATCH TOPICALLY TO NECK AND APPLY 1 PATCH TOPICALLY TO RIGHT KNEE ONCE DAILY (ON FOR 12 HOURS, OFF FOR 12 HOURS)  Quantity: 30 patch  Refills: 97     atorvastatin 40 MG tablet  Commonly known as: LIPITOR  Used for: Hyperlipidemia, unspecified hyperlipidemia type      TAKE 1 TABLET BY MOUTH AT BEDTIME  Quantity: 28 tablet  Refills: PRN     Calmoseptine 0.44-20.6 % Oint ointment  Used for: Pressure injury of contiguous region involving buttock and hip, stage 1, unspecified laterality  Generic drug: menthol-zinc oxide      APPLY A THIN LAYER TO AFFECTED AREA(S) TWICE DAILY;& WITH EACH INCONTINENCE CARE  Quantity: 113  g  Refills: 97     chlorhexidine 0.12 % solution  Commonly known as: PERIDEX  Used for: Sialadenitis      Dose: 15 mL  Swish and spit 15 mLs in mouth 2 times daily  Refills: 0     Dilt- MG 24 hr capsule  Used for: Longstanding persistent atrial fibrillation (H)  Generic drug: diltiazem ER      TAKE 1 CAPSULE BY MOUTH ONCE DAILY  Quantity: 28 capsule  Refills: PRN     donepezil 5 MG tablet  Commonly known as: ARICEPT  Used for: Cognitive impairment      Dose: 5 mg  Take 1 tablet (5 mg) by mouth daily  Refills: 0     furosemide 40 MG tablet  Commonly known as: LASIX  Used for: Acute on chronic congestive heart failure, unspecified heart failure type (H)      Dose: 60 mg  Take 1.5 tablets (60 mg) by mouth daily  Quantity: 60 tablet  Refills: 3     levothyroxine 150 MCG tablet  Commonly known as: SYNTHROID/LEVOTHROID  Used for: Hypothyroidism, unspecified type      Dose: 150 mcg  Take 1 tablet (150 mcg) by mouth daily  Refills: 0     loperamide 2 MG tablet  Commonly known as: IMODIUM A-D  Used for: Diarrhea, unspecified type      Dose: 2 mg  Take 1 tablet (2 mg) by mouth 4 times daily as needed for diarrhea  Quantity:    Refills: 0     Menthol (Topical Analgesic) 4 % Gel  Used for: Primary osteoarthritis involving multiple joints      Externally apply 1 Application topically 2 times daily. May also externally apply 1 Application 2 times daily as needed.  Quantity: 89 mL  Refills: 4     Metamucil 0.52 g capsule  Used for: Loose stools  Generic drug: psyllium      TAKE 1 CAPSULE BY MOUTH TWICE DAILY  Quantity: 56 capsule  Refills: PRN     potassium chloride ER 20 MEQ CR tablet  Commonly known as: K-TAB  Used for: Acute on chronic congestive heart failure, unspecified heart failure type (H)      Dose: 40 mEq  Take 2 tablets (40 mEq) by mouth daily  Refills: 0     pyrithione zinc 1 % external shampoo  Commonly known as: SELSUN BLUE/HEAD AND SHOULDERS      Dose: 1 applicator  Apply 1 mL topically daily as needed for  "irritation  Refills: 0     sotalol 80 MG tablet  Commonly known as: BETAPACE  Used for: Longstanding persistent atrial fibrillation (H)      TAKE 1 TABLET BY MOUTH TWICE DAILY WITH MEALS  Quantity: 56 tablet  Refills: PRN     Tab-A-Edgar Tabs  Used for: Hyperlipidemia, unspecified hyperlipidemia type      TAKE 1 TABLET BY MOUTH ONCE DAILY  Quantity: 28 tablet  Refills: PRN     Warfarin Therapy Reminder  Used for: Chronic atrial fibrillation (H)      Dose: 1 each  1 each continuous prn (per INR)  Refills: 0          Case Management:  I have reviewed the care plan and MDS and do agree with the plan. Patient's desire to return to the community is present, but is not able due to care needs . Information reviewed:  Medications, vital signs, orders, and nursing notes.    ROS:  4 point ROS including Respiratory, CV, GI and , other than that noted in the HPI,  is negative    Vitals:  /74   Pulse 61   Temp 97.8  F (36.6  C)   Resp 18   Ht 1.575 m (5' 2\")   Wt 85.9 kg (189 lb 6.4 oz)   SpO2 96%   BMI 34.64 kg/m    Body mass index is 34.64 kg/m .  Exam:  GENERAL APPEARANCE:  Alert, in NAD  HEENT: normocephalic, nose without drainage or crusting; rubbery, soft lump below jawline with no tenderness on palpation  RESP:  respiratory effort normal, no respiratory distress, Lung sounds with some scattered crackles bilaterally, patient is on RA  CV: auscultation of heart done, rate and rhythm irregular.   ABDOMEN: + bowel sounds, soft, nontender, no grimacing or guarding with palpation.  M/S:  Generalized lower extremity edema  PSYCH:  affect and mood anxious       Lab/Diagnostic data:   Labs done in SNF are in [employer] [facility]. Please refer to them using [employer]/Care Everywhere. and Recent labs in [facility] reviewed by me today.     ASSESSMENT/PLAN  (K11.20) Sialadenitis  (primary encounter diagnosis)  Comment: soft today- improving- no s/sx of infection  Plan: Continue current treatment with Lemon drops- please administer 1 " lemon drop 5 times a day and PRN to increase saliva. Apply warm compress to area BID. Continue peridex mouthwash BID. Monitor for s/s of infection- if develops consider course of antibiotics    (I50.32) Chronic diastolic congestive heart failure (H)  (I12.9,  N18.30) Benign hypertension with CKD (chronic kidney disease) stage III (H)  Comment: appears euvolemic, BP controlled 128/72, 138/74, 137/75; HR 61, 62, 62  -creatinine stable at baseline  Creatinine   Date Value Ref Range Status   09/24/2021 0.79 0.52 - 1.04 mg/dL Final   06/08/2021 0.91 0.52 - 1.04 mg/dL Final     Plan: Continue lasix 60 mg daily, potassium 40 meq daily, diltiazem 180 mg daily, sotalol 80 mg BID, atorvastatin 40 mg daily. Daily weights. Notify provider with weight gain >2 lbs in a day, >5 lbs in on week. Tubi .TONG diet.   VS per policy. Adjust medications as needed. BMP PRN.     (G20) Parkinson's disease (H)  Comment: patient and family had declined further workup or medication in the past for this  -currently with EZ stand transfers and WHEELCHAIR bound  Plan: Monitor symptoms and consider trial of sinemet if ability of hands is impaired.     (R44.3) Hallucinations  (R41.89) Cognitive impairment  (F41.9) Anxiety  (F51.5) Nightmares  Comment: ongoing, with anxious symptoms noted today during visit  -hallucinations, nightmares noted by family since move here  -cognitive function overall waxes and wanes since I've met Liberty  -symptoms in setting of Parkinson disease are consistent with early lewy body dementia-has not had formal diagnosis by neurologist  Plan: Continue donepezil 5 mg daily. May need to consider another agent for anxious symptoms.  LTC to assist with cares, meals, medication assistance, activities. Discussed neurology referral and at this time Michele does not want his mom to go out for this.     (E03.9) Hypothyroidism, unspecified type  Comment: chronic  TSH   Date Value Ref Range Status   09/24/2021 1.03 0.40 - 4.00 mU/L  Final   04/20/2021 1.68 0.40 - 4.00 mU/L Final   -levothyroxine dose reduced on 9/25  Plan: Continue levothyroxine 150 mcg daily. TSH 11/22.     Plan above discussed with son Michele today    Electronically signed by:  IRASEMA Delarosa CNP

## 2021-10-06 NOTE — LETTER
"    10/6/2021        RE: Liberty Horta  [facility]  [address] [facility] [address]        [facility] SERVICES  [place] Medical Record Number:  [id number]  Place of Service where encounter took place: [facility]  () [[id number]]    HPI:    Liberty Horta is a 86 year old  (1935), who is being seen today for an episodic care visit at the above location. Today's concern is INR/Coumadin management for A. Fib    ROS/Subjective:  Bleeding Signs/Symptoms:  None  Thromboembolic Signs/Symptoms:  None  Medication Changes:  No  Dietary Changes:  No  Activity Changes: No  Bacterial/Viral Infection:  No  Missed Coumadin Doses:  Held 9/15  On ASA: No  Other Concerns: INRs are variable of late since admission and have been continuing to reduce dose. No s/sx of infection per discussion with patient- no SOB, dysuria. Nursing reports she is eating consistently.  -Patient with sialadenitis. No fevers, chills, feeling unwell. Area to left cheek/ jaw is soft, seems to be slightly smaller in size.    OBJECTIVE:  /75   Pulse 60   Temp 97.5  F (36.4  C)   Resp 18   Ht 1.575 m (5' 2\")   Wt 85.5 kg (188 lb 9.6 oz)   SpO2 96%   BMI 34.50 kg/m     GENERAL APPEARANCE:  Alert, in NAD, sitting in wheelchair   HEENT: normocephalic, moist mucous membranes, nose without drainage or crusting, large lump below left ear near jawline is soft, rubbery feeling- non tender with palpation and no erythema noted  RESP:  respiratory effort normal, no respiratory distress, Lung sounds clear, patient is on RA  CV: auscultation of heart done, rate and rhythm regular.  M/S: generalized lower extremity edema  PSYCH: affect and mood ok      Last INR: 2.9 on 9/29/21  INR Today:  3.5  Current Dose:  Coumadin 5 mg M, W, F and Coumadin 4 mg rest of days.   INR Flow sheet at [facility]:    ASSESSMENT/PLAN:  (Z51.81) Encounter for therapeutic drug monitoring  (primary encounter diagnosis)  (Z79.01) Long " term (current) use of anticoagulants  (I48.20) Chronic atrial fibrillation (H)  supratherapeutic INR for goal of 2-3  New Dose: Hold coumadin today    Next INR: tomorrow    (K11.20) Sialadenitis  Comment: is slowly improving and no signs of infection  Plan: Discussed with Dr. Mendez today and will continue lemon drops, warm compresses, peridex. Continue to monitor for infection.     Electronically signed by:  IRASEMA Delarosa CNP           Sincerely,        IRASEMA Delarosa CNP

## 2021-10-06 NOTE — PROGRESS NOTES
"[facility]  [place] Medical Record Number:  [id number]  Place of Service where encounter took place: [facility]  () [[id number]]    HPI:    Liberty Horta is a 86 year old  (1935), who is being seen today for an episodic care visit at the above location. Today's concern is INR/Coumadin management for A. Fib    ROS/Subjective:  Bleeding Signs/Symptoms:  None  Thromboembolic Signs/Symptoms:  None  Medication Changes:  No  Dietary Changes:  No  Activity Changes: No  Bacterial/Viral Infection:  No  Missed Coumadin Doses:  Held 9/15  On ASA: No  Other Concerns: INRs are variable of late since admission and have been continuing to reduce dose. No s/sx of infection per discussion with patient- no SOB, dysuria. Nursing reports she is eating consistently.  -Patient with sialadenitis. No fevers, chills, feeling unwell. Area to left cheek/ jaw is soft, seems to be slightly smaller in size.    OBJECTIVE:  /75   Pulse 60   Temp 97.5  F (36.4  C)   Resp 18   Ht 1.575 m (5' 2\")   Wt 85.5 kg (188 lb 9.6 oz)   SpO2 96%   BMI 34.50 kg/m     GENERAL APPEARANCE:  Alert, in NAD, sitting in wheelchair   HEENT: normocephalic, moist mucous membranes, nose without drainage or crusting, large lump below left ear near jawline is soft, rubbery feeling- non tender with palpation and no erythema noted  RESP:  respiratory effort normal, no respiratory distress, Lung sounds clear, patient is on RA  CV: auscultation of heart done, rate and rhythm regular.  M/S: generalized lower extremity edema  PSYCH: affect and mood ok      Last INR: 2.9 on 9/29/21  INR Today:  3.5  Current Dose:  Coumadin 5 mg M, W, F and Coumadin 4 mg rest of days.   INR Flow sheet at [facility]:    ASSESSMENT/PLAN:  (Z51.81) Encounter for therapeutic drug monitoring  (primary encounter diagnosis)  (Z79.01) Long term (current) use of anticoagulants  (I48.20) Chronic atrial fibrillation (H)  supratherapeutic INR for goal of " 2-3  New Dose: Hold coumadin today    Next INR: tomorrow    (K11.20) Sialadenitis  Comment: is slowly improving and no signs of infection  Plan: Discussed with Dr. Mendez today and will continue lemon drops, warm compresses, peridex. Continue to monitor for infection.     Electronically signed by:  IRASEMA Delarosa CNP

## 2021-10-07 NOTE — PROGRESS NOTES
"[facility] GERIATRIC SERVICES  [place] Medical Record Number:  [id number]  Place of Service where encounter took place: [facility]  () [[id number]]    HPI:    Liberty Horta is a 86 year old  (1935), who is being seen today for an episodic care visit at the above location. Today's concern is INR/Coumadin management for A. Fib    ROS/Subjective:  Bleeding Signs/Symptoms:  None  Thromboembolic Signs/Symptoms:  None  Medication Changes:  No  Dietary Changes:  No  Activity Changes: No  Bacterial/Viral Infection:  No  Missed Coumadin Doses: Held 9/15 and 10/6  On ASA: No  Other Concerns: INRs are variable of late since admission and have been continuing to reduce dose. No s/sx of infection per discussion with patient- no SOB, dysuria. Nursing reports she is eating consistently.    OBJECTIVE:  BP (!) 152/70   Pulse 60   Temp 97.7  F (36.5  C)   Resp 18   Ht 1.575 m (5' 2\")   Wt 85.9 kg (189 lb 6.4 oz)   SpO2 96%   BMI 34.64 kg/m    Last INR: 3.5 on 10/6/21  INR Today:  3.1  Current Dose:  Coumadin held 10/6  INR Flow sheet at [facility]:    ASSESSMENT:  (Z51.81) Encounter for therapeutic drug monitoring  (primary encounter diagnosis)  (Z79.01) Long term (current) use of anticoagulants  (I48.20) Chronic atrial fibrillation (H)    Supratherapeutic INR for goal of 2-3    PLAN/ORDERS:     New Dose: Coumadin 4 mg M, W, F and 3 mg rest of days.     Next INR: 10/13/21      Electronically signed by:  IRASEMA Delarosa CNP     "

## 2021-10-07 NOTE — LETTER
"    10/7/2021        RE: Liberty Horta  [facility]  [address] [address] [address]        [facility] SERVICES  [place] Medical Record Number:  [id number]  Place of Service where encounter took place: [facility]  () [[id number]]    HPI:    Liberty Horta is a 86 year old  (1935), who is being seen today for an episodic care visit at the above location. Today's concern is INR/Coumadin management for A. Fib    ROS/Subjective:  Bleeding Signs/Symptoms:  None  Thromboembolic Signs/Symptoms:  None  Medication Changes:  No  Dietary Changes:  No  Activity Changes: No  Bacterial/Viral Infection:  No  Missed Coumadin Doses: Held 9/15 and 10/6  On ASA: No  Other Concerns: INRs are variable of late since admission and have been continuing to reduce dose. No s/sx of infection per discussion with patient- no SOB, dysuria. Nursing reports she is eating consistently.    OBJECTIVE:  BP (!) 152/70   Pulse 60   Temp 97.7  F (36.5  C)   Resp 18   Ht 1.575 m (5' 2\")   Wt 85.9 kg (189 lb 6.4 oz)   SpO2 96%   BMI 34.64 kg/m    Last INR: 3.5 on 10/6/21  INR Today:  3.1  Current Dose:  Coumadin held 10/6  INR Flow sheet at [facility]:    ASSESSMENT:  (Z51.81) Encounter for therapeutic drug monitoring  (primary encounter diagnosis)  (Z79.01) Long term (current) use of anticoagulants  (I48.20) Chronic atrial fibrillation (H)    Supratherapeutic INR for goal of 2-3    PLAN/ORDERS:     New Dose: Coumadin 4 mg M, W, F and 3 mg rest of days.     Next INR: 10/13/21      Electronically signed by:  IRASEMA Delarosa CNP           Sincerely,        IRASEMA Delarosa CNP    "

## 2021-10-07 NOTE — PATIENT INSTRUCTIONS
Lamar Horta  1935  1) New Dose: Coumadin 4 mg M, W, F and 3 mg rest of days.     Next INR: 10/13/21  IRASEMA Delarosa CNP on 10/7/2021 at 12:04 PM

## 2021-10-20 NOTE — LETTER
"    10/20/2021        RE: Liberty Horta  [facility]  [address] [address] [address]        [facility] SERVICES  [place] Medical Record Number:  [id number]  Place of Service where encounter took place: [facility]  () [[id number]]    HPI:    Liberty Horta is a 86 year old  (1935), who is being seen today for an episodic care visit at the above location. Today's concern is INR/Coumadin management for A. Fib    ROS/Subjective:  Bleeding Signs/Symptoms:  None  Thromboembolic Signs/Symptoms:  None  Medication Changes:  No  Dietary Changes:  No  Activity Changes: No  Bacterial/Viral Infection:  No  Missed Coumadin Doses:  None  On ASA: No  Other Concerns:  No    OBJECTIVE:  BP (!) 141/83   Pulse 78   Temp 97.8  F (36.6  C)   Resp 18   Ht 1.575 m (5' 2\")   Wt 85.7 kg (188 lb 14.4 oz)   SpO2 97%   BMI 34.55 kg/m    Last INR: 2.1 on 10/13/21  INR Today:  2.6  Current Dose:  Coumadin 4 mg daily  INR Flow sheet at [facility]:    ASSESSMENT:  (Z51.81) Encounter for therapeutic drug monitoring  (primary encounter diagnosis)  (Z79.01) Long term (current) use of anticoagulants  (I48.20) Chronic atrial fibrillation (H)    Therapeutic INR for goal of 2-3    PLAN/ORDERS:     New Dose: No Change    Next INR: 1 week      Electronically signed by:  IRASEMA Delarosa CNP           Sincerely,        IRASEMA Delarosa CNP    "

## 2021-10-20 NOTE — PROGRESS NOTES
"[facility] GERIATRIC SERVICES  [place] Medical Record Number:  [id number]  Place of Service where encounter took place: [facility]  () [[id number]]    HPI:    Liberty Horta is a 86 year old  (1935), who is being seen today for an episodic care visit at the above location. Today's concern is INR/Coumadin management for A. Fib    ROS/Subjective:  Bleeding Signs/Symptoms:  None  Thromboembolic Signs/Symptoms:  None  Medication Changes:  No  Dietary Changes:  No  Activity Changes: No  Bacterial/Viral Infection:  No  Missed Coumadin Doses:  None  On ASA: No  Other Concerns:  No    OBJECTIVE:  BP (!) 141/83   Pulse 78   Temp 97.8  F (36.6  C)   Resp 18   Ht 1.575 m (5' 2\")   Wt 85.7 kg (188 lb 14.4 oz)   SpO2 97%   BMI 34.55 kg/m    Last INR: 2.1 on 10/13/21  INR Today:  2.6  Current Dose:  Coumadin 4 mg daily  INR Flow sheet at [facility]:    ASSESSMENT:  (Z51.81) Encounter for therapeutic drug monitoring  (primary encounter diagnosis)  (Z79.01) Long term (current) use of anticoagulants  (I48.20) Chronic atrial fibrillation (H)    Therapeutic INR for goal of 2-3    PLAN/ORDERS:     New Dose: No Change    Next INR: 1 week      Electronically signed by:  IRASEMA Delarosa CNP     "

## 2021-10-26 NOTE — PROGRESS NOTES
"[facility] GERIATRIC SERVICES  [place] Medical Record Number:  [id number]  Place of Service where encounter took place: [facility]  () [[id number]]    HPI:    Liberty Horta is a 86 year old  (1935), who is being seen today for an episodic care visit at the above location. Today's concern is INR/Coumadin management for A. Fib    ROS/Subjective:  Bleeding Signs/Symptoms:  None  Thromboembolic Signs/Symptoms:  None  Medication Changes:  No  Dietary Changes:  No  Activity Changes: No  Bacterial/Viral Infection:  No  Missed Coumadin Doses:  None  On ASA: No  Other Concerns:  No    OBJECTIVE:  BP (!) 152/82   Pulse 65   Temp 98  F (36.7  C)   Resp 18   Ht 1.575 m (5' 2\")   Wt 86.5 kg (190 lb 12.8 oz)   SpO2 95%   BMI 34.90 kg/m    Last INR: 2.6 on 10/20/21  INR Today:  2.6  Current Dose:  Coumadin 4 mg daily  INR Flow sheet at [facility]:    ASSESSMENT:  1. Encounter for therapeutic drug monitoring    2. Long term (current) use of anticoagulants    3. Chronic atrial fibrillation (H)      Therapeutic INR for goal of 2-3    PLAN/ORDERS:   New Dose: No Change    Next INR: 2 weeks      Electronically signed by:  IRASEMA Delarosa CNP     "

## 2021-10-27 NOTE — LETTER
"    10/27/2021        RE: Liberty Horta  [facility]  [address] [address] [address]        [facility] SERVICES  [place] Medical Record Number:  [id number]  Place of Service where encounter took place: [facility]  () [[id number]]    HPI:    Liberty Horta is a 86 year old  (1935), who is being seen today for an episodic care visit at the above location. Today's concern is INR/Coumadin management for A. Fib    ROS/Subjective:  Bleeding Signs/Symptoms:  None  Thromboembolic Signs/Symptoms:  None  Medication Changes:  No  Dietary Changes:  No  Activity Changes: No  Bacterial/Viral Infection:  No  Missed Coumadin Doses:  None  On ASA: No  Other Concerns:  No    OBJECTIVE:  BP (!) 152/82   Pulse 65   Temp 98  F (36.7  C)   Resp 18   Ht 1.575 m (5' 2\")   Wt 86.5 kg (190 lb 12.8 oz)   SpO2 95%   BMI 34.90 kg/m    Last INR: 2.6 on 10/20/21  INR Today:  2.6  Current Dose:  Coumadin 4 mg daily  INR Flow sheet at [facility]:    ASSESSMENT:  1. Encounter for therapeutic drug monitoring    2. Long term (current) use of anticoagulants    3. Chronic atrial fibrillation (H)      Therapeutic INR for goal of 2-3    PLAN/ORDERS:   New Dose: No Change    Next INR: 2 weeks      Electronically signed by:  IRASEMA Delarosa CNP           Sincerely,        IRASEMA Delarosa CNP    "

## 2021-11-10 NOTE — PROGRESS NOTES
"[facility] GERIATRIC SERVICES  [place] Medical Record Number:  [id number]  Place of Service where encounter took place: [facility]  () [[id number]]    HPI:    Liberty Horta is a 86 year old  (1935), who is being seen today for an episodic care visit at the above location. Today's concern is INR/Coumadin management for A. Fib    ROS/Subjective:  Bleeding Signs/Symptoms:  None  Thromboembolic Signs/Symptoms:  None  Medication Changes:  No  Dietary Changes:  No  Activity Changes: No  Bacterial/Viral Infection:  No  Missed Coumadin Doses:  None  On ASA: No  Other Concerns:  No    OBJECTIVE:  BP (!) 168/84   Pulse 60   Temp 98.1  F (36.7  C)   Resp 18   Ht 1.575 m (5' 2\")   Wt 84.8 kg (186 lb 14.4 oz)   SpO2 94%   BMI 34.18 kg/m    Last INR: 2.6 on 10/27/21  INR Today:  2.5  Current Dose:  Coumadin 4 mg daily  INR Flow sheet at [facility]:    ASSESSMENT:  (Z51.81) Encounter for therapeutic drug monitoring  (primary encounter diagnosis)  (Z79.01) Long term (current) use of anticoagulants  (I48.20) Chronic atrial fibrillation (H)    Therapeutic INR for goal of 2-3    PLAN/ORDERS:   New Dose: No Change    Next INR: 12/8/21      Electronically signed by:  IRASEMA Delarosa CNP     "

## 2021-11-10 NOTE — LETTER
"    11/10/2021        RE: Liberty Horta  [facility]  [address] [address] [address]        [facility] SERVICES  [place] Medical Record Number:  [id number]  Place of Service where encounter took place: [facility]  () [[id number]]    HPI:    Liberty Horta is a 86 year old  (1935), who is being seen today for an episodic care visit at the above location. Today's concern is INR/Coumadin management for A. Fib    ROS/Subjective:  Bleeding Signs/Symptoms:  None  Thromboembolic Signs/Symptoms:  None  Medication Changes:  No  Dietary Changes:  No  Activity Changes: No  Bacterial/Viral Infection:  No  Missed Coumadin Doses:  None  On ASA: No  Other Concerns:  No    OBJECTIVE:  BP (!) 168/84   Pulse 60   Temp 98.1  F (36.7  C)   Resp 18   Ht 1.575 m (5' 2\")   Wt 84.8 kg (186 lb 14.4 oz)   SpO2 94%   BMI 34.18 kg/m    Last INR: 2.6 on 10/27/21  INR Today:  2.5  Current Dose:  Coumadin 4 mg daily  INR Flow sheet at [facility]:    ASSESSMENT:  (Z51.81) Encounter for therapeutic drug monitoring  (primary encounter diagnosis)  (Z79.01) Long term (current) use of anticoagulants  (I48.20) Chronic atrial fibrillation (H)    Therapeutic INR for goal of 2-3    PLAN/ORDERS:   New Dose: No Change    Next INR: 12/8/21      Electronically signed by:  IRASEMA Delarosa CNP           Sincerely,        IRASEMA Delarosa CNP    "

## 2021-11-12 NOTE — LETTER
"    11/12/2021        RE: Liberty Horta  [facility]  [address] [address] [address]        Liberty Horta is a 86 year old female seen November 12, 2021 at [facility] where she has resided for 3 months (admit 8/2021) transferred to LT from [place] for ongoing cognitive and functional decline.  She is seen in her room up to tilting .   Reports she feels \"like hell\"   This seems to be related to a need to toilet.   Cannot get other history from her this morning and she does not report pain today.      By chart review, patient last hospitalized in June 2019 with a nondisplaced L5 lateral vertebral body fracture and severe canal stenosis at L3-4.  She has significant OA/DJD with h/o left TKA and right hip ORIF.    No longer ambulatory.   She has had Parkinsonism noted by prior physician, but pt and family have declined any further workup or medication.  She also has longstanding atrial fibrillation and has a pacemaker, anticoagulated with warfarin    She has had device checks, and followed by Cardiology clinic, not sure when she was last seen there.      Past Medical History:   Diagnosis Date     Basal cell carcinoma      Chronic atrial fibrillation (H)      DVT (deep vein thrombosis) in pregnancy      Esophageal reflux      Hypertension      Hypothyroid      Osteoarthritis      Pacemaker      Renal insufficiency    Parkinsonism    Past Surgical History:   Procedure Laterality Date     IMPLANT PACEMAKER       OPEN REDUCTION INTERNAL FIXATION FEMUR DISTAL Right 10/6/2016    Procedure: OPEN REDUCTION INTERNAL FIXATION FEMUR DISTAL;  Surgeon: Filipe Coburn MD;  Location:  OR     ORTHOPEDIC SURGERY      Knee replacement left in 2011     SH:   for 30 years.   She has 7 children.  Son Romaine is POA  Pt resided at [facility] 2/2020-8/2021    Non smoker    ROS: now WC bound with assist for transfers  Wt Readings from Last 5 Encounters:   11/12/21 84.8 kg (187 lb) " "  11/10/21 84.8 kg (186 lb 14.4 oz)   10/26/21 86.5 kg (190 lb 12.8 oz)   10/20/21 85.7 kg (188 lb 14.4 oz)   10/07/21 85.9 kg (189 lb 6.4 oz)      EXAM: Up to WC, mild distress  BP (!) 154/82   Pulse 62   Temp 97.6  F (36.4  C)   Resp 18   Ht 1.575 m (5' 2\")   Wt 84.8 kg (187 lb)   SpO2 96%   BMI 34.20 kg/m     +dysphonia  Still lesion left chest wall   Neck supple without adenopathy   Lungs clear bilaterally with fair air movement   Heart irreg irreg at 70, 1/6 NELLY  Abd soft, NT, no distention or guarding, +BS  Ext 1+ ankle edema wearing tubi-, right knee with gross changes of OA, +sponginess, decreased ROM.   +onychymycosis  Neuro: confused history, increased tone diffusely, left hand resting tremor    Psych: a little anxious    B12 level 362  Sodium   Date Value Ref Range Status   09/24/2021 140 133 - 144 mmol/L Final     Potassium   Date Value Ref Range Status   09/24/2021 3.9 3.4 - 5.3 mmol/L Final     Carbon Dioxide (CO2)   Date Value Ref Range Status   09/24/2021 26 20 - 32 mmol/L Final     Glucose   Date Value Ref Range Status   09/24/2021 85 70 - 99 mg/dL Final     Urea Nitrogen   Date Value Ref Range Status   09/24/2021 20 7 - 30 mg/dL Final     Creatinine   Date Value Ref Range Status   09/24/2021 0.79 0.52 - 1.04 mg/dL Final   06/08/2021 0.91 0.52 - 1.04 mg/dL Final     GFR Estimate   Date Value Ref Range Status   09/24/2021 69 >60 mL/min/1.73m2 Final     Calcium   Date Value Ref Range Status   09/24/2021 9.3 8.5 - 10.1 mg/dL Final     Lab Results   Component Value Date    AST 17 09/24/2021      ALBUMIN 3.4 09/24/2021      ALKPHOS 131 09/24/2021      Lab Results   Component Value Date    WBC 10.2 09/24/2021      HGB 13.7 09/24/2021      MCV 97 09/24/2021       09/24/2021     ECHO 2016   Interpretation Summary  Left ventricular systolic function is low normal.  The visual ejection fraction is estimated at 50-55%.  The left ventricle is normal in size.  There is mild concentric left " ventricular hypertrophy.  No regional wall motion abnormalities noted.  The right ventricle is normal in structure, function and size.  Doppler interrogation does not demonstrate significant stenosis or insufficiency involving cardiac valves.      IMP/PLAN:   (M17.11) Primary osteoarthritis of right knee    (M89.49) Primary osteoarthritis involving multiple joints  Comment: persistent right knee pain   Now EZ stand lift for transfers and WC bound       Plan: diclofenac gel / Aspercreme lidocaine patch  Continue scheduled acetaminophen tid with prn available; agree that addition of gabapentin might be a good option if recurrence of pain       (I48.11) Longstanding persistent atrial fibrillation (H)  Comment: has had a pacemaker for several years, monitor site ?pt scratching there.   Pulse Readings from Last 4 Encounters:   11/12/21 62   11/10/21 60   10/26/21 65   10/20/21 78      Plan: sotalol 80 mg bid with meals and diltiazem 180 mg/day for VR control.   Warfarin per INR for stroke prophylaxis.       (I12.9,  N18.3) Benign hypertension with CKD (chronic kidney disease) stage III (H)  Comment:  CrCl 50   BP Readings from Last 3 Encounters:   11/12/21 (!) 154/82   11/10/21 (!) 168/84   10/26/21 (!) 152/82      Plan: diltiazem 180 mg/day, follow bps and BMP      (G20) Parkinsonism, unspecified Parkinsonism type (H)  Comment: clear symptoms with dysphonia, left hand resting tremor, rigidity and decline in mobility   Plan: she has been seen by [facility] PHYSICAL THERAPY / OCCUPATIONAL THERAPY with [place] tx in the past, has ongoing physical decline, now EZ stand transfers and WC bound.     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   May need pharmacologic intervention if not able to settle better.        Funmi Mendez MD         Sincerely,        Funmi Mendez MD

## 2021-11-22 NOTE — PROGRESS NOTES
"Liberty Horta is a 86 year old female seen November 12, 2021 at [facility] where she has resided for 3 months (admit 8/2021) transferred to LTC from [place] for ongoing cognitive and functional decline.  She is seen in her room up to tilting .   Reports she feels \"like hell\"   This seems to be related to a need to toilet.   Cannot get other history from her this morning and she does not report pain today.      By chart review, patient last hospitalized in June 2019 with a nondisplaced L5 lateral vertebral body fracture and severe canal stenosis at L3-4.  She has significant OA/DJD with h/o left TKA and right hip ORIF.    No longer ambulatory.   She has had Parkinsonism noted by prior physician, but pt and family have declined any further workup or medication.  She also has longstanding atrial fibrillation and has a pacemaker, anticoagulated with warfarin    She has had device checks, and followed by Cardiology clinic, not sure when she was last seen there.      Past Medical History:   Diagnosis Date     Basal cell carcinoma      Chronic atrial fibrillation (H)      DVT (deep vein thrombosis) in pregnancy      Esophageal reflux      Hypertension      Hypothyroid      Osteoarthritis      Pacemaker      Renal insufficiency    Parkinsonism    Past Surgical History:   Procedure Laterality Date     IMPLANT PACEMAKER       OPEN REDUCTION INTERNAL FIXATION FEMUR DISTAL Right 10/6/2016    Procedure: OPEN REDUCTION INTERNAL FIXATION FEMUR DISTAL;  Surgeon: Filipe Coburn MD;  Location:  OR     ORTHOPEDIC SURGERY      Knee replacement left in 2011     SH:   for 30 years.   She has 7 children.  Son Romaine is POA  Pt resided at [facility] 2/2020-8/2021    Non smoker    ROS: now WC bound with assist for transfers  Wt Readings from Last 5 Encounters:   11/12/21 84.8 kg (187 lb)   11/10/21 84.8 kg (186 lb 14.4 oz)   10/26/21 86.5 kg (190 lb 12.8 oz)   10/20/21 85.7 kg (188 lb 14.4 oz)   10/07/21 " "85.9 kg (189 lb 6.4 oz)      EXAM: Up to WC, mild distress  BP (!) 154/82   Pulse 62   Temp 97.6  F (36.4  C)   Resp 18   Ht 1.575 m (5' 2\")   Wt 84.8 kg (187 lb)   SpO2 96%   BMI 34.20 kg/m     +dysphonia  Still lesion left chest wall   Neck supple without adenopathy   Lungs clear bilaterally with fair air movement   Heart irreg irreg at 70, 1/6 NELLY  Abd soft, NT, no distention or guarding, +BS  Ext 1+ ankle edema wearing tubi-, right knee with gross changes of OA, +sponginess, decreased ROM.   +onychymycosis  Neuro: confused history, increased tone diffusely, left hand resting tremor    Psych: a little anxious    B12 level 362  Sodium   Date Value Ref Range Status   09/24/2021 140 133 - 144 mmol/L Final     Potassium   Date Value Ref Range Status   09/24/2021 3.9 3.4 - 5.3 mmol/L Final     Carbon Dioxide (CO2)   Date Value Ref Range Status   09/24/2021 26 20 - 32 mmol/L Final     Glucose   Date Value Ref Range Status   09/24/2021 85 70 - 99 mg/dL Final     Urea Nitrogen   Date Value Ref Range Status   09/24/2021 20 7 - 30 mg/dL Final     Creatinine   Date Value Ref Range Status   09/24/2021 0.79 0.52 - 1.04 mg/dL Final   06/08/2021 0.91 0.52 - 1.04 mg/dL Final     GFR Estimate   Date Value Ref Range Status   09/24/2021 69 >60 mL/min/1.73m2 Final     Calcium   Date Value Ref Range Status   09/24/2021 9.3 8.5 - 10.1 mg/dL Final     Lab Results   Component Value Date    AST 17 09/24/2021      ALBUMIN 3.4 09/24/2021      ALKPHOS 131 09/24/2021      Lab Results   Component Value Date    WBC 10.2 09/24/2021      HGB 13.7 09/24/2021      MCV 97 09/24/2021       09/24/2021     ECHO 2016   Interpretation Summary  Left ventricular systolic function is low normal.  The visual ejection fraction is estimated at 50-55%.  The left ventricle is normal in size.  There is mild concentric left ventricular hypertrophy.  No regional wall motion abnormalities noted.  The right ventricle is normal in structure, " function and size.  Doppler interrogation does not demonstrate significant stenosis or insufficiency involving cardiac valves.      IMP/PLAN:   (M17.11) Primary osteoarthritis of right knee    (M89.49) Primary osteoarthritis involving multiple joints  Comment: persistent right knee pain   Now EZ stand lift for transfers and WC bound       Plan: diclofenac gel / Aspercreme lidocaine patch  Continue scheduled acetaminophen tid with prn available; agree that addition of gabapentin might be a good option if recurrence of pain       (I48.11) Longstanding persistent atrial fibrillation (H)  Comment: has had a pacemaker for several years, monitor site ?pt scratching there.   Pulse Readings from Last 4 Encounters:   11/12/21 62   11/10/21 60   10/26/21 65   10/20/21 78      Plan: sotalol 80 mg bid with meals and diltiazem 180 mg/day for VR control.   Warfarin per INR for stroke prophylaxis.       (I12.9,  N18.3) Benign hypertension with CKD (chronic kidney disease) stage III (H)  Comment:  CrCl 50   BP Readings from Last 3 Encounters:   11/12/21 (!) 154/82   11/10/21 (!) 168/84   10/26/21 (!) 152/82      Plan: diltiazem 180 mg/day, follow bps and BMP      (G20) Parkinsonism, unspecified Parkinsonism type (H)  Comment: clear symptoms with dysphonia, left hand resting tremor, rigidity and decline in mobility   Plan: she has been seen by [facility] PHYSICAL THERAPY / OCCUPATIONAL THERAPY with [place] tx in the past, has ongoing physical decline, now EZ stand transfers and WC bound.     Could reconsider trial of Sinemet if her ability to use hands is impaired      (E03.9) Hypothyroidism, unspecified type  Comment: last TSH 1.68    Plan: levothyroxine 150 mcg/day; follow TSH     (I50.32) Chronic diastolic heart failure (H)  (R60.0) Edema of both legs  Comment: LEs elevated today, and lightweight compression stockings on   Plan: furosemide 60 mg/day and follow exam, weights, sx     (G20,  F02.81) Dementia due to Parkinson's disease  with behavioral disturbance (H)  (R41.89) Cognitive impairment  Comment: no scores available, but clear cognitive decline     Plan: LTC support for meds, meals, activity, mobility     Remains on donepezil 5 mg/day      (F41.9) Anxiety  Comment: more anxious and perseverative today   Family has also reported hallucinations and nightmares   Plan: reassurance and redirection.   May need pharmacologic intervention if not able to settle better.        Funmi Mendez MD

## 2021-12-08 NOTE — PROGRESS NOTES
"[facility] GERIATRIC SERVICES  [place] Medical Record Number:  [id number]  Place of Service where encounter took place: [facility]  () [[id number]]    HPI:    Liberty Horta is a 86 year old  (1935), who is being seen today for an episodic care visit at the above location. Today's concern is INR/Coumadin management for A. Fib    ROS/Subjective:  Bleeding Signs/Symptoms:  None  Thromboembolic Signs/Symptoms:  None  Medication Changes:  No  Dietary Changes:  No  Activity Changes: No  Bacterial/Viral Infection:  No  Missed Coumadin Doses:  None  On ASA: No  Other Concerns:  No    OBJECTIVE:  BP (!) 147/86   Pulse 63   Temp 98.2  F (36.8  C)   Resp 18   Ht 1.575 m (5' 2\")   Wt 85.7 kg (189 lb)   SpO2 96%   BMI 34.57 kg/m    Last INR: 2.1 on 12/8/21  INR Today:  2.1  Current Dose:  Coumadin 4 mg daily  INR Flow sheet at [facility]:    ASSESSMENT:  (Z51.81) Encounter for therapeutic drug monitoring  (primary encounter diagnosis)  (Z79.01) Long term (current) use of anticoagulants  (I48.20) Chronic atrial fibrillation (H)    Therapeutic INR for goal of 2-3    PLAN/ORDERS:     New Dose: Coumadin 4 mg daily    Next INR: 1 month      Electronically signed by  IRASEMA Delarosa CNP     "

## 2021-12-08 NOTE — LETTER
"    12/8/2021        RE: Liberty Horta  [facility]  [address] [address] [address]        [facility] SERVICES  [place] Medical Record Number:  [id number]  Place of Service where encounter took place: [facility]  () [[id number]]    HPI:    Liberty Horta is a 86 year old  (1935), who is being seen today for an episodic care visit at the above location. Today's concern is INR/Coumadin management for A. Fib    ROS/Subjective:  Bleeding Signs/Symptoms:  None  Thromboembolic Signs/Symptoms:  None  Medication Changes:  No  Dietary Changes:  No  Activity Changes: No  Bacterial/Viral Infection:  No  Missed Coumadin Doses:  None  On ASA: No  Other Concerns:  No    OBJECTIVE:  BP (!) 147/86   Pulse 63   Temp 98.2  F (36.8  C)   Resp 18   Ht 1.575 m (5' 2\")   Wt 85.7 kg (189 lb)   SpO2 96%   BMI 34.57 kg/m    Last INR: 2.1 on 12/8/21  INR Today:  2.1  Current Dose:  Coumadin 4 mg daily  INR Flow sheet at [facility]:    ASSESSMENT:  (Z51.81) Encounter for therapeutic drug monitoring  (primary encounter diagnosis)  (Z79.01) Long term (current) use of anticoagulants  (I48.20) Chronic atrial fibrillation (H)    Therapeutic INR for goal of 2-3    PLAN/ORDERS:     New Dose: Coumadin 4 mg daily    Next INR: 1 month      Electronically signed by  IRASEMA Delarosa CNP           Sincerely,        IRASEMA Delarosa CNP    "

## 2022-01-01 ENCOUNTER — LAB REQUISITION (OUTPATIENT)
Dept: LAB | Facility: CLINIC | Age: 87
End: 2022-01-01
Payer: COMMERCIAL

## 2022-01-01 ENCOUNTER — LAB REQUISITION (OUTPATIENT)
Dept: LAB | Facility: CLINIC | Age: 87
End: 2022-01-01
Payer: OTHER MISCELLANEOUS

## 2022-01-01 ENCOUNTER — LAB REQUISITION (OUTPATIENT)
Dept: LAB | Facility: CLINIC | Age: 87
End: 2022-01-01
Payer: MEDICARE

## 2022-01-01 ENCOUNTER — NURSING HOME VISIT (OUTPATIENT)
Dept: GERIATRICS | Facility: CLINIC | Age: 87
End: 2022-01-01
Payer: COMMERCIAL

## 2022-01-01 ENCOUNTER — NURSING HOME VISIT (OUTPATIENT)
Dept: GERIATRICS | Facility: CLINIC | Age: 87
End: 2022-01-01
Payer: MEDICARE

## 2022-01-01 ENCOUNTER — DOCUMENTATION ONLY (OUTPATIENT)
Dept: OTHER | Facility: CLINIC | Age: 87
End: 2022-01-01
Payer: COMMERCIAL

## 2022-01-01 ENCOUNTER — MEDICAL CORRESPONDENCE (OUTPATIENT)
Dept: HEALTH INFORMATION MANAGEMENT | Facility: CLINIC | Age: 87
End: 2022-01-01

## 2022-01-01 VITALS
HEIGHT: 62 IN | DIASTOLIC BLOOD PRESSURE: 72 MMHG | WEIGHT: 182.3 LBS | HEART RATE: 60 BPM | TEMPERATURE: 98.3 F | OXYGEN SATURATION: 94 % | SYSTOLIC BLOOD PRESSURE: 125 MMHG | BODY MASS INDEX: 33.55 KG/M2 | RESPIRATION RATE: 17 BRPM

## 2022-01-01 VITALS
HEART RATE: 61 BPM | RESPIRATION RATE: 18 BRPM | DIASTOLIC BLOOD PRESSURE: 76 MMHG | WEIGHT: 188.2 LBS | HEIGHT: 62 IN | OXYGEN SATURATION: 96 % | SYSTOLIC BLOOD PRESSURE: 131 MMHG | TEMPERATURE: 98 F | BODY MASS INDEX: 34.63 KG/M2

## 2022-01-01 VITALS
SYSTOLIC BLOOD PRESSURE: 142 MMHG | RESPIRATION RATE: 18 BRPM | BODY MASS INDEX: 35.55 KG/M2 | HEIGHT: 62 IN | TEMPERATURE: 97.8 F | OXYGEN SATURATION: 98 % | DIASTOLIC BLOOD PRESSURE: 73 MMHG | WEIGHT: 193.2 LBS | HEART RATE: 63 BPM

## 2022-01-01 VITALS
WEIGHT: 180.2 LBS | DIASTOLIC BLOOD PRESSURE: 72 MMHG | HEIGHT: 62 IN | OXYGEN SATURATION: 95 % | SYSTOLIC BLOOD PRESSURE: 144 MMHG | TEMPERATURE: 97.9 F | BODY MASS INDEX: 33.16 KG/M2 | RESPIRATION RATE: 18 BRPM | HEART RATE: 61 BPM

## 2022-01-01 VITALS
TEMPERATURE: 97.4 F | HEART RATE: 60 BPM | OXYGEN SATURATION: 95 % | SYSTOLIC BLOOD PRESSURE: 168 MMHG | HEIGHT: 62 IN | BODY MASS INDEX: 34.04 KG/M2 | RESPIRATION RATE: 18 BRPM | DIASTOLIC BLOOD PRESSURE: 72 MMHG | WEIGHT: 185 LBS

## 2022-01-01 VITALS
DIASTOLIC BLOOD PRESSURE: 74 MMHG | OXYGEN SATURATION: 98 % | HEIGHT: 62 IN | HEART RATE: 60 BPM | TEMPERATURE: 98.1 F | SYSTOLIC BLOOD PRESSURE: 155 MMHG | WEIGHT: 182.3 LBS | RESPIRATION RATE: 16 BRPM | BODY MASS INDEX: 33.55 KG/M2

## 2022-01-01 VITALS
HEIGHT: 62 IN | BODY MASS INDEX: 34.08 KG/M2 | DIASTOLIC BLOOD PRESSURE: 83 MMHG | RESPIRATION RATE: 18 BRPM | HEART RATE: 61 BPM | SYSTOLIC BLOOD PRESSURE: 153 MMHG | TEMPERATURE: 97.7 F | OXYGEN SATURATION: 96 % | WEIGHT: 185.2 LBS

## 2022-01-01 VITALS
RESPIRATION RATE: 18 BRPM | TEMPERATURE: 98.2 F | DIASTOLIC BLOOD PRESSURE: 82 MMHG | BODY MASS INDEX: 33.6 KG/M2 | OXYGEN SATURATION: 95 % | HEIGHT: 62 IN | SYSTOLIC BLOOD PRESSURE: 146 MMHG | HEART RATE: 60 BPM | WEIGHT: 182.6 LBS

## 2022-01-01 DIAGNOSIS — U07.1 COVID-19: ICD-10-CM

## 2022-01-01 DIAGNOSIS — M15.0 PRIMARY OSTEOARTHRITIS INVOLVING MULTIPLE JOINTS: ICD-10-CM

## 2022-01-01 DIAGNOSIS — G47.00 INSOMNIA, UNSPECIFIED TYPE: ICD-10-CM

## 2022-01-01 DIAGNOSIS — L98.9 SKIN LESION OF CHEST WALL: ICD-10-CM

## 2022-01-01 DIAGNOSIS — I50.32 CHRONIC DIASTOLIC CONGESTIVE HEART FAILURE (H): ICD-10-CM

## 2022-01-01 DIAGNOSIS — G20.A1 DEMENTIA DUE TO PARKINSON'S DISEASE WITH BEHAVIORAL DISTURBANCE (H): ICD-10-CM

## 2022-01-01 DIAGNOSIS — Z71.89 ACP (ADVANCE CARE PLANNING): ICD-10-CM

## 2022-01-01 DIAGNOSIS — I48.11 LONGSTANDING PERSISTENT ATRIAL FIBRILLATION (H): ICD-10-CM

## 2022-01-01 DIAGNOSIS — R52 PAIN: ICD-10-CM

## 2022-01-01 DIAGNOSIS — I12.9 BENIGN HYPERTENSION WITH CKD (CHRONIC KIDNEY DISEASE) STAGE III (H): ICD-10-CM

## 2022-01-01 DIAGNOSIS — E03.9 HYPOTHYROIDISM, UNSPECIFIED TYPE: ICD-10-CM

## 2022-01-01 DIAGNOSIS — I48.20 CHRONIC ATRIAL FIBRILLATION (H): ICD-10-CM

## 2022-01-01 DIAGNOSIS — N18.30 BENIGN HYPERTENSION WITH CKD (CHRONIC KIDNEY DISEASE) STAGE III (H): Primary | ICD-10-CM

## 2022-01-01 DIAGNOSIS — F02.818 DEMENTIA DUE TO PARKINSON'S DISEASE WITH BEHAVIORAL DISTURBANCE (H): ICD-10-CM

## 2022-01-01 DIAGNOSIS — N18.9 CHRONIC KIDNEY DISEASE, UNSPECIFIED: ICD-10-CM

## 2022-01-01 DIAGNOSIS — G20.A1 PARKINSON'S DISEASE (H): ICD-10-CM

## 2022-01-01 DIAGNOSIS — R06.02 SHORTNESS OF BREATH: ICD-10-CM

## 2022-01-01 DIAGNOSIS — R45.1 RESTLESSNESS AND AGITATION: ICD-10-CM

## 2022-01-01 DIAGNOSIS — E66.01 MORBID OBESITY (H): ICD-10-CM

## 2022-01-01 DIAGNOSIS — S21.102D OPEN CHEST WOUND, LEFT, SUBSEQUENT ENCOUNTER: Primary | ICD-10-CM

## 2022-01-01 DIAGNOSIS — Z79.01 LONG TERM CURRENT USE OF ANTICOAGULANT THERAPY: ICD-10-CM

## 2022-01-01 DIAGNOSIS — T14.8XXA NONHEALING NONSURGICAL WOUND: ICD-10-CM

## 2022-01-01 DIAGNOSIS — I12.9 BENIGN HYPERTENSION WITH CKD (CHRONIC KIDNEY DISEASE) STAGE III (H): Primary | ICD-10-CM

## 2022-01-01 DIAGNOSIS — T14.8XXA NON-HEALING NON-SURGICAL WOUND: Primary | ICD-10-CM

## 2022-01-01 DIAGNOSIS — I10 ESSENTIAL HYPERTENSION: ICD-10-CM

## 2022-01-01 DIAGNOSIS — N18.30 BENIGN HYPERTENSION WITH CKD (CHRONIC KIDNEY DISEASE) STAGE III (H): ICD-10-CM

## 2022-01-01 DIAGNOSIS — C44.91 MALIGNANT BASAL CELL NEOPLASM OF SKIN: ICD-10-CM

## 2022-01-01 DIAGNOSIS — G20.A1 DEMENTIA DUE TO PARKINSON'S DISEASE WITH BEHAVIORAL DISTURBANCE (H): Primary | ICD-10-CM

## 2022-01-01 DIAGNOSIS — R22.1 NECK MASS: ICD-10-CM

## 2022-01-01 DIAGNOSIS — Z51.81 ENCOUNTER FOR THERAPEUTIC DRUG MONITORING: ICD-10-CM

## 2022-01-01 DIAGNOSIS — Z79.01 LONG TERM (CURRENT) USE OF ANTICOAGULANTS: ICD-10-CM

## 2022-01-01 DIAGNOSIS — Z51.81 ENCOUNTER FOR THERAPEUTIC DRUG MONITORING: Primary | ICD-10-CM

## 2022-01-01 DIAGNOSIS — G20.A1 PARKINSON'S DISEASE (H): Primary | ICD-10-CM

## 2022-01-01 DIAGNOSIS — R59.1 LYMPHADENOPATHY: ICD-10-CM

## 2022-01-01 DIAGNOSIS — I48.20 CHRONIC ATRIAL FIBRILLATION (H): Primary | ICD-10-CM

## 2022-01-01 DIAGNOSIS — R59.0 ENLARGED LYMPH NODE IN NECK: ICD-10-CM

## 2022-01-01 DIAGNOSIS — F02.818 DEMENTIA DUE TO PARKINSON'S DISEASE WITH BEHAVIORAL DISTURBANCE (H): Primary | ICD-10-CM

## 2022-01-01 DIAGNOSIS — R60.9 EDEMA, UNSPECIFIED: ICD-10-CM

## 2022-01-01 DIAGNOSIS — Z95.0 PACEMAKER: ICD-10-CM

## 2022-01-01 DIAGNOSIS — R52 GENERALIZED PAIN: ICD-10-CM

## 2022-01-01 LAB
ALBUMIN UR-MCNC: NEGATIVE MG/DL
AMORPH CRY #/AREA URNS HPF: ABNORMAL /HPF
ANION GAP SERPL CALCULATED.3IONS-SCNC: 5 MMOL/L (ref 3–14)
APPEARANCE UR: ABNORMAL
BACTERIA #/AREA URNS HPF: ABNORMAL /HPF
BACTERIA UR CULT: ABNORMAL
BACTERIA UR CULT: ABNORMAL
BILIRUB UR QL STRIP: NEGATIVE
BUN SERPL-MCNC: 19 MG/DL (ref 7–30)
CALCIUM SERPL-MCNC: 9.8 MG/DL (ref 8.5–10.1)
CHLORIDE BLD-SCNC: 105 MMOL/L (ref 94–109)
CO2 SERPL-SCNC: 31 MMOL/L (ref 20–32)
COLOR UR AUTO: ABNORMAL
CREAT SERPL-MCNC: 0.82 MG/DL (ref 0.52–1.04)
GFR SERPL CREATININE-BSD FRML MDRD: 70 ML/MIN/1.73M2
GLUCOSE BLD-MCNC: 94 MG/DL (ref 70–99)
GLUCOSE UR STRIP-MCNC: NEGATIVE MG/DL
HGB UR QL STRIP: NEGATIVE
KETONES UR STRIP-MCNC: NEGATIVE MG/DL
LEUKOCYTE ESTERASE UR QL STRIP: NEGATIVE
NITRATE UR QL: NEGATIVE
PH UR STRIP: 7.5 [PH] (ref 5–7)
POTASSIUM BLD-SCNC: 3.9 MMOL/L (ref 3.4–5.3)
RBC URINE: 1 /HPF
SARS-COV-2 RNA RESP QL NAA+PROBE: NEGATIVE
SARS-COV-2 RNA RESP QL NAA+PROBE: NORMAL
SARS-COV-2 RNA RESP QL NAA+PROBE: NOT DETECTED
SODIUM SERPL-SCNC: 141 MMOL/L (ref 133–144)
SP GR UR STRIP: 1.01 (ref 1–1.03)
SQUAMOUS EPITHELIAL: 1 /HPF
UROBILINOGEN UR STRIP-MCNC: NORMAL MG/DL
WBC URINE: 2 /HPF

## 2022-01-01 PROCEDURE — 99308 SBSQ NF CARE LOW MDM 20: CPT

## 2022-01-01 PROCEDURE — U0005 INFEC AGEN DETEC AMPLI PROBE: HCPCS | Mod: ORL

## 2022-01-01 PROCEDURE — U0005 INFEC AGEN DETEC AMPLI PROBE: HCPCS | Mod: ORL | Performed by: NURSE PRACTITIONER

## 2022-01-01 PROCEDURE — U0003 INFECTIOUS AGENT DETECTION BY NUCLEIC ACID (DNA OR RNA); SEVERE ACUTE RESPIRATORY SYNDROME CORONAVIRUS 2 (SARS-COV-2) (CORONAVIRUS DISEASE [COVID-19]), AMPLIFIED PROBE TECHNIQUE, MAKING USE OF HIGH THROUGHPUT TECHNOLOGIES AS DESCRIBED BY CMS-2020-01-R: HCPCS | Mod: ORL | Performed by: NURSE PRACTITIONER

## 2022-01-01 PROCEDURE — 99309 SBSQ NF CARE MODERATE MDM 30: CPT

## 2022-01-01 PROCEDURE — U0003 INFECTIOUS AGENT DETECTION BY NUCLEIC ACID (DNA OR RNA); SEVERE ACUTE RESPIRATORY SYNDROME CORONAVIRUS 2 (SARS-COV-2) (CORONAVIRUS DISEASE [COVID-19]), AMPLIFIED PROBE TECHNIQUE, MAKING USE OF HIGH THROUGHPUT TECHNOLOGIES AS DESCRIBED BY CMS-2020-01-R: HCPCS | Mod: ORL

## 2022-01-01 PROCEDURE — 82310 ASSAY OF CALCIUM: CPT | Mod: ORL | Performed by: NURSE PRACTITIONER

## 2022-01-01 PROCEDURE — 99309 SBSQ NF CARE MODERATE MDM 30: CPT | Performed by: INTERNAL MEDICINE

## 2022-01-01 PROCEDURE — 81001 URINALYSIS AUTO W/SCOPE: CPT | Mod: ORL

## 2022-01-01 PROCEDURE — 87086 URINE CULTURE/COLONY COUNT: CPT | Mod: ORL

## 2022-01-01 PROCEDURE — 99309 SBSQ NF CARE MODERATE MDM 30: CPT | Mod: GV

## 2022-01-01 PROCEDURE — 99318 PR ANNUAL NURSING FAC ASSESSMNT, STABLE: CPT | Performed by: NURSE PRACTITIONER

## 2022-01-01 RX ORDER — METHADONE HYDROCHLORIDE 5 MG/1
2.5 TABLET ORAL EVERY 12 HOURS
COMMUNITY

## 2022-01-01 RX ORDER — AMOXICILLIN 250 MG
1 CAPSULE ORAL DAILY PRN
COMMUNITY

## 2022-01-01 RX ORDER — LORAZEPAM 2 MG/ML
0.25 CONCENTRATE ORAL EVERY 6 HOURS PRN
COMMUNITY

## 2022-01-01 RX ORDER — MORPHINE SULFATE 20 MG/5ML
2.5 SOLUTION ORAL
COMMUNITY

## 2022-01-01 RX ORDER — HALOPERIDOL 2 MG/ML
0.5 SOLUTION ORAL
COMMUNITY

## 2022-01-01 RX ORDER — BISACODYL 10 MG
10 SUPPOSITORY, RECTAL RECTAL DAILY PRN
COMMUNITY

## 2022-01-01 RX ORDER — LORATADINE 10 MG/1
10 TABLET ORAL DAILY PRN
Start: 2022-01-01

## 2022-01-01 ASSESSMENT — MIFFLIN-ST. JEOR: SCORE: 1256.92

## 2022-01-05 NOTE — PATIENT INSTRUCTIONS
Lamar Horta  1935  1) Discontinue loperamide 2 mg PRN (the one that states after every loose stools...)  2) BMP 1/10 and every 3 months.  Nissa George, IRASEMA CNP on 1/6/2022 at 1:52 PM

## 2022-01-05 NOTE — LETTER
1/5/2022        RE: Liberty Horta  [facility]  [address] [address] [address]         HEALTH GERIATRIC SERVICES  Chief Complaint   Patient presents with     Annual Comprehensive Nursing Home     [place] Medical Record Number:  [id number]  Place of Service where encounter took place:  [facility]  () [[id number]]    HPI:    Liberty Horta  is a 86 year old  (1935), who is being seen today for an annual comprehensive visit. HPI information obtained from: facility chart records, facility staff, patient report and [facility] chart review.     Liberty Horta is 86 year old female with PMH significant for atrial fib on coumadin, s/p pace maker, hypothyroidism, HTN, OA, spinal stenosis, GERD, anemia. HLD, urinary retention, parkinson's, CHF, CKD3.    Liberty seen today in her room sitting in her wheelchair. Reports overall she is doing well.    Reports her pain is much improved and hasn't been a problem for her lately.    Denies SOB, CP, dizziness, swelling to legs. She has tubi  in place.     INR today 2.0    With dementia. Was started on donepezil last fall for anxious features, hallucinations, nightmares at that time. Symptoms seemed to be improving after starting the medication. She tells me today her mood is good. Denies feeling anxious.     Is on levothyroxine for hypothyroidism. Last check WNL after dose was adjusted 9/25.     ALLERGIES: Meperidine, No clinical screening - see comments, Propoxyphene n-apap, and Tramadol  PAST MEDICAL HISTORY:   Past Medical History:   Diagnosis Date     Basal cell carcinoma      Chronic atrial fibrillation (H)      DVT (deep vein thrombosis) in pregnancy      Esophageal reflux      Hypertension      Hypothyroid      Osteoarthritis      Pacemaker      Renal insufficiency       PAST SURGICAL HISTORY:  has a past surgical history that includes orthopedic surgery; Open reduction internal fixation femur distal  (Right, 10/6/2016); and Implant pacemaker.  IMMUNIZATIONS:  Immunization History   Administered Date(s) Administered     COVID-19,PF,Moderna 11/18/2021     COVID-19,PF,Pfizer (12+ Yrs) 01/20/2021, 02/10/2021     DTaP, Unspecified 10/08/2012     FLU 6-35 months 10/14/2011     Flu 65+ Years 10/08/2018     A8z1-10 Novel Flu 02/01/2010     Influenza (H1N1) 02/01/2010     Influenza (High Dose) 3 valent vaccine 11/14/2014, 10/26/2015, 09/20/2017, 09/26/2017, 10/01/2019     Influenza (IIV3) PF 10/28/2003, 11/02/2004, 10/25/2005, 11/21/2006, 11/12/2007, 11/24/2008, 10/19/2009, 10/27/2010, 10/14/2011, 10/08/2012, 12/04/2013     Influenza, Quad, High Dose, Pf, 65yr+ (Fluzone HD) 09/30/2020     Pneumo Conj 13-V (2010&after) 01/18/2017     Pneumococcal 23 valent 05/01/2001, 11/21/2006     Td (Adult), Adsorbed 03/17/2003     Tdap (Adacel,Boostrix) 10/08/2012     Zoster vaccine, live 10/21/2011, 10/21/2011     Above immunizations pulled from [facility]. [facility] and facility records also reconciled. Outstanding information sent to  to update [facility].  Future immunizations are not needed at this point as all recommended immunizations are up to date.       Current Outpatient Medications:      acetaminophen (ACETAMINOPHEN EXTRA STRENGTH) 500 MG tablet, Take 2 tablets (1,000 mg) by mouth 3 times daily, Disp: 112 tablet, Rfl: PRN     ASPERCREME LIDOCAINE 4 % Patch, APPLY 1 PATCH TOPICALLY TO NECK AND APPLY 1 PATCH TOPICALLY TO RIGHT KNEE ONCE DAILY (ON FOR 12 HOURS, OFF FOR 12 HOURS), Disp: 30 patch, Rfl: 97     atorvastatin (LIPITOR) 40 MG tablet, TAKE 1 TABLET BY MOUTH AT BEDTIME, Disp: 28 tablet, Rfl: PRN     chlorhexidine (PERIDEX) 0.12 % solution, Swish and spit 15 mLs in mouth 2 times daily, Disp: , Rfl:      DILT- MG 24 hr capsule, TAKE 1 CAPSULE BY MOUTH ONCE DAILY, Disp: 28 capsule, Rfl: PRN     donepezil (ARICEPT) 5 MG tablet, Take 1 tablet (5 mg) by mouth daily, Disp: , Rfl:      furosemide  (LASIX) 40 MG tablet, Take 1.5 tablets (60 mg) by mouth daily, Disp: 60 tablet, Rfl: 3     levothyroxine (SYNTHROID/LEVOTHROID) 150 MCG tablet, Take 1 tablet (150 mcg) by mouth daily, Disp: , Rfl:      loperamide (IMODIUM A-D) 2 MG tablet, Take 1 tablet (2 mg) by mouth 4 times daily as needed for diarrhea, Disp:  , Rfl:      loratadine (CLARITIN) 10 MG tablet, Take 1 tablet (10 mg) by mouth daily as needed for allergies, Disp: , Rfl:      Menthol, Topical Analgesic, 4 % GEL, Externally apply 1 Application topically 2 times daily. May also externally apply 1 Application 2 times daily as needed., Disp: 89 mL, Rfl: 4     menthol-zinc oxide (CALMOSEPTINE) 0.44-20.6 % OINT ointment, APPLY A THIN LAYER TO AFFECTED AREA(S) TWICE DAILY;& WITH EACH INCONTINENCE CARE, Disp: 113 g, Rfl: 97     METAMUCIL 0.52 g capsule, TAKE 1 CAPSULE BY MOUTH TWICE DAILY, Disp: 56 capsule, Rfl: PRN     Multiple Vitamins-Minerals (TAB-A-IRIS) TABS, TAKE 1 TABLET BY MOUTH ONCE DAILY, Disp: 28 tablet, Rfl: PRN     potassium chloride ER (K-TAB) 20 MEQ CR tablet, Take 2 tablets (40 mEq) by mouth daily, Disp: , Rfl:      pyrithione zinc (SELSUN BLUE/HEAD AND SHOULDERS) 1 % external shampoo, Apply 1 mL topically daily as needed for irritation, Disp: , Rfl:      sotalol (BETAPACE) 80 MG tablet, TAKE 1 TABLET BY MOUTH TWICE DAILY WITH MEALS, Disp: 56 tablet, Rfl: PRN     Warfarin Therapy Reminder, 1 each continuous prn (per INR), Disp: , Rfl:      Case Management:  I have reviewed the facility/SNF care plan/MDS, including the falls risk, nutrition and pain screening. I also reviewed the current immunizations, and preventive care.. Future cancer screening is not clinically indicated secondary to age/goals of care Patient's desire to return to the community is not present. Current Level of Care is appropriate.    Advance Directive Discussion:    I reviewed the current advanced directives as reflected in EPIC, the POLST and the facility chart, and  "verified the congruency of orders . I contacted the first party Michele and left a message regarding the plan of Care.  I did not due to cognitive impairment review the advance directives with the resident. Patient's goal is pain control and comfort.    Team Discussion:  I communicated with the appropriate disciplines involved with the Plan of Care:   Nursing    Information reviewed:  Medications, vital signs, orders, and nursing notes.    ROS:  10 point ROS of systems including Constitutional, Eyes, Respiratory, Cardiovascular, Gastroenterology, Genitourinary, Integumentary, Musculoskeletal, Psychiatric were all negative except for pertinent positives noted in my HPI.    Vitals:  /76   Pulse 61   Temp 98  F (36.7  C)   Resp 18   Ht 1.575 m (5' 2\")   Wt 85.4 kg (188 lb 3.2 oz)   SpO2 96%   BMI 34.42 kg/m   Body mass index is 34.42 kg/m .  Exam:  GENERAL APPEARANCE:  Alert, in no distress  ENT:  Mouth and posterior oropharynx normal, moist mucous membranes  EYES:  Small amount of discharge to lids today, EOM, conjunctivae, pupils and irises normal  RESP:  respiratory effort and palpation of chest normal, lungs clear to auscultation   CV:  Palpation and auscultation of heart done , irregular rate and rhythm, no murmur, rub, or gallop  ABDOMEN:  normal bowel sounds, soft, nontender, no hepatosplenomegaly or other masses  M/S:   Generalized edema to lower extremities; tubi  in place; wheelchair bound  SKIN:  With dressing to left chest over wound- did not visualize wound today  NEURO:   Moves extremities freely, increased muscle tone  PSYCH:  insight and judgement impaired, memory impaired , affect and mood ok    Lab/Diagnostic data:   Labs done in SNF are in [place] EPIC. Please refer to them using [employer] Everywhere. and Recent labs in EPIC reviewed by me today.     ASSESSMENT/PLAN  (!48.20) Chronic atrial fibrillation (H)  Comment: INR today 2.0  HR controlled: 64, 62, 74  Plan: Continue coumadin 4 " mg po daily. Next INR 1 month. Continue diltiazem 180 mg daily, sotalol 80 mg BID. Monitor HR.     (I50.32) Chronic diastolic congestive heart failure (H)  (I12.9,  N18.30) Benign hypertension with CKD (chronic kidney disease) stage III (H)  Comment: appears euvolemic- denies SOB, CP, no pitting edema to legs  - BP controlled 121/72, 126/79, 131/76; HR 64, 62, 74  BP goals are ~130 -165/60 -90 mmHg.This is higher than ACC and AHA recommendations due to goals of care, risk for hypotension, risk of dizziness and falls, risk of tissue/cerebral hypoperfusion and frailty. Patient is stable with current plan of care and routine assessment.    Wt Readings from Last 4 Encounters:   01/05/22 85.4 kg (188 lb 3.2 oz)   12/08/21 85.7 kg (189 lb)   11/12/21 84.8 kg (187 lb)   11/10/21 84.8 kg (186 lb 14.4 oz)     -creatinine stable at baseline    Plan: Continue lasix 60 mg daily, potassium 40 meq daily, diltiazem 180 mg daily, sotalol 80 mg BID, atorvastatin 40 mg daily. BMP 1/10 and every 3 months. Daily weights. Notify provider with weight gain >2 lbs in a day, >5 lbs in on week. Tubi . TONG diet. VS per policy. Adjust medications as needed.      (G20) Parkinson's disease (H)  Comment: patient and family had declined further workup or medication in the past for this  -currently EZ stand for transfers and WHEELCHAIR bound  Plan: Monitor symptoms and consider trial of sinemet if ability of hands is impaired.      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 May need to consider another agent for anxious symptoms if they return. LTC to assist with cares, meals, medication assistance, activities.      (M89.49) Primary osteoarthritis involving multiple joints  Comment: chronic, reports pain has been better  -does not tolerate narcotics- with history of hallucinations in the past  Plan: Continue tylenol TID, topical medications like menthol, Aspercreme.     (E03.9) Hypothyroidism, unspecified type  Comment: chronic   TSH   Date Value Ref Range Status   11/22/2021 3.05 0.40 - 4.00 mU/L Final   04/20/2021 1.68 0.40 - 4.00 mU/L Final     -levothyroxine dose reduced on 9/25  Plan: Continue levothyroxine 150 mcg daily. TSH annually     (E66.01) Morbid Obesity (H)  Comment: can exacerbate chronic health issues   Estimated body mass index is 34.42 kg/m  as calculated from the following:    Height as of this encounter: 1.575 m (5' 2\").    Weight as of this encounter: 85.4 kg (188 lb 3.2 oz).  Plan: Dietician following    Electronically signed by:  IRASEMA Delarosa CNP             Sincerely,        IRASEMA Delarosa CNP    "

## 2022-01-05 NOTE — PROGRESS NOTES
[facility] GERIATRIC SERVICES  Chief Complaint   Patient presents with     Annual Comprehensive Nursing Home     [facility] Record Number:  [id number]  Place of Service where encounter took place:  [facility]  () [[id number]]    HPI:    Liberty Horta  is a 86 year old  (1935), who is being seen today for an annual comprehensive visit. HPI information obtained from: facility chart records, facility staff, patient report and [facility] chart review.     Liberty Horta is 86 year old female with PMH significant for atrial fib on coumadin, s/p pace maker, hypothyroidism, HTN, OA, spinal stenosis, GERD, anemia. HLD, urinary retention, parkinson's, CHF, CKD3.    Liberty seen today in her room sitting in her wheelchair. Reports overall she is doing well.    Reports her pain is much improved and hasn't been a problem for her lately.    Denies SOB, CP, dizziness, swelling to legs. She has tubi  in place.     INR today 2.0    With dementia. Was started on donepezil last fall for anxious features, hallucinations, nightmares at that time. Symptoms seemed to be improving after starting the medication. She tells me today her mood is good. Denies feeling anxious.     Is on levothyroxine for hypothyroidism. Last check WNL after dose was adjusted 9/25.     ALLERGIES: Meperidine, No clinical screening - see comments, Propoxyphene n-apap, and Tramadol  PAST MEDICAL HISTORY:   Past Medical History:   Diagnosis Date     Basal cell carcinoma      Chronic atrial fibrillation (H)      DVT (deep vein thrombosis) in pregnancy      Esophageal reflux      Hypertension      Hypothyroid      Osteoarthritis      Pacemaker      Renal insufficiency       PAST SURGICAL HISTORY:  has a past surgical history that includes orthopedic surgery; Open reduction internal fixation femur distal (Right, 10/6/2016); and Implant pacemaker.  IMMUNIZATIONS:  Immunization History   Administered Date(s) Administered      COVID-19,PF,Moderna 11/18/2021     COVID-19,PF,Pfizer (12+ Yrs) 01/20/2021, 02/10/2021     DTaP, Unspecified 10/08/2012     FLU 6-35 months 10/14/2011     Flu 65+ Years 10/08/2018     I7x1-37 Novel Flu 02/01/2010     Influenza (H1N1) 02/01/2010     Influenza (High Dose) 3 valent vaccine 11/14/2014, 10/26/2015, 09/20/2017, 09/26/2017, 10/01/2019     Influenza (IIV3) PF 10/28/2003, 11/02/2004, 10/25/2005, 11/21/2006, 11/12/2007, 11/24/2008, 10/19/2009, 10/27/2010, 10/14/2011, 10/08/2012, 12/04/2013     Influenza, Quad, High Dose, Pf, 65yr+ (Fluzone HD) 09/30/2020     Pneumo Conj 13-V (2010&after) 01/18/2017     Pneumococcal 23 valent 05/01/2001, 11/21/2006     Td (Adult), Adsorbed 03/17/2003     Tdap (Adacel,Boostrix) 10/08/2012     Zoster vaccine, live 10/21/2011, 10/21/2011     Above immunizations pulled from [facility]. [facility] and facility records also reconciled. Outstanding information sent to  to update [facility].  Future immunizations are not needed at this point as all recommended immunizations are up to date.       Current Outpatient Medications:      acetaminophen (ACETAMINOPHEN EXTRA STRENGTH) 500 MG tablet, Take 2 tablets (1,000 mg) by mouth 3 times daily, Disp: 112 tablet, Rfl: PRN     ASPERCREME LIDOCAINE 4 % Patch, APPLY 1 PATCH TOPICALLY TO NECK AND APPLY 1 PATCH TOPICALLY TO RIGHT KNEE ONCE DAILY (ON FOR 12 HOURS, OFF FOR 12 HOURS), Disp: 30 patch, Rfl: 97     atorvastatin (LIPITOR) 40 MG tablet, TAKE 1 TABLET BY MOUTH AT BEDTIME, Disp: 28 tablet, Rfl: PRN     chlorhexidine (PERIDEX) 0.12 % solution, Swish and spit 15 mLs in mouth 2 times daily, Disp: , Rfl:      DILT- MG 24 hr capsule, TAKE 1 CAPSULE BY MOUTH ONCE DAILY, Disp: 28 capsule, Rfl: PRN     donepezil (ARICEPT) 5 MG tablet, Take 1 tablet (5 mg) by mouth daily, Disp: , Rfl:      furosemide (LASIX) 40 MG tablet, Take 1.5 tablets (60 mg) by mouth daily, Disp: 60 tablet, Rfl: 3     levothyroxine (SYNTHROID/LEVOTHROID)  150 MCG tablet, Take 1 tablet (150 mcg) by mouth daily, Disp: , Rfl:      loperamide (IMODIUM A-D) 2 MG tablet, Take 1 tablet (2 mg) by mouth 4 times daily as needed for diarrhea, Disp:  , Rfl:      loratadine (CLARITIN) 10 MG tablet, Take 1 tablet (10 mg) by mouth daily as needed for allergies, Disp: , Rfl:      Menthol, Topical Analgesic, 4 % GEL, Externally apply 1 Application topically 2 times daily. May also externally apply 1 Application 2 times daily as needed., Disp: 89 mL, Rfl: 4     menthol-zinc oxide (CALMOSEPTINE) 0.44-20.6 % OINT ointment, APPLY A THIN LAYER TO AFFECTED AREA(S) TWICE DAILY;& WITH EACH INCONTINENCE CARE, Disp: 113 g, Rfl: 97     METAMUCIL 0.52 g capsule, TAKE 1 CAPSULE BY MOUTH TWICE DAILY, Disp: 56 capsule, Rfl: PRN     Multiple Vitamins-Minerals (TAB-A-IRIS) TABS, TAKE 1 TABLET BY MOUTH ONCE DAILY, Disp: 28 tablet, Rfl: PRN     potassium chloride ER (K-TAB) 20 MEQ CR tablet, Take 2 tablets (40 mEq) by mouth daily, Disp: , Rfl:      pyrithione zinc (SELSUN BLUE/HEAD AND SHOULDERS) 1 % external shampoo, Apply 1 mL topically daily as needed for irritation, Disp: , Rfl:      sotalol (BETAPACE) 80 MG tablet, TAKE 1 TABLET BY MOUTH TWICE DAILY WITH MEALS, Disp: 56 tablet, Rfl: PRN     Warfarin Therapy Reminder, 1 each continuous prn (per INR), Disp: , Rfl:      Case Management:  I have reviewed the facility/SNF care plan/MDS, including the falls risk, nutrition and pain screening. I also reviewed the current immunizations, and preventive care.. Future cancer screening is not clinically indicated secondary to age/goals of care Patient's desire to return to the community is not present. Current Level of Care is appropriate.    Advance Directive Discussion:    I reviewed the current advanced directives as reflected in EPIC, the POLST and the facility chart, and verified the congruency of orders . I contacted the first party Michele and left a message regarding the plan of Care.  I did not due to  "cognitive impairment review the advance directives with the resident. Patient's goal is pain control and comfort.    Team Discussion:  I communicated with the appropriate disciplines involved with the Plan of Care:   Nursing    Information reviewed:  Medications, vital signs, orders, and nursing notes.    ROS:  10 point ROS of systems including Constitutional, Eyes, Respiratory, Cardiovascular, Gastroenterology, Genitourinary, Integumentary, Musculoskeletal, Psychiatric were all negative except for pertinent positives noted in my HPI.    Vitals:  /76   Pulse 61   Temp 98  F (36.7  C)   Resp 18   Ht 1.575 m (5' 2\")   Wt 85.4 kg (188 lb 3.2 oz)   SpO2 96%   BMI 34.42 kg/m   Body mass index is 34.42 kg/m .  Exam:  GENERAL APPEARANCE:  Alert, in no distress  ENT:  Mouth and posterior oropharynx normal, moist mucous membranes  EYES:  Small amount of discharge to lids today, EOM, conjunctivae, pupils and irises normal  RESP:  respiratory effort and palpation of chest normal, lungs clear to auscultation   CV:  Palpation and auscultation of heart done , irregular rate and rhythm, no murmur, rub, or gallop  ABDOMEN:  normal bowel sounds, soft, nontender, no hepatosplenomegaly or other masses  M/S:   Generalized edema to lower extremities; tubi  in place; wheelchair bound  SKIN:  With dressing to left chest over wound- did not visualize wound today  NEURO:   Moves extremities freely, increased muscle tone  PSYCH:  insight and judgement impaired, memory impaired , affect and mood ok    Lab/Diagnostic data:   Labs done in SNF are in [facility]. Please refer to them using [facility] Everywhere. and Recent labs in [facility] reviewed by me today.     ASSESSMENT/PLAN  (!48.20) Chronic atrial fibrillation (H)  Comment: INR today 2.0  HR controlled: 64, 62, 74  Plan: Continue coumadin 4 mg po daily. Next INR 1 month. Continue diltiazem 180 mg daily, sotalol 80 mg BID. Monitor HR.     (I50.32) Chronic diastolic " congestive heart failure (H)  (I12.9,  N18.30) Benign hypertension with CKD (chronic kidney disease) stage III (H)  Comment: appears euvolemic- denies SOB, CP, no pitting edema to legs  - BP controlled 121/72, 126/79, 131/76; HR 64, 62, 74  BP goals are ~130 -165/60 -90 mmHg.This is higher than ACC and AHA recommendations due to goals of care, risk for hypotension, risk of dizziness and falls, risk of tissue/cerebral hypoperfusion and frailty. Patient is stable with current plan of care and routine assessment.    Wt Readings from Last 4 Encounters:   01/05/22 85.4 kg (188 lb 3.2 oz)   12/08/21 85.7 kg (189 lb)   11/12/21 84.8 kg (187 lb)   11/10/21 84.8 kg (186 lb 14.4 oz)     -creatinine stable at baseline    Plan: Continue lasix 60 mg daily, potassium 40 meq daily, diltiazem 180 mg daily, sotalol 80 mg BID, atorvastatin 40 mg daily. BMP 1/10 and every 3 months. Daily weights. Notify provider with weight gain >2 lbs in a day, >5 lbs in on week. Tubi . TONG diet. VS per policy. Adjust medications as needed.      (G20) Parkinson's disease (H)  Comment: patient and family had declined further workup or medication in the past for this  -currently EZ stand for transfers and WHEELCHAIR bound  Plan: Monitor symptoms and consider trial of sinemet if ability of hands is impaired.      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 May need to consider another agent for anxious  "symptoms if they return. LTC to assist with cares, meals, medication assistance, activities.      (M89.49) Primary osteoarthritis involving multiple joints  Comment: chronic, reports pain has been better  -does not tolerate narcotics- with history of hallucinations in the past  Plan: Continue tylenol TID, topical medications like menthol, Aspercreme.     (E03.9) Hypothyroidism, unspecified type  Comment: chronic   TSH   Date Value Ref Range Status   11/22/2021 3.05 0.40 - 4.00 mU/L Final   04/20/2021 1.68 0.40 - 4.00 mU/L Final     -levothyroxine dose reduced on 9/25  Plan: Continue levothyroxine 150 mcg daily. TSH annually     (E66.01) Morbid Obesity (H)  Comment: can exacerbate chronic health issues   Estimated body mass index is 34.42 kg/m  as calculated from the following:    Height as of this encounter: 1.575 m (5' 2\").    Weight as of this encounter: 85.4 kg (188 lb 3.2 oz).  Plan: Dietician following    Electronically signed by:  IRASEMA Delarosa CNP       Addendum: Son Michele called back. Feels donepezil has really helped with Liberty's symptoms and would like to continue with no dose adjustments. We reviewed POC as above. All questions were answered     "

## 2022-01-06 PROBLEM — G20.A1 DEMENTIA DUE TO PARKINSON'S DISEASE WITH BEHAVIORAL DISTURBANCE (H): Status: ACTIVE | Noted: 2022-01-01

## 2022-01-06 PROBLEM — F02.818 DEMENTIA DUE TO PARKINSON'S DISEASE WITH BEHAVIORAL DISTURBANCE (H): Status: ACTIVE | Noted: 2022-01-01

## 2022-02-12 NOTE — PROGRESS NOTES
[employer] Medicare Disenrollment    Member was disenrolled from [employer] Medicare effective: 8-31-21  Reason for disenrollment:   Moved to LTC    Tianna PAGAN   [facility] Care Coordinator   [phone] - work cell phone   [phone] - work fax

## 2022-02-17 PROBLEM — Z71.89 ADVANCE CARE PLANNING: Chronic | Status: RESOLVED | Noted: 2017-01-15 | Resolved: 2019-05-10

## 2022-03-18 NOTE — LETTER
3/18/2022        RE: Liberty Horta  [facility]  [id number]   [place] [address]        Liberty Horta is a 87 year old female seen March 18, 2022 at [facility] where she has resided for 7 months (admit 8/2021) transferred to LTC from [place] for ongoing cognitive and functional decline.  She is seen on the unit up to specialized WC.   She is alert, but limited verbal skills and not able to give any meaningful history     By chart review, patient last hospitalized in June 2019 with a nondisplaced L5 lateral vertebral body fracture and severe canal stenosis at L3-4.  She has significant OA/DJD with h/o left TKA and right hip ORIF.    No longer ambulatory.   She has had Parkinsonism noted by prior physician, but pt and family have declined any further workup or medication.  She also has longstanding atrial fibrillation and has a pacemaker, anticoagulated with warfarin    She has had device checks, and followed by Cardiology clinic, not sure when she was last seen there.      Past Medical History:   Diagnosis Date     Basal cell carcinoma      Chronic atrial fibrillation (H)      DVT (deep vein thrombosis) in pregnancy      Esophageal reflux      Hypertension      Hypothyroid      Osteoarthritis      Pacemaker      Renal insufficiency    Parkinsonism    Past Surgical History:   Procedure Laterality Date     IMPLANT PACEMAKER       OPEN REDUCTION INTERNAL FIXATION FEMUR DISTAL Right 10/6/2016    Procedure: OPEN REDUCTION INTERNAL FIXATION FEMUR DISTAL;  Surgeon: Filipe Coburn MD;  Location:  OR     ORTHOPEDIC SURGERY      Knee replacement left in 2011     SH:   for 30 years.   She has 7 children.  Son Romaine is POA  Pt resided at [facility] 2/2020-8/2021    Non smoker    ROS: now WC bound with assist for transfers  Wt Readings from Last 5 Encounters:   03/18/22 84 kg (185 lb 3.2 oz)   01/05/22 85.4 kg (188 lb 3.2 oz)   12/08/21 85.7 kg (189 lb)   11/12/21  "84.8 kg (187 lb)   11/10/21 84.8 kg (186 lb 14.4 oz)      EXAM: Up to WC, mild distress  BP (!) 153/83   Pulse 61   Temp 97.7  F (36.5  C)   Resp 18   Ht 1.575 m (5' 2\")   Wt 84 kg (185 lb 3.2 oz)   SpO2 96%   BMI 33.87 kg/m     +dysphonia  Pt has a 3-4 cm left preauricular LN that is not tender.  Fixed, no skin changes.      Still lesion left chest wall with is granular, slightly heaped up, about 3 cm across.   Scant drainage.      Neck supple without adenopathy   Lungs clear bilaterally with fair air movement   Heart irreg irreg at 70, 1/6 NELLY  Abd soft, NT, no distention or guarding, +BS  Ext 1+ ankle edema wearing tubi-   Neuro: confused history, increased tone diffusely, left hand resting tremor    Psych: affect okay     B12 level 362  Last Comprehensive Metabolic Panel:  Sodium   Date Value Ref Range Status   01/10/2022 141 133 - 144 mmol/L Final     Potassium   Date Value Ref Range Status   01/10/2022 3.9 3.4 - 5.3 mmol/L Final     Carbon Dioxide (CO2)   Date Value Ref Range Status   01/10/2022 31 20 - 32 mmol/L Final     Glucose   Date Value Ref Range Status   01/10/2022 94 70 - 99 mg/dL Final     Urea Nitrogen   Date Value Ref Range Status   01/10/2022 19 7 - 30 mg/dL Final     Creatinine   Date Value Ref Range Status   01/10/2022 0.82 0.52 - 1.04 mg/dL Final     GFR Estimate   Date Value Ref Range Status   01/10/2022 70 >60 mL/min/1.73m2 Final     Calcium   Date Value Ref Range Status   01/10/2022 9.8 8.5 - 10.1 mg/dL Final     ECHO 2016   Interpretation Summary  Left ventricular systolic function is low normal.  The visual ejection fraction is estimated at 50-55%.  The left ventricle is normal in size.  There is mild concentric left ventricular hypertrophy.  No regional wall motion abnormalities noted.  The right ventricle is normal in structure, function and size.  Doppler interrogation does not demonstrate significant stenosis or insufficiency involving cardiac valves.      IMP/PLAN: "   (R59.1) Lymphadenopathy  (L98.9) Skin lesion of chest wall  Comment: left chest wall non-healing lesion and left AC lymph node     Plan: not sure if these are related.   Could consider biopsy of either to help guide decision -making.        (M17.11) Primary osteoarthritis of right knee    (M89.49) Primary osteoarthritis involving multiple joints  Comment: persistent right knee pain   Now EZ stand lift for transfers and WC bound       Plan: diclofenac gel / Aspercreme lidocaine patch  Continue scheduled acetaminophen tid with prn available; agree that addition of gabapentin might be a good option if recurrence of pain       (I48.11) Longstanding persistent atrial fibrillation (H)  Comment: s/p pacemaker    Pulse Readings from Last 4 Encounters:   03/18/22 61   01/05/22 61   12/08/21 63   11/12/21 62      Plan: sotalol 80 mg bid with meals and diltiazem 180 mg/day for VR control.   Warfarin per INR for stroke prophylaxis.       (I12.9,  N18.3) Benign hypertension with CKD (chronic kidney disease) stage III (H)  Comment:  CrCl 50   BP Readings from Last 3 Encounters:   03/18/22 (!) 153/83   01/05/22 131/76   12/08/21 (!) 147/86      Plan: diltiazem 180 mg/day, follow bps and BMP      (G20) Parkinsonism, unspecified Parkinsonism type (H)  Comment: clear symptoms with dysphonia, left hand resting tremor, rigidity and decline in mobility   Plan: she has been seen by [facility] PHYSICAL THERAPY / OCCUPATIONAL THERAPY with [place] tx in the past, has ongoing physical decline, now EZ stand transfers and WC bound.       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      Funmi Mendez MD         Sincerely,        Funmi Mendez MD

## 2022-03-18 NOTE — PATIENT INSTRUCTIONS
Orders  Liberty Horta  1935     1. Discontinue donepezil       IRASEMA Fletcher CNP on 3/18/2022 at 12:47 PM

## 2022-03-18 NOTE — PROGRESS NOTES
Liberty Horta is a 87 year old female seen March 18, 2022 at [facility] where she has resided for 7 months (admit 8/2021) transferred to LTC from [place] for ongoing cognitive and functional decline.  She is seen on the unit up to specialized WC.   She is alert, but limited verbal skills and not able to give any meaningful history     By chart review, patient last hospitalized in June 2019 with a nondisplaced L5 lateral vertebral body fracture and severe canal stenosis at L3-4.  She has significant OA/DJD with h/o left TKA and right hip ORIF.    No longer ambulatory.   She has had Parkinsonism noted by prior physician, but pt and family have declined any further workup or medication.  She also has longstanding atrial fibrillation and has a pacemaker, anticoagulated with warfarin    She has had device checks, and followed by Cardiology clinic, not sure when she was last seen there.      Past Medical History:   Diagnosis Date     Basal cell carcinoma      Chronic atrial fibrillation (H)      DVT (deep vein thrombosis) in pregnancy      Esophageal reflux      Hypertension      Hypothyroid      Osteoarthritis      Pacemaker      Renal insufficiency    Parkinsonism    Past Surgical History:   Procedure Laterality Date     IMPLANT PACEMAKER       OPEN REDUCTION INTERNAL FIXATION FEMUR DISTAL Right 10/6/2016    Procedure: OPEN REDUCTION INTERNAL FIXATION FEMUR DISTAL;  Surgeon: Filipe Coburn MD;  Location:  OR     ORTHOPEDIC SURGERY      Knee replacement left in 2011     SH:   for 30 years.   She has 7 children.  Son Romaine is POA  Pt resided at [facility] 2/2020-8/2021    Non smoker    ROS: now WC bound with assist for transfers  Wt Readings from Last 5 Encounters:   03/18/22 84 kg (185 lb 3.2 oz)   01/05/22 85.4 kg (188 lb 3.2 oz)   12/08/21 85.7 kg (189 lb)   11/12/21 84.8 kg (187 lb)   11/10/21 84.8 kg (186 lb 14.4 oz)      EXAM: Up to WC, mild distress  BP (!) 153/83   Pulse 61   Temp  "97.7  F (36.5  C)   Resp 18   Ht 1.575 m (5' 2\")   Wt 84 kg (185 lb 3.2 oz)   SpO2 96%   BMI 33.87 kg/m     +dysphonia  Pt has a 3-4 cm left preauricular LN that is not tender.  Fixed, no skin changes.      Still lesion left chest wall with is granular, slightly heaped up, about 3 cm across.   Scant drainage.      Neck supple without adenopathy   Lungs clear bilaterally with fair air movement   Heart irreg irreg at 70, 1/6 NELLY  Abd soft, NT, no distention or guarding, +BS  Ext 1+ ankle edema wearing tubi-   Neuro: confused history, increased tone diffusely, left hand resting tremor    Psych: affect okay     B12 level 362  Last Comprehensive Metabolic Panel:  Sodium   Date Value Ref Range Status   01/10/2022 141 133 - 144 mmol/L Final     Potassium   Date Value Ref Range Status   01/10/2022 3.9 3.4 - 5.3 mmol/L Final     Carbon Dioxide (CO2)   Date Value Ref Range Status   01/10/2022 31 20 - 32 mmol/L Final     Glucose   Date Value Ref Range Status   01/10/2022 94 70 - 99 mg/dL Final     Urea Nitrogen   Date Value Ref Range Status   01/10/2022 19 7 - 30 mg/dL Final     Creatinine   Date Value Ref Range Status   01/10/2022 0.82 0.52 - 1.04 mg/dL Final     GFR Estimate   Date Value Ref Range Status   01/10/2022 70 >60 mL/min/1.73m2 Final     Calcium   Date Value Ref Range Status   01/10/2022 9.8 8.5 - 10.1 mg/dL Final     ECHO 2016   Interpretation Summary  Left ventricular systolic function is low normal.  The visual ejection fraction is estimated at 50-55%.  The left ventricle is normal in size.  There is mild concentric left ventricular hypertrophy.  No regional wall motion abnormalities noted.  The right ventricle is normal in structure, function and size.  Doppler interrogation does not demonstrate significant stenosis or insufficiency involving cardiac valves.      IMP/PLAN:   (R59.1) Lymphadenopathy  (L98.9) Skin lesion of chest wall  Comment: left chest wall non-healing lesion and left AC lymph node   "   Plan: not sure if these are related.   Could consider biopsy of either to help guide decision -making.        (M17.11) Primary osteoarthritis of right knee    (M89.49) Primary osteoarthritis involving multiple joints  Comment: persistent right knee pain   Now EZ stand lift for transfers and WC bound       Plan: diclofenac gel / Aspercreme lidocaine patch  Continue scheduled acetaminophen tid with prn available; agree that addition of gabapentin might be a good option if recurrence of pain       (I48.11) Longstanding persistent atrial fibrillation (H)  Comment: s/p pacemaker    Pulse Readings from Last 4 Encounters:   03/18/22 61   01/05/22 61   12/08/21 63   11/12/21 62      Plan: sotalol 80 mg bid with meals and diltiazem 180 mg/day for VR control.   Warfarin per INR for stroke prophylaxis.       (I12.9,  N18.3) Benign hypertension with CKD (chronic kidney disease) stage III (H)  Comment:  CrCl 50   BP Readings from Last 3 Encounters:   03/18/22 (!) 153/83   01/05/22 131/76   12/08/21 (!) 147/86      Plan: diltiazem 180 mg/day, follow bps and BMP      (G20) Parkinsonism, unspecified Parkinsonism type (H)  Comment: clear symptoms with dysphonia, left hand resting tremor, rigidity and decline in mobility   Plan: she has been seen by [facility] PHYSICAL THERAPY / OCCUPATIONAL THERAPY with [place] tx in the past, has ongoing physical decline, now EZ stand transfers and WC bound.       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      Funmi Mendez MD

## 2022-03-21 NOTE — PROGRESS NOTES
"[facility] GERIATRICS    Chief Complaint   Patient presents with     RECHECK     HPI:  Liberty Horta is a 87 year old  (1935), who is being seen today for an episodic care visit at: [facility]  () [[id number]]. Today's concern is:     Seen today to follow up on non-healing wound on the left chest, noted on admission and initially attributed to previous pacemaker placement in the same general area. Per nursing, they have been dressing the chronic wound with a bandage, it never seems to get significantly better or worse. Seen last week by my colleague who noted a large fixed mass on the left anterior chain just inferior from the left angle of mandible. Seen today sitting up in her chair in her room in long term care. She reports neither the wound nor the enlarged lymph nodes are especially bothersome to her, denies pain or discomfort, non-tender on palpation. Chronic wound sits superior to pacemaker, covered in mepilex, wound bed is pink, raised, non-tender without drainage. She denies fever, chills, shortness of breath, night sweats, She does report some fatigue, does not think she slept well last night and had a nightmare. Discussed with family on phone call today, they have noted a general decline in function and progression of Parkinson's dementia and other chronic health problems. They do note a history of salivary gland infection that was treated with antibiotics.    Allergies, and PMH/PSH reviewed in EPIC today.  REVIEW OF SYSTEMS:  Limited secondary to cognitive impairment but today pt reports the above and 4 point ROS including Respiratory, CV, GI and , other than that noted in the HPI,  is negative    Objective:   BP (!) 142/73   Pulse 63   Temp 97.8  F (36.6  C)   Resp 18   Ht 1.575 m (5' 2\")   Wt 87.6 kg (193 lb 3.2 oz)   SpO2 98%   BMI 35.34 kg/m    GENERAL APPEARANCE:  Alert, in no distress  ENT:  Mouth and posterior oropharynx normal, moist mucous membranes, " soft-spoken  NECK:  neck mass large fixed mass on the left aneterior cervical chain inferior to the angle of mandible approximately 2 cm in diameter  RESP:  respiratory effort and palpation of chest normal, lungs clear to auscultation , no respiratory distress  CV:  Palpation and auscultation of heart done , regular rate and rhythm, no murmur, rub, or gallop, no edema  ABDOMEN:  normal bowel sounds, soft, nontender, no hepatosplenomegaly or other masses  M/S:   Gait and station abnormal transfers with assist, wheelchair for mobility  SKIN:  Inspection of skin and subcutaneous tissue baseline, non-healing chest wound, pink, raised, no drainage approx 4x4x1.5  NEURO:   Cranial nerves 2-12 are normal tested and grossly at patient's baseline  PSYCH:  memory impaired , affect and mood normal    Labs done in SNF are in [facility]. Please refer to them using [employer]/Care Everywhere. and Recent labs in EPIC reviewed by me today.     Assessment/Plan:  (S21.102D) Chronic chest wound, left, subsequent encounter  (primary encounter diagnosis)  (R22.1) Neck mass  Comment: Chronic, non-healing. Initially attributed to pacemaker placement in the general vicinity but would expect routine healing after several years. No signs of infection, concern for possible malignancy, concerned newly observed neck mass is likely lymph node and related. Wound care provider in house is agreeable to evaluation and possible biopsy.  Plan: Wound care provider evaluate and treat. Continue with wound treatment and routine skin monitoring per nursing. Pain management with diclofenac, aspercreme, tylenol scheduled and PRN.     (Z71.89) ACP (advance care planning)  Comment: Discussed new findings with son Michele and daughter Lorena today. They are voicing a strong preference for comfort focus, interested in Hospice services. I am not sure she actually qualifies at this point - no weight loss, no significant decline in function but suspect biopsy would  provide supporting diagnosis for admission. Family is agreeable with this plan of care, would not want to pursue aggressive treatment or care outside of the facility at this time per goals of care.  Plan: DNR/DNI, comfort focus, symptoms-directed treatment and workup. Consider hospice consult as appropriate. Chronic wound management and workup as above.       Electronically signed by: IRASEMA Fletcher CNP

## 2022-03-21 NOTE — LETTER
"    3/21/2022        RE: Liberty Horta  [facility]  [address]   [place] [address]        [facility]    Chief Complaint   Patient presents with     RECHECK     HPI:  Liberty Horta is a 87 year old  (1935), who is being seen today for an episodic care visit at: [facility]  () [[id number]]. Today's concern is:     Seen today to follow up on non-healing wound on the left chest, noted on admission and initially attributed to previous pacemaker placement in the same general area. Per nursing, they have been dressing the chronic wound with a bandage, it never seems to get significantly better or worse. Seen last week by my colleague who noted a large fixed mass on the left anterior chain just inferior from the left angle of mandible. Seen today sitting up in her chair in her room in long term care. She reports neither the wound nor the enlarged lymph nodes are especially bothersome to her, denies pain or discomfort, non-tender on palpation. Chronic wound sits superior to pacemaker, covered in mepilex, wound bed is pink, raised, non-tender without drainage. She denies fever, chills, shortness of breath, night sweats, She does report some fatigue, does not think she slept well last night and had a nightmare. Discussed with family on phone call today, they have noted a general decline in function and progression of Parkinson's dementia and other chronic health problems. They do note a history of salivary gland infection that was treated with antibiotics.    Allergies, and PMH/PSH reviewed in EPIC today.  REVIEW OF SYSTEMS:  Limited secondary to cognitive impairment but today pt reports the above and 4 point ROS including Respiratory, CV, GI and , other than that noted in the HPI,  is negative    Objective:   BP (!) 142/73   Pulse 63   Temp 97.8  F (36.6  C)   Resp 18   Ht 1.575 m (5' 2\")   Wt 87.6 kg (193 lb 3.2 oz)   SpO2 98%   BMI 35.34 " kg/m    GENERAL APPEARANCE:  Alert, in no distress  ENT:  Mouth and posterior oropharynx normal, moist mucous membranes, soft-spoken  NECK:  neck mass large fixed mass on the left aneterior cervical chain inferior to the angle of mandible approximately 2 cm in diameter  RESP:  respiratory effort and palpation of chest normal, lungs clear to auscultation , no respiratory distress  CV:  Palpation and auscultation of heart done , regular rate and rhythm, no murmur, rub, or gallop, no edema  ABDOMEN:  normal bowel sounds, soft, nontender, no hepatosplenomegaly or other masses  M/S:   Gait and station abnormal transfers with assist, wheelchair for mobility  SKIN:  Inspection of skin and subcutaneous tissue baseline, non-healing chest wound, pink, raised, no drainage approx 4x4x1.5  NEURO:   Cranial nerves 2-12 are normal tested and grossly at patient's baseline  PSYCH:  memory impaired , affect and mood normal    Labs done in SNF are in [facility]. Please refer to them using [employer]Care Everywhere. and Recent labs in EPIC reviewed by me today.     Assessment/Plan:  (S21.102D) Chronic chest wound, left, subsequent encounter  (primary encounter diagnosis)  (R22.1) Neck mass  Comment: Chronic, non-healing. Initially attributed to pacemaker placement in the general vicinity but would expect routine healing after several years. No signs of infection, concern for possible malignancy, concerned newly observed neck mass is likely lymph node and related. Wound care provider in house is agreeable to evaluation and possible biopsy.  Plan: Wound care provider evaluate and treat. Continue with wound treatment and routine skin monitoring per nursing. Pain management with diclofenac, aspercreme, tylenol scheduled and PRN.     (Z71.89) ACP (advance care planning)  Comment: Discussed new findings with son Michele and daughter Lorena today. They are voicing a strong preference for comfort focus, interested in Hospice services. I am not sure  she actually qualifies at this point - no weight loss, no significant decline in function but suspect biopsy would provide supporting diagnosis for admission. Family is agreeable with this plan of care, would not want to pursue aggressive treatment or care outside of the facility at this time per goals of care.  Plan: DNR/DNI, comfort focus, symptoms-directed treatment and workup. Consider hospice consult as appropriate. Chronic wound management and workup as above.       Electronically signed by: IRASEMA Fletcher CNP            Sincerely,        IRASEMA Fletcher CNP

## 2022-03-23 NOTE — PROGRESS NOTES
"[facility] GERIATRICS  [place] Medical Record Number:  [id number]  Place of Service where encounter took place: [facility]  () [[id number]]    HPI:    Liberty Horta is a 87 year old  (1935), who is being seen today for an episodic care visit at the above location. Today's concern is INR/Coumadin management for A. Fib    ROS/Subjective:  Bleeding Signs/Symptoms:  None  Thromboembolic Signs/Symptoms:  None  Medication Changes:  No  Dietary Changes:  No  Activity Changes: No  Bacterial/Viral Infection:  No  Missed Coumadin Doses:  None  On ASA: No  Other Concerns:  No    OBJECTIVE:  BP (!) 146/82   Pulse 60   Temp 98.2  F (36.8  C)   Resp 18   Ht 1.575 m (5' 2\")   Wt 82.8 kg (182 lb 9.6 oz)   SpO2 95%   BMI 33.40 kg/m    Last INR: 3.4 on 3/23  INR Today:  2.6  Current Dose:  Coumadin 3 mg MWF, 5 mg AOD, held dose 3/22  INR Flow sheet at [facility]:    ASSESSMENT:     Encounter for therapeutic drug monitoring  Long term (current) use of anticoagulants  Longstanding persistent atrial fibrillation (H)  Therapeutic INR for goal of 2-3    PLAN/ORDERS:     New Dose: Coumadin 5 mg MWF, 3 mg AOD     Next INR: 1 week      Electronically signed by:  IRASEMA Fletcher CNP     "

## 2022-03-23 NOTE — LETTER
"    3/23/2022        RE: Liberty Horta  [facility]  [address]   [place] [address]        [facility] Medical Record Number:  [id number]  Place of Service where encounter took place: [facility]  () [[id number]]    HPI:    Liberty Horta is a 87 year old  (1935), who is being seen today for an episodic care visit at the above location. Today's concern is INR/Coumadin management for A. Fib    ROS/Subjective:  Bleeding Signs/Symptoms:  None  Thromboembolic Signs/Symptoms:  None  Medication Changes:  No  Dietary Changes:  No  Activity Changes: No  Bacterial/Viral Infection:  No  Missed Coumadin Doses:  None  On ASA: No  Other Concerns:  No    OBJECTIVE:  BP (!) 146/82   Pulse 60   Temp 98.2  F (36.8  C)   Resp 18   Ht 1.575 m (5' 2\")   Wt 82.8 kg (182 lb 9.6 oz)   SpO2 95%   BMI 33.40 kg/m    Last INR: 3.4 on 3/23  INR Today:  2.6  Current Dose:  Coumadin 3 mg MWF, 5 mg AOD, held dose 3/22  INR Flow sheet at :    ASSESSMENT:     Encounter for therapeutic drug monitoring  Long term (current) use of anticoagulants  Longstanding persistent atrial fibrillation (H)  Therapeutic INR for goal of 2-3    PLAN/ORDERS:     New Dose: Coumadin 5 mg MWF, 3 mg AOD     Next INR: 1 week      Electronically signed by:  IRASEMA Fletcher CNP           Sincerely,        IRASEMA Fletcher CNP    "

## 2022-03-31 NOTE — PATIENT INSTRUCTIONS
Orders  Liberty Horta  1935     1. Increase lasix to 80 mg daily x 3 days dx: edema, SOB  2. BMP 4/7/22 dx: edema  3. Diclofenac 2 g BID to knees and 2 g BID PRN for pain to knees, shoulders, neck dx: pain  4. Melatonin 1 mg PRN for insomnia at HS ok to give in addition to currently scheduled dose.  Dx: insomnia  5. Tylenol 1000 mg prn for pain daily in addition to currently scheduled TID dosing. Dx Pain         Martha IRASEMA Denny CNP on 3/31/2022 at 1:37 PM

## 2022-03-31 NOTE — PROGRESS NOTES
"[facility] GERIATRICS    Chief Complaint   Patient presents with     RECHECK     HPI:  Liberty Horta is a 87 year old  (1935), who is being seen today for an episodic care visit at: [facility]  () [[id number]]. Today's concern is:     Seen today to follow-up on multiple medical conditions.  Seen for follow-up on nonhealing chest wound present on admission to the facility, previously presumed be related to pacemaker placement.  Nursing had been dressing the chronic wound with bandage, no significant changes since admission.  More recently noted to have a large fixed mass on the left anterior cervical chain.  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  Per previous discussions with family, comfort focused seen and interested in hospice, declining further treatment but were interested in pursuing biopsy in the hopes of qualifying her for hospice services.  Seen sitting up in her room today in her wheelchair, she denies pain or discomfort associated with the left neck mass or chronic chest wound.  She is declining further work-up, tells me at the mention of CT \"I would not want to do chemotherapy.\"  She is concerned with ongoing insomnia, started on melatonin at her request despite previous reports of increased insomnia on melatonin.  She had a long history of insomnia with nightmares keeping her up and then she doses during the day.  She received her first dose last night which was effective, wondering about another dose for breakthrough as needed.    During her visit, daughter Xochitl Garcia arrives from visit.  She reports concerns with increased trouble breathing recently.  March does have a history of CHF, noted edema on exam did previously diuretics had to be titrated quite frequently.  Xochitl Garcia also notes increased complaints of generalized pain, usually in the " "evenings.  Typically in knees, neck, sometimes in head though not headaches.  Her pain can be difficult to assess at times due to cognitive impairments per Xochitl Garcia's report.  Family again is very interested in pursuing hospice care and wondering if she will qualify if biopsy results come back negative.    INR today 2.1 no signs or symptoms of thromboembolic event or overt bleeding.    Allergies, and PMH/PSH reviewed in EPIC today.  REVIEW OF SYSTEMS:  Limited secondary to cognitive impairment but today pt reports the above and 4 point ROS including Respiratory, CV, GI and , other than that noted in the HPI,  is negative    Objective:   BP (!) 168/72   Pulse 60   Temp 97.4  F (36.3  C)   Resp 18   Ht 1.575 m (5' 2\")   Wt 83.9 kg (185 lb)   SpO2 95%   BMI 33.84 kg/m    GENERAL APPEARANCE:  Alert, in no distress  ENT:  Mouth and posterior oropharynx normal, moist mucous membranes, Soft-spoken  RESP:  respiratory effort and palpation of chest normal, no respiratory distress, diminished breath sounds In the lower extremities due to body habitus and positioning  CV:  Palpation and auscultation of heart done , regular rate and rhythm, no murmur, rub, or gallop, peripheral edema 2+ in BLE  ABDOMEN:  normal bowel sounds, soft, nontender, no hepatosplenomegaly or other masses  M/S:   Gait and station abnormal Transfers with assist, wheelchair for mobility  SKIN:  Chronic wound on left chest dressed with bandage, large fixed left neck mass, nontender  NEURO:   Cranial nerves 2-12 are normal tested and grossly at patient's baseline    Labs done in SNF are in [facility]. Please refer to them using [facility]/Care Everywhere. and Recent labs in [facility] reviewed by me today.     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  Declining further work-up per goals of care, family " interested in hospice services.  Question whether she may qualify for hospice regardless based on low functional status, increased edema?  Plan: Continue with dressing changes, site monitoring per nursing.  Comfort focus    (R52) Pain  (M89.49) Primary osteoarthritis involving multiple joints  Comment: Generalized chronic pain, increased recently per family report.  We will trial diclofenac joints, increase APAP limit to 4 g per goals of care and added as needed dosing additional treatment currently scheduled.  Biofreeze trial x2 weeks recently and effective, continue indefinitely.  May also be a candidate for gabapentin, sensitive to narcotics per chart review.  Plan: Continue Tylenol 3 times daily, Biofreeze.  Add Tylenol 1000 mg as needed daily, Voltaren twice daily to knees and twice daily as needed to knees, shoulders, neck.     (G47.00) Insomnia, unspecified type  Comment: Chronic with nightmares.  Melatonin effective per patient report, history of worsened nightmares on melatonin perhaps due to higher doses?  Will avoid higher doses given his history and to avoid daytime fatigue.  Plan: Melatonin 1 mg at at bedtime and 1 mg as needed for insomnia, okay to give in addition to scheduled dose.    (I50.32) Chronic diastolic congestive heart failure (H)  (R06.02) Shortness of breath  Comment: Edematous today on exam, suspect this is contributing to shortness of breath.  Also note SBP elevation above baseline, typically less than 150.  Daughter unsure whether she would be able to use an inhaler, given her hypovolemic appearance, will increase Lasix to 80 mg daily x3 days, reassess early next week.  Consider nebulizer treatments for shortness of breath if ineffective.  Plan: Lasix 80 mg x 3 days, then resume 60 mg daily.  BMP 4/6.  Tubigrip's, encourage elevation.    (I48.11) Longstanding persistent atrial fibrillation (H)  (Z79.01) Long term current use of anticoagulant therapy  (Z51.81) Encounter for therapeutic  drug monitoring  Comment: INR therapeutic, no signs of bleeding  Plan: Continue Coumadin 5 mg Monday Wednesday Friday, 3 mg all other days.  Recheck INR in 4 weeks.      Electronically signed by: IRASEMA Fletcher CNP

## 2022-03-31 NOTE — LETTER
"    3/31/2022        RE: Liberty Horta  [facility]  [address]   [place] [address]        [facility]    Chief Complaint   Patient presents with     RECHECK     HPI:  Liberty Horta is a 87 year old  (1935), who is being seen today for an episodic care visit at: [facility]  () [[id number]]. Today's concern is:     Seen today to follow-up on multiple medical conditions.  Seen for follow-up on nonhealing chest wound present on admission to the facility, previously presumed be related to pacemaker placement.  Nursing had been dressing the chronic wound with bandage, no significant changes since admission.  More recently noted to have a large fixed mass on the left anterior cervical chain.  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  Per previous discussions with family, comfort focused seen and interested in hospice, declining further treatment but were interested in pursuing biopsy in the hopes of qualifying her for hospice services.  Seen sitting up in her room today in her wheelchair, she denies pain or discomfort associated with the left neck mass or chronic chest wound.  She is declining further work-up, tells me at the mention of CT \"I would not want to do chemotherapy.\"  She is concerned with ongoing insomnia, started on melatonin at her request despite previous reports of increased insomnia on melatonin.  She had a long history of insomnia with nightmares keeping her up and then she doses during the day.  She received her first dose last night which was effective, wondering about another dose for breakthrough as needed.    During her visit, daughter Xochitl Garcia arrives from visit.  She reports concerns with increased trouble breathing recently.  March does have a history of CHF, noted edema on exam did previously " "diuretics had to be titrated quite frequently.  Xochitl Garcia also notes increased complaints of generalized pain, usually in the evenings.  Typically in knees, neck, sometimes in head though not headaches.  Her pain can be difficult to assess at times due to cognitive impairments per Xochitl Garcia's report.  Family again is very interested in pursuing hospice care and wondering if she will qualify if biopsy results come back negative.    INR today 2.1 no signs or symptoms of thromboembolic event or overt bleeding.    Allergies, and PMH/PSH reviewed in EPIC today.  REVIEW OF SYSTEMS:  Limited secondary to cognitive impairment but today pt reports the above and 4 point ROS including Respiratory, CV, GI and , other than that noted in the HPI,  is negative    Objective:   BP (!) 168/72   Pulse 60   Temp 97.4  F (36.3  C)   Resp 18   Ht 1.575 m (5' 2\")   Wt 83.9 kg (185 lb)   SpO2 95%   BMI 33.84 kg/m    GENERAL APPEARANCE:  Alert, in no distress  ENT:  Mouth and posterior oropharynx normal, moist mucous membranes, Soft-spoken  RESP:  respiratory effort and palpation of chest normal, no respiratory distress, diminished breath sounds In the lower extremities due to body habitus and positioning  CV:  Palpation and auscultation of heart done , regular rate and rhythm, no murmur, rub, or gallop, peripheral edema 2+ in BLE  ABDOMEN:  normal bowel sounds, soft, nontender, no hepatosplenomegaly or other masses  M/S:   Gait and station abnormal Transfers with assist, wheelchair for mobility  SKIN:  Chronic wound on left chest dressed with bandage, large fixed left neck mass, nontender  NEURO:   Cranial nerves 2-12 are normal tested and grossly at patient's baseline    Labs done in SNF are in [facility]. Please refer to them using [facility]/Care Everywhere. and Recent labs in [facility] reviewed by me today.     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  Declining further work-up per goals of care, family interested in hospice services.  Question whether she may qualify for hospice regardless based on low functional status, increased edema?  Plan: Continue with dressing changes, site monitoring per nursing.  Comfort focus    (R52) Pain  (M89.49) Primary osteoarthritis involving multiple joints  Comment: Generalized chronic pain, increased recently per family report.  We will trial diclofenac joints, increase APAP limit to 4 g per goals of care and added as needed dosing additional treatment currently scheduled.  Biofreeze trial x2 weeks recently and effective, continue indefinitely.  May also be a candidate for gabapentin, sensitive to narcotics per chart review.  Plan: Continue Tylenol 3 times daily, Biofreeze.  Add Tylenol 1000 mg as needed daily, Voltaren twice daily to knees and twice daily as needed to knees, shoulders, neck.     (G47.00) Insomnia, unspecified type  Comment: Chronic with nightmares.  Melatonin effective per patient report, history of worsened nightmares on melatonin perhaps due to higher doses?  Will avoid higher doses given his history and to avoid daytime fatigue.  Plan: Melatonin 1 mg at at bedtime and 1 mg as needed for insomnia, okay to give in addition to scheduled dose.    (I50.32) Chronic diastolic congestive heart failure (H)  (R06.02) Shortness of breath  Comment: Edematous today on exam, suspect this is contributing to shortness of breath.  Also note SBP elevation above baseline, typically less than 150.  Daughter unsure whether she would be able to use an inhaler, given her hypovolemic appearance, will increase Lasix to 80 mg daily x3 days, reassess early next week.  Consider nebulizer treatments for shortness of breath if ineffective.  Plan: Lasix 80 mg x 3 days, then resume 60 mg daily.  BMP 4/6.  Tubigrip's, encourage elevation.    (I48.11) Longstanding  persistent atrial fibrillation (H)  (Z79.01) Long term current use of anticoagulant therapy  (Z51.81) Encounter for therapeutic drug monitoring  Comment: INR therapeutic, no signs of bleeding  Plan: Continue Coumadin 5 mg Monday Wednesday Friday, 3 mg all other days.  Recheck INR in 4 weeks.      Electronically signed by: IRASEMA Fletcher CNP             Sincerely,        IRASEMA Fletcher CNP

## 2022-04-05 NOTE — PATIENT INSTRUCTIONS
Orders  Liberty Horta  1935     1. [facility] hospice eval and treat dx: Parkinsons  2. Discontinue current lasix orders  3. Lasix 80 mg daily dx: CHF      IRASEMA Fletcher CNP on 4/5/2022 at 9:30 AM

## 2022-04-05 NOTE — PROGRESS NOTES
[facility] GERIATRICS    Chief Complaint   Patient presents with     Nursing Home Acute     edema     HPI:  Liberty Horta is a 87 year old  (1935), who is being seen today for an episodic care visit at: [facility]  () [[id number]]. Today's concern is:     Seen today to follow up on shortness of breath, pain reported 3/31. Patient and her daughter reported increased shortness of breath with chronic leg edema, lasix was increased to 80 mg daily through the weekend. Also complaining of increased generalized pain - knees, neck, head. Voltaren was added, tylenol limit liberalized to 4 g daily per goals of care for comfort. Today, seen sitting up in her wheelchair. She is reporting improvement in shortness of breath. Denying pain today, does recall addition of Voltaren but unsure if it has made a significant difference. She denies chest pain, dizziness, lightheadedness, NVD, changes in bowel or bladder habits. No concerns per nursing.    Continue awaiting biopsy results of non-healing left chest wound, suspected SCC with likely related fixed left neck lymph node. Family has declined further workup, would not treat systemically. They expressed a strong desire for hospice care and requested screen by Hospice. After discussion with hospice MD, do believe she would qualify now with her Parkinson's diagnosis. Son Michele discussed with siblings and they are in agreement with proceeding with referral.    Allergies, and PMH/PSH reviewed in EPIC today.  Past Medical History:   Diagnosis Date     Basal cell carcinoma      Chronic atrial fibrillation (H)      DVT (deep vein thrombosis) in pregnancy      Esophageal reflux      Hypertension      Hypothyroid      Osteoarthritis      Pacemaker      Renal insufficiency      REVIEW OF SYSTEMS:  Limited secondary to cognitive impairment but today pt reports the above and 4 point ROS including Respiratory, CV, GI and , other than that noted in the HPI,  is  "negative    Objective:   BP (!) 144/72   Pulse 61   Temp 97.9  F (36.6  C)   Resp 18   Ht 1.575 m (5' 2\")   Wt 81.7 kg (180 lb 3.2 oz)   SpO2 95%   BMI 32.96 kg/m    GENERAL APPEARANCE:  Alert, in no distress  ENT:  Mouth and posterior oropharynx normal, moist mucous membranes  RESP:  respiratory effort and palpation of chest normal, lungs clear to auscultation , no respiratory distress  CV:  Palpation and auscultation of heart done , regular rate and rhythm, no murmur, rub, or gallop, peripheral edema 1+ in BLE  ABDOMEN:  normal bowel sounds, soft, nontender, no hepatosplenomegaly or other masses  M/S:   Gait and station abnormal transfers with assist, wheelchair for mobility  SKIN:  Inspection of skin and subcutaneous tissue baseline, Palpation of skin and subcutaneous tissue baseline, non-healing left chest wound dressed with mepilx. Large fixed lymph node to left jaw, non-tender  NEURO:   Cranial nerves 2-12 are normal tested and grossly at patient's baseline  PSYCH:  insight and judgement impaired, memory impaired , affect and mood normal    Labs done in SNF are in [facility]. Please refer to them using [employer]Care Everywhere. and Recent labs in EPIC reviewed by me today.     Assessment/Plan:  (G20) Parkinson's disease (H)  (primary encounter diagnosis)  Comment: Chronic, progressive. Not on treatment. After multiple discussions with hospice and family, will proceed with referral per goals of care.  Plan: Hospice Referral - [facility] per family preference. 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 Recent development of systemic symptoms, generalized pain, nausea.   Plan: Hospice referral for symptoms management. Follow up with wound care provider PRN. Continue voltaren, tylenol, lidocaine, menthol for pain management.     (I50.32) Chronic " diastolic congestive heart failure (H)  (R06.02) Shortness of breath  Comment: Improved symptoms with increased lasix dose.   Plan: Continue lasix 80 mg daily with KCl suppleement, encourage elevation, repeat BMP as previously ordered.         Electronically signed by: IRASEMA Fletcher CNP

## 2022-04-05 NOTE — LETTER
4/5/2022        RE: Liberty Horta  [facility]  [address]   [place] [address]        [facility]    Chief [facility]   Patient presents with     Nursing Home Acute     edema     HPI:  Liberty Horta is a 87 year old  (1935), who is being seen today for an episodic care visit at: [facility]  () [[id number]]. Today's concern is:     Seen today to follow up on shortness of breath, pain reported 3/31. Patient and her daughter reported increased shortness of breath with chronic leg edema, lasix was increased to 80 mg daily through the weekend. Also complaining of increased generalized pain - knees, neck, head. Voltaren was added, tylenol limit liberalized to 4 g daily per goals of care for comfort. Today, seen sitting up in her wheelchair. She is reporting improvement in shortness of breath. Denying pain today, does recall addition of Voltaren but unsure if it has made a significant difference. She denies chest pain, dizziness, lightheadedness, NVD, changes in bowel or bladder habits. No concerns per nursing.    Continue awaiting biopsy results of non-healing left chest wound, suspected SCC with likely related fixed left neck lymph node. Family has declined further workup, would not treat systemically. They expressed a strong desire for hospice care and requested screen by Hospice. After discussion with hospice MD, do believe she would qualify now with her Parkinson's diagnosis. Son Michele discussed with siblings and they are in agreement with proceeding with referral.    Allergies, and PMH/PSH reviewed in EPIC today.  Past Medical History:   Diagnosis Date     Basal cell carcinoma      Chronic atrial fibrillation (H)      DVT (deep vein thrombosis) in pregnancy      Esophageal reflux      Hypertension      Hypothyroid      Osteoarthritis      Pacemaker      Renal insufficiency      REVIEW OF SYSTEMS:  Limited secondary to cognitive impairment but  "today pt reports the above and 4 point ROS including Respiratory, CV, GI and , other than that noted in the HPI,  is negative    Objective:   BP (!) 144/72   Pulse 61   Temp 97.9  F (36.6  C)   Resp 18   Ht 1.575 m (5' 2\")   Wt 81.7 kg (180 lb 3.2 oz)   SpO2 95%   BMI 32.96 kg/m    GENERAL APPEARANCE:  Alert, in no distress  ENT:  Mouth and posterior oropharynx normal, moist mucous membranes  RESP:  respiratory effort and palpation of chest normal, lungs clear to auscultation , no respiratory distress  CV:  Palpation and auscultation of heart done , regular rate and rhythm, no murmur, rub, or gallop, peripheral edema 1+ in BLE  ABDOMEN:  normal bowel sounds, soft, nontender, no hepatosplenomegaly or other masses  M/S:   Gait and station abnormal transfers with assist, wheelchair for mobility  SKIN:  Inspection of skin and subcutaneous tissue baseline, Palpation of skin and subcutaneous tissue baseline, non-healing left chest wound dressed with mepilx. Large fixed lymph node to left jaw, non-tender  NEURO:   Cranial nerves 2-12 are normal tested and grossly at patient's baseline  PSYCH:  insight and judgement impaired, memory impaired , affect and mood normal    Labs done in SNF are in [employer]. Please refer to them using [employer]/Care Everywhere. and Recent labs in EPIC reviewed by me today.     Assessment/Plan:  (G20) Parkinson's disease (H)  (primary encounter diagnosis)  Comment: Chronic, progressive. Not on treatment. After multiple discussions with hospice and family, will proceed with referral per goals of care.  Plan: Hospice Referral - [facility] per family preference. 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 Recent development of systemic symptoms, generalized pain, nausea.   Plan: Hospice referral for symptoms management. Follow up with " wound care provider PRN. Continue voltaren, tylenol, lidocaine, menthol for pain management.     (I50.32) Chronic diastolic congestive heart failure (H)  (R06.02) Shortness of breath  Comment: Improved symptoms with increased lasix dose.   Plan: Continue lasix 80 mg daily with KCl suppleement, encourage elevation, repeat BMP as previously ordered.         Electronically signed by: IRASEMA Fletcher CNP             Sincerely,        IRASEMA Fletcher CNP

## 2022-04-28 NOTE — LETTER
"    4/28/2022        RE: Liberty Horta  [facility]  [address]   [place] [address]        [facility] Medical Record Number:  [id number]  Place of Service where encounter took place: [facility]  () [[id number]]    HPI:    Liberty Horta is a 87 year old  (1935), who is being seen today for an episodic care visit at the above location. Today's concern is INR/Coumadin management for A. Fib    ROS/Subjective:  Bleeding Signs/Symptoms:  None  Thromboembolic Signs/Symptoms:  None  Medication Changes:  Yes: Recently admitted on hospice, several medication changes, started on Keflex 4/26 with suspcion for UTI  Dietary Changes:  No  Activity Changes: No  Bacterial/Viral Infection:  Yes: Suspected UTI  Missed Coumadin Doses:  None  On ASA: No  Other Concerns:  No    OBJECTIVE:  BP (!) 155/74   Pulse 60   Temp 98.1  F (36.7  C)   Resp 16   Ht 1.575 m (5' 2\")   Wt 82.7 kg (182 lb 4.8 oz)   SpO2 98%   BMI 33.34 kg/m    Last INR: 2.6 on 3/23  INR Today:  4.0  Current Dose:  3 mg MWF, 5 mg AOD  INR Flow sheet at [facility]:    ASSESSMENT:     Encounter for therapeutic drug monitoring  Long term (current) use of anticoagulants  Longstanding persistent atrial fibrillation (H)  Supratherapeutic INR for goal of 2-3    PLAN/ORDERS:     New Dose: Hold dose today 4/28/22    Next INR: 4/29/22      Electronically signed by:  IRASEMA Fletcher CNP           Sincerely,        IRASEMA Fletcher CNP    "

## 2022-04-28 NOTE — PROGRESS NOTES
"[facility] GERIATRICS  [place] Medical Record Number:  [id number]  Place of Service where encounter took place: [facility] (formerly [facility])  () [[id number]]    HPI:    Liberty Horta is a 87 year old  (1935), who is being seen today for an episodic care visit at the above location. Today's concern is INR/Coumadin management for A. Fib    ROS/Subjective:  Bleeding Signs/Symptoms:  None  Thromboembolic Signs/Symptoms:  None  Medication Changes:  Yes: Recently admitted on hospice, several medication changes, started on Keflex 4/26 with suspcion for UTI  Dietary Changes:  No  Activity Changes: No  Bacterial/Viral Infection:  Yes: Suspected UTI  Missed Coumadin Doses:  None  On ASA: No  Other Concerns:  No    OBJECTIVE:  BP (!) 155/74   Pulse 60   Temp 98.1  F (36.7  C)   Resp 16   Ht 1.575 m (5' 2\")   Wt 82.7 kg (182 lb 4.8 oz)   SpO2 98%   BMI 33.34 kg/m    Last INR: 2.6 on 3/23  INR Today:  4.0  Current Dose:  3 mg MWF, 5 mg AOD  INR Flow sheet at SNF:    ASSESSMENT:     Encounter for therapeutic drug monitoring  Long term (current) use of anticoagulants  Longstanding persistent atrial fibrillation (H)  Supratherapeutic INR for goal of 2-3    PLAN/ORDERS:     New Dose: Hold dose today 4/28/22    Next INR: 4/29/22      Electronically signed by:  IRASEMA Fletcher CNP     "

## 2022-05-02 NOTE — PROGRESS NOTES
[facility] GERIATRICS  Chief Complaint   Patient presents with     custodial Regulatory     [place] Medical Record Number:  [id number]  Place of Service where encounter took place:  [facility]  () [[id number]]    HPI:    Liberty Horta  is 87 year old (1935), who is being seen today for a federally mandated E/M visit.     By chart review, [place] August [place] with ongoing progressive and functional decline. Hospitalized June 2019 with L5 lateral vertebral body fracture, severe spinal canal stenosis at L3-4. Recently qualified for hospice services for her Parkinson's disease, admitted to [facility] hospice 4/5 per goals of care.     Today's concerns are: Seen today for review of multiple medical conditions. Currently being treated by hospice for presumed UTI, apparently with altered mental status, cloudy urine. Hospice qualifying diagnosis is Parkinsons, also with positive biospy for BCC on chronic chest wound, suspected mets with large fixed mass on left neck. Continues coumadin for afib, INR eleavted at 4.0 4/28 presumably from abx use. Also with increased agitation, anxiety per nursing. Seen in her room today with daughter at bedside. She does not respond to gentle stimulation, per daughter nursing was able to get her medications to her crushed but otherwise has been like this all day. Lots of family was in yesterday and she was able to visit some. She appears comfortable. Her daughter expresses a desire to discontinue medications unless for comfort.       ALLERGIES:Meperidine, No clinical screening - see comments, Propoxyphene n-apap, and Tramadol  PAST MEDICAL HISTORY:   Past Medical History:   Diagnosis Date     Basal cell carcinoma      Chronic atrial fibrillation (H)      DVT (deep vein thrombosis) in pregnancy      Esophageal reflux      Hypertension      Hypothyroid      Osteoarthritis      Pacemaker      Renal insufficiency      PAST SURGICAL HISTORY:   has a  past surgical history that includes orthopedic surgery; Open reduction internal fixation femur distal (Right, 10/6/2016); and Implant pacemaker.  FAMILY HISTORY: Family history is unknown by patient.  SOCIAL HISTORY:  reports that she has never smoked. She has never used smokeless tobacco. She reports that she does not drink alcohol and does not use drugs.    MEDICATIONS:     Review of your medicines          Accurate as of May 2, 2022 11:59 PM. If you have any questions, ask your nurse or doctor.            CONTINUE these medicines which may have CHANGED, or have new prescriptions. If we are uncertain of the size of tablets/capsules you have at home, strength may be listed as something that might have changed.      Dose / Directions   acetaminophen 500 MG tablet  Commonly known as: Acetaminophen Extra Strength  This may have changed: additional instructions  Used for: Primary osteoarthritis involving multiple joints      Dose: 1,000 mg  Take 2 tablets (1,000 mg) by mouth 3 times daily  Quantity: 112 tablet  Refills: PRN     furosemide 40 MG tablet  Commonly known as: LASIX  This may have changed: how much to take  Used for: Acute on chronic congestive heart failure, unspecified heart failure type (H)      Dose: 60 mg  Take 1.5 tablets (60 mg) by mouth daily  Quantity: 60 tablet  Refills: 3        CONTINUE these medicines which have NOT CHANGED      Dose / Directions   ACE/ARB/ARNI NOT PRESCRIBED  Commonly known as: INTENTIONAL  Used for: Benign hypertension with CKD (chronic kidney disease) stage III (H)      Please choose reason not prescribed from choices below.  Refills: 0     Aspercreme Lidocaine 4 % Patch  Used for: Spinal stenosis, unspecified spinal region, Primary osteoarthritis involving multiple joints  Generic drug: Lidocaine      APPLY 1 PATCH TOPICALLY TO NECK AND APPLY 1 PATCH TOPICALLY TO RIGHT KNEE ONCE DAILY (ON FOR 12 HOURS, OFF FOR 12 HOURS)  Quantity: 30 patch  Refills: 97     atorvastatin 40 MG  tablet  Commonly known as: LIPITOR  Used for: Hyperlipidemia, unspecified hyperlipidemia type      TAKE 1 TABLET BY MOUTH AT BEDTIME  Quantity: 28 tablet  Refills: PRN     atropine 1 % Soln      Dose: 2 drop  Place 2 drops under the tongue every 4 hours as needed for secretions  Refills: 0     bisacodyl 10 MG suppository  Commonly known as: DULCOLAX      Dose: 10 mg  Place 10 mg rectally daily as needed for constipation  Refills: 0     Calmoseptine 0.44-20.6 % Oint ointment  Used for: Pressure injury of contiguous region involving buttock and hip, stage 1, unspecified laterality  Generic drug: menthol-zinc oxide      APPLY A THIN LAYER TO AFFECTED AREA(S) TWICE DAILY;& WITH EACH INCONTINENCE CARE  Quantity: 113 g  Refills: 97     diclofenac 1 % topical gel  Commonly known as: VOLTAREN  Used for: Pain      Dose: 2 g  Apply 2 g topically 2 times daily And BID PRN to knees, shoulders, neck for pain  Quantity: 50 g  Refills: 3     Dilt- MG 24 hr capsule  Used for: Longstanding persistent atrial fibrillation (H)  Generic drug: diltiazem ER      TAKE 1 CAPSULE BY MOUTH ONCE DAILY  Quantity: 28 capsule  Refills: PRN     haloperidol 2 MG/ML (HIGH CONC) solution  Commonly known as: HALDOL      Dose: 0.5 mg  Take 0.5 mg by mouth every hour as needed for agitation  Refills: 0     levothyroxine 150 MCG tablet  Commonly known as: SYNTHROID/LEVOTHROID  Used for: Hypothyroidism, unspecified type      Dose: 150 mcg  Take 1 tablet (150 mcg) by mouth daily  Refills: 0     loperamide 2 MG tablet  Commonly known as: IMODIUM A-D  Used for: Diarrhea, unspecified type      Dose: 2 mg  Take 1 tablet (2 mg) by mouth 4 times daily as needed for diarrhea  Quantity:    Refills: 0     loratadine 10 MG tablet  Commonly known as: CLARITIN      Dose: 10 mg  Take 1 tablet (10 mg) by mouth daily as needed for allergies  Refills: 0     LORazepam 2 MG/ML (HIGH CONC) oral solution  Commonly known as: ATIVAN      Dose: 0.25 mg  Take 0.25 mg by  mouth every 6 hours as needed for anxiety  Refills: 0     melatonin 1 MG Tabs tablet      Dose: 1 mg  Take 1 mg by mouth At Bedtime And 1 mg PRN as needed for insomnia  Refills: 0     Menthol (Topical Analgesic) 4 % Gel  Used for: Primary osteoarthritis involving multiple joints      Externally apply 1 Application topically 2 times daily. May also externally apply 1 Application 2 times daily as needed.  Quantity: 89 mL  Refills: 4     Metamucil 0.52 g capsule  Used for: Loose stools  Generic drug: psyllium      TAKE 1 CAPSULE BY MOUTH TWICE DAILY  Quantity: 56 capsule  Refills: PRN     methadone 5 MG tablet  Commonly known as: DOLOPHINE      Dose: 2.5 mg  Take 2.5 mg by mouth every 12 hours  Refills: 0     morphine sulfate 20 MG/5ML Soln      Dose: 2.5 mg  Take 2.5 mg by mouth every hour as needed  Refills: 0     potassium chloride ER 20 MEQ CR tablet  Commonly known as: K-TAB  Used for: Acute on chronic congestive heart failure, unspecified heart failure type (H)      Dose: 40 mEq  Take 2 tablets (40 mEq) by mouth daily  Refills: 0     pyrithione zinc 1 % external shampoo  Commonly known as: SELSUN BLUE/HEAD AND SHOULDERS      Dose: 1 applicator  Apply 1 mL topically daily as needed for irritation  Refills: 0     senna-docusate 8.6-50 MG tablet  Commonly known as: SENOKOT-S/PERICOLACE      Dose: 1 tablet  Take 1 tablet by mouth daily as needed for constipation  Refills: 0     sotalol 80 MG tablet  Commonly known as: BETAPACE  Used for: Longstanding persistent atrial fibrillation (H)      TAKE 1 TABLET BY MOUTH TWICE DAILY WITH MEALS  Quantity: 56 tablet  Refills: PRN           Case Management:  I have reviewed the care plan and MDS and do agree with the plan. Patient's desire to return to the community is not assessible due to cognitive impairment. Information reviewed:  Medications, vital signs, orders, and nursing notes.    ROS:  Unobtainable secondary to cognitive impairment.     Vitals:  /72   Pulse 60   " Temp 98.3  F (36.8  C)   Resp 17   Ht 1.575 m (5' 2\")   Wt 82.7 kg (182 lb 4.8 oz)   SpO2 94%   BMI 33.34 kg/m    Body mass index is 33.34 kg/m .  Exam:  GENERAL APPEARANCE:  in no distress, somnolent  ENT:  Mouth and posterior oropharynx normal, moist mucous membranes  RESP:  respiratory effort and palpation of chest normal, lungs clear to auscultation , no respiratory distress  CV:  Palpation and auscultation of heart done , regular rate and rhythm, no murmur, rub, or gallop, no edema  ABDOMEN:  normal bowel sounds, soft, nontender, no hepatosplenomegaly or other masses  M/S:   Gait and station abnormal transfers with assist, wheelchair for mobility  SKIN:  Inspection of skin and subcutaneous tissue baseline, Palpation of skin and subcutaneous tissue baseline, chronic left chest wound dressed with mepilex  NEURO:   does not awaken to stimulation, minimal spontanous movement  PSYCH:  memory impaired , appears calm, comfortable    Lab/Diagnostic data:   Patient is on hospice/palliative care and labs are not recommended    ASSESSMENT/PLAN  (G20,  F02.81) 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  Being treated currently for urinary infection with increased behaviors, think this is unlikely and recommend to hospice we stop abx, culture if symptoms persist.  Plan: Comfort medications per hospice. SNF for assist with ADLs, medication management, meals, activities    (Z95.0) Pacemaker  (I48.11) Longstanding persistent atrial fibrillation (H)  Comment: Currently on coumadin, after discussion with daughter today will stop coumadin therapy and INR monitoring with comfort focus.  Plan: Discontinue INR, coumadin per goals of care. Sotalol 80 mg bid, cardizem 180 mg daily.     (I50.32) Chronic diastolic congestive heart failure (H)  Comment: Appears euvolemic today.  Plan: lasix 80 mg daily. " Sotalol.     (I10) Essential hypertension  Comment:   BP Readings from Last 3 Encounters:   05/02/22 125/72   04/28/22 (!) 155/74   04/05/22 (!) 144/72   Plan:     (C44.91) Malignant basal cell neoplasm of skin  Comment: Per biopsy of chronic chest lesion. Suspected mets with fixed mass on left neck, suspect lymph node.  Plan: no further intervention per goals of care, dressing change per nursing, comfort measures per hospice.      Electronically signed by:  IRASEMA Fletcher CNP

## 2022-05-02 NOTE — LETTER
5/2/2022        RE: Liberty Horta  [address] [place] [address]        [facility] GERIATRICS  Chief Complaint   Patient presents with     USP Regulatory     [place] Medical Record Number:  [id number]  Place of Service where encounter took place:  [facility]  () [[id number]]    HPI:    Liberty Horta  is 87 year old (1935), who is being seen today for a federally mandated E/M visit.     By chart review, [place]TC [place] August 2021 with ongoing progressive and functional decline. Hospitalized June 2019 with L5 lateral vertebral body fracture, severe spinal canal stenosis at L3-4. Recently qualified for hospice services for her Parkinson's disease, admitted to [facility] hospice 4/5 per goals of care.     Today's concerns are: Seen today for review of multiple medical conditions. Currently being treated by hospice for presumed UTI, apparently with altered mental status, cloudy urine. Hospice qualifying diagnosis is Parkinsons, also with positive biospy for BCC on chronic chest wound, suspected mets with large fixed mass on left neck. Continues coumadin for afib, INR eleavted at 4.0 4/28 presumably from abx use. Also with increased agitation, anxiety per nursing. Seen in her room today with daughter at bedside. She does not respond to gentle stimulation, per daughter nursing was able to get her medications to her crushed but otherwise has been like this all day. Lots of family was in yesterday and she was able to visit some. She appears comfortable. Her daughter expresses a desire to discontinue medications unless for comfort.       ALLERGIES:Meperidine, No clinical screening - see comments, Propoxyphene n-apap, and Tramadol  PAST MEDICAL HISTORY:   Past Medical History:   Diagnosis Date     Basal cell carcinoma      Chronic atrial fibrillation (H)      DVT (deep vein thrombosis) in pregnancy      Esophageal reflux      Hypertension      Hypothyroid       Osteoarthritis      Pacemaker      Renal insufficiency      PAST SURGICAL HISTORY:   has a past surgical history that includes orthopedic surgery; Open reduction internal fixation femur distal (Right, 10/6/2016); and Implant pacemaker.  FAMILY HISTORY: Family history is unknown by patient.  SOCIAL HISTORY:  reports that she has never smoked. She has never used smokeless tobacco. She reports that she does not drink alcohol and does not use drugs.    MEDICATIONS:     Review of your medicines          Accurate as of May 2, 2022 11:59 PM. If you have any questions, ask your nurse or doctor.            CONTINUE these medicines which may have CHANGED, or have new prescriptions. If we are uncertain of the size of tablets/capsules you have at home, strength may be listed as something that might have changed.      Dose / Directions   acetaminophen 500 MG tablet  Commonly known as: Acetaminophen Extra Strength  This may have changed: additional instructions  Used for: Primary osteoarthritis involving multiple joints      Dose: 1,000 mg  Take 2 tablets (1,000 mg) by mouth 3 times daily  Quantity: 112 tablet  Refills: PRN     furosemide 40 MG tablet  Commonly known as: LASIX  This may have changed: how much to take  Used for: Acute on chronic congestive heart failure, unspecified heart failure type (H)      Dose: 60 mg  Take 1.5 tablets (60 mg) by mouth daily  Quantity: 60 tablet  Refills: 3        CONTINUE these medicines which have NOT CHANGED      Dose / Directions   ACE/ARB/ARNI NOT PRESCRIBED  Commonly known as: INTENTIONAL  Used for: Benign hypertension with CKD (chronic kidney disease) stage III (H)      Please choose reason not prescribed from choices below.  Refills: 0     Aspercreme Lidocaine 4 % Patch  Used for: Spinal stenosis, unspecified spinal region, Primary osteoarthritis involving multiple joints  Generic drug: Lidocaine      APPLY 1 PATCH TOPICALLY TO NECK AND APPLY 1 PATCH TOPICALLY TO RIGHT KNEE ONCE DAILY  (ON FOR 12 HOURS, OFF FOR 12 HOURS)  Quantity: 30 patch  Refills: 97     atorvastatin 40 MG tablet  Commonly known as: LIPITOR  Used for: Hyperlipidemia, unspecified hyperlipidemia type      TAKE 1 TABLET BY MOUTH AT BEDTIME  Quantity: 28 tablet  Refills: PRN     atropine 1 % Soln      Dose: 2 drop  Place 2 drops under the tongue every 4 hours as needed for secretions  Refills: 0     bisacodyl 10 MG suppository  Commonly known as: DULCOLAX      Dose: 10 mg  Place 10 mg rectally daily as needed for constipation  Refills: 0     Calmoseptine 0.44-20.6 % Oint ointment  Used for: Pressure injury of contiguous region involving buttock and hip, stage 1, unspecified laterality  Generic drug: menthol-zinc oxide      APPLY A THIN LAYER TO AFFECTED AREA(S) TWICE DAILY;& WITH EACH INCONTINENCE CARE  Quantity: 113 g  Refills: 97     diclofenac 1 % topical gel  Commonly known as: VOLTAREN  Used for: Pain      Dose: 2 g  Apply 2 g topically 2 times daily And BID PRN to knees, shoulders, neck for pain  Quantity: 50 g  Refills: 3     Dilt- MG 24 hr capsule  Used for: Longstanding persistent atrial fibrillation (H)  Generic drug: diltiazem ER      TAKE 1 CAPSULE BY MOUTH ONCE DAILY  Quantity: 28 capsule  Refills: PRN     haloperidol 2 MG/ML (HIGH CONC) solution  Commonly known as: HALDOL      Dose: 0.5 mg  Take 0.5 mg by mouth every hour as needed for agitation  Refills: 0     levothyroxine 150 MCG tablet  Commonly known as: SYNTHROID/LEVOTHROID  Used for: Hypothyroidism, unspecified type      Dose: 150 mcg  Take 1 tablet (150 mcg) by mouth daily  Refills: 0     loperamide 2 MG tablet  Commonly known as: IMODIUM A-D  Used for: Diarrhea, unspecified type      Dose: 2 mg  Take 1 tablet (2 mg) by mouth 4 times daily as needed for diarrhea  Quantity:    Refills: 0     loratadine 10 MG tablet  Commonly known as: CLARITIN      Dose: 10 mg  Take 1 tablet (10 mg) by mouth daily as needed for allergies  Refills: 0     LORazepam 2 MG/ML  (HIGH CONC) oral solution  Commonly known as: ATIVAN      Dose: 0.25 mg  Take 0.25 mg by mouth every 6 hours as needed for anxiety  Refills: 0     melatonin 1 MG Tabs tablet      Dose: 1 mg  Take 1 mg by mouth At Bedtime And 1 mg PRN as needed for insomnia  Refills: 0     Menthol (Topical Analgesic) 4 % Gel  Used for: Primary osteoarthritis involving multiple joints      Externally apply 1 Application topically 2 times daily. May also externally apply 1 Application 2 times daily as needed.  Quantity: 89 mL  Refills: 4     Metamucil 0.52 g capsule  Used for: Loose stools  Generic drug: psyllium      TAKE 1 CAPSULE BY MOUTH TWICE DAILY  Quantity: 56 capsule  Refills: PRN     methadone 5 MG tablet  Commonly known as: DOLOPHINE      Dose: 2.5 mg  Take 2.5 mg by mouth every 12 hours  Refills: 0     morphine sulfate 20 MG/5ML Soln      Dose: 2.5 mg  Take 2.5 mg by mouth every hour as needed  Refills: 0     potassium chloride ER 20 MEQ CR tablet  Commonly known as: K-TAB  Used for: Acute on chronic congestive heart failure, unspecified heart failure type (H)      Dose: 40 mEq  Take 2 tablets (40 mEq) by mouth daily  Refills: 0     pyrithione zinc 1 % external shampoo  Commonly known as: SELSUN BLUE/HEAD AND SHOULDERS      Dose: 1 applicator  Apply 1 mL topically daily as needed for irritation  Refills: 0     senna-docusate 8.6-50 MG tablet  Commonly known as: SENOKOT-S/PERICOLACE      Dose: 1 tablet  Take 1 tablet by mouth daily as needed for constipation  Refills: 0     sotalol 80 MG tablet  Commonly known as: BETAPACE  Used for: Longstanding persistent atrial fibrillation (H)      TAKE 1 TABLET BY MOUTH TWICE DAILY WITH MEALS  Quantity: 56 tablet  Refills: PRN           Case Management:  I have reviewed the care plan and MDS and do agree with the plan. Patient's desire to return to the community is not assessible due to cognitive impairment. Information reviewed:  Medications, vital signs, orders, and nursing  "notes.    ROS:  Unobtainable secondary to cognitive impairment.     Vitals:  /72   Pulse 60   Temp 98.3  F (36.8  C)   Resp 17   Ht 1.575 m (5' 2\")   Wt 82.7 kg (182 lb 4.8 oz)   SpO2 94%   BMI 33.34 kg/m    Body mass index is 33.34 kg/m .  Exam:  GENERAL APPEARANCE:  in no distress, somnolent  ENT:  Mouth and posterior oropharynx normal, moist mucous membranes  RESP:  respiratory effort and palpation of chest normal, lungs clear to auscultation , no respiratory distress  CV:  Palpation and auscultation of heart done , regular rate and rhythm, no murmur, rub, or gallop, no edema  ABDOMEN:  normal bowel sounds, soft, nontender, no hepatosplenomegaly or other masses  M/S:   Gait and station abnormal transfers with assist, wheelchair for mobility  SKIN:  Inspection of skin and subcutaneous tissue baseline, Palpation of skin and subcutaneous tissue baseline, chronic left chest wound dressed with mepilex  NEURO:   does not awaken to stimulation, minimal spontanous movement  PSYCH:  memory impaired , appears calm, comfortable    Lab/Diagnostic data:   Patient is on hospice/palliative care and labs are not recommended    ASSESSMENT/PLAN  (G20,  F02.81) 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  Being treated currently for urinary infection with increased behaviors, think this is unlikely and recommend to hospice we stop abx, culture if symptoms persist.  Plan: Comfort medications per hospice. SNF for assist with ADLs, medication management, meals, activities    (Z95.0) Pacemaker  (I48.11) Longstanding persistent atrial fibrillation (H)  Comment: Currently on coumadin, after discussion with daughter today will stop coumadin therapy and INR monitoring with comfort focus.  Plan: Discontinue INR, coumadin per goals of care. Sotalol 80 mg bid, cardizem 180 mg daily.     (I50.32) Chronic " diastolic congestive heart failure (H)  Comment: Appears euvolemic today.  Plan: lasix 80 mg daily. Sotalol.     (I10) Essential hypertension  Comment:   BP Readings from Last 3 Encounters:   05/02/22 125/72   04/28/22 (!) 155/74   04/05/22 (!) 144/72   Plan:     (C44.91) Malignant basal cell neoplasm of skin  Comment: Per biopsy of chronic chest lesion. Suspected mets with fixed mass on left neck, suspect lymph node.  Plan: no further intervention per goals of care, dressing change per nursing, comfort measures per hospice.      Electronically signed by:  IRASEMA Fletcher CNP            Sincerely,        IRASEMA Fletcher CNP

## 2022-06-03 LAB — SARS-COV-2 RNA RESP QL NAA+PROBE: NEGATIVE

## 2022-06-06 ENCOUNTER — LAB REQUISITION (OUTPATIENT)
Dept: LAB | Facility: CLINIC | Age: 87
End: 2022-06-06
Payer: COMMERCIAL

## 2022-06-06 DIAGNOSIS — U07.1 COVID-19: ICD-10-CM

## 2022-06-09 ENCOUNTER — LAB REQUISITION (OUTPATIENT)
Dept: LAB | Facility: CLINIC | Age: 87
End: 2022-06-09
Payer: COMMERCIAL

## 2022-06-09 DIAGNOSIS — U07.1 COVID-19: ICD-10-CM

## 2022-06-13 ENCOUNTER — LAB REQUISITION (OUTPATIENT)
Dept: LAB | Facility: CLINIC | Age: 87
End: 2022-06-13
Payer: COMMERCIAL

## 2022-06-13 DIAGNOSIS — U07.1 COVID-19: ICD-10-CM

## 2023-01-01 NOTE — CONSULTS
[facility]    Orthopedic Consultation    Liberty Horta MRN# [id number]   Age: 84 year old YOB: 1935     Date of Admission: 6/17/2019    Reason for consult: Left sided low back pain, L5 lateral body fracture       Requesting physician: Jessica Sadler MD       Level of consult: Consult and follow  to assist in determining a diagnosis and to recommend an appropriate treatment plan           Assessment and Plan:   Assessment:   Low back pain, non-displaced L5 lateral vertebral body fracture        Plan:   Non-operative management  PT/OT  Able to WBAT with walker  Pain meds as needed, limit narcotics as able.   Support brace is an option if not progressing in PT  Will reassess tomorrow following PT assessment, if continued level of pain, consider medrol dose pack.            Chief Complaint:   Low back pain         History of Present Illness:   Liberty Horta is a 84 year old female who presents with pain in her left buttock. She reports that the pain started a few days ago. She states that the pain worsens while she is weightbearing and reduces when she sits or lays down. She notes that she has taken Tylenol for the pain without relief. She notes urinary incontinence recently. She denies recent falls, recent injury, fevers, chest pain, shortness of breath, abdominal pain, headache, abnormal bowel movements, numbness in the legs. Of note, the patient had a fall earlier this year    Orthopedics called for treatment recommendations. Patient denies any recent falls or trauma.  Has had increasing low back pain x 3 days.  Has moved recently into assisted living.  Her knee pain has improved since last hospitalization.  Patient reports this morning she had significant pain with sitting and ambulation.  Denies having any numbness or tingling down either extremity.   Of note, patient has a pacemaker, therefore cannot have an MRI.     CT:  There is a nondisplaced recent appearing fracture  "involving the  left lateral mass of L5 adjacent to the left sacroiliac joint.           Past Medical History:     Past Medical History:   Diagnosis Date     Basal cell carcinoma      Chronic atrial fibrillation (H)      DVT (deep vein thrombosis) in pregnancy (H)      Esophageal reflux      Hypertension      Hypothyroid      Osteoarthritis      Pacemaker      Renal insufficiency              Past Surgical History:     Past Surgical History:   Procedure Laterality Date     IMPLANT PACEMAKER       OPEN REDUCTION INTERNAL FIXATION FEMUR DISTAL Right 10/6/2016    Procedure: OPEN REDUCTION INTERNAL FIXATION FEMUR DISTAL;  Surgeon: Filipe Coburn MD;  Location: [facility] OR     ORTHOPEDIC SURGERY      Knee replacement left in 2011             Social History:     Social History     Tobacco Use     Smoking status: Never Smoker   Substance Use Topics     Alcohol use: No             Family History:   No family history on file.          Immunizations:     VACCINE/DOSE   Diptheria   DPT   DTAP   HBIG   Hepatitis A   Hepatitis B   HIB   Influenza   Measles   Meningococcal   MMR   Mumps   Pneumococcal   Polio   Rubella   Small Pox   TDAP   Varicella   Zoster             Allergies:     Allergies   Allergen Reactions     Darvocet [Propoxyphene N-Apap]      Demerol [Meperidine]      Tramadol      Patient described feeling \"squirrely\"             Medications:     Current Facility-Administered Medications   Medication     Lidocaine (LIDOCARE) 4 % Patch 1 patch     lidocaine (LMX4) cream     lidocaine 1 % 0.1-1 mL     sodium chloride (PF) 0.9% PF flush 3 mL     sodium chloride (PF) 0.9% PF flush 3 mL     Current Outpatient Medications   Medication Sig     acetaminophen (TYLENOL) 500 MG tablet Take 2 tablets (1,000 mg) by mouth every 8 hours For 1 week, then re-assess     atorvastatin (LIPITOR) 40 MG tablet Take 1 tablet by mouth At Bedtime      diltiazem (TIAZAC) 180 MG 24 hr ER beaded capsule Take 180 mg by mouth daily     furosemide " (LASIX) 40 MG tablet Take 40 mg by mouth daily     levothyroxine (TIROSINT) 150 MCG capsule Take 150 mcg by mouth daily      sotalol (BETAPACE) 80 MG tablet Take 1 tablet by mouth 2 times daily      warfarin (COUMADIN) 2.5 MG tablet Take by mouth daily 5 mg Tuesday-Sunday, 2.5 mg on Monday             Review of Systems:   CV: NEGATIVE for chest pain, palpitations or peripheral edema  C: NEGATIVE for fever, chills, change in weight  E/M: NEGATIVE for ear, mouth and throat problems  R: NEGATIVE for significant cough or SOB          Physical Exam:   All vitals have been reviewed  Patient Vitals for the past 24 hrs:   BP Temp Temp src Pulse Heart Rate Resp SpO2   06/17/19 1300 132/68 -- -- 68 -- -- 93 %   06/17/19 1200 127/78 -- -- 73 -- -- 96 %   06/17/19 1145 (!) 138/112 -- -- 85 -- -- 96 %   06/17/19 1131 143/78 -- -- 87 -- -- 97 %   06/17/19 1030 127/78 -- -- 67 -- -- 95 %   06/17/19 1018 -- -- -- -- -- -- 96 %   06/17/19 1017 -- -- -- -- -- -- 95 %   06/17/19 1016 -- -- -- -- -- -- 94 %   06/17/19 1015 113/70 -- -- 69 -- -- 94 %   06/17/19 1000 124/69 -- -- 72 -- -- 95 %   06/17/19 0945 113/68 -- -- 79 -- -- 95 %   06/17/19 0943 -- -- -- -- -- -- 98 %   06/17/19 0942 -- -- -- -- -- -- 97 %   06/17/19 0941 -- -- -- -- -- -- 94 %   06/17/19 0940 -- -- -- -- -- -- 96 %   06/17/19 0938 -- -- -- -- -- -- 94 %   06/17/19 0937 -- -- -- -- -- -- 96 %   06/17/19 0930 122/69 -- -- 84 -- -- 95 %   06/17/19 0921 119/68 -- -- 74 -- -- --   06/17/19 0845 121/73 -- -- 87 -- -- 95 %   06/17/19 0830 115/62 -- -- 81 -- -- 97 %   06/17/19 0815 118/62 -- -- 85 -- -- 95 %   06/17/19 0800 153/71 -- -- 78 -- -- 98 %   06/17/19 0745 144/61 -- -- 59 -- -- 96 %   06/17/19 0701 179/78 97.8  F (36.6  C) Oral 60 60 18 98 %     No intake or output data in the 24 hours ending 06/17/19 1322  On physical exam, patient is resting comfortably in bed.  She denies having pain when at rest.  She is tender to palpation along the midline of her lumbar  spine in the L5 region.  No palpable swelling or masses.  Straight leg raise is negative for radiculopathy.  Denies pain with bilateral hip range of motion.  Denies numbness or tingling to light touch on the right versus left lower extremities.  Distal pulses are intact and equal bilaterally.  Able to resist dorsi and plantar flexion.  Denies pain with knee flexion or extension bilaterally.            Data:   All laboratory data reviewed  Results for orders placed or performed during the hospital encounter of 06/17/19   Lumbar spine CT w/o contrast    Narrative    CT LUMBAR SPINE WITHOUT CONTRAST June 17, 2019 9:18 AM    HISTORY: Back and left-sided buttock pain.    COMPARISON: None.    TECHNIQUE: CT imaging is performed through the lumbar spine without  contrast. Radiation dose for this scan was reduced using automated  exposure control, adjustment of the mA and/or kV according to patient  size, or iterative reconstruction technique.     FINDINGS: There are five lumbar type vertebral bodies with partial  sacralization of L5. The anterior aspect of the L5-S1 disc is not  entirely included on this study. Vertebral body alignment is normal.  There is a nondisplaced recent appearing fracture involving the left  lateral mass of L5 adjacent to the anterior aspect of the left  sacroiliac joint. No other fracture is demonstrated. There is  calcification of the vascular structures. No other soft tissue  abnormality is seen.    There are vacuum discs from T11 to L4. There is prominent disc height  loss at L4-L5 with bridging anterior marginal osteophytes. There is  moderate disc height loss at L2-L3 with mild disc height loss  elsewhere.    FINDINGS by specific level:    T11-T12: There is a mild disc bulge and marginal osteophyte formation.  No disc herniation is demonstrated. There is mild narrowing of the  neural foramina bilaterally. No central canal stenosis is seen.    T12-L1: There is a mild disc bulge and moderate  marginal osteophyte  formation. No disc herniation is seen. There are moderate degenerative  changes in the facet joints. No central canal stenosis is present.  There is mild bilateral neural foraminal narrowing.    L1-L2: There is a moderate disc bulge and mild marginal osteophyte  formation. No disc herniation is seen. There are moderate degenerative  changes in the facet joints. There is mild to moderate central canal  stenosis. There is moderate to severe right and moderate left neural  foraminal stenosis.    L2-L3: There is a moderately prominent disc bulge and marginal  osteophyte formation with no focal disc herniation seen. There are  moderate to advanced degenerative changes in the facet joints. There  is moderate central canal stenosis with moderate to severe right and  moderate left neural foraminal stenosis.    L3-L4: There is a prominent disc bulge and marginal osteophyte  formation with no focal disc herniation seen. There are prominent  hypertrophic degenerative changes in the facet joints. There is severe  central canal stenosis with moderate to severe bilateral neural  foraminal stenosis.    L4-L5: There is a moderate disc bulge with prominent marginal  osteophyte formation including anterior bridging spur formation.  Advanced left and mild to moderate right facet joint degenerative  changes are present. There is mild central canal stenosis with  moderate right and mild left neural foraminal stenosis.    L5-S1: The anterior aspect of the disc space is not included on this  study. The visualized disc height is well-preserved. There is a mild  disc bulge with no focal herniation seen. There are moderate to  advanced degenerative changes in the facet joints. No stenosis is  seen.      Impression    IMPRESSION:  1. Numbering of the levels is based on what appears to be five lumbar  type vertebral bodies with partial sacralization of L5. If level  specific treatment is considered, close attention should  be given to  the numbering of the levels.  2. There is a nondisplaced recent appearing fracture involving the  left lateral mass of L5 adjacent to the left sacroiliac joint.  3. Diffuse degenerative changes as described above. No focal disc  herniation is demonstrated. This is most prominent at L3-L4 where  there is severe central canal stenosis and moderate to severe  bilateral neural foraminal stenosis.    RAJ CORONA MD   UA with Microscopic   Result Value Ref Range    Color Urine Light Yellow     Appearance Urine Clear     Glucose Urine Negative NEG^Negative mg/dL    Bilirubin Urine Negative NEG^Negative    Ketones Urine Negative NEG^Negative mg/dL    Specific Gravity Urine 1.013 1.003 - 1.035    Blood Urine Negative NEG^Negative    pH Urine 6.5 5.0 - 7.0 pH    Protein Albumin Urine Negative NEG^Negative mg/dL    Urobilinogen mg/dL Normal 0.0 - 2.0 mg/dL    Nitrite Urine Negative NEG^Negative    Leukocyte Esterase Urine Negative NEG^Negative    Source Catheterized Urine     WBC Urine 1 0 - 5 /HPF    RBC Urine 1 0 - 2 /HPF   CBC with platelets differential   Result Value Ref Range    WBC 7.8 4.0 - 11.0 10e9/L    RBC Count 4.60 3.8 - 5.2 10e12/L    Hemoglobin 14.2 11.7 - 15.7 g/dL    Hematocrit 44.9 35.0 - 47.0 %    MCV 98 78 - 100 fl    MCH 30.9 26.5 - 33.0 pg    MCHC 31.6 31.5 - 36.5 g/dL    RDW 12.8 10.0 - 15.0 %    Platelet Count 187 150 - 450 10e9/L    Diff Method Automated Method     % Neutrophils 71.5 %    % Lymphocytes 16.5 %    % Monocytes 9.2 %    % Eosinophils 2.1 %    % Basophils 0.3 %    % Immature Granulocytes 0.4 %    Nucleated RBCs 0 0 /100    Absolute Neutrophil 5.6 1.6 - 8.3 10e9/L    Absolute Lymphocytes 1.3 0.8 - 5.3 10e9/L    Absolute Monocytes 0.7 0.0 - 1.3 10e9/L    Absolute Eosinophils 0.2 0.0 - 0.7 10e9/L    Absolute Basophils 0.0 0.0 - 0.2 10e9/L    Abs Immature Granulocytes 0.0 0 - 0.4 10e9/L    Absolute Nucleated RBC 0.0    Basic metabolic panel   Result Value Ref Range     Sodium 141 133 - 144 mmol/L    Potassium 4.0 3.4 - 5.3 mmol/L    Chloride 105 94 - 109 mmol/L    Carbon Dioxide 30 20 - 32 mmol/L    Anion Gap 6 3 - 14 mmol/L    Glucose 95 70 - 99 mg/dL    Urea Nitrogen 20 7 - 30 mg/dL    Creatinine 0.81 0.52 - 1.04 mg/dL    GFR Estimate 67 >60 mL/min/[1.73_m2]    GFR Estimate If Black 78 >60 mL/min/[1.73_m2]    Calcium 9.7 8.5 - 10.1 mg/dL   INR   Result Value Ref Range    INR 1.97 (H) 0.86 - 1.14          Attestation:  I have reviewed today's vital signs, notes, medications, labs and imaging with Dr. Ortiz.  Amount of time performed on this consult: 30 minutes.    Doris Hewitt PA-C        denies pain/discomfort Statement Selected